# Patient Record
Sex: FEMALE | Race: WHITE | NOT HISPANIC OR LATINO | Employment: STUDENT | URBAN - METROPOLITAN AREA
[De-identification: names, ages, dates, MRNs, and addresses within clinical notes are randomized per-mention and may not be internally consistent; named-entity substitution may affect disease eponyms.]

---

## 2017-10-23 ENCOUNTER — OFFICE VISIT (OUTPATIENT)
Dept: RHEUMATOLOGY | Facility: CLINIC | Age: 22
End: 2017-10-23
Payer: COMMERCIAL

## 2017-10-23 DIAGNOSIS — R76.8 POSITIVE ANA (ANTINUCLEAR ANTIBODY): Primary | ICD-10-CM

## 2017-10-23 DIAGNOSIS — Z79.899 ENCOUNTER FOR LONG-TERM (CURRENT) DRUG USE: ICD-10-CM

## 2017-10-23 LAB
CK SERPL-CCNC: 39 IU/L (ref 13–135)
TSH SERPL DL<=0.005 MIU/L-ACNC: 2.48 ULU/ML (ref 0.3–5.6)

## 2017-10-23 PROCEDURE — 99204 OFFICE O/P NEW MOD 45 MIN: CPT | Mod: ,,, | Performed by: INTERNAL MEDICINE

## 2017-10-23 RX ORDER — SERTRALINE HYDROCHLORIDE 100 MG/1
100 TABLET, FILM COATED ORAL DAILY
COMMUNITY
End: 2018-04-24

## 2017-10-23 RX ORDER — RAMIPRIL 2.5 MG/1
2.5 CAPSULE ORAL DAILY
COMMUNITY
End: 2018-01-04

## 2017-10-23 RX ORDER — HYDROXYCHLOROQUINE SULFATE 200 MG/1
200 TABLET, FILM COATED ORAL 2 TIMES DAILY
Qty: 30 TABLET | Refills: 5 | Status: SHIPPED | OUTPATIENT
Start: 2017-10-23 | End: 2017-11-27

## 2017-10-23 RX ORDER — HYDROXYZINE PAMOATE 25 MG/1
25 CAPSULE ORAL EVERY 8 HOURS PRN
COMMUNITY
End: 2017-11-27

## 2017-10-23 RX ORDER — NAPROXEN SODIUM 220 MG
220 TABLET ORAL
COMMUNITY
End: 2017-11-27

## 2017-10-23 RX ORDER — CHOLECALCIFEROL (VITAMIN D3) 25 MCG
1000 TABLET ORAL DAILY
COMMUNITY
End: 2018-01-04

## 2017-10-23 RX ORDER — FAMOTIDINE 20 MG/1
20 TABLET, FILM COATED ORAL 2 TIMES DAILY
COMMUNITY
End: 2017-11-27
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
## 2017-10-23 NOTE — PATIENT INSTRUCTIONS
Hydroxychloroquine tablets  What is this medicine?  HYDROXYCHLOROQUINE (tanna drox ee KLOR oh kwin) is used to treat rheumatoid arthritis and systemic lupus erythematosus. It is also used to treat malaria.  How should I use this medicine?  Take this medicine by mouth with a glass of water. Follow the directions on the prescription label. If this medicine upsets your stomach take it with food or milk. Take your doses at regular intervals. Do not take your medicine more often than directed.  Talk to your pediatrician regarding the use of this medicine in children. Special care may be needed.  What side effects may I notice from receiving this medicine?  Side effects that you should report to your doctor or health care professional as soon as possible:  · allergic reactions like skin rash, itching or hives, swelling of the face, lips, or tongue  · change in vision  · fever, infection  · hearing loss or ringing  · muscle weakness, tremor, or numbness  · redness, blistering, peeling or loosening of the skin, including inside the mouth  · seizures  · unusual bleeding or bruising  · unusually weak or tired  Side effects that usually do not require medical attention (report to your doctor or health care professional if they continue or are bothersome):  · change in coloration of the mouth or skin  · dizziness  · hair loss, lightening  · headache  · irritability, nervousness, nightmares  · loss of appetite  · stomach upset, diarrhea  What may interact with this medicine?  · antacids  · botulinum toxins  · digoxin  · kaolin  · penicillamine  What if I miss a dose?  If you miss a dose, take it as soon as you can. If it is almost time for your next dose, take only that dose. Do not take double or extra doses.  Where should I keep my medicine?  Keep out of the reach of children. In children, this medicine can cause overdose with small doses.  Store at room temperature between 15 and 30 degrees C (59 and 86 degrees F). Protect  from moisture and light. Throw away any unused medicine after the expiration date.  What should I tell my health care provider before I take this medicine?  They need to know if you have any of these conditions:  · alcoholism  · anemia or other blood disorder  · eye disease  · glucose 6-phosphate dehydrogenase (G6PD) deficiency  · liver disease  · porphyria  · psoriasis  · an unusual or allergic reaction to chloroquine, hydroxychloroquine, other medicines, foods, dyes, or preservatives  · pregnant or trying to get pregnant  · breast-feeding  What should I watch for while using this medicine?  Visit your doctor or health care professional for regular check ups. Tell your doctor if your symptoms do not improve. Arthritis symptoms may take several weeks to improve. If you are taking this medicine for a long time, you will need important blood work done. You will also need to have your eyes checked as directed.  This medicine can make you more sensitive to the sun. Keep out of the sun. If you cannot avoid being in the sun, wear protective clothing and use sunscreen. Do not use sun lamps or tanning beds/booths.  Avoid antacids and kaolin containing products for 2 hours before and after taking a dose of this medicine.  NOTE:This sheet is a summary. It may not cover all possible information. If you have questions about this medicine, talk to your doctor, pharmacist, or health care provider. Copyright© 2017 [employer]

## 2017-10-25 LAB
ALBUMIN SERPL-MCNC: 3.4 G/DL (ref 2.9–4.4)
ALBUMIN/GLOB SERPL ELPH: 1.1 {RATIO} (ref 0.7–1.7)
ALPHA 1 GLOBULIN/PROTEIN TOTAL: 0.2 G/DL (ref 0–0.4)
ALPHA 2 GLOBULIN/PROTEIN TOTAL: 0.6 G/DL (ref 0.4–1)
B-GLOBULIN FLD ELPH-MCNC: 1 G/DL (ref 0.7–1.3)
C3 SERPL-MCNC: 73 MG/DL (ref 82–167)
DRVVT CONFIRM: 37.7 SEC (ref 0–47)
GAMMA GLOB FLD ELPH-MCNC: 1.4 G/DL (ref 0.4–1.8)
GLOBULIN SER CALC-MCNC: 3.2 G/DL (ref 2.2–3.9)
LUPUS ANTICOAGULANT INTERPRETATION: NORMAL
Lab: NORMAL
M PROTEIN MFR UR ELPH: NORMAL G/DL
PROT SERPL-MCNC: 6.6 G/DL (ref 6–8.5)
PTT-LA MIX: 28.4 SEC (ref 0–51.9)

## 2017-11-09 ENCOUNTER — TELEPHONE (OUTPATIENT)
Dept: RHEUMATOLOGY | Facility: CLINIC | Age: 22
End: 2017-11-09

## 2017-11-09 NOTE — TELEPHONE ENCOUNTER
Ms. Swenson notified pt can only take tylenol up to 6 tablets a day until she is seen by the nephrologist

## 2017-11-09 NOTE — TELEPHONE ENCOUNTER
Mom called for patient who is at school in [place], [place].  Pt has joint pain and swelling. Her wrist are hurting pretty bad.  And she is concerned about being able to drive home.  She requests something to help with the pain and swelling sent to pharmacy in [place]

## 2017-11-27 ENCOUNTER — OFFICE VISIT (OUTPATIENT)
Dept: RHEUMATOLOGY | Facility: CLINIC | Age: 22
End: 2017-11-27
Payer: COMMERCIAL

## 2017-11-27 VITALS — BODY MASS INDEX: 24.25 KG/M2 | WEIGHT: 169 LBS | SYSTOLIC BLOOD PRESSURE: 152 MMHG | DIASTOLIC BLOOD PRESSURE: 86 MMHG

## 2017-11-27 DIAGNOSIS — M79.7 FIBROMYALGIA: Primary | ICD-10-CM

## 2017-11-27 PROCEDURE — 99214 OFFICE O/P EST MOD 30 MIN: CPT | Mod: ,,, | Performed by: INTERNAL MEDICINE

## 2017-11-27 RX ORDER — OXYMETAZOLINE HYDROCHLORIDE 1 G/100G
CREAM TOPICAL
Status: ON HOLD | COMMUNITY
Start: 2017-11-14 | End: 2019-01-27

## 2017-11-27 RX ORDER — DAPSONE 75 MG/G
GEL TOPICAL
Status: ON HOLD | COMMUNITY
Start: 2017-11-09 | End: 2019-01-27

## 2017-11-27 NOTE — PROGRESS NOTES
[facility] RHEUMATOLOGY           Follow-up visit      Subjective:       Patient ID:   NAME: Anitha Swenson : 1995     22 y.o. female    Referring Doc: No ref. provider found  Other Physicians:    Chief Complaint:  Fatigue; Joint Pain; and Sore Throat (bloody mucous)      HPI/Interval History:    There have been no changes since her previous visit. We had tried HCQ but she had GI side effects. She decided to discontinue it and I have no objection. She saw dermatology and had a biopsy which was not consistent with vasculitis. She is being treated by dermatology for acne vulgaris and rosacea  She has seen nephrology and had a blood and urine evaluation nothing further. She has been told to keep a blood pressure log by the nephrologist. Her numbers have continued to run quite high.        Medications:    Current Outpatient Prescriptions:     ACZONE 7.5 % GlwP, , Disp: , Rfl:     butalbital-acetaminophen-caffeine -40 mg (FIORICET, ESGIC) -40 mg per tablet, Take 1 tablet by mouth every 4 (four) hours as needed for Pain., Disp: , Rfl:     ramipril (ALTACE) 2.5 MG capsule, Take 2.5 mg by mouth once daily., Disp: , Rfl:     RHOFADE 1 % Crea, , Disp: , Rfl:     sertraline (ZOLOFT) 100 MG tablet, Take 100 mg by mouth once daily. 2 po qd, Disp: , Rfl:     vitamin D 1000 units Tab, Take 1,000 Units by mouth once daily., Disp: , Rfl:         Review of patient's allergies indicates:   Allergen Reactions    Sulfur              Objective:             Labs:   No results found for: WBC, HGB, HCT, MCV, PLTCMP@  Total Protein   Date Value Ref Range Status   10/23/2017 6.6 6.0 - 8.5 g/dL      Albumin   Date Value Ref Range Status   10/23/2017 3.4 2.9 - 4.4 g/dL      CPK   Date Value Ref Range Status   10/23/2017 39 13 - 135 IU/L          Radiology/Diagnostic Studies:    Blood testing has been unrevealing, including a negative LARA and panel.  Lupus anticoagulants and anticardiolipin were likewise negative. The  urine however demonstrates some proteinuria and hematuria.    Assessment/Discussion/Plan:   22 y.o. female with vague musculoskeletal symptoms suggestive of underlying fibromyalgia, probably partially attenuated by full dose Zoloft.  (2) HTN- I continue to be concerned about the cause for her hypertension. I am hopeful that her nephrology evaluation will include an assessment of renovascular flow. My concern in this young woman with unusually high blood pressure is renal artery stenosis. If there is nothing in the nephrology workup to explain the hypertension, I would like her to be seen by cardiology next.    PLAN:  I am not going to put her on any medication at this time. This is almost certainly not an autoimmune disease or vasculitis.  I would like to have an answer to the blood pressure issues.      RTC:  I will see her back in 3 months or sooner if needed.      Electronically signed by Romeo Lanza MD

## 2018-01-04 ENCOUNTER — OFFICE VISIT (OUTPATIENT)
Dept: RHEUMATOLOGY | Facility: CLINIC | Age: 23
End: 2018-01-04
Payer: COMMERCIAL

## 2018-01-04 VITALS
BODY MASS INDEX: 27.49 KG/M2 | SYSTOLIC BLOOD PRESSURE: 182 MMHG | WEIGHT: 192 LBS | HEIGHT: 70 IN | DIASTOLIC BLOOD PRESSURE: 118 MMHG

## 2018-01-04 DIAGNOSIS — M32.14 SYSTEMIC LUPUS ERYTHEMATOSUS WITH GLOMERULAR DISEASE, UNSPECIFIED SLE TYPE: Primary | ICD-10-CM

## 2018-01-04 PROCEDURE — 99213 OFFICE O/P EST LOW 20 MIN: CPT | Mod: ,,, | Performed by: INTERNAL MEDICINE

## 2018-01-04 RX ORDER — AMLODIPINE BESYLATE 10 MG/1
TABLET ORAL
COMMUNITY
Start: 2017-12-29 | End: 2018-03-21

## 2018-01-04 RX ORDER — MEDROXYPROGESTERONE ACETATE 150 MG/ML
150 INJECTION, SUSPENSION INTRAMUSCULAR
COMMUNITY
Start: 2017-12-23 | End: 2020-01-14

## 2018-01-04 RX ORDER — MYCOPHENOLATE MOFETIL 500 MG/1
TABLET ORAL
Status: ON HOLD | COMMUNITY
Start: 2017-12-26 | End: 2019-01-27

## 2018-01-04 RX ORDER — OXYCODONE AND ACETAMINOPHEN 5; 325 MG/1; MG/1
TABLET ORAL
Status: ON HOLD | COMMUNITY
Start: 2017-12-19 | End: 2019-01-27

## 2018-01-04 RX ORDER — SODIUM BICARBONATE 650 MG/1
TABLET ORAL
Refills: 0 | Status: ON HOLD | COMMUNITY
Start: 2017-12-26 | End: 2019-01-27

## 2018-01-04 RX ORDER — PREDNISONE 20 MG/1
TABLET ORAL
COMMUNITY
Start: 2017-12-26 | End: 2018-08-07

## 2018-01-04 RX ORDER — FUROSEMIDE 40 MG/1
TABLET ORAL
Status: ON HOLD | COMMUNITY
Start: 2017-12-29 | End: 2019-01-27

## 2018-01-04 RX ORDER — PANTOPRAZOLE SODIUM 40 MG/1
40 TABLET, DELAYED RELEASE ORAL 2 TIMES DAILY
Status: ON HOLD | COMMUNITY
Start: 2017-12-26 | End: 2019-01-31 | Stop reason: SDUPTHER

## 2018-01-04 NOTE — PROGRESS NOTES
The patient was seen today in my private office in the presence of her mother. I had requested that they come in for a conference.    Prior to her arrival I had spoken at length with Dr. 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    She has been bolused with IV steroids and will be sure he did initially with MMF. He is also titrating her medications for the treatment of her hypertension.    I answered all of their questions concerning vaccinations (the influenza vaccine should be administered as soon as possible and a pneumonia vaccine 2 weeks later).    We also discussed the importance of careful birth control. That discussion led to suggestions about having them look into harvesting of ova prior to possibly having to begin a more aggressive treatment with a drug like Cytoxan or Rituxan.    I am not satisfied with her blood pressure today andhave asked that they contact the nephrologist this afternoon for an adjustment of her blood pressure medication.    By mutual agreement, Dr. Pendleton will manage her kidney disease and bloodshot pressure and I will handle all other aspects of her lupus. The patient and her mother are satisfied with that arrangement and feel confident in Dr. Pendleton's ability.    I will see her back in 2 months.

## 2018-01-09 ENCOUNTER — HISTORICAL (OUTPATIENT)
Dept: ADMINISTRATIVE | Facility: HOSPITAL | Age: 23
End: 2018-01-09

## 2018-01-11 LAB
% SATURATION: 61 %
DRVVT CONFIRM: 41.4 SEC (ref 0–47)
DSDNA AB SER IA-ACNC: 11 IU/ML (ref 0–9)
FERRITIN SERPL-MCNC: 487 NG/ML (ref 24–162)
FOLATE SERPL-MCNC: 6 NG/ML (ref 2.2–11.2)
IRON: 74 MCG/DL (ref 24–162)
LDH SERPL L TO P-CCNC: 181 IU/L (ref 100–194)
LUPUS ANTICOAGULANT INTERPRETATION: NORMAL
PTT-LA MIX: 32.2 SEC (ref 0–51.9)
TOTAL IRON BINDING CAPACITY: 121 MCG/DL (ref 177–435)
VITAMIN B12: 240 PG/ML (ref 62–940)

## 2018-01-12 LAB
HAPTOGLOBIN: 25 MG/DL (ref 34–200)
HOMOCYSTEINE: 16.8 UMOL/L (ref 0–15)

## 2018-01-14 LAB
BASOPHILS NFR BLD: 0 K/UL (ref 0–0.2)
BASOPHILS NFR BLD: 0.1 %
EOSINOPHIL NFR BLD: 0 K/UL (ref 0–0.7)
EOSINOPHIL NFR BLD: 0.1 %
ERYTHROCYTE [DISTWIDTH] IN BLOOD BY AUTOMATED COUNT: 14.5 % (ref 11.7–14.9)
GRAN #: 13.1 K/UL (ref 1.4–6.5)
GRAN%: 84.2 %
HCT VFR BLD AUTO: 24.3 % (ref 36–48)
HGB BLD-MCNC: 8.4 G/DL (ref 12–15)
IMMATURE GRANS (ABS): 0.3 K/UL (ref 0–1)
IMMATURE GRANULOCYTES: 2.2 %
LYMPH #: 1.2 K/UL (ref 1.2–3.4)
LYMPH%: 7.5 %
MCH RBC QN AUTO: 28.9 PG (ref 25–35)
MCHC RBC AUTO-ENTMCNC: 34.6 G/DL (ref 31–36)
MCV RBC AUTO: 83.5 FL (ref 79–98)
MONO #: 0.9 K/UL (ref 0.1–0.6)
MONO%: 5.9 %
NUCLEATED RBCS: 0 %
PLATELET # BLD AUTO: 153 K/UL (ref 140–440)
PMV BLD AUTO: 11.1 FL (ref 8.8–12.7)
RBC # BLD AUTO: 2.91 M/UL (ref 3.5–5.5)
WBC # BLD AUTO: 15.5 K/UL (ref 5–10)

## 2018-01-16 LAB — HAPTOGLOBIN: 38 MG/DL (ref 34–200)

## 2018-01-17 LAB — FLOW CYTOMETRY ANTIBODIES ANALYZED: NORMAL

## 2018-02-08 ENCOUNTER — OFFICE VISIT (OUTPATIENT)
Dept: HEMATOLOGY/ONCOLOGY | Facility: CLINIC | Age: 23
End: 2018-02-08
Payer: COMMERCIAL

## 2018-02-08 VITALS
DIASTOLIC BLOOD PRESSURE: 97 MMHG | HEIGHT: 70 IN | HEART RATE: 102 BPM | SYSTOLIC BLOOD PRESSURE: 132 MMHG | BODY MASS INDEX: 25.57 KG/M2 | WEIGHT: 178.63 LBS | TEMPERATURE: 99 F | RESPIRATION RATE: 18 BRPM

## 2018-02-08 DIAGNOSIS — K92.2 GASTROINTESTINAL HEMORRHAGE, UNSPECIFIED GASTROINTESTINAL HEMORRHAGE TYPE: ICD-10-CM

## 2018-02-08 DIAGNOSIS — E53.8 B12 DEFICIENCY: ICD-10-CM

## 2018-02-08 DIAGNOSIS — D63.8 ANEMIA, CHRONIC DISEASE: ICD-10-CM

## 2018-02-08 DIAGNOSIS — D50.0 ANEMIA DUE TO CHRONIC BLOOD LOSS: ICD-10-CM

## 2018-02-08 DIAGNOSIS — R79.89 ELEVATED HOMOCYSTEINE: Primary | ICD-10-CM

## 2018-02-08 DIAGNOSIS — D64.89 ANEMIA DUE TO MULTIPLE MECHANISMS: ICD-10-CM

## 2018-02-08 DIAGNOSIS — I26.99 OTHER PULMONARY EMBOLISM WITHOUT ACUTE COR PULMONALE, UNSPECIFIED CHRONICITY: ICD-10-CM

## 2018-02-08 PROCEDURE — 3008F BODY MASS INDEX DOCD: CPT | Mod: ,,, | Performed by: INTERNAL MEDICINE

## 2018-02-08 PROCEDURE — 96372 THER/PROPH/DIAG INJ SC/IM: CPT | Mod: ,,, | Performed by: INTERNAL MEDICINE

## 2018-02-08 PROCEDURE — 99214 OFFICE O/P EST MOD 30 MIN: CPT | Mod: 25,,, | Performed by: INTERNAL MEDICINE

## 2018-02-08 RX ORDER — CALCIUM ACETATE 667 MG/1
CAPSULE ORAL
Status: ON HOLD | COMMUNITY
Start: 2018-01-18 | End: 2019-01-27

## 2018-02-08 RX ORDER — APIXABAN 2.5 MG/1
TABLET, FILM COATED ORAL
Status: ON HOLD | COMMUNITY
Start: 2018-01-18 | End: 2019-01-27

## 2018-02-08 RX ORDER — EAR PLUGS
EACH OTIC (EAR)
Refills: 0 | Status: ON HOLD | COMMUNITY
Start: 2018-01-24 | End: 2019-01-27

## 2018-02-08 RX ORDER — CYANOCOBALAMIN 1000 UG/ML
1000 INJECTION, SOLUTION INTRAMUSCULAR; SUBCUTANEOUS WEEKLY
Status: SHIPPED | OUTPATIENT
Start: 2018-02-08 | End: 2018-03-08

## 2018-02-08 RX ORDER — CLONIDINE HYDROCHLORIDE 0.1 MG/1
0.1 TABLET ORAL
Status: ON HOLD | COMMUNITY
Start: 2018-01-18 | End: 2019-01-31 | Stop reason: SDUPTHER

## 2018-02-08 RX ORDER — ONDANSETRON 4 MG/1
4 TABLET, FILM COATED ORAL
COMMUNITY
Start: 2018-01-18 | End: 2019-10-16

## 2018-02-08 RX ORDER — CARVEDILOL 25 MG/1
TABLET ORAL
COMMUNITY
Start: 2018-01-18 | End: 2018-08-07

## 2018-02-08 RX ORDER — OXYCODONE HYDROCHLORIDE 5 MG/1
TABLET ORAL
COMMUNITY
Start: 2018-02-01 | End: 2018-04-24

## 2018-02-08 RX ORDER — DIPHENOXYLATE HYDROCHLORIDE AND ATROPINE SULFATE 2.5; .025 MG/1; MG/1
TABLET ORAL
Status: ON HOLD | COMMUNITY
Start: 2018-01-24 | End: 2019-01-27

## 2018-02-08 RX ORDER — SODIUM CITRATE AND CITRIC ACID MONOHYDRATE 334; 500 MG/5ML; MG/5ML
SOLUTION ORAL
Status: ON HOLD | COMMUNITY
Start: 2018-01-19 | End: 2019-01-27

## 2018-02-08 RX ORDER — TRAMADOL HYDROCHLORIDE 50 MG/1
TABLET ORAL
COMMUNITY
Start: 2018-01-18 | End: 2018-11-08

## 2018-02-08 RX ADMIN — CYANOCOBALAMIN 1000 MCG: 1000 INJECTION, SOLUTION INTRAMUSCULAR; SUBCUTANEOUS at 02:02

## 2018-02-08 NOTE — LETTER
February 8, 2018      John Garcia MD  [address] [facility] [address]           Critical access hospital Hematology Oncology  [address]  [address]  [facility] [address]  Phone: [phone]  Fax: [phone]          Patient: Anitha Swenson   MR Number: [id number]   YOB: 1995   Date of Visit: 2/8/2018       Dear Dr. John Garcia:    Thank you for referring Anitha Swenson to me for evaluation. Attached you will find relevant portions of my assessment and plan of care.    If you have questions, please do not hesitate to call me. I look forward to following Anitha Swenson along with you.    Sincerely,    Sukhjinder Goodwin MD    Enclosure  CC:  No Recipients    If you would like to receive this communication electronically, please contact [email or web address] or [phone] to request more information on [employer] Link access.    For providers and/or their staff who would like to refer a patient to [facility], please contact us through our one-stop-shop provider referral line, [facility], at [phone].    If you feel you have received this communication in error or would no longer like to receive these types of communications, please e-mail [email or web address]

## 2018-02-08 NOTE — PROGRESS NOTES
[facility]  Hospital Follow-up Note    Subjective:      Patient ID:   NAME: Anitha Swenson : 1995     22 y.o. female    Referring Doc: Radha (Ped)  Other Physicians: Marquise Monsalve Kovachev    Chief Complaint: anemia      HPI:  22 y.o. female with diagnosis of anemia  who was seen as a consult at [facility] in 2018 where she was admitted with hypertensive crisis, anasarca, and an acute flare and exacerbation of SLE (lupus). She is here with her parents. The leg swelling has improved. She has been having nose bleeds almost daily occasionally severe. She has not seen Dr Arredondo. She has seen Dr Lanza as of yet (due on ). She denies any CP, SOB, HA's or N/V out of the ordinary.             ROS:   GEN: normal without any fever, night sweats or weight loss  HEENT: normal with no HA's, sore throat, stiff neck, changes in vision  CV: normal with no CP, SOB, PND, BOOGIE or orthopnea  PULM: normal with no SOB, cough, hemoptysis, sputum or pleuritic pain  GI: normal with no abdominal pain, nausea, vomiting, constipation, diarrhea, melanotic stools, BRBPR, or hematemesis  : normal with no hematuria, dysuria  BREAST: normal with no mass, discharge, pain  SKIN: normal with no rash, erythema, bruising, or swelling     Past Medical/Surgical History:  Past Medical History:   Diagnosis Date    Anemia due to chronic blood loss 2018    Anemia due to multiple mechanisms 2018    Anemia, chronic disease 2018    Elevated homocysteine 2018    GI bleeding 2018    Hypertension     Migraine headache with aura     Other pulmonary embolism without acute cor pulmonale 2018     Past Surgical History:   Procedure Laterality Date    HIP SURGERY      TYMPANOSTOMY TUBE PLACEMENT           Allergies:  Review of patient's allergies indicates:   Allergen Reactions    Sulfur        Social/Family History:  Social History     Social History    Marital status: Single     Spouse name:  N/A    Number of children: N/A    Years of education: N/A     Occupational History    Not on file.     Social History Main Topics    Smoking status: Never Smoker    Smokeless tobacco: Never Used    Alcohol use No    Drug use: No    Sexual activity: Not on file     Other Topics Concern    Not on file     Social History Narrative    No narrative on file     Family History   Problem Relation Age of Onset    Diabetes Mellitus Maternal Grandfather          Medications:    Current Outpatient Prescriptions:     ACZONE 7.5 % GlwP, , Disp: , Rfl:     amLODIPine (NORVASC) 10 MG tablet, , Disp: , Rfl:     calcium acetate (PHOSLO) 667 mg capsule, , Disp: , Rfl:     carvedilol (COREG) 25 MG tablet, , Disp: , Rfl:     citric acid-sodium citrate 500-334 mg/5 ml (BICITRA) 500-334 mg/5 mL solution, , Disp: , Rfl:     cloNIDine (CATAPRES) 0.1 MG tablet, , Disp: , Rfl:     diphenoxylate-atropine 2.5-0.025 mg (LOMOTIL) 2.5-0.025 mg per tablet, , Disp: , Rfl:     ELIQUIS 2.5 mg Tab, , Disp: , Rfl:     furosemide (LASIX) 40 MG tablet, , Disp: , Rfl:     medroxyPROGESTERone (DEPO-PROVERA) 150 mg/mL Syrg, , Disp: , Rfl:     mycophenolate (CELLCEPT) 500 mg Tab, , Disp: , Rfl:     ondansetron (ZOFRAN) 4 MG tablet, , Disp: , Rfl:     oxyCODONE (ROXICODONE) 5 MG immediate release tablet, , Disp: , Rfl:     oxyCODONE-acetaminophen (PERCOCET) 5-325 mg per tablet, , Disp: , Rfl:     pantoprazole (PROTONIX) 40 MG tablet, , Disp: , Rfl:     predniSONE (DELTASONE) 20 MG tablet, , Disp: , Rfl:     RHOFADE 1 % Crea, , Disp: , Rfl:     sertraline (ZOLOFT) 100 MG tablet, Take 100 mg by mouth once daily. 2 po qd, Disp: , Rfl:     sodium bicarbonate 650 MG tablet, , Disp: , Rfl: 0    traMADol (ULTRAM) 50 mg tablet, , Disp: , Rfl:     zinc oxide 40 % Oint, MAXIMILIAN TOPICALLY AA BID, Disp: , Rfl: 0      Pathology:  No matching staging information was found for the patient.        Objective:   Vitals:  Blood pressure (!) 132/97,  "pulse 102, temperature 99.2 °F (37.3 °C), resp. rate 18, height 5' 10" (1.778 m), weight 81 kg (178 lb 9.6 oz).    Physical Examination:   GEN: no apparent distress, comfortable; AAOx3  HEAD: atraumatic and normocephalic  EYES: no pallor, no icterus, PERRLA  ENT: OMM, no pharyngeal erythema, external ears WNL; no nasal discharge; no thrush  NECK: no masses, thyroid normal, trachea midline, no LAD/LN's, supple  CV: RRR with no murmur; normal pulse; normal S1 and S2; no pedal edema  CHEST: Normal respiratory effort; CTAB; normal breath sounds; no wheeze or crackles  ABDOM: nontender and nondistended; soft; normal bowel sounds; no rebound/guarding  MUSC/Skeletal: ROM normal; no crepitus; joints normal; no deformities or arthropathy  EXTREM: improved swelling  SKIN: no rashes, lesions, ulcers, petechiae or subcutaneous nodules  : no preciado  NEURO: grossly intact; motor/sensory WNL; AAOx3; no tremors  PSYCH: normal mood, affect and behavior  LYMPH: normal cervical, supraclavicular, axillary and groin LN's      Labs:     1/30/2018 on chart    I have reviewed all available lab results and radiology reports.    Radiology/Diagnostic Studies:          All lab results and imaging results have been reviewed and discussed with the patient    Assessment:   (1) 22 y.o. female with diagnosis of SLE/lupus with recent admission at [facility] for HTN crisis, renal failure and anasarca.  She was seen by hematology a consult for evaluation of her anemia.  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 Elevated homocysteine    2. Anemia, chronic disease    3. Gastrointestinal hemorrhage, unspecified gastrointestinal hemorrhage type    4. Anemia due to chronic blood loss    5. Anemia due to multiple mechanisms    6. Other pulmonary embolism without acute cor pulmonale, unspecified chronicity        Plan:     PLAN:  1. F/u with rheumatology  2. Need her to f/u with Dr Arredondo to see if she still needs to remain on eliquis  3. Check MTHFR genes  4. Repeat iron panel  5. B12 weekly x 4 then q month  6. Consideration for procrit - will leave to discretion of nephrology  RTC in  4 weeks   Fax note to John Garcia MD, Maria Elena Pendleton [place]              Elevated homocysteine    Anemia, chronic disease    Gastrointestinal hemorrhage, unspecified gastrointestinal hemorrhage type    Anemia due to chronic blood loss    Anemia due to multiple mechanisms    Other pulmonary embolism without acute cor pulmonale, unspecified chronicity  -     Methylenetetrahydrofol Genotyping (C677T/A7047T); Future; Expected date: 02/08/2018  -     Iron and TIBC; Future; Expected date: 02/08/2018  -     Ferritin; Future; Expected date: 02/08/2018  -     CBC auto differential; Standing  -     Comprehensive metabolic panel; Standing  -     Ambulatory referral to Gastroenterology  -     Ambulatory referral to  Pulmonology      Follow-up in about 4 weeks (around 3/8/2018).        I have explained and the patient understands all of  the current recommendation(s). I have answered all of their questions to the best of my ability and to their complete satisfaction.             Thank you for allowing me to participate in this pleasant patient's care. Please call with any questions or concerns.      Electronically signed Sukhjinder Goodwin MD

## 2018-02-12 LAB
% SATURATION: 77 %
ALBUMIN SERPL-MCNC: 2.1 G/DL (ref 3.1–4.7)
ALP SERPL-CCNC: 33 IU/L (ref 40–104)
ALT (SGPT): 19 IU/L (ref 3–33)
AST SERPL-CCNC: 11 IU/L (ref 10–40)
BACTERIA SPEC CULT: ABNORMAL
BASOPHILS NFR BLD: 0 %
BASOPHILS NFR BLD: 0 K/UL (ref 0–0.2)
BILIRUB SERPL-MCNC: 0.3 MG/DL (ref 0.3–1)
BUN SERPL-MCNC: 45 MG/DL (ref 8–20)
CALCIUM SERPL-MCNC: 7.8 MG/DL (ref 7.7–10.4)
CHLORIDE: 107 MMOL/L (ref 98–110)
CO2 SERPL-SCNC: 24.9 MMOL/L (ref 22.8–31.6)
CREATININE: 1.72 MG/DL (ref 0.6–1.4)
EOSINOPHIL NFR BLD: 0 K/UL (ref 0–0.7)
EOSINOPHIL NFR BLD: 0.1 %
ERYTHROCYTE [DISTWIDTH] IN BLOOD BY AUTOMATED COUNT: 15.1 % (ref 11.7–14.9)
FOLATE SERPL-MCNC: 18 NG/ML (ref 2.2–11.2)
GLUCOSE: 144 MG/DL (ref 70–99)
GRAN #: 8.2 K/UL (ref 1.4–6.5)
GRAN%: 75.7 %
HCT VFR BLD AUTO: 26.2 % (ref 36–48)
HGB BLD-MCNC: 8.5 G/DL (ref 12–15)
IMMATURE GRANS (ABS): 0.1 K/UL (ref 0–1)
IMMATURE GRANULOCYTES: 1.1 %
IRON: 119 MCG/DL (ref 24–162)
LYMPH #: 1.7 K/UL (ref 1.2–3.4)
LYMPH%: 15.3 %
MCH RBC QN AUTO: 29.1 PG (ref 25–35)
MCHC RBC AUTO-ENTMCNC: 32.4 G/DL (ref 31–36)
MCV RBC AUTO: 89.7 FL (ref 79–98)
MONO #: 0.8 K/UL (ref 0.1–0.6)
MONO%: 7.8 %
NUCLEATED RBCS: 0 %
PHOSPHATE FLD-MCNC: 3.7 MG/DL (ref 2.5–4.9)
PLATELET # BLD AUTO: 295 K/UL (ref 140–440)
PMV BLD AUTO: 9.2 FL (ref 8.8–12.7)
POTASSIUM SERPL-SCNC: 3.2 MMOL/L (ref 3.5–5)
PROT SERPL-MCNC: 4 G/DL (ref 6–8.2)
RBC # BLD AUTO: 2.92 M/UL (ref 3.5–5.5)
RBC # BLD AUTO: ABNORMAL /HPF
SODIUM: 140 MMOL/L (ref 134–144)
SQUAMOUS EPITHELIAL, UA: ABNORMAL
TOTAL IRON BINDING CAPACITY: 155 MCG/DL (ref 177–435)
VITAMIN B12: >1500 PG/ML (ref 62–940)
VITAMIN D, 1,25 (OH)2: 9.84 NG/ML (ref 30–100)
WBC # BLD AUTO: 10.8 K/UL (ref 5–10)
WBC # BLD: ABNORMAL /HPF

## 2018-02-14 ENCOUNTER — TELEPHONE (OUTPATIENT)
Dept: HEMATOLOGY/ONCOLOGY | Facility: CLINIC | Age: 23
End: 2018-02-14

## 2018-02-14 LAB
DSDNA AB SER IA-ACNC: 11 IU/ML (ref 0–9)
RHEUMATOID FACT SERPL-ACNC: <10 IU/ML (ref 0–13.9)

## 2018-02-14 NOTE — TELEPHONE ENCOUNTER
----- Message from Sukhjinder Goodwin MD sent at 2/12/2018  4:42 PM CST -----  Call her with results - wbc is better and hgb stable if not little better

## 2018-02-16 ENCOUNTER — CLINICAL SUPPORT (OUTPATIENT)
Dept: HEMATOLOGY/ONCOLOGY | Facility: CLINIC | Age: 23
End: 2018-02-16
Payer: COMMERCIAL

## 2018-02-16 DIAGNOSIS — D63.8 ANEMIA, CHRONIC DISEASE: ICD-10-CM

## 2018-02-16 PROCEDURE — 96372 THER/PROPH/DIAG INJ SC/IM: CPT | Mod: ,,, | Performed by: INTERNAL MEDICINE

## 2018-02-16 RX ADMIN — CYANOCOBALAMIN 1000 MCG: 1000 INJECTION, SOLUTION INTRAMUSCULAR; SUBCUTANEOUS at 01:02

## 2018-02-23 ENCOUNTER — TELEPHONE (OUTPATIENT)
Dept: HEMATOLOGY/ONCOLOGY | Facility: CLINIC | Age: 23
End: 2018-02-23

## 2018-02-23 ENCOUNTER — CLINICAL SUPPORT (OUTPATIENT)
Dept: HEMATOLOGY/ONCOLOGY | Facility: CLINIC | Age: 23
End: 2018-02-23
Payer: COMMERCIAL

## 2018-02-23 PROCEDURE — 96372 THER/PROPH/DIAG INJ SC/IM: CPT | Mod: ,,, | Performed by: INTERNAL MEDICINE

## 2018-02-23 RX ADMIN — CYANOCOBALAMIN 1000 MCG: 1000 INJECTION, SOLUTION INTRAMUSCULAR; SUBCUTANEOUS at 11:02

## 2018-03-13 ENCOUNTER — CLINICAL SUPPORT (OUTPATIENT)
Dept: HEMATOLOGY/ONCOLOGY | Facility: CLINIC | Age: 23
End: 2018-03-13
Payer: COMMERCIAL

## 2018-03-13 DIAGNOSIS — E53.8 B12 DEFICIENCY: ICD-10-CM

## 2018-03-13 DIAGNOSIS — D63.8 ANEMIA, CHRONIC DISEASE: Primary | ICD-10-CM

## 2018-03-13 PROCEDURE — 96372 THER/PROPH/DIAG INJ SC/IM: CPT | Mod: ,,, | Performed by: INTERNAL MEDICINE

## 2018-03-13 RX ORDER — CYANOCOBALAMIN 1000 UG/ML
1000 INJECTION, SOLUTION INTRAMUSCULAR; SUBCUTANEOUS
Status: DISCONTINUED | OUTPATIENT
Start: 2018-03-14 | End: 2019-10-16

## 2018-03-13 RX ADMIN — CYANOCOBALAMIN 1000 MCG: 1000 INJECTION, SOLUTION INTRAMUSCULAR; SUBCUTANEOUS at 02:03

## 2018-03-21 ENCOUNTER — OFFICE VISIT (OUTPATIENT)
Dept: HEMATOLOGY/ONCOLOGY | Facility: CLINIC | Age: 23
End: 2018-03-21
Payer: COMMERCIAL

## 2018-03-21 VITALS
HEART RATE: 73 BPM | WEIGHT: 163 LBS | SYSTOLIC BLOOD PRESSURE: 126 MMHG | DIASTOLIC BLOOD PRESSURE: 89 MMHG | RESPIRATION RATE: 18 BRPM | BODY MASS INDEX: 23.34 KG/M2 | TEMPERATURE: 100 F | HEIGHT: 70 IN

## 2018-03-21 DIAGNOSIS — I26.99 OTHER PULMONARY EMBOLISM WITHOUT ACUTE COR PULMONALE, UNSPECIFIED CHRONICITY: Primary | ICD-10-CM

## 2018-03-21 DIAGNOSIS — M79.89 RIGHT LEG SWELLING: ICD-10-CM

## 2018-03-21 DIAGNOSIS — Z15.89 COMPOUND HETEROZYGOUS MTHFR MUTATION C677T/A1298C: ICD-10-CM

## 2018-03-21 DIAGNOSIS — D63.8 ANEMIA, CHRONIC DISEASE: ICD-10-CM

## 2018-03-21 DIAGNOSIS — R79.89 ELEVATED HOMOCYSTEINE: ICD-10-CM

## 2018-03-21 DIAGNOSIS — D50.0 ANEMIA DUE TO CHRONIC BLOOD LOSS: ICD-10-CM

## 2018-03-21 DIAGNOSIS — D64.89 ANEMIA DUE TO MULTIPLE MECHANISMS: ICD-10-CM

## 2018-03-21 DIAGNOSIS — K92.2 GASTROINTESTINAL HEMORRHAGE, UNSPECIFIED GASTROINTESTINAL HEMORRHAGE TYPE: ICD-10-CM

## 2018-03-21 PROCEDURE — 99213 OFFICE O/P EST LOW 20 MIN: CPT | Mod: ,,, | Performed by: INTERNAL MEDICINE

## 2018-03-21 RX ORDER — MUPIROCIN 20 MG/G
OINTMENT TOPICAL
Status: ON HOLD | COMMUNITY
Start: 2018-03-19 | End: 2019-01-27

## 2018-03-21 RX ORDER — CALCITRIOL 0.25 UG/1
CAPSULE ORAL
Status: ON HOLD | COMMUNITY
Start: 2018-03-10 | End: 2019-01-27

## 2018-03-21 RX ORDER — RAMIPRIL 5 MG/1
CAPSULE ORAL
COMMUNITY
Start: 2018-03-15 | End: 2018-04-24

## 2018-03-21 RX ORDER — FLUCONAZOLE 200 MG/1
TABLET ORAL
Status: ON HOLD | COMMUNITY
Start: 2018-03-16 | End: 2019-01-27

## 2018-03-21 NOTE — PROGRESS NOTES
[facility]ematology/Oncology  PROGRESS NOTE      Subjective:       Patient ID:   NAME: Anitha Swenson : 1995     23 y.o. female    Referring Doc: Radha (peds)  Other Physicians: Marquise Monsalve Kovachev; Alfred Small    Chief Complaint:  Anemia f/u    History of Present Illness:     Patient returns today for a regularly scheduled follow-up visit.  The patient was recently hospitalized last week at [facility] with atypical CP and N/V. She had repeat EGD with Dr Camarillo which showed candidal esophagitis. Her gastric ulcers had healed per patient.She had two units of blood as well. She is here with her mom. She seems to be having some increase swelling with possible Cushing's from the steroids. She has some increase swelling RLE > LLE with rash on medial and lateral right thigh. Red erythema on lateral side of right foot with no itching except for the lateral thigh.             ROS:   GEN: normal without any fever, night sweats or weight loss  HEENT: normal with no HA's, sore throat, stiff neck, changes in vision  CV: normal with no CP, SOB, PND, BOOGIE or orthopnea  PULM: normal with no SOB, cough, hemoptysis, sputum or pleuritic pain  GI: normal with no abdominal pain, nausea, vomiting, constipation, diarrhea, melanotic stools, BRBPR, or hematemesis  : normal with no hematuria, dysuria  BREAST: normal with no mass, discharge, pain  SKIN: normal with no rash, erythema, bruising, or swelling    Allergies:  Review of patient's allergies indicates:   Allergen Reactions    Sulfur        Medications:    Current Outpatient Prescriptions:     ACZONE 7.5 % GlwP, , Disp: , Rfl:     calcitRIOL (ROCALTROL) 0.25 MCG Cap, , Disp: , Rfl:     calcium acetate (PHOSLO) 667 mg capsule, , Disp: , Rfl:     carvedilol (COREG) 25 MG tablet, , Disp: , Rfl:     citric acid-sodium citrate 500-334 mg/5 ml (BICITRA) 500-334 mg/5 mL solution, , Disp: , Rfl:     cloNIDine (CATAPRES) 0.1 MG tablet, , Disp: , Rfl:      "diphenoxylate-atropine 2.5-0.025 mg (LOMOTIL) 2.5-0.025 mg per tablet, , Disp: , Rfl:     ELIQUIS 2.5 mg Tab, , Disp: , Rfl:     fluconazole (DIFLUCAN) 200 MG Tab, , Disp: , Rfl:     furosemide (LASIX) 40 MG tablet, , Disp: , Rfl:     medroxyPROGESTERone (DEPO-PROVERA) 150 mg/mL Syrg, , Disp: , Rfl:     mupirocin (BACTROBAN) 2 % ointment, , Disp: , Rfl:     mycophenolate (CELLCEPT) 500 mg Tab, , Disp: , Rfl:     ondansetron (ZOFRAN) 4 MG tablet, , Disp: , Rfl:     oxyCODONE (ROXICODONE) 5 MG immediate release tablet, , Disp: , Rfl:     oxyCODONE-acetaminophen (PERCOCET) 5-325 mg per tablet, , Disp: , Rfl:     pantoprazole (PROTONIX) 40 MG tablet, , Disp: , Rfl:     predniSONE (DELTASONE) 20 MG tablet, , Disp: , Rfl:     ramipril (ALTACE) 5 MG capsule, , Disp: , Rfl:     RHOFADE 1 % Crea, , Disp: , Rfl:     sertraline (ZOLOFT) 100 MG tablet, Take 100 mg by mouth once daily. 2 po qd, Disp: , Rfl:     sodium bicarbonate 650 MG tablet, , Disp: , Rfl: 0    traMADol (ULTRAM) 50 mg tablet, , Disp: , Rfl:     zinc oxide 40 % Oint, MAXIMILIAN TOPICALLY AA BID, Disp: , Rfl: 0    Current Facility-Administered Medications:     cyanocobalamin injection 1,000 mcg, 1,000 mcg, Subcutaneous, Q30 Days, Sukhjinder Goodwin MD, 1,000 mcg at 03/13/18 1430    PMHx/PSHx Updates:  See patient's last visit with me on 2/8/2018.  See H&P on 2/8/2018        Pathology:  none          Objective:     Vitals:  Resp. rate 18, height 5' 10" (1.778 m), weight 73.9 kg (163 lb).    Physical Examination:   GEN: no apparent distress, comfortable; AAOx3  HEAD: atraumatic and normocephalic  EYES: no pallor, no icterus, PERRLA  ENT: OMM, no pharyngeal erythema, external ears WNL; no nasal discharge; no thrush  NECK: no masses, thyroid normal, trachea midline, no LAD/LN's, supple  CV: RRR with no murmur; normal pulse; normal S1 and S2; no pedal edema  CHEST: Normal respiratory effort; CTAB; normal breath sounds; no wheeze or crackles  ABDOM: " nontender and nondistended; soft; normal bowel sounds; no rebound/guarding  MUSC/Skeletal: ROM normal; no crepitus; joints normal; no deformities or arthropathy  EXTREM: no clubbing, cyanosis, bilateral LE swelling but right > left;   SKIN: no rashes, lesions, ulcers, petechiae or subcutaneous nodules; red erythema on right lateral foot; rash on medial right thigh and lateral hip  : no preciado  NEURO: grossly intact; motor/sensory WNL; AAOx3; no tremors  PSYCH: normal mood, affect and behavior  LYMPH: normal cervical, supraclavicular, axillary and groin LN's            Labs:     2/12/2018      Ref Range & Units 1mo ago 2mo ago    WBC 5.0 - 10.0 K/uL 10.8   15.5      RBC 3.50 - 5.50 M/uL 2.92   2.91      Hemoglobin 12.0 - 15.0 g/dL 8.5   8.4      Hematocrit 36.0 - 48.0 % 26.2   24.3      MCV 79.0 - 98.0 fL 89.7  83.5     MCH 25.0 - 35.0 pg 29.1  28.9     MCHC 31.0 - 36.0 g/dL 32.4  34.6     RDW 11.7 - 14.9 % 15.1   14.5     Platelets 140 - 440 K/uL 295  153     MPV 8.8 - 12.7 fL 9.2  11.1     Gran% % 75.7  84.2     Lymph% % 15.3  7.5     Mono% % 7.8  5.9     Eosinophil% % 0.1  0.1     Basophil% % 0.0  0.1     Gran # (ANC) 1.4 - 6.5 K/uL 8.2   13.1      Lymph # 1.2 - 3.4 K/uL 1.7  1.2     Mono # 0.1 - 0.6 K/uL 0.8   0.9      Eos # 0.0 - 0.7 K/uL 0.0  0.0     Baso # 0.0 - 0.2 K/uL 0.0  0.0     Immature Grans (Abs) 0.0 - 1.0 K/uL 0.1  0.3     Immature Granulocytes % 1.1  2.2     nRBC% % 0  0          BMP  Lab Results   Component Value Date     02/12/2018    K 3.2 (L) 02/12/2018     02/12/2018    CO2 24.9 02/12/2018    BUN 45 (H) 02/12/2018    CREATININE 1.72 (H) 02/12/2018    CALCIUM 7.8 02/12/2018     Lab Results   Component Value Date    AST 11 02/12/2018    ALKPHOS 33 (L) 02/12/2018    BILITOT 0.3 02/12/2018     Lab Results   Component Value Date    IRON 119 02/12/2018    TIBC 155 (L) 02/12/2018    FERRITIN 487 (H) 01/11/2018         Radiology/Diagnostic Studies:    No results found.    I have reviewed  all available lab results and radiology reports.    Assessment/Plan:     (1) 23 y.o. female with diagnosis of SLE/lupus with recent admission at [facility] for HTN crisis, renal failure and anasarca.  She was seen by hematology a consult for evaluation of her anemia.  - suspected multifactorial process with anemia of chronic disorders, lupus mediated anemia, anemia of renal disease and prior GI bleed component; could also have marrow suppression from the cellcept  - seen by Dr Camarillo with GI for stool OBS positive studies previously  - bili and LDH were normal so no hemolysis was suspected  - iron was 119 and TIBC was 155  - ferritin was 487; retic was 2.9  - SPEP with no M-protein previously    - latest hgb was 8.5 on 2/12/2018 and 10.6 on 3/16/2018 but after a transfusion     (2) HTN/CRI with nephrotic syndrome 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 - followed by Dr Pendleton with nephrology   - latest creatinine at 1.72  - she had kidney biopsy couple of weeks ago which seemed to show improved findings per patient  - seeing Dr Pendleton again on April 6th    (3) SLE/Lupus - followed by Dr Rohan Lanza with Rheum previously; she is on cellcept and prednisone; she is seeing Dr Alfred Small in [place] this Friday     (4) Questionable pulmonary emboli with indeterminant VQ - 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  - clot workup was ordered while in hospital: ATIII was 135, prothrombin II was normal;  Factor V leiden was negative; LA was negative; protein C and S were adequate; antiphospholipid and anticardiolipin negative;   - homocysteine was a little elevated at 16.8  - she is now off the eliquis for about 3 weeks per nephrology's directives  - MTHFR A and C genes were both abnormal and heterozygous  - discussed genetic implications    (5) PTSD     (6) B12 deficiency with prior level at 240     (7) Leucocytosis - mild residual - prior leukemia screen was negative; she is on  steroids  - latest wbc is at 10.8 and improved    (8) Low Vit D - I will to discretion of Dr Pendleton    (9) Recent admit for candidal esophagitis      PLAN:  1. Recommend consideration for procrit - will leave up to discretion of nephrology  2. Patient to see nephrology on April 6th   3. VitD low - I would like nephrology to address this as well  4. Check labs every two weeks  5. She is seeing Dr Small this Friday  6. US of RLE  RTC in 1 month  Fax note to John Garcia MD, Alfred Small; Marquise; Keer[place]    Discussion:     I have explained all of the above in detail and the patient understands all of the current recommendation(s). I have answered all of their questions to the best of my ability and to their complete satisfaction.   The patient is to continue with the current management plan.            Electronically signed by Sukhjinder Goodwin MD

## 2018-03-21 NOTE — LETTER
March 21, 2018      John Garcia MD  [address] [address] [address]           [facility]  [address]  [address]  [place] [address]  Phone: [phone]  Fax: [phone]          Patient: Anitha Swenson   MR Number: [id number]   YOB: 1995   Date of Visit: 3/21/2018       Dear Dr. John Garcia:    Thank you for referring Anitha Swenson to me for evaluation. Attached you will find relevant portions of my assessment and plan of care.    If you have questions, please do not hesitate to call me. I look forward to following Anitha Swenson along with you.    Sincerely,    Sukhjinder Goodwin MD    Enclosure  CC:  No Recipients    If you would like to receive this communication electronically, please contact [email or web address] or [phone] to request more information on [email or web address] access.    For providers and/or their staff who would like to refer a patient to [facility], please contact us through our one-stop-shop provider referral line, [facility], at [phone].    If you feel you have received this communication in error or would no longer like to receive these types of communications, please e-mail [email or web address]

## 2018-03-23 ENCOUNTER — TELEPHONE (OUTPATIENT)
Dept: HEMATOLOGY/ONCOLOGY | Facility: CLINIC | Age: 23
End: 2018-03-23

## 2018-03-23 NOTE — TELEPHONE ENCOUNTER
----- Message from Sukhjinder Goodwin MD sent at 3/23/2018 12:48 PM CDT -----  No clot in leg      Spoke to Anitha and let her know that there is no clot in her leg. She voiced understanding. No further instructions from doctor given

## 2018-04-24 ENCOUNTER — OFFICE VISIT (OUTPATIENT)
Dept: HEMATOLOGY/ONCOLOGY | Facility: CLINIC | Age: 23
End: 2018-04-24
Payer: COMMERCIAL

## 2018-04-24 VITALS
DIASTOLIC BLOOD PRESSURE: 107 MMHG | HEIGHT: 70 IN | BODY MASS INDEX: 23.53 KG/M2 | SYSTOLIC BLOOD PRESSURE: 138 MMHG | TEMPERATURE: 98 F | WEIGHT: 164.38 LBS | HEART RATE: 102 BPM

## 2018-04-24 DIAGNOSIS — D64.89 ANEMIA DUE TO MULTIPLE MECHANISMS: ICD-10-CM

## 2018-04-24 DIAGNOSIS — D63.8 ANEMIA, CHRONIC DISEASE: ICD-10-CM

## 2018-04-24 DIAGNOSIS — R79.89 ELEVATED HOMOCYSTEINE: ICD-10-CM

## 2018-04-24 DIAGNOSIS — D50.0 ANEMIA DUE TO CHRONIC BLOOD LOSS: ICD-10-CM

## 2018-04-24 DIAGNOSIS — Z15.89 COMPOUND HETEROZYGOUS MTHFR MUTATION C677T/A1298C: Primary | ICD-10-CM

## 2018-04-24 DIAGNOSIS — K92.2 GASTROINTESTINAL HEMORRHAGE, UNSPECIFIED GASTROINTESTINAL HEMORRHAGE TYPE: ICD-10-CM

## 2018-04-24 PROCEDURE — 99213 OFFICE O/P EST LOW 20 MIN: CPT | Mod: ,,, | Performed by: INTERNAL MEDICINE

## 2018-04-24 RX ORDER — RAMIPRIL 10 MG/1
CAPSULE ORAL
COMMUNITY
Start: 2018-04-05 | End: 2018-08-07

## 2018-04-24 RX ORDER — DULOXETIN HYDROCHLORIDE 30 MG/1
CAPSULE, DELAYED RELEASE ORAL
COMMUNITY
Start: 2018-03-23 | End: 2018-08-07

## 2018-04-24 RX ORDER — CLONAZEPAM 0.5 MG/1
TABLET ORAL
Status: ON HOLD | COMMUNITY
Start: 2018-04-20 | End: 2019-01-27

## 2018-04-24 NOTE — PROGRESS NOTES
[facility] Hematology/Oncology  PROGRESS NOTE      Subjective:       Patient ID:   NAME: Anitha Swenson : 1995     23 y.o. female    Referring Doc: Radha (peds)  Other Physicians:  Keerthi Camarillo; Alfred Small    Chief Complaint:  Anemia f/u    History of Present Illness:     Patient returns today for a regularly scheduled follow-up visit.  She is here with her mom. She has been in the hospital a couple of times since last visit. Labs from 2018 is on chart. She has been feeling better in general. She had recent blood infection and was seen by Dr An. The leg swelling has improved nicely. She remains on oral steroids but has completed the oral antibiotics. She is now under the care of Dr Alfred Small with Rheumatology in [place]. She seems to be improving overall and is back to driving. No CP, SOB, HA's or N/V        ROS:   GEN: normal without any fever, night sweats or weight loss  HEENT: normal with no HA's, sore throat, stiff neck, changes in vision  CV: normal with no CP, SOB, PND, BOOGIE or orthopnea  PULM: normal with no SOB, cough, hemoptysis, sputum or pleuritic pain  GI: normal with no abdominal pain, nausea, vomiting, constipation, diarrhea, melanotic stools, BRBPR, or hematemesis  : normal with no hematuria, dysuria  BREAST: normal with no mass, discharge, pain  SKIN: normal with no rash, erythema, bruising, or swelling    Allergies:  Review of patient's allergies indicates:   Allergen Reactions    Sulfur        Medications:    Current Outpatient Prescriptions:     ACZONE 7.5 % GlwP, , Disp: , Rfl:     calcitRIOL (ROCALTROL) 0.25 MCG Cap, , Disp: , Rfl:     calcium acetate (PHOSLO) 667 mg capsule, , Disp: , Rfl:     carvedilol (COREG) 25 MG tablet, , Disp: , Rfl:     citric acid-sodium citrate 500-334 mg/5 ml (BICITRA) 500-334 mg/5 mL solution, , Disp: , Rfl:     clonazePAM (KLONOPIN) 0.5 MG tablet, , Disp: , Rfl:     cloNIDine (CATAPRES) 0.1 MG tablet, , Disp: , Rfl:      "diphenoxylate-atropine 2.5-0.025 mg (LOMOTIL) 2.5-0.025 mg per tablet, , Disp: , Rfl:     DULoxetine (CYMBALTA) 30 MG capsule, , Disp: , Rfl:     ELIQUIS 2.5 mg Tab, , Disp: , Rfl:     fluconazole (DIFLUCAN) 200 MG Tab, , Disp: , Rfl:     furosemide (LASIX) 40 MG tablet, , Disp: , Rfl:     medroxyPROGESTERone (DEPO-PROVERA) 150 mg/mL Syrg, , Disp: , Rfl:     mupirocin (BACTROBAN) 2 % ointment, , Disp: , Rfl:     mycophenolate (CELLCEPT) 500 mg Tab, , Disp: , Rfl:     ondansetron (ZOFRAN) 4 MG tablet, , Disp: , Rfl:     oxyCODONE-acetaminophen (PERCOCET) 5-325 mg per tablet, , Disp: , Rfl:     pantoprazole (PROTONIX) 40 MG tablet, , Disp: , Rfl:     predniSONE (DELTASONE) 20 MG tablet, , Disp: , Rfl:     ramipril (ALTACE) 10 MG capsule, , Disp: , Rfl:     RHOFADE 1 % Crea, , Disp: , Rfl:     sodium bicarbonate 650 MG tablet, , Disp: , Rfl: 0    traMADol (ULTRAM) 50 mg tablet, , Disp: , Rfl:     zinc oxide 40 % Oint, MAXIMILIAN TOPICALLY AA BID, Disp: , Rfl: 0    Current Facility-Administered Medications:     cyanocobalamin injection 1,000 mcg, 1,000 mcg, Subcutaneous, Q30 Days, Sukhjinder Goodwin MD, 1,000 mcg at 03/13/18 1430    PMHx/PSHx Updates:  See patient's last visit with me on 3/21/2018.  See H&P on 2/8/2018        Pathology:  none          Objective:     Vitals:  Blood pressure (!) 138/107, pulse 102, temperature 98.4 °F (36.9 °C), height 5' 10" (1.778 m), weight 74.6 kg (164 lb 6.4 oz).    Physical Examination:   GEN: no apparent distress, comfortable; AAOx3  HEAD: atraumatic and normocephalic  EYES: no pallor, no icterus, PERRLA  ENT: OMM, no pharyngeal erythema, external ears WNL; no nasal discharge; no thrush  NECK: no masses, thyroid normal, trachea midline, no LAD/LN's, supple  CV: RRR with no murmur; normal pulse; normal S1 and S2; no pedal edema  CHEST: Normal respiratory effort; CTAB; normal breath sounds; no wheeze or crackles  ABDOM: nontender and nondistended; soft; normal bowel " sounds; no rebound/guarding  MUSC/Skeletal: ROM normal; no crepitus; joints normal; no deformities or arthropathy  EXTREM: no clubbing, cyanosis, bilateral LE swelling much improved  SKIN: no rashes, lesions, ulcers, petechiae or subcutaneous nodules; no current rash of erythema  : no preciado  NEURO: grossly intact; motor/sensory WNL; AAOx3; no tremors  PSYCH: normal mood, affect and behavior  LYMPH: normal cervical, supraclavicular, axillary and groin LN's            Labs:       4/19/2018 on chart from [facility]      MTHFR                                                                 Result:     Two mutations (C677T and Q0714T) - heterzygous for both the MTHFR C677T and M5699V variants     Radiology/Diagnostic Studies:    CXR 4/3/2018  Head CT 4/3/2018    I have reviewed all available lab results and radiology reports.    Assessment/Plan:     (1) 23 y.o. female with diagnosis of SLE/lupus with recent admission at [facility] for HTN crisis, renal failure and anasarca.  She was seen by hematology a consult for evaluation of her anemia.  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 - followed by Dr Pendleton with nephrology   - latest creatinine at 1.31 and better  - she had kidney biopsy couple of weeks ago which seemed to show improved findings per patient  - seeing Dr Pendleton again on April 6th    (3) SLE/Lupus - followed by Dr Rohan Lanza with Rheum previously; she is on cellcept and prednisone; she is seeing  Dr Alfred Small in [place] now     (4) Questionable pulmonary emboli with indeterminant VQ - 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  - clot workup was ordered while in hospital: ATIII was 135, prothrombin II was normal;  Factor V leiden was negative; LA was negative; protein C and S were adequate; antiphospholipid and anticardiolipin negative;   - homocysteine was a little elevated at 16.8  - she is now off the eliquis for about 3 weeks per nephrology's directives  - MTHFR A and C genes were both abnormal and heterozygous  - discussed genetic implications    (5) PTSD     (6) B12 deficiency with prior level at 240     (7) Leucocytosis - mild residual - prior leukemia screen was negative; she is on steroids  - latest wbc is at 20.7 as of 4/19/2018 when she was admitted for blood infection  - wbc was normal at 8.1 on 4/4/2018    (8) Low Vit D - I will defer to discretion of Dr Pendleton    (9) Prior admit for candidal esophagitis    (10) recent blood infection - seen by Dr An and is now off oral antibiotics    PLAN:  1. F/u with Dr Small with Rheum  2. F/u with Dr Pendleton with neph  3. VitD low - I would like nephrology to address this as well  4. Check labs once a month  5. Check repeat ferritin to see if it has improved    RTC in 3 months  Fax note to John Garcia MD, Alfred Small; Dakathy; Keerthi    Discussion:     I have explained all of the above in detail and the patient understands all of the current recommendation(s). I have answered all of their questions to the best of my ability and to their complete satisfaction.   The patient is to continue with the current management plan.            Electronically signed by Sukhjinder Goodwin MD

## 2018-04-24 NOTE — LETTER
April 24, 2018      John Garcia MD  [id number] [address] [address]           [facility] Hematology Oncology  [address]  [address]  [place] [address]  Phone: [phone]  Fax: [phone]          Patient: Anitha Swenson   MR Number: [id number]   YOB: 1995   Date of Visit: 4/24/2018       Dear Dr. John Garcia:    Thank you for referring Anitha Swenson to me for evaluation. Attached you will find relevant portions of my assessment and plan of care.    If you have questions, please do not hesitate to call me. I look forward to following Anitha Swenson along with you.    Sincerely,    Sukhjinder Goodwin MD    Enclosure  CC:  No Recipients    If you would like to receive this communication electronically, please contact [email or web address] or [phone] to request more information on [employer] access.    For providers and/or their staff who would like to refer a patient to [facility], please contact us through our one-stop-shop provider referral line, [facility], at [phone].    If you feel you have received this communication in error or would no longer like to receive these types of communications, please e-mail [email or web address]

## 2018-07-14 LAB — HOMOCYSTEINE: 11.9 UMOL/L (ref 0–15)

## 2018-07-16 ENCOUNTER — TELEPHONE (OUTPATIENT)
Dept: HEMATOLOGY/ONCOLOGY | Facility: CLINIC | Age: 23
End: 2018-07-16

## 2018-07-16 NOTE — TELEPHONE ENCOUNTER
----- Message from Jamal Johns MD sent at 7/13/2018  3:47 PM CDT -----  Please call the patient regarding her abnormal result. How is she doing? WBC is low.    Make sure to call or go into hospital with fever or new infection.

## 2018-07-16 NOTE — TELEPHONE ENCOUNTER
----- Message from Jamal Johns MD sent at 7/13/2018  3:47 PM CDT -----  Please call the patient regarding her abnormal result. How is she doing? WBC is low.    Make sure to call or go into hospital with fever or new infection.      Spoke to her Mom today. Patient is in [facility] ICU. She went in on Thursday evening 7/12 and received 2 units of blood. She also develpoed pneumonia and is in house getting iv antibiotics. Mother is on way back from [place] today. She said Dr. Mckeon saw Anitha over the weekend. Dr. Johns also notified of this.

## 2018-07-18 LAB
INR PPP: 1.1
PROTHROMBIN TIME: 13.9 SEC (ref 11.3–15.2)

## 2018-07-19 ENCOUNTER — HISTORICAL (OUTPATIENT)
Dept: ADMINISTRATIVE | Facility: HOSPITAL | Age: 23
End: 2018-07-19

## 2018-07-21 LAB
BASOPHILS NFR BLD: 0 K/UL (ref 0–0.2)
BASOPHILS NFR BLD: 1 %
EOSINOPHIL NFR BLD: 0 K/UL (ref 0–0.7)
EOSINOPHIL NFR BLD: 0.5 %
ERYTHROCYTE [DISTWIDTH] IN BLOOD BY AUTOMATED COUNT: 15.3 % (ref 11.7–14.9)
GRAN #: 0.4 K/UL (ref 1.4–6.5)
GRAN%: 9.8 %
HCT VFR BLD AUTO: 32.8 % (ref 36–48)
HGB BLD-MCNC: 10.6 G/DL (ref 12–15)
IMMATURE GRANS (ABS): 0 K/UL (ref 0–1)
IMMATURE GRANULOCYTES: 0.5 %
INR PPP: 1.1
LYMPH #: 2.1 K/UL (ref 1.2–3.4)
LYMPH%: 51 %
MCH RBC QN AUTO: 29.6 PG (ref 25–35)
MCHC RBC AUTO-ENTMCNC: 32.3 G/DL (ref 31–36)
MCV RBC AUTO: 91.6 FL (ref 79–98)
MONO #: 1.5 K/UL (ref 0.1–0.6)
MONO%: 37.2 %
NUCLEATED RBCS: 8 %
PLATELET # BLD AUTO: 101 K/UL (ref 140–440)
PMV BLD AUTO: 10.9 FL (ref 8.8–12.7)
PROTHROMBIN TIME: 14.3 SEC (ref 11.3–15.2)
RBC # BLD AUTO: 3.58 M/UL (ref 3.5–5.5)
WBC # BLD AUTO: 4.1 K/UL (ref 5–10)

## 2018-07-23 ENCOUNTER — TELEPHONE (OUTPATIENT)
Dept: HEMATOLOGY/ONCOLOGY | Facility: CLINIC | Age: 23
End: 2018-07-23

## 2018-07-23 NOTE — TELEPHONE ENCOUNTER
----- Message from Sophie Estrada sent at 7/23/2018  2:25 PM CDT -----  Dr. Peters (radiologist with [facility]) called to speak with Dr. Martinez in regards to this patient, however Dr. Martinez was in a room at the time. She is requesting a call back at [phone]. Thanks

## 2018-08-07 ENCOUNTER — OFFICE VISIT (OUTPATIENT)
Dept: HEMATOLOGY/ONCOLOGY | Facility: CLINIC | Age: 23
End: 2018-08-07
Payer: COMMERCIAL

## 2018-08-07 VITALS
TEMPERATURE: 99 F | HEIGHT: 71 IN | BODY MASS INDEX: 25.02 KG/M2 | WEIGHT: 178.69 LBS | DIASTOLIC BLOOD PRESSURE: 86 MMHG | RESPIRATION RATE: 18 BRPM | SYSTOLIC BLOOD PRESSURE: 126 MMHG | HEART RATE: 97 BPM

## 2018-08-07 DIAGNOSIS — K92.2 GASTROINTESTINAL HEMORRHAGE, UNSPECIFIED GASTROINTESTINAL HEMORRHAGE TYPE: ICD-10-CM

## 2018-08-07 DIAGNOSIS — I26.99 OTHER PULMONARY EMBOLISM WITHOUT ACUTE COR PULMONALE, UNSPECIFIED CHRONICITY: ICD-10-CM

## 2018-08-07 DIAGNOSIS — Z15.89 COMPOUND HETEROZYGOUS MTHFR MUTATION C677T/A1298C: ICD-10-CM

## 2018-08-07 DIAGNOSIS — D50.0 ANEMIA DUE TO CHRONIC BLOOD LOSS: ICD-10-CM

## 2018-08-07 DIAGNOSIS — R79.89 ELEVATED HOMOCYSTEINE: ICD-10-CM

## 2018-08-07 DIAGNOSIS — D63.8 ANEMIA, CHRONIC DISEASE: Primary | ICD-10-CM

## 2018-08-07 DIAGNOSIS — D64.89 ANEMIA DUE TO MULTIPLE MECHANISMS: ICD-10-CM

## 2018-08-07 PROCEDURE — 3008F BODY MASS INDEX DOCD: CPT | Mod: ,,, | Performed by: INTERNAL MEDICINE

## 2018-08-07 PROCEDURE — 99214 OFFICE O/P EST MOD 30 MIN: CPT | Mod: ,,, | Performed by: INTERNAL MEDICINE

## 2018-08-07 RX ORDER — DILTIAZEM HYDROCHLORIDE 180 MG/1
CAPSULE, EXTENDED RELEASE ORAL
Refills: 0 | Status: ON HOLD | COMMUNITY
Start: 2018-07-26 | End: 2019-01-27

## 2018-08-07 RX ORDER — DULOXETIN HYDROCHLORIDE 60 MG/1
CAPSULE, DELAYED RELEASE ORAL
Refills: 2 | Status: ON HOLD | COMMUNITY
Start: 2018-07-30 | End: 2019-01-27

## 2018-08-07 RX ORDER — CARVEDILOL 12.5 MG/1
TABLET ORAL
Refills: 0 | Status: ON HOLD | COMMUNITY
Start: 2018-07-26 | End: 2019-06-04 | Stop reason: CLARIF

## 2018-08-07 RX ORDER — RAMIPRIL 5 MG/1
CAPSULE ORAL
Refills: 0 | Status: ON HOLD | COMMUNITY
Start: 2018-07-26 | End: 2019-01-27

## 2018-08-07 RX ORDER — GABAPENTIN 300 MG/1
300 CAPSULE ORAL 3 TIMES DAILY
Refills: 0 | Status: ON HOLD | COMMUNITY
Start: 2018-07-26 | End: 2019-01-31 | Stop reason: SDUPTHER

## 2018-08-07 NOTE — PROGRESS NOTES
[facility] Hematology/Oncology  PROGRESS NOTE      Subjective:       Patient ID:   NAME: Anitha Swenson : 1995     23 y.o. female    Referring Doc: Radha saleem)  Other Physicians:  Keerthi Camarillo; Alfred Small    Chief Complaint:  Anemia f/u    History of Present Illness:     Patient returns today for a regularly scheduled follow-up visit.  She was recently hospitalized with MRSA pneumonia and bacteremia. She had another bone marrow biopsy while in hospital. She is here by herself. She is feeling much better. Breathing ok. She also had C. Diff. Some residual swelling in feet.      ROS:   GEN: normal without any fever, night sweats or weight loss  HEENT: normal with no HA's, sore throat, stiff neck, changes in vision  CV: normal with no CP, SOB, PND, BOOGIE or orthopnea  PULM: normal with no SOB, cough, hemoptysis, sputum or pleuritic pain  GI: normal with no abdominal pain, nausea, vomiting, constipation, diarrhea, melanotic stools, BRBPR, or hematemesis  : normal with no hematuria, dysuria  BREAST: normal with no mass, discharge, pain  SKIN: normal with no rash, erythema, bruising, or swelling    Allergies:  Review of patient's allergies indicates:   Allergen Reactions    Sulfur        Medications:    Current Outpatient Prescriptions:     ACZONE 7.5 % GlwP, , Disp: , Rfl:     calcitRIOL (ROCALTROL) 0.25 MCG Cap, , Disp: , Rfl:     calcium acetate (PHOSLO) 667 mg capsule, , Disp: , Rfl:     carvedilol (COREG) 12.5 MG tablet, TK 1 T PO BID WITH FOOD, Disp: , Rfl: 0    citric acid-sodium citrate 500-334 mg/5 ml (BICITRA) 500-334 mg/5 mL solution, , Disp: , Rfl:     clonazePAM (KLONOPIN) 0.5 MG tablet, , Disp: , Rfl:     cloNIDine (CATAPRES) 0.1 MG tablet, , Disp: , Rfl:     DILT- mg CDCR, , Disp: , Rfl: 0    diphenoxylate-atropine 2.5-0.025 mg (LOMOTIL) 2.5-0.025 mg per tablet, , Disp: , Rfl:     DULoxetine (CYMBALTA) 60 MG capsule, TK 1 C PO QD, Disp: , Rfl: 2    ELIQUIS 2.5 mg Tab, ,  Disp: , Rfl:     fluconazole (DIFLUCAN) 200 MG Tab, , Disp: , Rfl:     furosemide (LASIX) 40 MG tablet, , Disp: , Rfl:     gabapentin (NEURONTIN) 300 MG capsule, , Disp: , Rfl: 0    medroxyPROGESTERone (DEPO-PROVERA) 150 mg/mL Syrg, , Disp: , Rfl:     mupirocin (BACTROBAN) 2 % ointment, , Disp: , Rfl:     mycophenolate (CELLCEPT) 500 mg Tab, , Disp: , Rfl:     ondansetron (ZOFRAN) 4 MG tablet, , Disp: , Rfl:     oxyCODONE-acetaminophen (PERCOCET) 5-325 mg per tablet, , Disp: , Rfl:     pantoprazole (PROTONIX) 40 MG tablet, , Disp: , Rfl:     predniSONE (DELTASONE) 10 MG tablet, , Disp: , Rfl: 0    ramipril (ALTACE) 5 MG capsule, , Disp: , Rfl: 0    RHOFADE 1 % Crea, , Disp: , Rfl:     sodium bicarbonate 650 MG tablet, , Disp: , Rfl: 0    traMADol (ULTRAM) 50 mg tablet, , Disp: , Rfl:     zinc oxide 40 % Oint, MAXIMILIAN TOPICALLY AA BID, Disp: , Rfl: 0    Current Facility-Administered Medications:     cyanocobalamin injection 1,000 mcg, 1,000 mcg, Subcutaneous, Q30 Days, Sukhjinder Goodwin MD, 1,000 mcg at 03/13/18 1430    PMHx/PSHx Updates:  See patient's last visit with me on 3/21/2018.  See H&P on 2/8/2018        Pathology:  Bone Marrow biopsy 7/19/2018:    FINAL DIAGNOSIS:  PERIPHERAL BLOOD:   PANCYTOPENIA:   SEVERE LEUKOPENIA WITH ABSOLUTE NEUTROPENIA AND SLIGHT ABSOLUTE  LYMPHOPENIA.   MILD NORMOCHROMIC NORMOCYTIC ANEMIA AND MILD PERIPHERAL THROMBOCYTOPENIA.    BONE MARROW ASPIRATE, BIOPSY AND CLOT SECTION:   CELLULARITY: 40% ON THE CLOT, 40-50% ON THE BIOPSY.   HYPOCELLULAR MARROW FOR THE PATIENT'S AGE.   TRILINEAGE HEMATOPOEITIC ACTIVITY WITH MARKED MEGALOBLASTIC ERYTHROID HYPERPLASIA.   RELATIVELY MARKED DECREASED LEFT SHIFTED MYELOID MATURATION TO IMMATURE FORMS AND DYSPOIESIS.   NO INCREASED IN CD34+ MYELOID BLASTS (1%) BY FLOW CYTOMETRY.   ADEQUATE MEGAKARYOCYTES WITH A FEW ATYPICAL HYPOLOBULATED FORMS.   ADEQUATE TO MILDLY INCREASED IRON STORES.   SPECIAL STAINS FOR AFB AND FUNGI ARE  "NEGATIVE.    COMMENT: In view of the patient's clinical history, the peripheral  blood and bone marrow findings are suggestive of a secondary process,  that is, post-treatment megaloblastic anemia in view of the patient's  history of vitamin B12 deficiency.  Other possible etiologies for the  patient's leukopenia would include immunosuppressive drugs/toxin  effect, folate deficiency or chronic autoimmune disease.    Objective:     Vitals:  Blood pressure 126/86, pulse 97, temperature 98.7 °F (37.1 °C), resp. rate 18, height 5' 11" (1.803 m), weight 81.1 kg (178 lb 11.2 oz).    Physical Examination:   GEN: no apparent distress, comfortable; AAOx3  HEAD: atraumatic and normocephalic  EYES: no pallor, no icterus, PERRLA  ENT: OMM, no pharyngeal erythema, external ears WNL; no nasal discharge; no thrush  NECK: no masses, thyroid normal, trachea midline, no LAD/LN's, supple  CV: RRR with no murmur; normal pulse; normal S1 and S2; mild pedal swelling  CHEST: Normal respiratory effort; CTAB; normal breath sounds; no wheeze or crackles  ABDOM: nontender and nondistended; soft; normal bowel sounds; no rebound/guarding  MUSC/Skeletal: ROM normal; no crepitus; joints normal; no deformities or arthropathy  EXTREM: no clubbing, cyanosis, bilateral LE swelling much improved  SKIN: no rashes, lesions, ulcers, petechiae or subcutaneous nodules; no current rash of erythema  : no preciado  NEURO: grossly intact; motor/sensory WNL; AAOx3; no tremors  PSYCH: normal mood, affect and behavior  LYMPH: normal cervical, supraclavicular, axillary and groin LN's            Labs:   8/2/2018 on chart from [facility]    7/21/2018:  Lab Results   Component Value Date    WBC 4.1 (L) 07/21/2018    HGB 10.6 (L) 07/21/2018    HCT 32.8 (L) 07/21/2018    MCV 91.6 07/21/2018     (L) 07/21/2018     BMP  Lab Results   Component Value Date     07/18/2018    K 4.7 07/18/2018     07/18/2018    CO2 18.1 (L) 07/18/2018    BUN 43 (H) 07/18/2018    " CREATININE 1.78 (H) 07/18/2018    CALCIUM 9.5 07/18/2018     Lab Results   Component Value Date    AST 10 07/18/2018    ALKPHOS 29 (L) 07/18/2018    BILITOT 0.4 07/18/2018           MTHFR                                                                 Result:     Two mutations (C677T and Q7238L) - heterzygous for both the MTHFR C677T and [id number] variants     Radiology/Diagnostic Studies:    CXR 4/3/2018  Head CT 4/3/2018    I have reviewed all available lab results and radiology reports.    Assessment/Plan:     (1) 23 y.o. female with diagnosis of SLE/lupus with recent admission at [facility] for HTN crisis, renal failure and anasarca.  She was seen by hematology a consult for evaluation of her anemia.  - suspected multifactorial process with anemia of chronic disorders, lupus mediated anemia, anemia of renal disease and prior GI bleed component; could also have marrow suppression from the cellcept  - seen by Dr Camarillo with GI for stool OBS positive studies previously  - bili and LDH were normal so no hemolysis was suspected  - iron was 119 and TIBC was 155  - ferritin was 487; retic was 2.9  - SPEP with no M-protein previously    - latest hgb was  12.4    - recent bone marrow biopsy on 7/19/2018: In view of the patient's clinical history, the peripheral blood and bone marrow findings are suggestive of a secondary process, that is, post-treatment megaloblastic anemia in view of the patient's history of vitamin B12 deficiency.  Other possible etiologies for the patient's leukopenia would include immunosuppressive drugs/toxin effect, folate deficiency or chronic autoimmune disease.   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 - followed by Dr Pendleton with nephrology   - latest creatinine at 1.49  - she had kidney biopsy couple of weeks ago which seemed to show  improved findings per patient  - seeing Dr Pendleton again on April 6th    (3) SLE/Lupus - followed by Dr Rohan Lanza with Rheum previously; she is on cellcept and prednisone; she is seeing Dr Alfred Small in [place] now     (4) Questionable pulmonary emboli with indeterminant VQ - 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  - clot workup was ordered while in hospital: ATIII was 135, prothrombin II was normal;  Factor V leiden was negative; LA was negative; protein C and S were adequate; antiphospholipid and anticardiolipin negative;   - homocysteine was a little elevated at 16.8  - she is now off the eliquis for about 3 weeks per nephrology's directives  - MTHFR A and C genes were both abnormal and heterozygous  - discussed genetic implications    (5) PTSD     (6) B12 deficiency with prior level at 240 - she has been on B12 supplements; latest level improved to 503     (7) Prior Leucocytosis and leucopenia issues    - prior leukemia screen was negative; she is on steroids  - wbc was normal at 11.3    (8) Low Vit D - I will defer to discretion of Dr Pendleton    (9) Prior admit for candidal esophagitis    (10) recent MRSA Pneumonia and C. Diff:   - recent admission at [facility] with MRSA pneumonia and bacteremia  - she was seen by Dr An with ID    (11) Thrombocytopenia - most likely due to autoimmune disorder itself and/or the  immunosuppressive therapy with subsequent marrow suppression  - latest platelet count at 222,000    (12) Mildly elevated ferritin with latest level at 515 - suspect that this is a reactive process   - will check hemochromatosis gene panel      1. Anemia, chronic disease     2. Anemia due to multiple mechanisms     3. Anemia due to chronic blood loss     4. Other pulmonary embolism without acute cor pulmonale, unspecified chronicity     5. Compound heterozygous MTHFR mutation C677T/[id number]     6. Elevated homocysteine     7. Gastrointestinal hemorrhage, unspecified  gastrointestinal hemorrhage type         PLAN:  1. F/u with Dr Small with Rheum  2. F/u with Dr Pendleton with neph, Dr An with ID  3. VitD low - I would like nephrology to address this as well  4. Check labs once a month      RTC in 3 months  Fax note to Alfred Garcia; Marquise; Nataliya Pendleton    Discussion:     I have explained all of the above in detail and the patient understands all of the current recommendation(s). I have answered all of their questions to the best of my ability and to their complete satisfaction.   The patient is to continue with the current management plan.            Electronically signed by Sukhjinder Goodwin MD

## 2018-11-07 NOTE — PROGRESS NOTES
[facility] Hematology/Oncology  PROGRESS NOTE      Subjective:       Patient ID:   NAME: Anitha Swenson : 1995     23 y.o. female    Referring Doc: Radha (batsheva)  Other Physicians:  Keerthi Camarillo; Alfred Small    Chief Complaint:  Anemia f/u    History of Present Illness:     Patient returns today for a regularly scheduled follow-up visit.  She was been on steroids with 10mg daily. She has been having some swelling with probable Cushing syndrome; she sees Dr Pendleton on ; she is now on Benlysta infusions for the Lupus per rheumatology; she does have some residual fatigue; she has been going to pain management; She sees Dr Small again in near future. I spoke to her mom on the speaker phone.       ROS:   GEN: normal without any fever, night sweats or weight loss  HEENT: normal with no HA's, sore throat, stiff neck, changes in vision  CV: normal with no CP, SOB, PND, BOOGIE or orthopnea  PULM: normal with no SOB, cough, hemoptysis, sputum or pleuritic pain  GI: normal with no abdominal pain, nausea, vomiting, constipation, diarrhea, melanotic stools, BRBPR, or hematemesis  : normal with no hematuria, dysuria  BREAST: normal with no mass, discharge, pain  SKIN: normal with no rash, erythema, bruising, or swelling    Allergies:  Review of patient's allergies indicates:   Allergen Reactions    Sulfur        Medications:    Current Outpatient Medications:     ACZONE 7.5 % GlwP, , Disp: , Rfl:     BENLYSTA 200 mg/mL AtIn, , Disp: , Rfl:     calcitRIOL (ROCALTROL) 0.25 MCG Cap, , Disp: , Rfl:     calcium acetate (PHOSLO) 667 mg capsule, , Disp: , Rfl:     CARTIA  mg 24 hr capsule, TK 1 C PO QD, Disp: , Rfl: 3    carvedilol (COREG) 12.5 MG tablet, TK 1 T PO BID WITH FOOD, Disp: , Rfl: 0    citric acid-sodium citrate 500-334 mg/5 ml (BICITRA) 500-334 mg/5 mL solution, , Disp: , Rfl:     clonazePAM (KLONOPIN) 0.5 MG tablet, , Disp: , Rfl:     cloNIDine (CATAPRES) 0.1 MG tablet, , Disp: ,  Rfl:     diclofenac sodium (VOLTAREN) 1 % Gel, APPLY TO AFFECTED AREA Q 4 TO 6 H, Disp: , Rfl: 1    DILT- mg CDCR, , Disp: , Rfl: 0    diphenoxylate-atropine 2.5-0.025 mg (LOMOTIL) 2.5-0.025 mg per tablet, , Disp: , Rfl:     DULoxetine (CYMBALTA) 60 MG capsule, TK 1 C PO QD, Disp: , Rfl: 2    ELIQUIS 2.5 mg Tab, , Disp: , Rfl:     escitalopram oxalate (LEXAPRO) 20 MG tablet, TK 1 T PO QD, Disp: , Rfl: 3    fluconazole (DIFLUCAN) 200 MG Tab, , Disp: , Rfl:     furosemide (LASIX) 40 MG tablet, , Disp: , Rfl:     gabapentin (NEURONTIN) 300 MG capsule, , Disp: , Rfl: 0    hydroxychloroquine (PLAQUENIL) 200 mg tablet, TK 1 T PO QD, Disp: , Rfl: 6    medroxyPROGESTERone (DEPO-PROVERA) 150 mg/mL Syrg, , Disp: , Rfl:     mupirocin (BACTROBAN) 2 % ointment, , Disp: , Rfl:     mycophenolate (CELLCEPT) 500 mg Tab, , Disp: , Rfl:     ondansetron (ZOFRAN) 4 MG tablet, , Disp: , Rfl:     oxyCODONE-acetaminophen (PERCOCET) 5-325 mg per tablet, , Disp: , Rfl:     pantoprazole (PROTONIX) 40 MG tablet, , Disp: , Rfl:     predniSONE (DELTASONE) 10 MG tablet, , Disp: , Rfl: 0    ramipril (ALTACE) 5 MG capsule, , Disp: , Rfl: 0    RHOFADE 1 % Crea, , Disp: , Rfl:     sodium bicarbonate 650 MG tablet, , Disp: , Rfl: 0    tramadol-acetaminophen 37.5-325 mg (ULTRACET) 37.5-325 mg Tab, TK ONE T PO Q 4 TO 6 H PRN, Disp: , Rfl: 1    zinc oxide 40 % Oint, MAXIMILIAN TOPICALLY AA BID, Disp: , Rfl: 0    Current Facility-Administered Medications:     cyanocobalamin injection 1,000 mcg, 1,000 mcg, Subcutaneous, Q30 Days, Sukhjinder Goodwin MD, 1,000 mcg at 03/13/18 1430    PMHx/PSHx Updates:  See patient's last visit with me on 8/7/2018.  See H&P on 2/8/2018        Pathology:  Bone Marrow biopsy 7/19/2018:    FINAL DIAGNOSIS:  PERIPHERAL BLOOD:   PANCYTOPENIA:   SEVERE LEUKOPENIA WITH ABSOLUTE NEUTROPENIA AND SLIGHT ABSOLUTE  LYMPHOPENIA.   MILD NORMOCHROMIC NORMOCYTIC ANEMIA AND MILD PERIPHERAL THROMBOCYTOPENIA.    BONE  MARROW ASPIRATE, BIOPSY AND CLOT SECTION:   CELLULARITY: 40% ON THE CLOT, 40-50% ON THE BIOPSY.   HYPOCELLULAR MARROW FOR THE PATIENT'S AGE.   TRILINEAGE HEMATOPOEITIC ACTIVITY WITH MARKED MEGALOBLASTIC ERYTHROID HYPERPLASIA.   RELATIVELY MARKED DECREASED LEFT SHIFTED MYELOID MATURATION TO IMMATURE FORMS AND DYSPOIESIS.   NO INCREASED IN CD34+ MYELOID BLASTS (1%) BY FLOW CYTOMETRY.   ADEQUATE MEGAKARYOCYTES WITH A FEW ATYPICAL HYPOLOBULATED FORMS.   ADEQUATE TO MILDLY INCREASED IRON STORES.   SPECIAL STAINS FOR AFB AND FUNGI ARE NEGATIVE.    COMMENT: In view of the patient's clinical history, the peripheral  blood and bone marrow findings are suggestive of a secondary process,  that is, post-treatment megaloblastic anemia in view of the patient's  history of vitamin B12 deficiency.  Other possible etiologies for the  patient's leukopenia would include immunosuppressive drugs/toxin  effect, folate deficiency or chronic autoimmune disease.    Objective:     Vitals:  Blood pressure 125/88, pulse 74, temperature 98.5 °F (36.9 °C), resp. rate 20, weight 92.6 kg (204 lb 3.2 oz).    Physical Examination:   GEN: no apparent distress, comfortable; AAOx3  HEAD: atraumatic and normocephalic  EYES: no pallor, no icterus, PERRLA  ENT: OMM, no pharyngeal erythema, external ears WNL; no nasal discharge; no thrush  NECK: no masses, thyroid normal, trachea midline, no LAD/LN's, supple  CV: RRR with no murmur; normal pulse; normal S1 and S2; mild pedal swelling  CHEST: Normal respiratory effort; CTAB; normal breath sounds; no wheeze or crackles  ABDOM: nontender and nondistended; soft; normal bowel sounds; no rebound/guarding  MUSC/Skeletal: ROM normal; no crepitus; joints normal; no deformities or arthropathy  EXTREM: no clubbing, cyanosis, bilateral LE swelling much improved  SKIN: no rashes, lesions, ulcers, petechiae or subcutaneous nodules; no current rash of erythema  : no preciado  NEURO: grossly intact; motor/sensory WNL;  AAOx3; no tremors  PSYCH: normal mood, affect and behavior  LYMPH: normal cervical, supraclavicular, axillary and groin LN's            Labs:   8/2/2018 on chart from [facility]    7/21/2018:  Lab Results   Component Value Date    WBC 4.1 (L) 07/21/2018    HGB 10.6 (L) 07/21/2018    HCT 32.8 (L) 07/21/2018    MCV 91.6 07/21/2018     (L) 07/21/2018     BMP  Lab Results   Component Value Date     07/18/2018    K 4.7 07/18/2018     07/18/2018    CO2 18.1 (L) 07/18/2018    BUN 43 (H) 07/18/2018    CREATININE 1.78 (H) 07/18/2018    CALCIUM 9.5 07/18/2018     Lab Results   Component Value Date    AST 10 07/18/2018    ALKPHOS 29 (L) 07/18/2018    BILITOT 0.4 07/18/2018           MTHFR                                                                 Result:     Two mutations (C677T and [id number]) - heterzygous for both the MTHFR C677T and N1980N variants     Radiology/Diagnostic Studies:    CXR 4/3/2018  Head CT 4/3/2018    I have reviewed all available lab results and radiology reports.    Assessment/Plan:     (1) 23 y.o. female with diagnosis of SLE/lupus with recent admission at [facility] for HTN crisis, renal failure and anasarca.  She was seen by hematology a consult for evaluation of her anemia.  - suspected multifactorial process with anemia of chronic disorders, lupus mediated anemia, anemia of renal disease and prior GI bleed component; could also have marrow suppression from the cellcept  - seen by Dr Camarillo with GI for stool OBS positive studies previously  - bili and LDH were normal so no hemolysis was suspected  - iron was 119 and TIBC was 155  - ferritin was 487; retic was 2.9  - SPEP with no M-protein previously  - latest hgb was  12.4    - recent bone marrow biopsy on 7/19/2018: In view of the patient's clinical history, the peripheral blood and bone marrow findings are suggestive of a secondary process, that is, post-treatment megaloblastic anemia in view of the patient's history of vitamin B12  deficiency.  Other possible etiologies for the patient's leukopenia would include immunosuppressive drugs/toxin effect, folate deficiency or chronic autoimmune disease.   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 - followed by Dr Pendleton with nephrology   - latest creatinine at 1.49  - she had kidney biopsy couple of weeks ago which seemed to show improved findings per patient  - seeing Dr Pendleton again on Nov 17th    (3) SLE/Lupus - followed by Dr Rohan Lanza with Rheum previously; she is on cellcept and prednisone; she is seeing Dr Alfred Small in [place] now  - now on Benlysta infusions for the Lupus     (4) Questionable pulmonary emboli with indeterminant VQ - 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  - clot workup was ordered while in hospital: ATIII was 135, prothrombin II was normal;  Factor V leiden was negative; LA was negative; protein C and S were adequate; antiphospholipid and anticardiolipin negative;   - homocysteine was a little elevated at 16.8  - she is now off the eliquis for about 3 weeks per nephrology's directives  - MTHFR A and C genes were both abnormal and heterozygous  - discussed genetic implications    (5) PTSD     (6) B12 deficiency with prior level at 240 - she has been on B12 supplements; latest level improved to 503     (7) Prior Leucocytosis and leucopenia issues    - prior leukemia screen was negative; she is on steroids  - wbc was normal at 11.3    (8) Low Vit D - I will defer to discretion of Dr Pendleton    (9) Prior admit for candidal esophagitis    (10) recent MRSA Pneumonia and C. Diff:   - recent admission at [facility] with MRSA pneumonia and bacteremia  - she was seen by Dr An with ID    (11) Thrombocytopenia - most likely due to autoimmune disorder itself and/or the  immunosuppressive  therapy with subsequent marrow suppression  - latest platelet count at 222,000    (12) Mildly elevated ferritin with latest level at 515 - suspect that this is a reactive process   - will check hemochromatosis gene panel      1. Other pulmonary embolism without acute cor pulmonale, unspecified chronicity     2. Compound heterozygous MTHFR mutation C677T/N9535L     3. Elevated homocysteine     4. Gastrointestinal hemorrhage, unspecified gastrointestinal hemorrhage type     5. Anemia due to chronic blood loss     6. Anemia due to multiple mechanisms     7. Anemia, chronic disease         PLAN:  1. F/u with Dr Small with Rheum  2. F/u with Dr Pendleton with neph, Dr An with ID  3. Encouraged compliance with labs  4. Check labs once a month      RTC in 3 months  Fax note to Alfred Garcia; Marquise; Nataliya Pendleton    Discussion:     I have explained all of the above in detail and the patient understands all of the current recommendation(s). I have answered all of their questions to the best of my ability and to their complete satisfaction.   The patient is to continue with the current management plan.            Electronically signed by Sukhjinder Goodwin MD

## 2018-11-08 ENCOUNTER — OFFICE VISIT (OUTPATIENT)
Dept: HEMATOLOGY/ONCOLOGY | Facility: CLINIC | Age: 23
End: 2018-11-08
Payer: COMMERCIAL

## 2018-11-08 VITALS
TEMPERATURE: 99 F | RESPIRATION RATE: 20 BRPM | SYSTOLIC BLOOD PRESSURE: 125 MMHG | DIASTOLIC BLOOD PRESSURE: 88 MMHG | HEART RATE: 74 BPM | BODY MASS INDEX: 28.48 KG/M2 | WEIGHT: 204.19 LBS

## 2018-11-08 DIAGNOSIS — I26.99 OTHER PULMONARY EMBOLISM WITHOUT ACUTE COR PULMONALE, UNSPECIFIED CHRONICITY: Primary | ICD-10-CM

## 2018-11-08 DIAGNOSIS — Z15.89 COMPOUND HETEROZYGOUS MTHFR MUTATION C677T/A1298C: ICD-10-CM

## 2018-11-08 DIAGNOSIS — K92.2 GASTROINTESTINAL HEMORRHAGE, UNSPECIFIED GASTROINTESTINAL HEMORRHAGE TYPE: ICD-10-CM

## 2018-11-08 DIAGNOSIS — R79.89 ELEVATED HOMOCYSTEINE: ICD-10-CM

## 2018-11-08 DIAGNOSIS — D64.89 ANEMIA DUE TO MULTIPLE MECHANISMS: ICD-10-CM

## 2018-11-08 DIAGNOSIS — D63.8 ANEMIA, CHRONIC DISEASE: ICD-10-CM

## 2018-11-08 DIAGNOSIS — D50.0 ANEMIA DUE TO CHRONIC BLOOD LOSS: ICD-10-CM

## 2018-11-08 PROCEDURE — 99213 OFFICE O/P EST LOW 20 MIN: CPT | Mod: ,,, | Performed by: INTERNAL MEDICINE

## 2018-11-08 PROCEDURE — 3008F BODY MASS INDEX DOCD: CPT | Mod: ,,, | Performed by: INTERNAL MEDICINE

## 2018-11-08 RX ORDER — TRAMADOL HYDROCHLORIDE AND ACETAMINOPHEN 37.5; 325 MG/1; MG/1
TABLET, FILM COATED ORAL
Refills: 1 | Status: ON HOLD | COMMUNITY
Start: 2018-10-24 | End: 2019-01-27

## 2018-11-08 RX ORDER — ESCITALOPRAM OXALATE 20 MG/1
TABLET ORAL
Refills: 3 | COMMUNITY
Start: 2018-11-05 | End: 2019-05-30

## 2018-11-08 RX ORDER — BELIMUMAB 200 MG/ML
200 SOLUTION SUBCUTANEOUS
Status: ON HOLD | COMMUNITY
Start: 2018-10-12 | End: 2019-01-31 | Stop reason: SDUPTHER

## 2018-11-08 RX ORDER — DILTIAZEM HYDROCHLORIDE 240 MG/1
CAPSULE, EXTENDED RELEASE ORAL
Refills: 3 | Status: ON HOLD | COMMUNITY
Start: 2018-10-14 | End: 2019-01-31 | Stop reason: HOSPADM

## 2018-11-08 RX ORDER — HYDROXYCHLOROQUINE SULFATE 200 MG/1
TABLET, FILM COATED ORAL
Refills: 6 | COMMUNITY
Start: 2018-10-19

## 2018-11-08 RX ORDER — DICLOFENAC SODIUM 10 MG/G
GEL TOPICAL
Refills: 1 | Status: ON HOLD | COMMUNITY
Start: 2018-10-24 | End: 2019-01-27

## 2018-11-08 NOTE — LETTER
November 8, 2018      John Garcia MD  [address] [address] [address]           [facility] - Hematology Oncology  [address]  [address]  [place] [address]  Phone: [phone]  Fax: [phone]          Patient: Anitha Swenson   MR Number: [id number]   YOB: 1995   Date of Visit: 11/8/2018       Dear Dr. John Garcia:    Thank you for referring Anitha Swenson to me for evaluation. Attached you will find relevant portions of my assessment and plan of care.    If you have questions, please do not hesitate to call me. I look forward to following Anitha Swenson along with you.    Sincerely,    Sukhjinder Goodwin MD    Enclosure  CC:  No Recipients    If you would like to receive this communication electronically, please contact [email or web address] or [phone] to request more information on [facility] Link access.    For providers and/or their staff who would like to refer a patient to [facility], please contact us through our one-stop-shop provider referral line, [facility], at [phone].    If you feel you have received this communication in error or would no longer like to receive these types of communications, please e-mail [email or web address]

## 2019-01-23 LAB
% SATURATION: ABNORMAL %
FERRITIN SERPL-MCNC: 5091 NG/ML (ref 24–162)
FOLATE SERPL-MCNC: 13.2 NG/ML (ref 2.2–11.2)
IRON: 14 MCG/DL (ref 24–162)
LDH SERPL L TO P-CCNC: 437 IU/L (ref 100–194)
TOTAL IRON BINDING CAPACITY: ABNORMAL MCG/DL (ref 177–435)
VITAMIN B12: 1401 PG/ML (ref 62–940)

## 2019-01-24 ENCOUNTER — TELEPHONE (OUTPATIENT)
Dept: HEMATOLOGY/ONCOLOGY | Facility: CLINIC | Age: 24
End: 2019-01-24

## 2019-01-24 PROBLEM — M32.9 LUPUS: Status: ACTIVE | Noted: 2019-01-24

## 2019-01-24 NOTE — PLAN OF CARE
Upon the patient's arrival to the floor, please call extension [phone] STAT for Hospital Medicine admit team assignment and for additional admit orders (if nobody answers, press the number 1 option, and this will flip to a beeper or spectra-link, so put in your call back number).  Do not page the attending staff physician associated with the patient in EPIC (they may not be in-house at the time of arrival).  Rather, always call [phone] to reach the triage physician for orders and team assignment.  If you have received no call back after 20 minutes, try again.  If you have not reached Hospital Medicine triage coordinator after two tries, please escalate to charge nurse in order to page the doctor on call for Hospital Medicine in general.         Outside Transfer Acceptance Note     Patients name:  Anitha Swenson MRN: [id number]     Transferring Physician or Mid-Level provider/Clinic giving report:   Dr. Meliza Valenzuela at [facility] NPT, admitted on 1/16/19    Accepting Physician for admission to hospital: Matti Dumont M.D.     Consulting Physicians: None    Acceptance date:  01/24/2019    Reason for transfer:   SLE flare    Report from Physician/Mid-Level Provider:  Ms. Swenson is a 24yo lady with SLE who was admitted on 1/16 with progressive renal failure, edema and vasculitic skin rash and joint pain.  She was continued on her home Prednisone 60mg po qday until today, when she was given one dose of Solumedrol 125mg, then 60mg iv q6 hours.  Since admission, she has developed progressive renal failure, now having a line placed with daily HD initiated.  Apparently she has taken Cytoxan infusions in the recent past.  She was also diagnosed with PNA (stenotrophomonas in sputum), and placed on Meropenem and Levaquin.      To Do List upon arrival:    Consult Nephrology and Rheumatology    CLINICAL REVIEW:  Past Medical History:  Past Medical History:   Diagnosis Date    Anemia due to chronic blood loss  2/8/2018    Anemia due to multiple mechanisms 2/8/2018    Anemia, chronic disease 2/8/2018    Compound heterozygous MTHFR mutation C677T/[id number] 3/21/2018    Elevated homocysteine 2/8/2018    GI bleeding 2/8/2018    Hypertension     Migraine headache with aura     Other pulmonary embolism without acute cor pulmonale 2/8/2018       Review of patient's allergies indicates:   Allergen Reactions    Sulfur        Vital Signs:  109  121/80  16  100.3   95%2LNC  104.3LG    LABS:  See labs from today scanned into media tab    Radiographs:   See CTA and Echo reports scanned into media tab    Medications:    Current Facility-Administered Medications:     cyanocobalamin injection 1,000 mcg, 1,000 mcg, Subcutaneous, Q30 Days, Sukhjinder Goodwin MD, 1,000 mcg at 03/13/18 1430    Current Outpatient Medications:     ACZONE 7.5 % GlwP, , Disp: , Rfl:     BENLYSTA 200 mg/mL AtIn, , Disp: , Rfl:     calcitRIOL (ROCALTROL) 0.25 MCG Cap, , Disp: , Rfl:     calcium acetate (PHOSLO) 667 mg capsule, , Disp: , Rfl:     CARTIA  mg 24 hr capsule, TK 1 C PO QD, Disp: , Rfl: 3    carvedilol (COREG) 12.5 MG tablet, TK 1 T PO BID WITH FOOD, Disp: , Rfl: 0    citric acid-sodium citrate 500-334 mg/5 ml (BICITRA) 500-334 mg/5 mL solution, , Disp: , Rfl:     clonazePAM (KLONOPIN) 0.5 MG tablet, , Disp: , Rfl:     cloNIDine (CATAPRES) 0.1 MG tablet, , Disp: , Rfl:     diclofenac sodium (VOLTAREN) 1 % Gel, APPLY TO AFFECTED AREA Q 4 TO 6 H, Disp: , Rfl: 1    DILT- mg CDCR, , Disp: , Rfl: 0    diphenoxylate-atropine 2.5-0.025 mg (LOMOTIL) 2.5-0.025 mg per tablet, , Disp: , Rfl:     DULoxetine (CYMBALTA) 60 MG capsule, TK 1 C PO QD, Disp: , Rfl: 2    ELIQUIS 2.5 mg Tab, , Disp: , Rfl:     escitalopram oxalate (LEXAPRO) 20 MG tablet, TK 1 T PO QD, Disp: , Rfl: 3    fluconazole (DIFLUCAN) 200 MG Tab, , Disp: , Rfl:     furosemide (LASIX) 40 MG tablet, , Disp: , Rfl:     gabapentin (NEURONTIN) 300 MG capsule, , Disp:  , Rfl: 0    hydroxychloroquine (PLAQUENIL) 200 mg tablet, TK 1 T PO QD, Disp: , Rfl: 6    medroxyPROGESTERone (DEPO-PROVERA) 150 mg/mL Syrg, , Disp: , Rfl:     mupirocin (BACTROBAN) 2 % ointment, , Disp: , Rfl:     mycophenolate (CELLCEPT) 500 mg Tab, , Disp: , Rfl:     ondansetron (ZOFRAN) 4 MG tablet, , Disp: , Rfl:     oxyCODONE-acetaminophen (PERCOCET) 5-325 mg per tablet, , Disp: , Rfl:     pantoprazole (PROTONIX) 40 MG tablet, , Disp: , Rfl:     predniSONE (DELTASONE) 10 MG tablet, , Disp: , Rfl: 0    ramipril (ALTACE) 5 MG capsule, , Disp: , Rfl: 0    RHOFADE 1 % Crea, , Disp: , Rfl:     sodium bicarbonate 650 MG tablet, , Disp: , Rfl: 0    tramadol-acetaminophen 37.5-325 mg (ULTRACET) 37.5-325 mg Tab, TK ONE T PO Q 4 TO 6 H PRN, Disp: , Rfl: 1    zinc oxide 40 % Oint, MAXIMILIAN TOPICALLY AA BID, Disp: , Rfl: 0

## 2019-01-24 NOTE — TELEPHONE ENCOUNTER
[facility] hospital talked to nurse to give order for hemochromotosis  Gene panel     ----- Message from Sukhjinder Goodwin MD sent at 1/23/2019  4:26 PM CST -----  Check and see if she ever had a hemochromatosis genetic panel done in the past in her prior labs at [facility] or as outpatient

## 2019-01-25 LAB
DSDNA AB SER IA-ACNC: <1 IU/ML (ref 0–9)
HAPTOGLOBIN: 334 MG/DL (ref 34–200)

## 2019-01-26 ENCOUNTER — HOSPITAL ENCOUNTER (INPATIENT)
Facility: HOSPITAL | Age: 24
LOS: 5 days | Discharge: HOME | DRG: 545 | End: 2019-01-31
Attending: HOSPITALIST | Admitting: INTERNAL MEDICINE
Payer: COMMERCIAL

## 2019-01-26 DIAGNOSIS — L93.0 LUPUS ERYTHEMATOSUS, UNSPECIFIED FORM: ICD-10-CM

## 2019-01-26 DIAGNOSIS — M32.14 LUPUS NEPHRITIS: Primary | ICD-10-CM

## 2019-01-26 DIAGNOSIS — I31.39 PERICARDIAL EFFUSION: ICD-10-CM

## 2019-01-26 DIAGNOSIS — N17.9 ACUTE RENAL FAILURE, UNSPECIFIED ACUTE RENAL FAILURE TYPE: ICD-10-CM

## 2019-01-26 DIAGNOSIS — M32.14 SYSTEMIC LUPUS ERYTHEMATOSUS WITH GLOMERULAR DISEASE, UNSPECIFIED SLE TYPE: ICD-10-CM

## 2019-01-26 DIAGNOSIS — M32.9 LUPUS: ICD-10-CM

## 2019-01-26 DIAGNOSIS — N19 KIDNEY FAILURE: ICD-10-CM

## 2019-01-26 PROBLEM — I10 HYPERTENSION: Status: ACTIVE | Noted: 2019-01-26

## 2019-01-26 PROBLEM — K21.9 ACID REFLUX DISEASE WITH ULCER: Status: ACTIVE | Noted: 2019-01-26

## 2019-01-26 PROBLEM — F41.9 ANXIETY AND DEPRESSION: Status: ACTIVE | Noted: 2019-01-26

## 2019-01-26 PROBLEM — R06.02 SHORTNESS OF BREATH: Status: ACTIVE | Noted: 2019-01-26

## 2019-01-26 PROBLEM — D84.9 IMMUNOCOMPROMISED: Status: ACTIVE | Noted: 2019-01-26

## 2019-01-26 PROBLEM — F32.A ANXIETY AND DEPRESSION: Status: ACTIVE | Noted: 2019-01-26

## 2019-01-26 LAB
ALBUMIN SERPL BCP-MCNC: 1.7 G/DL
ALP SERPL-CCNC: 72 U/L
ALT SERPL W/O P-5'-P-CCNC: 18 U/L
ANION GAP SERPL CALC-SCNC: 17 MMOL/L
ANISOCYTOSIS BLD QL SMEAR: SLIGHT
APTT BLDCRRT: 25.2 SEC
AST SERPL-CCNC: 7 U/L
BASOPHILS # BLD AUTO: ABNORMAL K/UL
BASOPHILS NFR BLD: 0 %
BILIRUB SERPL-MCNC: 0.3 MG/DL
BUN SERPL-MCNC: 77 MG/DL
CALCIUM SERPL-MCNC: 8.8 MG/DL
CHLORIDE SERPL-SCNC: 98 MMOL/L
CO2 SERPL-SCNC: 19 MMOL/L
CREAT SERPL-MCNC: 5.9 MG/DL
CRP SERPL-MCNC: 147.6 MG/L
DIFFERENTIAL METHOD: ABNORMAL
EOSINOPHIL # BLD AUTO: ABNORMAL K/UL
EOSINOPHIL NFR BLD: 0 %
ERYTHROCYTE [DISTWIDTH] IN BLOOD BY AUTOMATED COUNT: 13.8 %
ERYTHROCYTE [SEDIMENTATION RATE] IN BLOOD BY WESTERGREN METHOD: 83 MM/HR
EST. GFR  (AFRICAN AMERICAN): 10.8 ML/MIN/1.73 M^2
EST. GFR  (NON AFRICAN AMERICAN): 9.4 ML/MIN/1.73 M^2
ESTIMATED AVG GLUCOSE: 117 MG/DL
GLUCOSE SERPL-MCNC: 197 MG/DL
HBA1C MFR BLD HPLC: 5.7 %
HCT VFR BLD AUTO: 29.8 %
HGB BLD-MCNC: 9.8 G/DL
IMM GRANULOCYTES # BLD AUTO: ABNORMAL K/UL
IMM GRANULOCYTES NFR BLD AUTO: ABNORMAL %
INR PPP: 1
LYMPHOCYTES # BLD AUTO: ABNORMAL K/UL
LYMPHOCYTES NFR BLD: 4 %
MAGNESIUM SERPL-MCNC: 1.4 MG/DL
MCH RBC QN AUTO: 28.9 PG
MCHC RBC AUTO-ENTMCNC: 32.9 G/DL
MCV RBC AUTO: 88 FL
METAMYELOCYTES NFR BLD MANUAL: 1 %
MONOCYTES # BLD AUTO: ABNORMAL K/UL
MONOCYTES NFR BLD: 7 %
MYELOCYTES NFR BLD MANUAL: 4 %
NEUTROPHILS NFR BLD: 83 %
NEUTS BAND NFR BLD MANUAL: 1 %
NRBC BLD-RTO: 2 /100 WBC
PHOSPHATE SERPL-MCNC: 6.7 MG/DL
PLATELET # BLD AUTO: 96 K/UL
PMV BLD AUTO: 10 FL
POLYCHROMASIA BLD QL SMEAR: ABNORMAL
POTASSIUM SERPL-SCNC: 4.5 MMOL/L
PROT SERPL-MCNC: 5.5 G/DL
PROTHROMBIN TIME: 10.4 SEC
RBC # BLD AUTO: 3.39 M/UL
SODIUM SERPL-SCNC: 134 MMOL/L
TOXIC GRANULES BLD QL SMEAR: PRESENT
WBC # BLD AUTO: 12.7 K/UL

## 2019-01-26 PROCEDURE — 63600175 PHARM REV CODE 636 W HCPCS: Performed by: STUDENT IN AN ORGANIZED HEALTH CARE EDUCATION/TRAINING PROGRAM

## 2019-01-26 PROCEDURE — 85610 PROTHROMBIN TIME: CPT

## 2019-01-26 PROCEDURE — 25000003 PHARM REV CODE 250: Performed by: STUDENT IN AN ORGANIZED HEALTH CARE EDUCATION/TRAINING PROGRAM

## 2019-01-26 PROCEDURE — 93005 ELECTROCARDIOGRAM TRACING: CPT

## 2019-01-26 PROCEDURE — 20600001 HC STEP DOWN PRIVATE ROOM

## 2019-01-26 PROCEDURE — 84100 ASSAY OF PHOSPHORUS: CPT

## 2019-01-26 PROCEDURE — 85027 COMPLETE CBC AUTOMATED: CPT

## 2019-01-26 PROCEDURE — 93010 EKG 12-LEAD: ICD-10-PCS | Mod: ,,, | Performed by: INTERNAL MEDICINE

## 2019-01-26 PROCEDURE — 93010 ELECTROCARDIOGRAM REPORT: CPT | Mod: ,,, | Performed by: INTERNAL MEDICINE

## 2019-01-26 PROCEDURE — 85652 RBC SED RATE AUTOMATED: CPT

## 2019-01-26 PROCEDURE — 99223 PR INITIAL HOSPITAL CARE,LEVL III: ICD-10-PCS | Mod: ,,, | Performed by: HOSPITALIST

## 2019-01-26 PROCEDURE — 83036 HEMOGLOBIN GLYCOSYLATED A1C: CPT

## 2019-01-26 PROCEDURE — 99223 1ST HOSP IP/OBS HIGH 75: CPT | Mod: ,,, | Performed by: HOSPITALIST

## 2019-01-26 PROCEDURE — 85007 BL SMEAR W/DIFF WBC COUNT: CPT

## 2019-01-26 PROCEDURE — 85730 THROMBOPLASTIN TIME PARTIAL: CPT

## 2019-01-26 PROCEDURE — 86140 C-REACTIVE PROTEIN: CPT

## 2019-01-26 PROCEDURE — 80053 COMPREHEN METABOLIC PANEL: CPT

## 2019-01-26 PROCEDURE — 83735 ASSAY OF MAGNESIUM: CPT

## 2019-01-26 RX ORDER — IPRATROPIUM BROMIDE AND ALBUTEROL SULFATE 2.5; .5 MG/3ML; MG/3ML
3 SOLUTION RESPIRATORY (INHALATION)
Status: DISCONTINUED | OUTPATIENT
Start: 2019-01-26 | End: 2019-01-30

## 2019-01-26 RX ORDER — CLONIDINE HYDROCHLORIDE 0.1 MG/1
0.1 TABLET ORAL 2 TIMES DAILY
Status: DISCONTINUED | OUTPATIENT
Start: 2019-01-26 | End: 2019-01-26

## 2019-01-26 RX ORDER — CARVEDILOL 12.5 MG/1
12.5 TABLET ORAL 2 TIMES DAILY
Status: DISCONTINUED | OUTPATIENT
Start: 2019-01-26 | End: 2019-01-31 | Stop reason: HOSPADM

## 2019-01-26 RX ORDER — HYDROXYCHLOROQUINE SULFATE 200 MG/1
200 TABLET, FILM COATED ORAL DAILY
Status: DISCONTINUED | OUTPATIENT
Start: 2019-01-27 | End: 2019-01-31 | Stop reason: HOSPADM

## 2019-01-26 RX ORDER — CLONAZEPAM 0.5 MG/1
0.5 TABLET ORAL 2 TIMES DAILY PRN
Status: DISCONTINUED | OUTPATIENT
Start: 2019-01-26 | End: 2019-01-26

## 2019-01-26 RX ORDER — MYCOPHENOLATE MOFETIL 250 MG/1
500 CAPSULE ORAL 2 TIMES DAILY
Status: DISCONTINUED | OUTPATIENT
Start: 2019-01-26 | End: 2019-01-26

## 2019-01-26 RX ORDER — IBUPROFEN 200 MG
16 TABLET ORAL
Status: DISCONTINUED | OUTPATIENT
Start: 2019-01-26 | End: 2019-01-31 | Stop reason: HOSPADM

## 2019-01-26 RX ORDER — CETIRIZINE HYDROCHLORIDE 5 MG/1
10 TABLET ORAL DAILY
Status: DISCONTINUED | OUTPATIENT
Start: 2019-01-27 | End: 2019-01-31 | Stop reason: HOSPADM

## 2019-01-26 RX ORDER — ALPRAZOLAM 0.5 MG/1
0.5 TABLET ORAL EVERY 6 HOURS PRN
Status: DISCONTINUED | OUTPATIENT
Start: 2019-01-26 | End: 2019-01-31 | Stop reason: HOSPADM

## 2019-01-26 RX ORDER — RAMIPRIL 5 MG/1
5 CAPSULE ORAL DAILY
Status: DISCONTINUED | OUTPATIENT
Start: 2019-01-27 | End: 2019-01-26

## 2019-01-26 RX ORDER — CALCITRIOL 0.25 UG/1
0.25 CAPSULE ORAL DAILY
Status: DISCONTINUED | OUTPATIENT
Start: 2019-01-27 | End: 2019-01-26

## 2019-01-26 RX ORDER — SODIUM CHLORIDE 0.9 % (FLUSH) 0.9 %
5 SYRINGE (ML) INJECTION
Status: DISCONTINUED | OUTPATIENT
Start: 2019-01-26 | End: 2019-01-31 | Stop reason: HOSPADM

## 2019-01-26 RX ORDER — SODIUM BICARBONATE 650 MG/1
650 TABLET ORAL DAILY
Status: DISCONTINUED | OUTPATIENT
Start: 2019-01-27 | End: 2019-01-31 | Stop reason: HOSPADM

## 2019-01-26 RX ORDER — ZINC OXIDE 20 G/100G
OINTMENT TOPICAL
Status: DISCONTINUED | OUTPATIENT
Start: 2019-01-26 | End: 2019-01-31 | Stop reason: HOSPADM

## 2019-01-26 RX ORDER — RAMELTEON 8 MG/1
8 TABLET ORAL NIGHTLY PRN
Status: DISCONTINUED | OUTPATIENT
Start: 2019-01-26 | End: 2019-01-31 | Stop reason: HOSPADM

## 2019-01-26 RX ORDER — CALCITRIOL 0.5 UG/1
0.5 CAPSULE ORAL DAILY
Status: DISCONTINUED | OUTPATIENT
Start: 2019-01-27 | End: 2019-01-26

## 2019-01-26 RX ORDER — CHLORHEXIDINE GLUCONATE ORAL RINSE 1.2 MG/ML
15 SOLUTION DENTAL 2 TIMES DAILY
Status: DISCONTINUED | OUTPATIENT
Start: 2019-01-26 | End: 2019-01-28

## 2019-01-26 RX ORDER — CARVEDILOL 12.5 MG/1
12.5 TABLET ORAL DAILY
Status: DISCONTINUED | OUTPATIENT
Start: 2019-01-27 | End: 2019-01-26

## 2019-01-26 RX ORDER — OXYCODONE AND ACETAMINOPHEN 5; 325 MG/1; MG/1
1 TABLET ORAL EVERY 6 HOURS PRN
Status: DISCONTINUED | OUTPATIENT
Start: 2019-01-26 | End: 2019-01-26

## 2019-01-26 RX ORDER — SODIUM CHLORIDE 0.9 % (FLUSH) 0.9 %
5 SYRINGE (ML) INJECTION
Status: DISCONTINUED | OUTPATIENT
Start: 2019-01-26 | End: 2019-01-28

## 2019-01-26 RX ORDER — GLUCAGON 1 MG
1 KIT INJECTION
Status: DISCONTINUED | OUTPATIENT
Start: 2019-01-26 | End: 2019-01-31 | Stop reason: HOSPADM

## 2019-01-26 RX ORDER — ONDANSETRON 4 MG/1
4 TABLET, FILM COATED ORAL EVERY 6 HOURS PRN
Status: DISCONTINUED | OUTPATIENT
Start: 2019-01-26 | End: 2019-01-28

## 2019-01-26 RX ORDER — IPRATROPIUM BROMIDE AND ALBUTEROL SULFATE 2.5; .5 MG/3ML; MG/3ML
3 SOLUTION RESPIRATORY (INHALATION) EVERY 6 HOURS PRN
Status: DISCONTINUED | OUTPATIENT
Start: 2019-01-26 | End: 2019-01-31 | Stop reason: HOSPADM

## 2019-01-26 RX ORDER — PANTOPRAZOLE SODIUM 40 MG/1
40 TABLET, DELAYED RELEASE ORAL DAILY
Status: DISCONTINUED | OUTPATIENT
Start: 2019-01-27 | End: 2019-01-31 | Stop reason: HOSPADM

## 2019-01-26 RX ORDER — SEVELAMER CARBONATE 800 MG/1
800 TABLET, FILM COATED ORAL
Status: DISCONTINUED | OUTPATIENT
Start: 2019-01-27 | End: 2019-01-26

## 2019-01-26 RX ORDER — ACYCLOVIR 200 MG/5ML
200 SUSPENSION ORAL 2 TIMES DAILY
Status: DISCONTINUED | OUTPATIENT
Start: 2019-01-26 | End: 2019-01-31 | Stop reason: HOSPADM

## 2019-01-26 RX ORDER — ONDANSETRON 2 MG/ML
4 INJECTION INTRAMUSCULAR; INTRAVENOUS EVERY 6 HOURS PRN
Status: DISCONTINUED | OUTPATIENT
Start: 2019-01-26 | End: 2019-01-28

## 2019-01-26 RX ORDER — CALCIUM ACETATE 667 MG/1
667 CAPSULE ORAL
Status: DISCONTINUED | OUTPATIENT
Start: 2019-01-27 | End: 2019-01-31 | Stop reason: HOSPADM

## 2019-01-26 RX ORDER — IBUPROFEN 200 MG
24 TABLET ORAL
Status: DISCONTINUED | OUTPATIENT
Start: 2019-01-26 | End: 2019-01-31 | Stop reason: HOSPADM

## 2019-01-26 RX ORDER — LEVOFLOXACIN 5 MG/ML
500 INJECTION, SOLUTION INTRAVENOUS
Status: DISCONTINUED | OUTPATIENT
Start: 2019-01-27 | End: 2019-01-27

## 2019-01-26 RX ORDER — DULOXETIN HYDROCHLORIDE 60 MG/1
60 CAPSULE, DELAYED RELEASE ORAL DAILY
Status: DISCONTINUED | OUTPATIENT
Start: 2019-01-27 | End: 2019-01-26

## 2019-01-26 RX ORDER — GABAPENTIN 300 MG/1
300 CAPSULE ORAL 3 TIMES DAILY
Status: DISCONTINUED | OUTPATIENT
Start: 2019-01-26 | End: 2019-01-28

## 2019-01-26 RX ORDER — HEPARIN SODIUM 5000 [USP'U]/ML
5000 INJECTION, SOLUTION INTRAVENOUS; SUBCUTANEOUS EVERY 8 HOURS
Status: DISCONTINUED | OUTPATIENT
Start: 2019-01-27 | End: 2019-01-31 | Stop reason: HOSPADM

## 2019-01-26 RX ORDER — HEPARIN SODIUM 5000 [USP'U]/ML
5000 INJECTION, SOLUTION INTRAVENOUS; SUBCUTANEOUS EVERY 8 HOURS
Status: DISCONTINUED | OUTPATIENT
Start: 2019-01-26 | End: 2019-01-26

## 2019-01-26 RX ORDER — CHOLECALCIFEROL (VITAMIN D3) 25 MCG
1000 TABLET ORAL DAILY
Status: DISCONTINUED | OUTPATIENT
Start: 2019-01-27 | End: 2019-01-31 | Stop reason: HOSPADM

## 2019-01-26 RX ORDER — MORPHINE SULFATE 2 MG/ML
4 INJECTION, SOLUTION INTRAMUSCULAR; INTRAVENOUS EVERY 4 HOURS PRN
Status: DISCONTINUED | OUTPATIENT
Start: 2019-01-26 | End: 2019-01-28

## 2019-01-26 RX ORDER — FLUCONAZOLE 100 MG/1
100 TABLET ORAL DAILY
Status: DISCONTINUED | OUTPATIENT
Start: 2019-01-27 | End: 2019-01-28

## 2019-01-26 RX ORDER — ESCITALOPRAM OXALATE 20 MG/1
20 TABLET ORAL DAILY
Status: DISCONTINUED | OUTPATIENT
Start: 2019-01-27 | End: 2019-01-31 | Stop reason: HOSPADM

## 2019-01-26 RX ORDER — MAGNESIUM SULFATE HEPTAHYDRATE 40 MG/ML
2 INJECTION, SOLUTION INTRAVENOUS ONCE
Status: COMPLETED | OUTPATIENT
Start: 2019-01-26 | End: 2019-01-26

## 2019-01-26 RX ADMIN — CHLORHEXIDINE GLUCONATE 0.12% ORAL RINSE 15 ML: 1.2 LIQUID ORAL at 09:01

## 2019-01-26 RX ADMIN — CALCIUM GLUCONATE 1000 MG: 94 INJECTION, SOLUTION INTRAVENOUS at 11:01

## 2019-01-26 RX ADMIN — GABAPENTIN 300 MG: 300 CAPSULE ORAL at 09:01

## 2019-01-26 RX ADMIN — ONDANSETRON 4 MG: 2 INJECTION INTRAMUSCULAR; INTRAVENOUS at 07:01

## 2019-01-26 RX ADMIN — ACYCLOVIR 200 MG: 200 SUSPENSION ORAL at 09:01

## 2019-01-26 RX ADMIN — LEVOFLOXACIN 500 MG: 500 INJECTION, SOLUTION INTRAVENOUS at 11:01

## 2019-01-26 RX ADMIN — METHYLPREDNISOLONE SODIUM SUCCINATE 60 MG: 40 INJECTION, POWDER, FOR SOLUTION INTRAMUSCULAR; INTRAVENOUS at 11:01

## 2019-01-26 RX ADMIN — CARVEDILOL 12.5 MG: 12.5 TABLET, FILM COATED ORAL at 09:01

## 2019-01-26 RX ADMIN — MAGNESIUM SULFATE IN WATER 2 G: 40 INJECTION, SOLUTION INTRAVENOUS at 09:01

## 2019-01-26 RX ADMIN — Medication 4 MG: at 07:01

## 2019-01-27 PROBLEM — N18.9 ANEMIA IN CKD (CHRONIC KIDNEY DISEASE): Status: ACTIVE | Noted: 2019-01-27

## 2019-01-27 PROBLEM — D63.1 ANEMIA IN CKD (CHRONIC KIDNEY DISEASE): Status: ACTIVE | Noted: 2019-01-27

## 2019-01-27 PROBLEM — R21 RASH: Status: ACTIVE | Noted: 2019-01-27

## 2019-01-27 LAB
ALBUMIN SERPL BCP-MCNC: 1.8 G/DL
ALP SERPL-CCNC: 68 U/L
ALT SERPL W/O P-5'-P-CCNC: 18 U/L
ANION GAP SERPL CALC-SCNC: 15 MMOL/L
ANISOCYTOSIS BLD QL SMEAR: SLIGHT
AST SERPL-CCNC: 8 U/L
BACTERIA #/AREA URNS AUTO: NORMAL /HPF
BASOPHILS NFR BLD: 0 %
BILIRUB SERPL-MCNC: 0.3 MG/DL
BILIRUB UR QL STRIP: NEGATIVE
BILIRUB UR QL STRIP: NEGATIVE
BUN SERPL-MCNC: 88 MG/DL
C3 SERPL-MCNC: 146 MG/DL
C4 SERPL-MCNC: 30 MG/DL
CA-I BLDV-SCNC: 0.95 MMOL/L
CALCIUM SERPL-MCNC: 8.6 MG/DL
CHLORIDE SERPL-SCNC: 97 MMOL/L
CLARITY UR REFRACT.AUTO: CLEAR
CLARITY UR REFRACT.AUTO: CLEAR
CO2 SERPL-SCNC: 20 MMOL/L
COLOR UR AUTO: YELLOW
COLOR UR AUTO: YELLOW
CREAT SERPL-MCNC: 5.9 MG/DL
CREAT UR-MCNC: 75 MG/DL
CREAT UR-MCNC: 75 MG/DL
DAT IGG-SP REAG RBC-IMP: NORMAL
DIFFERENTIAL METHOD: ABNORMAL
EOSINOPHIL NFR BLD: 0 %
ERYTHROCYTE [DISTWIDTH] IN BLOOD BY AUTOMATED COUNT: 13.7 %
EST. GFR  (AFRICAN AMERICAN): 10.8 ML/MIN/1.73 M^2
EST. GFR  (NON AFRICAN AMERICAN): 9.4 ML/MIN/1.73 M^2
GLUCOSE SERPL-MCNC: 145 MG/DL
GLUCOSE UR QL STRIP: NEGATIVE
GLUCOSE UR QL STRIP: NEGATIVE
HAPTOGLOB SERPL-MCNC: 330 MG/DL
HCT VFR BLD AUTO: 29.8 %
HGB BLD-MCNC: 9.7 G/DL
HGB UR QL STRIP: ABNORMAL
HGB UR QL STRIP: ABNORMAL
HYALINE CASTS UR QL AUTO: 0 /LPF
IMM GRANULOCYTES # BLD AUTO: ABNORMAL K/UL
IMM GRANULOCYTES NFR BLD AUTO: ABNORMAL %
KETONES UR QL STRIP: NEGATIVE
KETONES UR QL STRIP: NEGATIVE
LDH SERPL L TO P-CCNC: 601 U/L
LEUKOCYTE ESTERASE UR QL STRIP: NEGATIVE
LEUKOCYTE ESTERASE UR QL STRIP: NEGATIVE
LYMPHOCYTES NFR BLD: 6 %
MAGNESIUM SERPL-MCNC: 1.9 MG/DL
MCH RBC QN AUTO: 28.4 PG
MCHC RBC AUTO-ENTMCNC: 32.6 G/DL
MCV RBC AUTO: 87 FL
MICROSCOPIC COMMENT: NORMAL
MONOCYTES NFR BLD: 2 %
NEUTROPHILS NFR BLD: 90 %
NEUTS BAND NFR BLD MANUAL: 2 %
NITRITE UR QL STRIP: NEGATIVE
NITRITE UR QL STRIP: NEGATIVE
NRBC BLD-RTO: 2 /100 WBC
PH UR STRIP: 5 [PH] (ref 5–8)
PH UR STRIP: 5 [PH] (ref 5–8)
PHOSPHATE SERPL-MCNC: 8 MG/DL
PLATELET # BLD AUTO: 93 K/UL
PLATELET BLD QL SMEAR: ABNORMAL
PMV BLD AUTO: 10.2 FL
POLYCHROMASIA BLD QL SMEAR: ABNORMAL
POTASSIUM SERPL-SCNC: 4.5 MMOL/L
PROT SERPL-MCNC: 5.4 G/DL
PROT UR QL STRIP: ABNORMAL
PROT UR QL STRIP: ABNORMAL
PROT UR-MCNC: 187 MG/DL
PROT UR-MCNC: 187 MG/DL
PROT/CREAT UR: 2.49 MG/G{CREAT}
PROT/CREAT UR: 2.49 MG/G{CREAT}
RBC # BLD AUTO: 3.41 M/UL
RBC #/AREA URNS AUTO: 2 /HPF (ref 0–4)
SODIUM SERPL-SCNC: 132 MMOL/L
SP GR UR STRIP: 1.01 (ref 1–1.03)
SP GR UR STRIP: 1.01 (ref 1–1.03)
SQUAMOUS #/AREA URNS AUTO: 3 /HPF
TOXIC GRANULES BLD QL SMEAR: PRESENT
URN SPEC COLLECT METH UR: ABNORMAL
URN SPEC COLLECT METH UR: ABNORMAL
WBC # BLD AUTO: 12.9 K/UL
WBC #/AREA URNS AUTO: 0 /HPF (ref 0–5)

## 2019-01-27 PROCEDURE — 63600175 PHARM REV CODE 636 W HCPCS: Performed by: STUDENT IN AN ORGANIZED HEALTH CARE EDUCATION/TRAINING PROGRAM

## 2019-01-27 PROCEDURE — 94761 N-INVAS EAR/PLS OXIMETRY MLT: CPT

## 2019-01-27 PROCEDURE — 82330 ASSAY OF CALCIUM: CPT

## 2019-01-27 PROCEDURE — 99223 PR INITIAL HOSPITAL CARE,LEVL III: ICD-10-PCS | Mod: ,,, | Performed by: INTERNAL MEDICINE

## 2019-01-27 PROCEDURE — 83615 LACTATE (LD) (LDH) ENZYME: CPT

## 2019-01-27 PROCEDURE — 85613 RUSSELL VIPER VENOM DILUTED: CPT

## 2019-01-27 PROCEDURE — 99222 1ST HOSP IP/OBS MODERATE 55: CPT | Mod: ,,, | Performed by: INTERNAL MEDICINE

## 2019-01-27 PROCEDURE — 25000242 PHARM REV CODE 250 ALT 637 W/ HCPCS: Performed by: STUDENT IN AN ORGANIZED HEALTH CARE EDUCATION/TRAINING PROGRAM

## 2019-01-27 PROCEDURE — 85027 COMPLETE CBC AUTOMATED: CPT

## 2019-01-27 PROCEDURE — 82570 ASSAY OF URINE CREATININE: CPT

## 2019-01-27 PROCEDURE — 99223 1ST HOSP IP/OBS HIGH 75: CPT | Mod: ,,, | Performed by: INTERNAL MEDICINE

## 2019-01-27 PROCEDURE — 99233 SBSQ HOSP IP/OBS HIGH 50: CPT | Mod: ,,, | Performed by: HOSPITALIST

## 2019-01-27 PROCEDURE — 80053 COMPREHEN METABOLIC PANEL: CPT

## 2019-01-27 PROCEDURE — 86160 COMPLEMENT ANTIGEN: CPT | Mod: 59

## 2019-01-27 PROCEDURE — 86146 BETA-2 GLYCOPROTEIN ANTIBODY: CPT

## 2019-01-27 PROCEDURE — 85598 HEXAGNAL PHOSPH PLTLT NEUTRL: CPT

## 2019-01-27 PROCEDURE — 83010 ASSAY OF HAPTOGLOBIN QUANT: CPT

## 2019-01-27 PROCEDURE — 83516 IMMUNOASSAY NONANTIBODY: CPT

## 2019-01-27 PROCEDURE — 99222 PR INITIAL HOSPITAL CARE,LEVL II: ICD-10-PCS | Mod: ,,, | Performed by: INTERNAL MEDICINE

## 2019-01-27 PROCEDURE — 86880 COOMBS TEST DIRECT: CPT

## 2019-01-27 PROCEDURE — 25000003 PHARM REV CODE 250: Performed by: STUDENT IN AN ORGANIZED HEALTH CARE EDUCATION/TRAINING PROGRAM

## 2019-01-27 PROCEDURE — 94640 AIRWAY INHALATION TREATMENT: CPT

## 2019-01-27 PROCEDURE — 20600001 HC STEP DOWN PRIVATE ROOM

## 2019-01-27 PROCEDURE — 81001 URINALYSIS AUTO W/SCOPE: CPT

## 2019-01-27 PROCEDURE — 36415 COLL VENOUS BLD VENIPUNCTURE: CPT

## 2019-01-27 PROCEDURE — 84100 ASSAY OF PHOSPHORUS: CPT

## 2019-01-27 PROCEDURE — 83735 ASSAY OF MAGNESIUM: CPT

## 2019-01-27 PROCEDURE — 86038 ANTINUCLEAR ANTIBODIES: CPT

## 2019-01-27 PROCEDURE — 85007 BL SMEAR W/DIFF WBC COUNT: CPT

## 2019-01-27 PROCEDURE — 86160 COMPLEMENT ANTIGEN: CPT

## 2019-01-27 PROCEDURE — 83516 IMMUNOASSAY NONANTIBODY: CPT | Mod: 59

## 2019-01-27 PROCEDURE — 86255 FLUORESCENT ANTIBODY SCREEN: CPT | Mod: 91

## 2019-01-27 PROCEDURE — 86225 DNA ANTIBODY NATIVE: CPT

## 2019-01-27 PROCEDURE — 86147 CARDIOLIPIN ANTIBODY EA IG: CPT

## 2019-01-27 PROCEDURE — 99233 PR SUBSEQUENT HOSPITAL CARE,LEVL III: ICD-10-PCS | Mod: ,,, | Performed by: HOSPITALIST

## 2019-01-27 RX ORDER — HYDROCODONE BITARTRATE AND ACETAMINOPHEN 5; 325 MG/1; MG/1
1 TABLET ORAL EVERY 12 HOURS PRN
Status: ON HOLD | COMMUNITY
End: 2019-06-04 | Stop reason: CLARIF

## 2019-01-27 RX ORDER — LOPERAMIDE HCL 2 MG
2 TABLET ORAL 4 TIMES DAILY PRN
COMMUNITY
End: 2019-10-16

## 2019-01-27 RX ORDER — METOLAZONE 5 MG/1
5 TABLET ORAL EVERY MORNING
Status: ON HOLD | COMMUNITY
End: 2019-01-31 | Stop reason: HOSPADM

## 2019-01-27 RX ORDER — ACETAMINOPHEN 325 MG/1
325 TABLET ORAL EVERY 6 HOURS PRN
COMMUNITY
End: 2019-10-16

## 2019-01-27 RX ORDER — TEMAZEPAM 15 MG/1
15 CAPSULE ORAL NIGHTLY PRN
COMMUNITY
End: 2019-09-04 | Stop reason: CLARIF

## 2019-01-27 RX ORDER — BUMETANIDE 2 MG/1
2 TABLET ORAL 3 TIMES DAILY
Status: ON HOLD | COMMUNITY
End: 2019-01-31 | Stop reason: HOSPADM

## 2019-01-27 RX ORDER — ACETAMINOPHEN 500 MG
5000 TABLET ORAL EVERY MORNING
COMMUNITY

## 2019-01-27 RX ORDER — ALPRAZOLAM 1 MG/1
1 TABLET ORAL DAILY PRN
COMMUNITY
End: 2019-05-30

## 2019-01-27 RX ADMIN — CALCIUM ACETATE 667 MG: 667 CAPSULE ORAL at 09:01

## 2019-01-27 RX ADMIN — METHYLPREDNISOLONE SODIUM SUCCINATE 60 MG: 40 INJECTION, POWDER, FOR SOLUTION INTRAMUSCULAR; INTRAVENOUS at 05:01

## 2019-01-27 RX ADMIN — GABAPENTIN 300 MG: 300 CAPSULE ORAL at 02:01

## 2019-01-27 RX ADMIN — Medication 4 MG: at 05:01

## 2019-01-27 RX ADMIN — CHLORHEXIDINE GLUCONATE 0.12% ORAL RINSE 15 ML: 1.2 LIQUID ORAL at 09:01

## 2019-01-27 RX ADMIN — IPRATROPIUM BROMIDE AND ALBUTEROL SULFATE 3 ML: .5; 3 SOLUTION RESPIRATORY (INHALATION) at 09:01

## 2019-01-27 RX ADMIN — ACYCLOVIR 200 MG: 200 SUSPENSION ORAL at 10:01

## 2019-01-27 RX ADMIN — CETIRIZINE HYDROCHLORIDE 10 MG: 5 TABLET ORAL at 08:01

## 2019-01-27 RX ADMIN — IPRATROPIUM BROMIDE AND ALBUTEROL SULFATE 3 ML: .5; 3 SOLUTION RESPIRATORY (INHALATION) at 08:01

## 2019-01-27 RX ADMIN — METHYLPREDNISOLONE SODIUM SUCCINATE 60 MG: 40 INJECTION, POWDER, FOR SOLUTION INTRAMUSCULAR; INTRAVENOUS at 11:01

## 2019-01-27 RX ADMIN — VITAMIN D, TAB 1000IU (100/BT) 1000 UNITS: 25 TAB at 09:01

## 2019-01-27 RX ADMIN — HEPARIN SODIUM 5000 UNITS: 5000 INJECTION, SOLUTION INTRAVENOUS; SUBCUTANEOUS at 06:01

## 2019-01-27 RX ADMIN — CARVEDILOL 12.5 MG: 12.5 TABLET, FILM COATED ORAL at 10:01

## 2019-01-27 RX ADMIN — HYDROXYCHLOROQUINE SULFATE 200 MG: 200 TABLET, FILM COATED ORAL at 09:01

## 2019-01-27 RX ADMIN — ESCITALOPRAM OXALATE 20 MG: 20 TABLET, FILM COATED ORAL at 08:01

## 2019-01-27 RX ADMIN — FLUCONAZOLE 100 MG: 100 TABLET ORAL at 08:01

## 2019-01-27 RX ADMIN — CHLORHEXIDINE GLUCONATE 0.12% ORAL RINSE 15 ML: 1.2 LIQUID ORAL at 10:01

## 2019-01-27 RX ADMIN — CARVEDILOL 12.5 MG: 12.5 TABLET, FILM COATED ORAL at 09:01

## 2019-01-27 RX ADMIN — CALCIUM ACETATE 667 MG: 667 CAPSULE ORAL at 12:01

## 2019-01-27 RX ADMIN — ACYCLOVIR 200 MG: 200 SUSPENSION ORAL at 09:01

## 2019-01-27 RX ADMIN — CALCIUM ACETATE 667 MG: 667 CAPSULE ORAL at 05:01

## 2019-01-27 RX ADMIN — GABAPENTIN 300 MG: 300 CAPSULE ORAL at 10:01

## 2019-01-27 RX ADMIN — Medication 4 MG: at 12:01

## 2019-01-27 RX ADMIN — HEPARIN SODIUM 5000 UNITS: 5000 INJECTION, SOLUTION INTRAVENOUS; SUBCUTANEOUS at 02:01

## 2019-01-27 RX ADMIN — SODIUM BICARBONATE 650 MG TABLET 650 MG: at 09:01

## 2019-01-27 RX ADMIN — PANTOPRAZOLE SODIUM 40 MG: 40 TABLET, DELAYED RELEASE ORAL at 08:01

## 2019-01-27 RX ADMIN — IPRATROPIUM BROMIDE AND ALBUTEROL SULFATE 3 ML: .5; 3 SOLUTION RESPIRATORY (INHALATION) at 01:01

## 2019-01-27 RX ADMIN — METHYLPREDNISOLONE SODIUM SUCCINATE 60 MG: 40 INJECTION, POWDER, FOR SOLUTION INTRAMUSCULAR; INTRAVENOUS at 06:01

## 2019-01-27 RX ADMIN — GABAPENTIN 300 MG: 300 CAPSULE ORAL at 09:01

## 2019-01-27 RX ADMIN — HEPARIN SODIUM 5000 UNITS: 5000 INJECTION, SOLUTION INTRAVENOUS; SUBCUTANEOUS at 10:01

## 2019-01-27 NOTE — PHARMACY MED REC
"Admission Medication Reconciliation - Pharmacy Consult Note    The home medication history was taken by Elinor Garcia, Pharmacy Tech.     You may go to "Admission" then "Reconcile Home Medications" tabs to review and/or act upon these items.     Potentially problematic discrepancies with current MAR  o Patient IS taking the following which was not ordered upon admit  o Diltiazem 240mg XR PO daily  o Patient IS NOT taking the following which was ordered upon admit  o Calcium acetate (rec'd d/c, patients phos is almost low)  o Blanca Jones, PharmD, BCPS  [id number]                  .    .            "

## 2019-01-27 NOTE — SUBJECTIVE & OBJECTIVE
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 Treated with cellcept in 2018 until 10-11/2018 (stopped for neutropenic fever), last steroid pulce 12/31/18 and had 2 doses CYC, last 1/19/2018. On HD since 1/2019. Sees Dr. Pendleton, nephrology in [place], [place]. and Dr. Small, rheumatology    Migraine headache with aura     Other pulmonary embolism without acute cor pulmonale 2/8/2018       Past Surgical History:   Procedure Laterality Date    ARTHROSCOPIC FEMOROPLASTY Left 12/26/2014    [facility] at [facility] [address] [facility]    ARTHROSCOPY-HIP WITH ACETABULOPLASTY(INCLUDES LABRAL REPAIR Left 12/26/2014    Performed by A[facility] Blake [facility] at [facility] OR [address] [place] [address]    HIP SURGERY      REPAIR-LABRAL Left 12/26/2014    Performed by Avery Blake MD at [facility] OR [address] [place] [address]    TYMPANOSTOMY TUBE PLACEMENT         Review of patient's allergies indicates:   Allergen Reactions    Sulfur        No current facility-administered medications on file prior to encounter.      Current Outpatient Medications on File Prior to Encounter   Medication Sig    ACZONE 7.5 % GlwP     BENLYSTA 200 mg/mL AtIn     calcitRIOL (ROCALTROL) 0.25 MCG Cap     calcium acetate (PHOSLO) 667 mg capsule     CARTIA  mg 24 hr capsule TK 1 C PO QD    carvedilol (COREG) 12.5 MG tablet TK 1 T PO BID WITH FOOD    citric acid-sodium citrate 500-334 mg/5 ml (BICITRA) 500-334 mg/5 mL solution     clonazePAM (KLONOPIN) 0.5 MG tablet     cloNIDine (CATAPRES) 0.1 MG tablet     diclofenac sodium (VOLTAREN) 1 % Gel  APPLY TO AFFECTED AREA Q 4 TO 6 H    DILT- mg CDCR     diphenoxylate-atropine 2.5-0.025 mg (LOMOTIL) 2.5-0.025 mg per tablet     DULoxetine (CYMBALTA) 60 MG capsule TK 1 C PO QD    ELIQUIS 2.5 mg Tab     escitalopram oxalate (LEXAPRO) 20 MG tablet TK 1 T PO QD    fluconazole (DIFLUCAN) 200 MG Tab     furosemide (LASIX) 40 MG tablet     gabapentin (NEURONTIN) 300 MG capsule     hydroxychloroquine (PLAQUENIL) 200 mg tablet TK 1 T PO QD    medroxyPROGESTERone (DEPO-PROVERA) 150 mg/mL Syrg     mupirocin (BACTROBAN) 2 % ointment     mycophenolate (CELLCEPT) 500 mg Tab     ondansetron (ZOFRAN) 4 MG tablet     oxyCODONE-acetaminophen (PERCOCET) 5-325 mg per tablet     pantoprazole (PROTONIX) 40 MG tablet     predniSONE (DELTASONE) 10 MG tablet     ramipril (ALTACE) 5 MG capsule     RHOFADE 1 % Crea     sodium bicarbonate 650 MG tablet     tramadol-acetaminophen 37.5-325 mg (ULTRACET) 37.5-325 mg Tab TK ONE T PO Q 4 TO 6 H PRN    zinc oxide 40 % Oint MAXIMILIAN TOPICALLY AA BID     Family History     Problem Relation (Age of Onset)    Diabetes Mellitus Maternal Grandfather        Tobacco Use    Smoking status: Never Smoker    Smokeless tobacco: Never Used   Substance and Sexual Activity    Alcohol use: No    Drug use: No    Sexual activity: Not on file     Review of Systems   Constitutional: Positive for fatigue. Negative for appetite change, chills and fever.   HENT: Positive for congestion. Negative for sore throat and trouble swallowing.    Eyes: Negative for visual disturbance.   Respiratory: Positive for cough and shortness of breath.    Cardiovascular: Positive for leg swelling. Negative for chest pain and palpitations.   Gastrointestinal: Positive for nausea. Negative for abdominal pain, blood in stool and vomiting.   Genitourinary: Negative for difficulty urinating, dysuria and hematuria.   Musculoskeletal: Positive for arthralgias and back pain.   Skin: Positive for color change and rash.    Neurological: Positive for weakness. Negative for dizziness and headaches.   Psychiatric/Behavioral: Negative for confusion.     Objective:     Vital Signs (Most Recent):  Temp: 97.8 °F (36.6 °C) (01/26/19 1751)  Pulse: 99 (01/26/19 1751)  Resp: 20 (01/26/19 1751)  BP: (!) 136/99 (01/26/19 1751)  SpO2: 95 % (01/26/19 1751) Vital Signs (24h Range):  Temp:  [97.8 °F (36.6 °C)-98.1 °F (36.7 °C)] 97.8 °F (36.6 °C)  Pulse:  [] 99  Resp:  [16-21] 20  SpO2:  [95 %-98 %] 95 %  BP: (129-136)/(79-99) 136/99        There is no height or weight on file to calculate BMI.    Physical Exam   Constitutional: She is oriented to person, place, and time. She appears well-developed and well-nourished. No distress.   HENT:   Head: Normocephalic and atraumatic.   Diffuse facial swelling   Eyes: Conjunctivae and EOM are normal. No scleral icterus.   Neck: Normal range of motion. Neck supple.   Cardiovascular: Normal rate, regular rhythm, normal heart sounds and intact distal pulses.   No murmur heard.  Pulmonary/Chest: Effort normal and breath sounds normal. No respiratory distress.   Breathing comfortably on 2L NC  Decreased breath sound bilateral lower lung fields   Abdominal: Soft. Bowel sounds are normal. She exhibits no distension. There is tenderness (diffuse).   Musculoskeletal: Normal range of motion. She exhibits edema and tenderness.   Neurological: She is alert and oriented to person, place, and time.   CN largely in tact  Strength full throughout  No sensory deficits   Skin: Skin is warm and dry. Rash (vasculitis rash to BLE; erythema to inner thighss) noted.   Psychiatric: She has a normal mood and affect. Her behavior is normal.   Nursing note and vitals reviewed.        CRANIAL NERVES     CN III, IV, VI   Extraocular motions are normal.        Significant Labs: All pertinent labs within the past 24 hours have been reviewed.    Significant Imaging: No new imaging to review

## 2019-01-27 NOTE — ASSESSMENT & PLAN NOTE
- Chronic steroid use  - Recently started Cytoxan in December per nephrology  - CD4 <200  - Respiratory cultures positive for stenotrophomonas, started on levaquin  - Continue Levaquin   - Continue acyclovir  - ID, Heme/Onc consult in the am

## 2019-01-27 NOTE — ASSESSMENT & PLAN NOTE
- Follows w/ nephrology in [place]  - Recently started on Cytoxan in December w/ dosing x2 monthly  - Received infusion prior to transfer  - Consult heme/onc, nephrology

## 2019-01-27 NOTE — ASSESSMENT & PLAN NOTE
24 yo F w/ PMH of SLE, anemia, HTN, and anxiety/depression who presents as a transfer with worsening SLE symptoms. Presents for nephrology and rheumatology consults. Recent hospitalization complicated by progressive renal failure 2/2 lupus nephritis, currently receiving HD daily.     Plan:  - Consult nephrology, rheumatology in the morning  - Continue IV solumedrol 60 mg q6  - Resume home hydroxychloroquine  - IV morphine PRN   - Retroperitoneal US pending

## 2019-01-27 NOTE — HPI
24yo F with history of SLE (dx Dec 2017, +LARA, malar rash, arthralgias, oral ulcers, hair loss, pleurisy, sicca symptoms, fatigue, photosensitivity), lupus nephritis (confirmed with kidney biopsy x 3, most recent Dec 2018), HTN, anemia, anxiety/depression was transferred from [facility] for lupus flare.  Patient was admitted to [facility] on 1/16/19 for worsening renal failure, anemia, edema, joint pain, and fatigue. During admission at [facility], patient found to be fluid overloaded and started on diuretics. Also re-started on home dose of prednisone 60 mg daily. Seen by Nephrologist  and given second dose of Cytoxan 500 mg IV on 1/19/19 (first dose on 12/31/18) per [facility]upus protocol. Patient also w/ productive cough. CTA done on 1/23/19 revealing multifocal pneumonia. Sputum growing Stenotrophomonas and patient started on Levaquin.  Patient was transferred to [facility] on 1/26 because no rheumatology inpatient. Day prior to transfer, prednisone 60 mg oral changed to Solumedrol 60 mg IV q 6h after and initial 125 mg IV bolus.     Lupus history:  Patient was diagnosed with SLE in Dec 2017. She was first diagnosed via kidney biopsy and then found thereafter +LARA.  Patient had worsening kidney function for a while and underwent kidney biopsy 12/2017 by Dr. Pendleton in [place].  She was found to have class III/IV lupus nephritis at that time.  She was seeing Dr. Lanza (rheumatology at that time).  At the time of diagnose, patient was admitted. She was started on high dose steroid x 3 days.  Discharge with prednisone 60mg.  Later started on plaquenil 200mg and then Cellcept 500mg BID.  Patient developed multiple infections (double PNA, C.diff, and MRSA) at that time and Cellcept was discontinued for around 1 month and then restarted. Attempts were made to taper steroid - she was down to prednisone 10mg and then developed a flare.  Prednisone was increased back to 60mg. Her 2nd renal biopsy was performed in March 2018 and showed  "class V lupus nephritis.  Patient was started on Benylasta SQ (started since Sept 2018) in addition to her plaquenil, prednisone and Cellcept.  She switched rheumatologist to Dr. Alfred Small (on [place]) just a few months ago.   Her 3rd renal biopsy was done Dec 27, 2018 and showed class III/IV lupus with crescents.  Dr. Pendleton decided to start Cytoxan. Has had in depth conversations about ovarian preservation with Cytoxan and has declined this. She was pulsed with Solumedrol 1g IV x 3 days, last dose on 12/31/18 and also started on Cyclophosphamide 500 mg IV q 2 weeks for 6 sessions per [facility] Lupus protocol. Altogether, has received two Cyclophosphomide infusions (12/31/18 and 1/19/19).      Initial SLE presentation (at the time of diagnose) include fatigue, diffused joint pain and kidney dysfunction.  Patient then developed Sicca symptoms, malar rash, photosensitivity, CP, SOB, and pleurisy.      Patient had been compliant with all home medications.  Most of her lupus care had been managed by her outpatient nephrologist (Dr. Pendleton).  Had couple of transfusions in the past because of Cellcept.  Had a bone marrow biopsy for evaluation of pancytopenia (July 2018) - showed hypocellularity with marked megaloblastic erythroid hyperplasia.  Per hem/onc, Dr. Goodwin, "the peripheral blood and bone marrow findings are suggestive of a secondary process, that is, post-treatment megaloblastic anemia in view of the patient's history of vitamin B12 deficiency.  Other possible etiologies for the patient's leukopenia would include immunosuppressive drugs/toxin effect, folate deficiency or chronic autoimmune disease. Suspect autoimmune mediated and /or immunosuppressive drug mediated hematopathology and marrow findings." Also had EGD/Colonoscopy in July 2018 which showed "erosive gastritis and small non-bleeding antral ulcerations, minimal non-specific colitis."    Patient just started on HD during this hospitalization (at " [facility]).  Still making urine.  Complaining of diffused body swelling.  Denies any recent sick contacts/travels, fever/chills, mouth ulcers, rash. +abdominal discomfort (feels like distention), +productive cough (greenish), pleurisy, sicca symptoms, CP, SOB, and joint swelling.   Last HD session was 1/25 at [facility].

## 2019-01-27 NOTE — PLAN OF CARE
Problem: Adult Inpatient Plan of Care  Goal: Plan of Care Review  Outcome: Ongoing (interventions implemented as appropriate)  Recommendations     1. Continue current Renal diet. Add Novasource ONS if PO intake consistently <50%.   2. RD to monitor & follow-up.

## 2019-01-27 NOTE — ASSESSMENT & PLAN NOTE
23F PMH SLE on cytoxan, steroids and plaquenil who presents as a transfer for nephrology and rheumatology consults, recently diagnosed w/ stenotrophomonas PNA and candidal esophagitis. Symptoms improving. ID consulted for treatment recommendations.    Recommendations:  - continue levofloxacin x 7 days total for treatment of stenotrophomonas in sputum cx  - continue fluconazole x 14 days total for treatment of candidal esophagitis    ID will sign off. Call with questions.

## 2019-01-27 NOTE — ASSESSMENT & PLAN NOTE
- Chronic steroid use  - Recently started Cytoxan in December per nephrology  - CD4 <200  - Respiratory cultures positive for stenotrophomonas, started on levaquin  - ID consulted; appreciate assistance  - Continue Levaquin   - Continue acyclovir, fluconazole for ppx  - Patient with sulfur allergy; will hold off on PCP ppx until assessed by ID

## 2019-01-27 NOTE — ASSESSMENT & PLAN NOTE
22yo F with history of SLE (dx Dec 2017 via kidney biopsy), lupus nephritis (confirmed with kidney biopsy - Dec 2017), HTN, anemia, anxiety/depression was transferred from [facility] for lupus flare.  Patient was admitted to [facility] for worsening renal failure, edema, joint pain, and fatigue.  Patient was found to have a lupus nephritis flare and started on high dose steroid and HD was started.  During this admission, patient was also given one dose of Cytoxan and found to have PNA (+Stenotrophomas - sensitive to Levaquin).  Patient was transferred to [facility] on 1/26 because no rheumatology inpatient.    Patient was diagnosed with Dec 2017 with SLE.  She was first diagnosed via kidney biopsy and then found thereafter +LARA.  Patient had a worsening kidney function for a while and underwent kidney biopsy by Dr. Pendleton in [place].  She was found to have class III/IV lupus nephritis at that time.  She was seeing Dr. Lanza (rheumatology at that time).  At the time of diagnose, patient was admitted.  She was started on high dose steroid x 3 days.  Discharge with prednisone 60mg.  Later started on plaquenil 200mg and then CellCept 500mg BID.  Patient developed multiple infections (double PNA, C.diff, and MRSA) at that time and CellCept was discontinued for a while.  Attempts were made to taper steroid - she was down to prednisone 10mg and then developed a flare.  Flare consist of worsen renal function.  Prednisone was increased back to 60mg.  2nd renal biopsy was performed in March 2018 and showed class V lupus nephritis.  Patient was then started on Benylasta SQ (started since Sept 2018) in addition to her plaquenil, prednisone and Cellcept.  She switched rheumatologist to Dr. Alfred Small (on [place]) just a few months ago.   3rd renal biopsy was done Dec 27 2018 and showed class III/IV lupus with crescent.  Discussion was made about treatment plan - patient agreed to start Cytoxan immediately.  1st dose of Cytoxan administered in  [facility] on Dec 31.  Goal was to do Cytoxan infusion Q2 weeks for 6 sessions.  Last cytoxan was done during Jan 16 admission.     Initial SLE presentation (at the time of diagnose) include fatigue, diffused joint pain and kidney dysfunction.  Patient then developed Sicca symptoms, joint pain (bilateral hands, ankles, and knees), LE weakness, malar rash, photosensitivity, CP, SOB, and pleurisy.      Labs: leukocytosis (12.70), thrombocytopenia (96).  Elevated inflammatory markers (ESR 83, .6).     Imaging: CXR : NUBIA +opacification.  US kidneys - renal disease, no acute processes.      At this time, patient appears to exhibit signs of lupus nephritis flare without other manifestation and PNA.  Cytoxan was recently administered and patient is on high dose steroids for the flare.     Plan   - Hold Benylasta SQ at this time - +PNA  - PNA treatment as per primary team  - Continue high dose steroid   - Will need more information about [facility] hospitalization (including dosing of cytoxan and steroid. Renal biopsy reports, previous labs).  Also need clarification for why acyclovir is on board - prophylaxis?    - Patient had extend long usage of high dose steroid - will need PCP prophylaxis with Bactrim DS M/W/F  - appreciate nephrology recommendation on management of lupus nephritis  - Pending more labs: C3/C4, LARA, dsDNA, ANCA, APS, ruben, LDH, Hapto, UA, Up/c  - continue home dose of plaquenil

## 2019-01-27 NOTE — H&P
[facility] Hospital Medicine  History & Physical    Patient Name: Anitha Swenson  MRN: [id number]  Admission Date: 1/26/2019  Attending Physician: Trevon Gao MD   Primary Care Provider: John Garcia MD    [facility] Medicine Team: [facility] MED 1 Kim Fox MD     Patient information was obtained from patient, parent, past medical records and ER records.     Subjective:     Principal Problem:Lupus    Chief Complaint:   Chief Complaint   Patient presents with    Kidney Failure        HPI: 24 yo F w/ PMH of SLE, anemia, HTN, and anxiety/depression who presents as a transfer from [facility] for nephrology and rheumatology consults. She presented to OSH on 1/16 with progressive renal failure, edema, and worsening skin rash, as well as joint pain. Initially during her hospitalization she continued her home prednisone but as symptoms progressed she was started on IV Solumedrol for stress dosing. Her admission has been complicated by progressive renal failure requiring daily HD with worsening peripheral and pulmonary edema, as well as developing pneumonia w/ cultures positive for stenotrophomonas. Due to her immunocompromised status, she was started on antibiotic regimen levaquin. On presentation, she was also noted to have developed a vasculitis skin rash to her lower extremities, which was painful to palpation. Wound care was following. Of note, she was recently diagnosed w/ candidal esophagitis. Her most recent CD4 count was <200. Follows with nephrology who initiated chemotherapy w/ cytoxan which she receives infusions twice a month. No previous cardiac involvement. Reports shortness of breath stable on 2L NC, as well as nausea, bilateral lower back pain. Denies fever, chills, chest pain, abdominal pain, dysuria, hematuria, and hematochezia.        Past Medical History:   Diagnosis Date    Anemia due to chronic blood loss 2/8/2018    Anemia due to multiple mechanisms 2/8/2018    Anemia,  chronic disease 2/8/2018    Compound heterozygous MTHFR mutation [id number] 3/21/2018    Elevated homocysteine 2/8/2018    GI bleeding 2/8/2018    Hypertension     Lupus nephritis, ISN/RPS class IV 12/2018    diagnosed 12/2018, 3 biopsies, 1st - Class 3-4, no crescents, IFTA 15%, 2nd - Class V, 3rd - Class 4, w/ cressents, IFTA 30%. Treated with cellcept in 2018 until 10-11/2018 (stopped for neutropenic fever), last steroid pulce 12/31/18 and had 2 doses CYC, last 1/19/2018. On HD since 1/2019. Sees Dr. Pendleton, nephrology in [place], [place]. and Dr. Small, rheumatology    Migraine headache with aura     Other pulmonary embolism without acute cor pulmonale 2/8/2018       Past Surgical History:   Procedure Laterality Date    ARTHROSCOPIC FEMOROPLASTY Left 12/26/2014    Performed by Avery Blake MD at [facility] OR [address] [place] [address]    ARTHROSCOPY-HIP WITH ACETABULOPLASTY(INCLUDES LABRAL REPAIR Left 12/26/2014    Performed by Avery Blake MD at [facility] OR [address] [place] [address]    HIP SURGERY      REPAIR-LABRAL Left 12/26/2014    Performed by Avery Blake MD at [facility] OR [address] [place] [address]    TYMPANOSTOMY TUBE PLACEMENT         Review of patient's allergies indicates:   Allergen Reactions    Sulfur        No current facility-administered medications on file prior to encounter.      Current Outpatient Medications on File Prior to Encounter   Medication Sig    ACZONE 7.5 % GlwP     BENLYSTA 200 mg/mL AtIn     calcitRIOL (ROCALTROL) 0.25 MCG Cap     calcium acetate (PHOSLO) 667 mg capsule     CARTIA  mg 24 hr capsule TK 1 C PO QD    carvedilol (COREG) 12.5 MG tablet TK 1 T PO BID WITH FOOD    citric acid-sodium citrate 500-334 mg/5 ml (BICITRA) 500-334 mg/5 mL solution     clonazePAM (KLONOPIN) 0.5 MG tablet     cloNIDine (CATAPRES) 0.1 MG tablet     diclofenac sodium (VOLTAREN) 1 % Gel APPLY TO AFFECTED AREA Q 4 TO 6 H    DILT- mg CDCR     diphenoxylate-atropine 2.5-0.025 mg (LOMOTIL) 2.5-0.025 mg per tablet      DULoxetine (CYMBALTA) 60 MG capsule TK 1 C PO QD    ELIQUIS 2.5 mg Tab     escitalopram oxalate (LEXAPRO) 20 MG tablet TK 1 T PO QD    fluconazole (DIFLUCAN) 200 MG Tab     furosemide (LASIX) 40 MG tablet     gabapentin (NEURONTIN) 300 MG capsule     hydroxychloroquine (PLAQUENIL) 200 mg tablet TK 1 T PO QD    medroxyPROGESTERone (DEPO-PROVERA) 150 mg/mL Syrg     mupirocin (BACTROBAN) 2 % ointment     mycophenolate (CELLCEPT) 500 mg Tab     ondansetron (ZOFRAN) 4 MG tablet     oxyCODONE-acetaminophen (PERCOCET) 5-325 mg per tablet     pantoprazole (PROTONIX) 40 MG tablet     predniSONE (DELTASONE) 10 MG tablet     ramipril (ALTACE) 5 MG capsule     RHOFADE 1 % Crea     sodium bicarbonate 650 MG tablet     tramadol-acetaminophen 37.5-325 mg (ULTRACET) 37.5-325 mg Tab TK ONE T PO Q 4 TO 6 H PRN    zinc oxide 40 % Oint MAXIMILIAN TOPICALLY AA BID     Family History     Problem Relation (Age of Onset)    Diabetes Mellitus Maternal Grandfather        Tobacco Use    Smoking status: Never Smoker    Smokeless tobacco: Never Used   Substance and Sexual Activity    Alcohol use: No    Drug use: No    Sexual activity: Not on file     Review of Systems   Constitutional: Positive for fatigue. Negative for appetite change, chills and fever.   HENT: Positive for congestion. Negative for sore throat and trouble swallowing.    Eyes: Negative for visual disturbance.   Respiratory: Positive for cough and shortness of breath.    Cardiovascular: Positive for leg swelling. Negative for chest pain and palpitations.   Gastrointestinal: Positive for nausea. Negative for abdominal pain, blood in stool and vomiting.   Genitourinary: Negative for difficulty urinating, dysuria and hematuria.   Musculoskeletal: Positive for arthralgias and back pain.   Skin: Positive for color change and rash.   Neurological: Positive for weakness. Negative for dizziness and headaches.   Psychiatric/Behavioral: Negative for confusion.      Objective:     Vital Signs (Most Recent):  Temp: 97.8 °F (36.6 °C) (01/26/19 1751)  Pulse: 99 (01/26/19 1751)  Resp: 20 (01/26/19 1751)  BP: (!) 136/99 (01/26/19 1751)  SpO2: 95 % (01/26/19 1751) Vital Signs (24h Range):  Temp:  [97.8 °F (36.6 °C)-98.1 °F (36.7 °C)] 97.8 °F (36.6 °C)  Pulse:  [] 99  Resp:  [16-21] 20  SpO2:  [95 %-98 %] 95 %  BP: (129-136)/(79-99) 136/99        There is no height or weight on file to calculate BMI.    Physical Exam   Constitutional: She is oriented to person, place, and time. She appears well-developed and well-nourished. No distress.   HENT:   Head: Normocephalic and atraumatic.   Diffuse facial swelling   Eyes: Conjunctivae and EOM are normal. No scleral icterus.   Neck: Normal range of motion. Neck supple.   Cardiovascular: Normal rate, regular rhythm, normal heart sounds and intact distal pulses.   No murmur heard.  Pulmonary/Chest: Effort normal and breath sounds normal. No respiratory distress.   Breathing comfortably on 2L NC  Decreased breath sound bilateral lower lung fields   Abdominal: Soft. Bowel sounds are normal. She exhibits no distension. There is tenderness (diffuse).   Musculoskeletal: Normal range of motion. She exhibits edema and tenderness.   Neurological: She is alert and oriented to person, place, and time.   CN largely in tact  Strength full throughout  No sensory deficits   Skin: Skin is warm and dry. Rash (vasculitis rash to BLE; erythema to inner thighss) noted.   Psychiatric: She has a normal mood and affect. Her behavior is normal.   Nursing note and vitals reviewed.        CRANIAL NERVES     CN III, IV, VI   Extraocular motions are normal.        Significant Labs: All pertinent labs within the past 24 hours have been reviewed.    Significant Imaging: No new imaging to review    Assessment/Plan:     * Lupus    24 yo F w/ PMH of SLE, anemia, HTN, and anxiety/depression who presents as a transfer with worsening SLE symptoms. Presents for  nephrology and rheumatology consults. Recent hospitalization complicated by progressive renal failure 2/2 lupus nephritis, currently receiving HD daily.     Plan:  - Consult nephrology, rheumatology in the morning  - Continue IV solumedrol 60 mg q6  - Resume home hydroxychloroquine  - IV morphine PRN   - Retroperitoneal US pending     Hypertension    - On clonidine, ramipril, coreg at home  - Continue home coreg at this time         Acid reflux disease with ulcer    - Hx of nonbleeding ulcers  - Continue home pantoprazole daily       Lupus nephritis    - Follows w/ nephrology in [place]  - Recently started on Cytoxan in December w/ dosing x2 monthly  - Received infusion prior to transfer  - Consult heme/onc, nephrology       Anxiety and depression    - Continue home Lexapro   - Xanax PRN       Shortness of breath    - Not on O2 at home  - Breathing comfortably on 2L NC  - O2 supplementation started following ARF w/ worsening pulmonary edema  - CXR pending  - HD per nephrology       Immunocompromised    - Chronic steroid use  - Recently started Cytoxan in December per nephrology  - CD4 <200  - Respiratory cultures positive for stenotrophomonas, started on levaquin  - Continue Levaquin   - Continue acyclovir  - ID, Heme/Onc consult in the am       Anemia, chronic disease    - No active signs of bleeding  - CBC daily         VTE Risk Mitigation (From admission, onward)        Ordered     heparin (porcine) injection 5,000 Units  Every 8 hours      01/26/19 1810     IP VTE HIGH RISK PATIENT  Once      01/26/19 1805             Kim Fox MD  Department of Hospital Medicine   [facility]

## 2019-01-27 NOTE — HPI
23F PMH SLE on cytoxan, plaquenil and steroids, HTN, anxiety and depression who was transferred to [facility] after presenting w/ worsening renal failure, edema, skin rash and joint pain. She was started on HD for worsening peripheral and pulmonary edema. Sputum cx + stenotrophomonas on 1/22. She also has recent diagnosis of candidal esophagitis. ID consulted for recommendations regarding these issues. She states her respiratory status has improved, denies fevers, pain w/ swallowing has improved. No other complaints.

## 2019-01-27 NOTE — CONSULTS
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  Pt states she had HD a few days ago via CVC.  She has no acute respiratory complaints at the moment.  She states she she had had 3 kidney bxs and on chart review, she is currently on cytoxan for SLE.  She has no fever, chills or sob at this time.  She is on abx for sputum positive for stenotrophomonas.    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 Treated with cellcept in 2018 until 10-11/2018 (stopped for neutropenic fever), last steroid pulce 12/31/18 and had 2 doses CYC, last 1/19/2018. On HD since 1/2019. Sees Dr. Pendleton, nephrology in [place], [place]. and Dr. Small, rheumatology    Migraine headache with aura     Other pulmonary embolism without acute cor pulmonale 2/8/2018       Allergies:   Review of patient's allergies indicates:   Allergen Reactions    Sulfur        Home Meds:   No current facility-administered medications on file prior to encounter.      Current Outpatient Medications on File Prior to Encounter   Medication Sig Dispense Refill    ACZONE 7.5 % GlwP       BENLYSTA 200 mg/mL AtIn       calcitRIOL (ROCALTROL) 0.25 MCG Cap       calcium acetate (PHOSLO) 667 mg capsule       CARTIA  mg 24 hr capsule TK 1 C PO QD  3    carvedilol (COREG) 12.5 MG tablet TK  1 T PO BID WITH FOOD  0    citric acid-sodium citrate 500-334 mg/5 ml (BICITRA) 500-334 mg/5 mL solution       clonazePAM (KLONOPIN) 0.5 MG tablet       cloNIDine (CATAPRES) 0.1 MG tablet       diclofenac sodium (VOLTAREN) 1 % Gel APPLY TO AFFECTED AREA Q 4 TO 6 H  1    DILT- mg CDCR   0    diphenoxylate-atropine 2.5-0.025 mg (LOMOTIL) 2.5-0.025 mg per tablet       DULoxetine (CYMBALTA) 60 MG capsule TK 1 C PO QD  2    ELIQUIS 2.5 mg Tab       escitalopram oxalate (LEXAPRO) 20 MG tablet TK 1 T PO QD  3    fluconazole (DIFLUCAN) 200 MG Tab       furosemide (LASIX) 40 MG tablet       gabapentin (NEURONTIN) 300 MG capsule   0    hydroxychloroquine (PLAQUENIL) 200 mg tablet TK 1 T PO QD  6    medroxyPROGESTERone (DEPO-PROVERA) 150 mg/mL Syrg       mupirocin (BACTROBAN) 2 % ointment       mycophenolate (CELLCEPT) 500 mg Tab       ondansetron (ZOFRAN) 4 MG tablet       oxyCODONE-acetaminophen (PERCOCET) 5-325 mg per tablet       pantoprazole (PROTONIX) 40 MG tablet       predniSONE (DELTASONE) 10 MG tablet   0    ramipril (ALTACE) 5 MG capsule   0    RHOFADE 1 % Crea       sodium bicarbonate 650 MG tablet   0    tramadol-acetaminophen 37.5-325 mg (ULTRACET) 37.5-325 mg Tab TK ONE T PO Q 4 TO 6 H PRN  1    zinc oxide 40 % Oint MAXIMILIAN TOPICALLY AA BID  0       Inpatient Meds: Scheduled Meds:   acyclovir  200 mg Oral BID    albuterol-ipratropium  3 mL Nebulization Q6H WAKE    calcium acetate  667 mg Oral TID WM    carvedilol  12.5 mg Oral BID    cetirizine  10 mg Oral Daily    chlorhexidine  15 mL Mouth/Throat BID    escitalopram oxalate  20 mg Oral Daily    fluconazole  100 mg Oral Daily    gabapentin  300 mg Oral TID    heparin (porcine)  5,000 Units Subcutaneous Q8H    hydroxychloroquine  200 mg Oral Daily    [START ON 1/28/2019] levoFLOXacin (LEVAQUIN) IVPB  250 mg Intravenous Q48H    methylPREDNISolone sodium succinate  60 mg Intravenous Q6H    pantoprazole  40 mg Oral Daily     sodium bicarbonate  650 mg Oral Daily    vitamin D  1,000 Units Oral Daily     Continuous Infusions:  PRN Meds:.albuterol-ipratropium, ALPRAZolam, dextrose 50%, dextrose 50%, glucagon (human recombinant), glucose, glucose, morphine, ondansetron, ondansetron, ramelteon, sodium chloride 0.9%, sodium chloride 0.9%, zinc oxide    FH:   Family History   Problem Relation Age of Onset    Diabetes Mellitus Maternal Grandfather        SH:   Social History     Socioeconomic History    Marital status: Single     Spouse name: Not on file    Number of children: Not on file    Years of education: Not on file    Highest education level: Not on file   Social Needs    Financial resource strain: Not on file    Food insecurity - worry: Not on file    Food insecurity - inability: Not on file    Transportation needs - medical: Not on file    Transportation needs - non-medical: Not on file   Occupational History    Not on file   Tobacco Use    Smoking status: Never Smoker    Smokeless tobacco: Never Used   Substance and Sexual Activity    Alcohol use: No    Drug use: No    Sexual activity: Not on file   Other Topics Concern    Not on file   Social History Narrative    Not on file       PSxHx:   Past Surgical History:   Procedure Laterality Date    ARTHROSCOPIC FEMOROPLASTY Left 12/26/2014    Performed [facility] at [facility] OR [address] [place] [address]    ARTHROSCOPY-HIP WITH ACETABULOPLASTY(INCLUDES LABRAL REPAIR Left 12/26/2014    Performed by [facility] at [facility] OR [address] [place] [address]    HIP SURGERY      REPAIR-LABRAL Left 12/26/2014    Performed by [facility] MD at [facility] OR [address] [place] [address]    TYMPANOSTOMY TUBE PLACEMENT         ROS:   Constitutional -  weakness,   HEENT - no vision changes,   CVS - no chest pain, edema, palpitations  Resp - no cough, shortness of breath  GI - no nausea, vomiting or diarrhea.   - no difficulty urinating, dysuria or hematuria  Neuro - no headaches, seizures or weakness  Skin -  rash  Vascular -  no edema  Heme - no easy bleeding or bruising, chills or fever  All other systems reviewed and otherwise negative except as above    Physical Exam:   Temp:  [97.5 °F (36.4 °C)-98.4 °F (36.9 °C)] 97.5 °F (36.4 °C)  Pulse:  [] 80  Resp:  [16-21] 18  SpO2:  [94 %-97 %] 96 %  BP: (126-136)/(67-99) 135/93  I/O last 3 completed shifts:  In: 200 [P.O.:200]  Out: 50 [Urine:50]  I/O this shift:  In: -   Out: 200 [Urine:200]        Intake/Output Summary (Last 24 hours) at 1/27/2019 1114  Last data filed at 1/27/2019 0900  Gross per 24 hour   Intake 200 ml   Output 250 ml   Net -50 ml     Wt Readings from Last 1 Encounters:   01/27/19 1000 104.3 kg (229 lb 15 oz)   01/27/19 0924 104.3 kg (229 lb 15 oz)     Lines -CVC to chest wall  Vital signs reviewed.  Gen - no acute distress, alert and oriented  HENT - normocephalic, atraumatic, steroid facies  Eyes - extraocular motions intact  CVS - RRR, no murmurs or rubs  Resp -dyspnea  Abd - soft, non-tender, non-distended, no rebound or guarding  Ext/Vascular  edema  Skin - rash to bilateral LE  Neuro - alert and oriented, normal speech, moves all four extremities, no asterixis  Psych - normal mood and affect, normal thought process      Labs:   Recent Results (from the past 24 hour(s))   Hemoglobin A1c    Collection Time: 01/26/19  6:32 PM   Result Value Ref Range    Hemoglobin A1C 5.7 (H) 4.0 - 5.6 %    Estimated Avg Glucose 117 68 - 131 mg/dL   Comprehensive Metabolic Panel (CMP)    Collection Time: 01/26/19  6:32 PM   Result Value Ref Range    Sodium 134 (L) 136 - 145 mmol/L    Potassium 4.5 3.5 - 5.1 mmol/L    Chloride 98 95 - 110 mmol/L    CO2 19 (L) 23 - 29 mmol/L    Glucose 197 (H) 70 - 110 mg/dL    BUN, Bld 77 (H) 6 - 20 mg/dL    Creatinine 5.9 (H) 0.5 - 1.4 mg/dL    Calcium 8.8 8.7 - 10.5 mg/dL    Total Protein 5.5 (L) 6.0 - 8.4 g/dL    Albumin 1.7 (L) 3.5 - 5.2 g/dL    Total Bilirubin 0.3 0.1 - 1.0 mg/dL    Alkaline Phosphatase 72 55 - 135 U/L    AST 7 (L) 10 - 40 U/L     ALT 18 10 - 44 U/L    Anion Gap 17 (H) 8 - 16 mmol/L    eGFR if African American 10.8 (A) >60 mL/min/1.73 m^2    eGFR if non African American 9.4 (A) >60 mL/min/1.73 m^2   Magnesium    Collection Time: 01/26/19  6:32 PM   Result Value Ref Range    Magnesium 1.4 (L) 1.6 - 2.6 mg/dL   Phosphorus    Collection Time: 01/26/19  6:32 PM   Result Value Ref Range    Phosphorus 6.7 (H) 2.7 - 4.5 mg/dL   CBC with Automated Differential    Collection Time: 01/26/19  6:32 PM   Result Value Ref Range    WBC 12.70 3.90 - 12.70 K/uL    RBC 3.39 (L) 4.00 - 5.40 M/uL    Hemoglobin 9.8 (L) 12.0 - 16.0 g/dL    Hematocrit 29.8 (L) 37.0 - 48.5 %    MCV 88 82 - 98 fL    MCH 28.9 27.0 - 31.0 pg    MCHC 32.9 32.0 - 36.0 g/dL    RDW 13.8 11.5 - 14.5 %    Platelets 96 (L) 150 - 350 K/uL    MPV 10.0 9.2 - 12.9 fL    Immature Granulocytes CANCELED 0.0 - 0.5 %    Immature Grans (Abs) CANCELED 0.00 - 0.04 K/uL    Lymph # CANCELED 1.0 - 4.8 K/uL    Mono # CANCELED 0.3 - 1.0 K/uL    Eos # CANCELED 0.0 - 0.5 K/uL    Baso # CANCELED 0.00 - 0.20 K/uL    nRBC 2 (A) 0 /100 WBC    Gran% 83.0 (H) 38.0 - 73.0 %    Lymph% 4.0 (L) 18.0 - 48.0 %    Mono% 7.0 4.0 - 15.0 %    Eosinophil% 0.0 0.0 - 8.0 %    Basophil% 0.0 0.0 - 1.9 %    Bands 1.0 %    Metamyelocytes 1.0 %    Myelocytes 4.0 %    Aniso Slight     Poly Occasional     Toxic Granulation Present     Differential Method Manual    PT/INR    Collection Time: 01/26/19  6:32 PM   Result Value Ref Range    Prothrombin Time 10.4 9.0 - 12.5 sec    INR 1.0 0.8 - 1.2   PTT    Collection Time: 01/26/19  6:32 PM   Result Value Ref Range    aPTT 25.2 21.0 - 32.0 sec   Sedimentation rate    Collection Time: 01/26/19  6:32 PM   Result Value Ref Range    Sed Rate 83 (H) 0 - 36 mm/Hr   C-reactive protein    Collection Time: 01/26/19  6:32 PM   Result Value Ref Range    .6 (H) 0.0 - 8.2 mg/L   Calcium, ionized    Collection Time: 01/27/19  4:30 AM   Result Value Ref Range    Calcium, Ion 0.95 (L) 1.06 - 1.42  mmol/L   Comprehensive Metabolic Panel (CMP)    Collection Time: 01/27/19  6:00 AM   Result Value Ref Range    Sodium 132 (L) 136 - 145 mmol/L    Potassium 4.5 3.5 - 5.1 mmol/L    Chloride 97 95 - 110 mmol/L    CO2 20 (L) 23 - 29 mmol/L    Glucose 145 (H) 70 - 110 mg/dL    BUN, Bld 88 (H) 6 - 20 mg/dL    Creatinine 5.9 (H) 0.5 - 1.4 mg/dL    Calcium 8.6 (L) 8.7 - 10.5 mg/dL    Total Protein 5.4 (L) 6.0 - 8.4 g/dL    Albumin 1.8 (L) 3.5 - 5.2 g/dL    Total Bilirubin 0.3 0.1 - 1.0 mg/dL    Alkaline Phosphatase 68 55 - 135 U/L    AST 8 (L) 10 - 40 U/L    ALT 18 10 - 44 U/L    Anion Gap 15 8 - 16 mmol/L    eGFR if African American 10.8 (A) >60 mL/min/1.73 m^2    eGFR if non African American 9.4 (A) >60 mL/min/1.73 m^2   Magnesium    Collection Time: 01/27/19  6:00 AM   Result Value Ref Range    Magnesium 1.9 1.6 - 2.6 mg/dL   Phosphorus    Collection Time: 01/27/19  6:00 AM   Result Value Ref Range    Phosphorus 8.0 (H) 2.7 - 4.5 mg/dL   CBC with Automated Differential    Collection Time: 01/27/19  6:00 AM   Result Value Ref Range    WBC 12.90 (H) 3.90 - 12.70 K/uL    RBC 3.41 (L) 4.00 - 5.40 M/uL    Hemoglobin 9.7 (L) 12.0 - 16.0 g/dL    Hematocrit 29.8 (L) 37.0 - 48.5 %    MCV 87 82 - 98 fL    MCH 28.4 27.0 - 31.0 pg    MCHC 32.6 32.0 - 36.0 g/dL    RDW 13.7 11.5 - 14.5 %    Platelets 93 (L) 150 - 350 K/uL    MPV 10.2 9.2 - 12.9 fL    Immature Granulocytes CANCELED 0.0 - 0.5 %    Immature Grans (Abs) CANCELED 0.00 - 0.04 K/uL    nRBC 2 (A) 0 /100 WBC    Gran% 90.0 (H) 38.0 - 73.0 %    Lymph% 6.0 (L) 18.0 - 48.0 %    Mono% 2.0 (L) 4.0 - 15.0 %    Eosinophil% 0.0 0.0 - 8.0 %    Basophil% 0.0 0.0 - 1.9 %    Bands 2.0 %    Platelet Estimate Decreased (A)     Aniso Slight     Poly Occasional     Toxic Granulation Present     Differential Method Manual              A/P:   1.SLE nephritis with ESRD  Check am labs- consider HD in am  Will require standard CBC, PTH, Phos  Consider PD or home dialysis when stable  Check C3 and  C4  Consult Rheum  Currently on solumedrol IV     2. Anemia of CKD- EPO with HD  Check iron studies    3. Secondary HPTH- screen with PTH      Access: CVC

## 2019-01-27 NOTE — CONSULTS
"  [facility]  Adult Nutrition  Consult Note    SUMMARY     Recommendations    1. Continue current Renal diet. Add Novasource ONS if PO intake consistently <50%.   2. RD to monitor & follow-up.    Goals: PO intake >50%  Nutrition Goal Status: new  Communication of RD Recs: reviewed with RN    Reason for Assessment    Reason For Assessment: consult  Diagnosis: other (see comments)(Lupus)  Relevant Medical History: HTN, Anemia  Interdisciplinary Rounds: did not attend    General Information Comments: Pt sleeping at time of visit w/ no family at bedside. Pt currently on renal diet & tolerating, per RN notes. Per chart review, pt weighed 92 kg - 2018. Pt appears nourished. PTA, pt requiring HD daily.  Nutrition Discharge Planning: Adequate PO intake    Nutrition/Diet History    Patient Reported Diet/Restrictions/Preferences: other (see comments)(ARCENIO)  Spiritual, Cultural Beliefs, Judaism Practices, Values that Affect Care: no  Factors Affecting Nutritional Intake: None identified at this time    Anthropometrics    Temp: 97.5 °F (36.4 °C)  Height: 5' 10.87" (180 cm)  Height (inches): 70.87 in  Weight: 104.3 kg (229 lb 15 oz)  Weight (lb): 229.94 lb  Ideal Body Weight (IBW), Female: 154.35 lb  % Ideal Body Weight, Female (lb): 148.97 lb  BMI (Calculated): 32.3  BMI Grade: 30 - 34.9- obesity - grade I  Usual Body Weight (UBW), k.6 kg(2018)  % Usual Body Weight: 112.87  % Weight Change From Usual Weight: 12.63 %    Lab/Procedures/Meds    Pertinent Labs Reviewed: reviewed  Pertinent Labs Comments: Na 132, BUN 88, Creat 5.9, GFR 10.8, P 8  Pertinent Medications Reviewed: reviewed    Estimated/Assessed Needs    Weight Used For Calorie Calculations: 104.3 kg (229 lb 15 oz)     Energy Calorie Requirements (kcal): 2371 kcal/d  Energy Need Method: [place]-St Jeor(1.25 PAL)     Protein Requirements: 104-125 g/d (1-1.2 g/kg)  Weight Used For Protein Calculations: 104.3 kg (229 lb 15 oz)     Estimated " Fluid Requirement Method: other (see comments)(Per MD or 1 mL/kcal)    Nutrition Prescription Ordered    Current Diet Order: Renal    Evaluation of Received Nutrient/Fluid Intake    Comments: LBM: 1/25    Tolerance: tolerating    Nutrition Risk    Level of Risk/Frequency of Follow-up: (1x/week)     Assessment and Plan    Nutrition Problem  Increased nutrient needs    Related to (etiology):   Increased demand for protein    Signs and Symptoms (as evidenced by):   Pt on HD PTA    Nutrition Diagnosis Status:   New     Monitor and Evaluation    Food and Nutrient Intake: energy intake, food and beverage intake  Food and Nutrient Adminstration: diet order  Physical Activity and Function: nutrition-related ADLs and IADLs  Anthropometric Measurements: weight, weight change  Biochemical Data, Medical Tests and Procedures: lipid profile, inflammatory profile, glucose/endocrine profile, gastrointestinal profile, electrolyte and renal panel  Nutrition-Focused Physical Findings: overall appearance     Nutrition Follow-Up    RD Follow-up?: Yes

## 2019-01-27 NOTE — SUBJECTIVE & OBJECTIVE
Past Medical History:   Diagnosis Date    Anemia due to chronic blood loss 2/8/2018    Anemia, chronic disease 2/8/2018    Compound heterozygous MTHFR mutation [id number] 3/21/2018    Elevated homocysteine 2/8/2018    GI bleeding 2/8/2018    Hypertension     Lupus nephritis, ISN/RPS class IV 12/2018    diagnosed 12/2018, 3 biopsies, 1st - Class 3-4, no crescents, IFTA 15%, 2nd - Class V, 3rd - Class 4, w/ cressents, IFTA 30%. Treated with cellcept in 2018 until 10-11/2018 (stopped for neutropenic fever), last steroid pulce 12/31/18 and had 2 doses CYC, last 1/19/2018. On HD since 1/2019. Sees Dr. Pendleton, nephrology in [place], [place]. and Dr. Small, rheumatology    Migraine headache with aura     Other pulmonary embolism without acute cor pulmonale 2/8/2018       Past Surgical History:   Procedure Laterality Date    ARTHROSCOPIC FEMOROPLASTY Left 12/26/2014    Performed by Avery Blake MD at [facility] OR [address] [place] [address]    ARTHROSCOPY-HIP WITH ACETABULOPLASTY(INCLUDES LABRAL REPAIR Left 12/26/2014    Performed by Avery Blake MD at [facility] OR [address] [place] [address]    HIP SURGERY      REPAIR-LABRAL Left 12/26/2014    Performed by Avery Blake MD at [facility] OR [address] [place] [address]    TYMPANOSTOMY TUBE PLACEMENT           There is no immunization history on file for this patient.    Review of patient's allergies indicates:   Allergen Reactions    Sulfur      Current Facility-Administered Medications   Medication Frequency    acyclovir suspension 200 mg BID    albuterol-ipratropium 2.5 mg-0.5 mg/3 mL nebulizer solution 3 mL Q6H PRN    albuterol-ipratropium 2.5 mg-0.5 mg/3 mL nebulizer solution 3 mL Q6H WAKE    ALPRAZolam tablet 0.5 mg Q6H PRN    calcium acetate capsule 667 mg TID WM    carvedilol tablet 12.5 mg BID    cetirizine tablet 10 mg Daily    chlorhexidine 0.12 % solution 15 mL BID    dextrose 50% injection 12.5 g PRN    dextrose 50% injection 25 g PRN    escitalopram oxalate tablet 20 mg Daily    fluconazole tablet 100  mg Daily    gabapentin capsule 300 mg TID    glucagon (human recombinant) injection 1 mg PRN    glucose chewable tablet 16 g PRN    glucose chewable tablet 24 g PRN    heparin (porcine) injection 5,000 Units Q8H    hydroxychloroquine tablet 200 mg Daily    [START ON 1/28/2019] levoFLOXacin (LEVAQUIN) 250 mg in dextrose 5 % 50 mL IVPB Q48H    methylPREDNISolone sodium succinate injection 60 mg Q6H    morphine injection 4 mg Q4H PRN    ondansetron injection 4 mg Q6H PRN    ondansetron tablet 4 mg Q6H PRN    pantoprazole EC tablet 40 mg Daily    ramelteon tablet 8 mg Nightly PRN    sodium bicarbonate tablet 650 mg Daily    sodium chloride 0.9% flush 5 mL PRN    sodium chloride 0.9% flush 5 mL PRN    vitamin D 1000 units tablet 1,000 Units Daily    zinc oxide 20 % ointment PRN     Family History     Problem Relation (Age of Onset)    Diabetes Mellitus Maternal Grandfather        Tobacco Use    Smoking status: Never Smoker    Smokeless tobacco: Never Used   Substance and Sexual Activity    Alcohol use: No    Drug use: No    Sexual activity: Not on file     Review of Systems   Constitutional: Positive for unexpected weight change. Negative for appetite change, chills and fever.   HENT: Negative for congestion, sore throat and trouble swallowing.    Eyes: Negative for visual disturbance.   Respiratory: Positive for cough and shortness of breath.    Cardiovascular: Positive for chest pain and leg swelling.   Gastrointestinal: Positive for abdominal distention and abdominal pain. Negative for blood in stool, nausea and vomiting.   Genitourinary: Negative for difficulty urinating, dysuria, genital sores and hematuria.   Musculoskeletal: Positive for arthralgias. Negative for joint swelling and neck stiffness.   Skin: Negative for rash.   Neurological: Positive for weakness. Negative for dizziness and headaches.   Psychiatric/Behavioral: Negative for confusion.     Objective:     Vital Signs (Most  Recent):  Temp: 97.6 °F (36.4 °C) (01/27/19 1220)  Pulse: 99 (01/27/19 1220)  Resp: 18 (01/27/19 0833)  BP: 132/81 (01/27/19 1220)  SpO2: 97 % (01/27/19 1220)  O2 Device (Oxygen Therapy): nasal cannula (01/27/19 0833) Vital Signs (24h Range):  Temp:  [97.5 °F (36.4 °C)-98.4 °F (36.9 °C)] 97.6 °F (36.4 °C)  Pulse:  [] 99  Resp:  [16-21] 18  SpO2:  [94 %-97 %] 97 %  BP: (126-136)/(67-99) 132/81     Weight: 104.3 kg (229 lb 15 oz) (01/27/19 1000)  Body mass index is 32.19 kg/m².  Body surface area is 2.28 meters squared.      Intake/Output Summary (Last 24 hours) at 1/27/2019 1324  Last data filed at 1/27/2019 0900  Gross per 24 hour   Intake 200 ml   Output 250 ml   Net -50 ml       Physical Exam   Constitutional: She is oriented to person, place, and time and well-developed, well-nourished, and in no distress. No distress.   Obese   +cushoid features    HENT:   Head: Normocephalic and atraumatic.   Dried oral mucosa  No signs of mouth ulcers   Normal salivary pooling    Eyes: EOM are normal. Pupils are equal, round, and reactive to light.   Neck: Normal range of motion. Neck supple.   Cardiovascular: Normal rate, regular rhythm and normal heart sounds.    Pulmonary/Chest: Effort normal and breath sounds normal.   Abdominal: Soft. She exhibits distension.   Neurological: She is alert and oriented to person, place, and time.   Skin: Skin is warm and dry.     Diffused stretch markings in bilateral thighs and abdomen   Psychiatric: Mood, affect and judgment normal.   Musculoskeletal: Normal range of motion. She exhibits edema and tenderness. She exhibits no deformity.   No signs of synovitis  ROM intact  +2 pitting edema of bilateral LE - to the calves   Mild tenderness of the ankle           Significant Labs:  Recent Results (from the past 48 hour(s))   Hemoglobin A1c    Collection Time: 01/26/19  6:32 PM   Result Value Ref Range    Hemoglobin A1C 5.7 (H) 4.0 - 5.6 %    Estimated Avg Glucose 117 68 - 131 mg/dL    Comprehensive Metabolic Panel (CMP)    Collection Time: 01/26/19  6:32 PM   Result Value Ref Range    Sodium 134 (L) 136 - 145 mmol/L    Potassium 4.5 3.5 - 5.1 mmol/L    Chloride 98 95 - 110 mmol/L    CO2 19 (L) 23 - 29 mmol/L    Glucose 197 (H) 70 - 110 mg/dL    BUN, Bld 77 (H) 6 - 20 mg/dL    Creatinine 5.9 (H) 0.5 - 1.4 mg/dL    Calcium 8.8 8.7 - 10.5 mg/dL    Total Protein 5.5 (L) 6.0 - 8.4 g/dL    Albumin 1.7 (L) 3.5 - 5.2 g/dL    Total Bilirubin 0.3 0.1 - 1.0 mg/dL    Alkaline Phosphatase 72 55 - 135 U/L    AST 7 (L) 10 - 40 U/L    ALT 18 10 - 44 U/L    Anion Gap 17 (H) 8 - 16 mmol/L    eGFR if African American 10.8 (A) >60 mL/min/1.73 m^2    eGFR if non African American 9.4 (A) >60 mL/min/1.73 m^2   Magnesium    Collection Time: 01/26/19  6:32 PM   Result Value Ref Range    Magnesium 1.4 (L) 1.6 - 2.6 mg/dL   Phosphorus    Collection Time: 01/26/19  6:32 PM   Result Value Ref Range    Phosphorus 6.7 (H) 2.7 - 4.5 mg/dL   CBC with Automated Differential    Collection Time: 01/26/19  6:32 PM   Result Value Ref Range    WBC 12.70 3.90 - 12.70 K/uL    RBC 3.39 (L) 4.00 - 5.40 M/uL    Hemoglobin 9.8 (L) 12.0 - 16.0 g/dL    Hematocrit 29.8 (L) 37.0 - 48.5 %    MCV 88 82 - 98 fL    MCH 28.9 27.0 - 31.0 pg    MCHC 32.9 32.0 - 36.0 g/dL    RDW 13.8 11.5 - 14.5 %    Platelets 96 (L) 150 - 350 K/uL    MPV 10.0 9.2 - 12.9 fL    Immature Granulocytes CANCELED 0.0 - 0.5 %    Immature Grans (Abs) CANCELED 0.00 - 0.04 K/uL    Lymph # CANCELED 1.0 - 4.8 K/uL    Mono # CANCELED 0.3 - 1.0 K/uL    Eos # CANCELED 0.0 - 0.5 K/uL    Baso # CANCELED 0.00 - 0.20 K/uL    nRBC 2 (A) 0 /100 WBC    Gran% 83.0 (H) 38.0 - 73.0 %    Lymph% 4.0 (L) 18.0 - 48.0 %    Mono% 7.0 4.0 - 15.0 %    Eosinophil% 0.0 0.0 - 8.0 %    Basophil% 0.0 0.0 - 1.9 %    Bands 1.0 %    Metamyelocytes 1.0 %    Myelocytes 4.0 %    Aniso Slight     Poly Occasional     Toxic Granulation Present     Differential Method Manual    PT/INR    Collection Time: 01/26/19   6:32 PM   Result Value Ref Range    Prothrombin Time 10.4 9.0 - 12.5 sec    INR 1.0 0.8 - 1.2   PTT    Collection Time: 01/26/19  6:32 PM   Result Value Ref Range    aPTT 25.2 21.0 - 32.0 sec   Sedimentation rate    Collection Time: 01/26/19  6:32 PM   Result Value Ref Range    Sed Rate 83 (H) 0 - 36 mm/Hr   C-reactive protein    Collection Time: 01/26/19  6:32 PM   Result Value Ref Range    .6 (H) 0.0 - 8.2 mg/L   Calcium, ionized    Collection Time: 01/27/19  4:30 AM   Result Value Ref Range    Calcium, Ion 0.95 (L) 1.06 - 1.42 mmol/L   Comprehensive Metabolic Panel (CMP)    Collection Time: 01/27/19  6:00 AM   Result Value Ref Range    Sodium 132 (L) 136 - 145 mmol/L    Potassium 4.5 3.5 - 5.1 mmol/L    Chloride 97 95 - 110 mmol/L    CO2 20 (L) 23 - 29 mmol/L    Glucose 145 (H) 70 - 110 mg/dL    BUN, Bld 88 (H) 6 - 20 mg/dL    Creatinine 5.9 (H) 0.5 - 1.4 mg/dL    Calcium 8.6 (L) 8.7 - 10.5 mg/dL    Total Protein 5.4 (L) 6.0 - 8.4 g/dL    Albumin 1.8 (L) 3.5 - 5.2 g/dL    Total Bilirubin 0.3 0.1 - 1.0 mg/dL    Alkaline Phosphatase 68 55 - 135 U/L    AST 8 (L) 10 - 40 U/L    ALT 18 10 - 44 U/L    Anion Gap 15 8 - 16 mmol/L    eGFR if African American 10.8 (A) >60 mL/min/1.73 m^2    eGFR if non African American 9.4 (A) >60 mL/min/1.73 m^2   Magnesium    Collection Time: 01/27/19  6:00 AM   Result Value Ref Range    Magnesium 1.9 1.6 - 2.6 mg/dL   Phosphorus    Collection Time: 01/27/19  6:00 AM   Result Value Ref Range    Phosphorus 8.0 (H) 2.7 - 4.5 mg/dL   CBC with Automated Differential    Collection Time: 01/27/19  6:00 AM   Result Value Ref Range    WBC 12.90 (H) 3.90 - 12.70 K/uL    RBC 3.41 (L) 4.00 - 5.40 M/uL    Hemoglobin 9.7 (L) 12.0 - 16.0 g/dL    Hematocrit 29.8 (L) 37.0 - 48.5 %    MCV 87 82 - 98 fL    MCH 28.4 27.0 - 31.0 pg    MCHC 32.6 32.0 - 36.0 g/dL    RDW 13.7 11.5 - 14.5 %    Platelets 93 (L) 150 - 350 K/uL    MPV 10.2 9.2 - 12.9 fL    Immature Granulocytes CANCELED 0.0 - 0.5 %     Immature Grans (Abs) CANCELED 0.00 - 0.04 K/uL    nRBC 2 (A) 0 /100 WBC    Gran% 90.0 (H) 38.0 - 73.0 %    Lymph% 6.0 (L) 18.0 - 48.0 %    Mono% 2.0 (L) 4.0 - 15.0 %    Eosinophil% 0.0 0.0 - 8.0 %    Basophil% 0.0 0.0 - 1.9 %    Bands 2.0 %    Platelet Estimate Decreased (A)     Aniso Slight     Poly Occasional     Toxic Granulation Present     Differential Method Manual    C3 complement    Collection Time: 01/27/19 12:12 PM   Result Value Ref Range    Complement (C-3) 146 50 - 180 mg/dL   C4 complement    Collection Time: 01/27/19 12:12 PM   Result Value Ref Range    Complement (C-4) 30 11 - 44 mg/dL         Significant Imaging:  Imaging results within the past 24 hours have been reviewed.

## 2019-01-27 NOTE — CONSULTS
Consult received. Full note to follow. Please call for questions.    Thanks,    Jackie Smith DO  Infectious Disease Fellow  P: [phone]

## 2019-01-27 NOTE — CONSULTS
Please see consult note dated 1/27 @ 8179    FERMIN Jc, [facility]  Nephrology  Pager:  [phone]

## 2019-01-27 NOTE — ASSESSMENT & PLAN NOTE
- Concern for vasculitis vs cellulitis  - Tender to touch  - Bandaged per wound care at OSH  - Wound care consulted

## 2019-01-27 NOTE — HOSPITAL COURSE
Admitted to hospital medicine. Consulted nephrology and rheumatology. Family requested to go back to [place] as management has been the same here. Continue steroids. PJP ppx w/ atovoquone 2/2 bactrim allergy and complete Levaquin for PNA. Pending permacath placement today, scheduled for today. Patient and family planning to discharge home following line placement. Confirmed scheduled HD chair on 2/1/10.

## 2019-01-27 NOTE — HPI
22 yo F w/ PMH of SLE, anemia, HTN, and anxiety/depression who presents as a transfer from OSH for nephrology and rheumatology consults. She presented to OSH on 1/16 with progressive renal failure, edema, and worsening skin rash, as well as joint pain. Initially during her hospitalization she continued her home prednisone but as symptoms progressed she was started on IV Solumedrol for stress dosing. Her admission has been complicated by progressive renal failure requiring daily HD with worsening peripheral and pulmonary edema, as well as developing pneumonia w/ cultures positive for stenotrophomonas. Due to her immunocompromised status, she was started on antibiotic regimen levaquin. On presentation, she was also noted to have developed a vasculitis skin rash to her lower extremities, which was painful to palpation. Wound care was following. Of note, she was recently diagnosed w/ candidal esophagitis. Her most recent CD4 count was <200. Follows with nephrology who initiated chemotherapy w/ cytoxan which she receives infusions twice a month. No previous cardiac involvement. Reports shortness of breath stable on 2L NC, as well as nausea, bilateral lower back pain. Denies fever, chills, chest pain, abdominal pain, dysuria, hematuria, and hematochezia.

## 2019-01-27 NOTE — PROGRESS NOTES
Pharmacist Renal Dose Adjustment Note    Anitha Swenson is a 23 y.o. female being treated with the medication levofloxacin 500 daily    Patient Data:    Vital Signs (Most Recent):  Temp: 97.5 °F (36.4 °C) (01/27/19 0814)  Pulse: 80 (01/27/19 0833)  Resp: 18 (01/27/19 0833)  BP: (!) 135/93 (01/27/19 0814)  SpO2: 96 % (01/27/19 0833)   Vital Signs (72h Range):  Temp:  [97.5 °F (36.4 °C)-100.3 °F (37.9 °C)]   Pulse:  []   Resp:  [16-22]   BP: (121-136)/(67-99)   SpO2:  [94 %-98 %]      Recent Labs   Lab 01/26/19  1832 01/27/19  0600   CREATININE 5.9* 5.9*     Serum creatinine: 5.9 mg/dL (H) 01/27/19 0600  Estimated creatinine clearance: 19.8 mL/min (A)  Patient on hemodialysis    Levaquin 500mg IV daily will be changed to 250mg IV q48h for HD dosing.    Melanie Jones, PharmD, BCPS  [id number]

## 2019-01-27 NOTE — SUBJECTIVE & OBJECTIVE
Past Medical History:   Diagnosis Date    Anemia due to chronic blood loss 2/8/2018    Anemia, chronic disease 2/8/2018    Compound heterozygous MTHFR mutation [id number] 3/21/2018    Elevated homocysteine 2/8/2018    GI bleeding 2/8/2018    Hypertension     Lupus nephritis, ISN/RPS class IV 12/2018    diagnosed 12/2018, 3 biopsies, 1st - Class 3-4, no crescents, IFTA 15%, 2nd - Class V, 3rd - Class 4, w/ cressents, IFTA 30%. Treated with cellcept in 2018 until 10-11/2018 (stopped for neutropenic fever), last steroid pulce 12/31/18 and had 2 doses CYC, last 1/19/2018. On HD since 1/2019. Sees Dr. Pendleton, nephrology in [place], [place]. and Dr. Small, rheumatology    Migraine headache with aura     Other pulmonary embolism without acute cor pulmonale 2/8/2018       Past Surgical History:   Procedure Laterality Date    ARTHROSCOPIC FEMOROPLASTY Left 12/26/2014    Performed by Avery Blake MD at [facility] OR [address] [place] [address]    ARTHROSCOPY-HIP WITH ACETABULOPLASTY(INCLUDES LABRAL REPAIR Left 12/26/2014    Performed by Avery Blake MD at [facility] OR [address] [place] [address]    HIP SURGERY      REPAIR-LABRAL Left 12/26/2014    Performed by Avery Blake MD at [facility] OR [address] [place] [address]    TYMPANOSTOMY TUBE PLACEMENT         Review of patient's allergies indicates:   Allergen Reactions    Sulfur        Medications:  Facility-Administered Medications Prior to Admission   Medication    cyanocobalamin injection 1,000 mcg     Medications Prior to Admission   Medication Sig    acetaminophen (TYLENOL) 325 MG tablet Take 325 mg by mouth every 6 (six) hours as needed for Pain.    ALPRAZolam (XANAX) 1 MG tablet Take 1 mg by mouth daily as needed for Anxiety.    BENLYSTA 200 mg/mL AtIn Inject 200 mg as directed Every Friday.     bumetanide (BUMEX) 2 MG tablet Take 2 mg by mouth 3 (three) times daily.    CARTIA  mg 24 hr capsule Take 1 capsule by mouth once daily    carvedilol (COREG) 12.5 MG tablet Take 1 tablet by mouth twice daily     cholecalciferol, vitamin D3, (VITAMIN D3) 5,000 unit Tab Take 5,000 Units by mouth every morning.    cloNIDine (CATAPRES) 0.1 MG tablet Take 0.1 mg by mouth as needed.     escitalopram oxalate (LEXAPRO) 20 MG tablet Take 1 tablet by mouth once daily    gabapentin (NEURONTIN) 300 MG capsule Take 300 mg by mouth 3 (three) times daily.     HYDROcodone-acetaminophen (NORCO) 5-325 mg per tablet Take 1 tablet by mouth every 12 (twelve) hours as needed for Pain.    hydroxychloroquine (PLAQUENIL) 200 mg tablet Take 1 tablet by mouth nightly    loperamide (IMODIUM A-D) 2 mg Tab Take 2 mg by mouth 4 (four) times daily as needed (diarrhea).    medroxyPROGESTERone (DEPO-PROVERA) 150 mg/mL Syrg Inject 150 mg into the muscle every 3 (three) months.     metOLazone (ZAROXOLYN) 5 MG tablet Take 5 mg by mouth every morning.    ondansetron (ZOFRAN) 4 MG tablet Take 4 mg by mouth every 24 hours as needed for Nausea.     pantoprazole (PROTONIX) 40 MG tablet Take 40 mg by mouth 2 (two) times daily.     PREDNISONE ORAL Take 60 mg by mouth once daily.     temazepam (RESTORIL) 15 mg Cap Take 15 mg by mouth nightly as needed (insomnia).     Antibiotics (From admission, onward)    Start     Stop Route Frequency Ordered    01/28/19 2100  levoFLOXacin (LEVAQUIN) 250 mg in dextrose 5 % 50 mL IVPB      -- IV Every 48 hours (non-standard times) 01/27/19 0928        Antifungals (From admission, onward)    Start     Stop Route Frequency Ordered    01/27/19 0900  fluconazole tablet 100 mg      -- Oral Daily 01/26/19 1853        Antivirals (From admission, onward)        Stop Route Frequency     acyclovir      -- Oral 2 times daily             There is no immunization history on file for this patient.    Family History     Problem Relation (Age of Onset)    Diabetes Mellitus Maternal Grandfather        Social History     Socioeconomic History    Marital status: Single     Spouse name: None    Number of children: None    Years of  education: None    Highest education level: None   Social Needs    Financial resource strain: None    Food insecurity - worry: None    Food insecurity - inability: None    Transportation needs - medical: None    Transportation needs - non-medical: None   Occupational History    None   Tobacco Use    Smoking status: Never Smoker    Smokeless tobacco: Never Used   Substance and Sexual Activity    Alcohol use: No    Drug use: No    Sexual activity: None   Other Topics Concern    None   Social History Narrative    None     Review of Systems   Constitutional: Negative for activity change, appetite change, chills, fever and unexpected weight change.   HENT: Negative for dental problem, ear discharge, ear pain, mouth sores, sinus pain, sore throat and trouble swallowing.    Eyes: Negative for pain and discharge.   Respiratory: Positive for cough. Negative for chest tightness, shortness of breath and wheezing.    Cardiovascular: Negative for chest pain and leg swelling.   Gastrointestinal: Negative for abdominal distention, abdominal pain, constipation, diarrhea, nausea and vomiting.   Genitourinary: Negative for difficulty urinating, dysuria, flank pain, frequency, genital sores and hematuria.   Musculoskeletal: Negative for arthralgias, joint swelling, neck pain and neck stiffness.   Skin: Negative for color change, rash and wound.   Allergic/Immunologic: Positive for immunocompromised state.   Neurological: Negative for dizziness, weakness, light-headedness, numbness and headaches.   Psychiatric/Behavioral: Negative for confusion. The patient is not nervous/anxious.      Objective:     Vital Signs (Most Recent):  Temp: 97.6 °F (36.4 °C) (01/27/19 1220)  Pulse: 104 (01/27/19 1340)  Resp: 18 (01/27/19 1340)  BP: 132/81 (01/27/19 1220)  SpO2: 96 % (01/27/19 1340) Vital Signs (24h Range):  Temp:  [97.5 °F (36.4 °C)-98.4 °F (36.9 °C)] 97.6 °F (36.4 °C)  Pulse:  [] 104  Resp:  [16-21] 18  SpO2:  [94 %-97 %]  96 %  BP: (126-136)/(67-99) 132/81     Weight: 104.3 kg (229 lb 15 oz)  Body mass index is 32.19 kg/m².    Estimated Creatinine Clearance: 19.8 mL/min (A) (based on SCr of 5.9 mg/dL (H)).    Physical Exam   Constitutional: She is oriented to person, place, and time. She appears well-developed and well-nourished. No distress.   HENT:   Right Ear: External ear normal.   Left Ear: External ear normal.   Nose: Nose normal.   Mouth/Throat: Oropharynx is clear and moist.   Eyes: Conjunctivae and EOM are normal.   Neck: Normal range of motion. Neck supple.   Cardiovascular: Normal rate, regular rhythm, normal heart sounds and intact distal pulses.   No murmur heard.  Pulmonary/Chest: Effort normal and breath sounds normal. No respiratory distress. She has no wheezes.   Abdominal: Soft. Bowel sounds are normal. She exhibits no distension. There is no tenderness.   Musculoskeletal: Normal range of motion. She exhibits no edema.   Neurological: She is alert and oriented to person, place, and time. No cranial nerve deficit. Coordination normal.   Skin: Skin is warm and dry. She is not diaphoretic. No erythema.   Psychiatric: She has a normal mood and affect. Her behavior is normal.   Vitals reviewed.      Significant Labs:   CBC:   Recent Labs   Lab 01/26/19  1832 01/27/19  0600   WBC 12.70 12.90*   HGB 9.8* 9.7*   HCT 29.8* 29.8*   PLT 96* 93*     CMP:   Recent Labs   Lab 01/26/19  1832 01/27/19  0600   * 132*   K 4.5 4.5   CL 98 97   CO2 19* 20*   * 145*   BUN 77* 88*   CREATININE 5.9* 5.9*   CALCIUM 8.8 8.6*   PROT 5.5* 5.4*   ALBUMIN 1.7* 1.8*   BILITOT 0.3 0.3   ALKPHOS 72 68   AST 7* 8*   ALT 18 18   ANIONGAP 17* 15   EGFRNONAA 9.4* 9.4*     Microbiology Results (last 7 days)     ** No results found for the last 168 hours. **          Significant Imaging: I have reviewed all pertinent imaging results/findings within the past 24 hours.

## 2019-01-27 NOTE — CONSULTS
[facility]  Rheumatology  Consult Note    Patient Name: Anitha Swenson  MRN: [id number]  Admission Date: 1/26/2019  Hospital Length of Stay: 1 days  Code Status: Full Code   Attending Provider: Trevon Gao MD  Primary Care Physician: John Garcia MD  Principal Problem:Lupus    Consults  Subjective:     HPI: 24yo F with history of SLE (dx Dec 2017 via kidney biopsy), lupus nephritis (confirmed with kidney biopsy - Dec 2017), HTN, anemia, anxiety/depression was transferred from [facility] for lupus flare.  Patient was admitted to [facility] for worsening renal failure, edema, joint pain, and fatigue.  Patient was found to have a lupus nephritis flare and started on high dose steroid and HD was started.  During this admission, patient was also given one dose of Cytoxan and found to have PNA (+Stenotrophomas - sensitive to Levaquin).  Patient was transferred to [facility] on 1/26 because no rheumatology inpatient.    Patient was diagnosed with Dec 2017 with SLE.  She was first diagnosed via kidney biopsy and then found thereafter +LARA.  Patient had a worsening kidney function for a while and underwent kidney biopsy by Dr. Pendleton in [place].  She was found to have class III/IV lupus nephritis at that time.  She was seeing Dr. Lanza (rheumatology at that time).  At the time of diagnose, patient was admitted.  She was started on high dose steroid x 3 days.  Discharge with prednisone 60mg.  Later started on plaquenil 200mg and then CellCept 500mg BID.  Patient developed multiple infections (double PNA, C.diff, and MRSA) at that time and CellCept was discontinued for a while.  Attempts were made to taper steroid - she was down to prednisone 10mg and then developed a flare.  Flare consist of worsen renal function.  Prednisone was increased back to 60mg.  2nd renal biopsy was performed in March 2018 and showed class V lupus nephritis.  Patient was then started on Benylasta SQ (started since Sept 2018) in addition to  her plaquenil, prednisone and Cellcept.  She switched rheumatologist to Dr. Alfred Small (on [place]) just a few months ago.   3rd renal biopsy was done Dec 27 2018 and showed class III/IV lupus with crescent.  Discussion was made about treatment plan - patient agreed to start Cytoxan immediately.  1st dose of Cytoxan administered in [facility] on Dec 31.  Goal was to do Cytoxan infusion Q2 weeks for 6 sessions.  Last cytoxan was done during Jan 16 admission.     Initial SLE presentation (at the time of diagnose) include fatigue, diffused joint pain and kidney dysfunction.  Patient then developed Sicca symptoms, joint pain (bilateral hands, ankles, and knees), LE weakness, malar rash, photosensitivity, CP, SOB, and pleurisy.      Patient had been compliant with all home medications.  Most of her lupus care had been managed by her outpatient nephrologist (Dr. Pendleton).  Had couple of transfusion in the past because of CellCept SE.  Had a bone marrow biopsy for evaluation of pancytopenia (July 2018) - showed hypocellularity with marked megaloblastic erythroid hyperplasia.      Patient just started on HD during this hospitalization (at [facility]).  Still making urine.  Complaining of diffused body swelling.  Denies any recent sick contacts/travels, fever/chills, mouth ulcers, rash. +abdominal discomfort (feels like distention), +productive cough (greenish), pleurisy, sicca symptoms, CP, SOB, and joint swelling.   Last HD session was 1/25 at [facility].      Past Medical History:   Diagnosis Date    Anemia due to chronic blood loss 2/8/2018    Anemia, chronic disease 2/8/2018    Compound heterozygous MTHFR mutation [id number] 3/21/2018    Elevated homocysteine 2/8/2018    GI bleeding 2/8/2018    Hypertension     Lupus nephritis, ISN/RPS class IV 12/2018    diagnosed 12/2018, 3 biopsies, 1st - Class 3-4, no crescents, IFTA 15%, 2nd - Class V, 3rd - Class 4, w/ cressents, IFTA 30%. Treated with cellcept in 2018 until 10-11/2018  (stopped for neutropenic fever), last steroid pulce 12/31/18 and had 2 doses CYC, last 1/19/2018. On HD since 1/2019. Sees Dr. Pendleton, nephrology in [place], [place]. and Dr. Small, rheumatology    Migraine headache with aura     Other pulmonary embolism without acute cor pulmonale 2/8/2018       Past Surgical History:   Procedure Laterality Date    ARTHROSCOPIC FEMOROPLASTY Left 12/26/2014    Performed by Avery Blake MD at [facility] OR [address] [place] [address]    ARTHROSCOPY-HIP WITH ACETABULOPLASTY(INCLUDES LABRAL REPAIR Left 12/26/2014    Performed by Avery Blake MD at [facility] OR [facility]    HIP SURGERY      REPAIR-LABRAL Left 12/26/2014    Performed by Avery Blake MD at [facility] OR [address] [place] [address]    TYMPANOSTOMY TUBE PLACEMENT           There is no immunization history on file for this patient.    Review of patient's allergies indicates:   Allergen Reactions    Sulfur      Current Facility-Administered Medications   Medication Frequency    acyclovir suspension 200 mg BID    albuterol-ipratropium 2.5 mg-0.5 mg/3 mL nebulizer solution 3 mL Q6H PRN    albuterol-ipratropium 2.5 mg-0.5 mg/3 mL nebulizer solution 3 mL Q6H WAKE    ALPRAZolam tablet 0.5 mg Q6H PRN    calcium acetate capsule 667 mg TID WM    carvedilol tablet 12.5 mg BID    cetirizine tablet 10 mg Daily    chlorhexidine 0.12 % solution 15 mL BID    dextrose 50% injection 12.5 g PRN    dextrose 50% injection 25 g PRN    escitalopram oxalate tablet 20 mg Daily    fluconazole tablet 100 mg Daily    gabapentin capsule 300 mg TID    glucagon (human recombinant) injection 1 mg PRN    glucose chewable tablet 16 g PRN    glucose chewable tablet 24 g PRN    heparin (porcine) injection 5,000 Units Q8H    hydroxychloroquine tablet 200 mg Daily    [START ON 1/28/2019] levoFLOXacin (LEVAQUIN) 250 mg in dextrose 5 % 50 mL IVPB Q48H    methylPREDNISolone sodium succinate injection 60 mg Q6H    morphine injection 4 mg Q4H PRN    ondansetron injection 4 mg Q6H PRN     ondansetron tablet 4 mg Q6H PRN    pantoprazole EC tablet 40 mg Daily    ramelteon tablet 8 mg Nightly PRN    sodium bicarbonate tablet 650 mg Daily    sodium chloride 0.9% flush 5 mL PRN    sodium chloride 0.9% flush 5 mL PRN    vitamin D 1000 units tablet 1,000 Units Daily    zinc oxide 20 % ointment PRN     Family History     Problem Relation (Age of Onset)    Diabetes Mellitus Maternal Grandfather        Tobacco Use    Smoking status: Never Smoker    Smokeless tobacco: Never Used   Substance and Sexual Activity    Alcohol use: No    Drug use: No    Sexual activity: Not on file     Review of Systems   Constitutional: Positive for unexpected weight change. Negative for appetite change, chills and fever.   HENT: Negative for congestion, sore throat and trouble swallowing.    Eyes: Negative for visual disturbance.   Respiratory: Positive for cough and shortness of breath.    Cardiovascular: Positive for chest pain and leg swelling.   Gastrointestinal: Positive for abdominal distention and abdominal pain. Negative for blood in stool, nausea and vomiting.   Genitourinary: Negative for difficulty urinating, dysuria, genital sores and hematuria.   Musculoskeletal: Positive for arthralgias. Negative for joint swelling and neck stiffness.   Skin: Negative for rash.   Neurological: Positive for weakness. Negative for dizziness and headaches.   Psychiatric/Behavioral: Negative for confusion.     Objective:     Vital Signs (Most Recent):  Temp: 97.6 °F (36.4 °C) (01/27/19 1220)  Pulse: 99 (01/27/19 1220)  Resp: 18 (01/27/19 0833)  BP: 132/81 (01/27/19 1220)  SpO2: 97 % (01/27/19 1220)  O2 Device (Oxygen Therapy): nasal cannula (01/27/19 0833) Vital Signs (24h Range):  Temp:  [97.5 °F (36.4 °C)-98.4 °F (36.9 °C)] 97.6 °F (36.4 °C)  Pulse:  [] 99  Resp:  [16-21] 18  SpO2:  [94 %-97 %] 97 %  BP: (126-136)/(67-99) 132/81     Weight: 104.3 kg (229 lb 15 oz) (01/27/19 1000)  Body mass index is 32.19 kg/m².  Body  surface area is 2.28 meters squared.      Intake/Output Summary (Last 24 hours) at 1/27/2019 1324  Last data filed at 1/27/2019 0900  Gross per 24 hour   Intake 200 ml   Output 250 ml   Net -50 ml       Physical Exam   Constitutional: She is oriented to person, place, and time and well-developed, well-nourished, and in no distress. No distress.   Obese   +cushoid features    HENT:   Head: Normocephalic and atraumatic.   Dried oral mucosa  No signs of mouth ulcers   Normal salivary pooling    Eyes: EOM are normal. Pupils are equal, round, and reactive to light.   Neck: Normal range of motion. Neck supple.   Cardiovascular: Normal rate, regular rhythm and normal heart sounds.    Pulmonary/Chest: Effort normal and breath sounds normal.   Abdominal: Soft. She exhibits distension.   Neurological: She is alert and oriented to person, place, and time.   Skin: Skin is warm and dry.     Diffused stretch markings in bilateral thighs and abdomen   Psychiatric: Mood, affect and judgment normal.   Musculoskeletal: Normal range of motion. She exhibits edema and tenderness. She exhibits no deformity.   No signs of synovitis  ROM intact  +2 pitting edema of bilateral LE - to the calves   Mild tenderness of the ankle           Significant Labs:  Recent Results (from the past 48 hour(s))   Hemoglobin A1c    Collection Time: 01/26/19  6:32 PM   Result Value Ref Range    Hemoglobin A1C 5.7 (H) 4.0 - 5.6 %    Estimated Avg Glucose 117 68 - 131 mg/dL   Comprehensive Metabolic Panel (CMP)    Collection Time: 01/26/19  6:32 PM   Result Value Ref Range    Sodium 134 (L) 136 - 145 mmol/L    Potassium 4.5 3.5 - 5.1 mmol/L    Chloride 98 95 - 110 mmol/L    CO2 19 (L) 23 - 29 mmol/L    Glucose 197 (H) 70 - 110 mg/dL    BUN, Bld 77 (H) 6 - 20 mg/dL    Creatinine 5.9 (H) 0.5 - 1.4 mg/dL    Calcium 8.8 8.7 - 10.5 mg/dL    Total Protein 5.5 (L) 6.0 - 8.4 g/dL    Albumin 1.7 (L) 3.5 - 5.2 g/dL    Total Bilirubin 0.3 0.1 - 1.0 mg/dL    Alkaline  Phosphatase 72 55 - 135 U/L    AST 7 (L) 10 - 40 U/L    ALT 18 10 - 44 U/L    Anion Gap 17 (H) 8 - 16 mmol/L    eGFR if African American 10.8 (A) >60 mL/min/1.73 m^2    eGFR if non African American 9.4 (A) >60 mL/min/1.73 m^2   Magnesium    Collection Time: 01/26/19  6:32 PM   Result Value Ref Range    Magnesium 1.4 (L) 1.6 - 2.6 mg/dL   Phosphorus    Collection Time: 01/26/19  6:32 PM   Result Value Ref Range    Phosphorus 6.7 (H) 2.7 - 4.5 mg/dL   CBC with Automated Differential    Collection Time: 01/26/19  6:32 PM   Result Value Ref Range    WBC 12.70 3.90 - 12.70 K/uL    RBC 3.39 (L) 4.00 - 5.40 M/uL    Hemoglobin 9.8 (L) 12.0 - 16.0 g/dL    Hematocrit 29.8 (L) 37.0 - 48.5 %    MCV 88 82 - 98 fL    MCH 28.9 27.0 - 31.0 pg    MCHC 32.9 32.0 - 36.0 g/dL    RDW 13.8 11.5 - 14.5 %    Platelets 96 (L) 150 - 350 K/uL    MPV 10.0 9.2 - 12.9 fL    Immature Granulocytes CANCELED 0.0 - 0.5 %    Immature Grans (Abs) CANCELED 0.00 - 0.04 K/uL    Lymph # CANCELED 1.0 - 4.8 K/uL    Mono # CANCELED 0.3 - 1.0 K/uL    Eos # CANCELED 0.0 - 0.5 K/uL    Baso # CANCELED 0.00 - 0.20 K/uL    nRBC 2 (A) 0 /100 WBC    Gran% 83.0 (H) 38.0 - 73.0 %    Lymph% 4.0 (L) 18.0 - 48.0 %    Mono% 7.0 4.0 - 15.0 %    Eosinophil% 0.0 0.0 - 8.0 %    Basophil% 0.0 0.0 - 1.9 %    Bands 1.0 %    Metamyelocytes 1.0 %    Myelocytes 4.0 %    Aniso Slight     Poly Occasional     Toxic Granulation Present     Differential Method Manual    PT/INR    Collection Time: 01/26/19  6:32 PM   Result Value Ref Range    Prothrombin Time 10.4 9.0 - 12.5 sec    INR 1.0 0.8 - 1.2   PTT    Collection Time: 01/26/19  6:32 PM   Result Value Ref Range    aPTT 25.2 21.0 - 32.0 sec   Sedimentation rate    Collection Time: 01/26/19  6:32 PM   Result Value Ref Range    Sed Rate 83 (H) 0 - 36 mm/Hr   C-reactive protein    Collection Time: 01/26/19  6:32 PM   Result Value Ref Range    .6 (H) 0.0 - 8.2 mg/L   Calcium, ionized    Collection Time: 01/27/19  4:30 AM   Result  Value Ref Range    Calcium, Ion 0.95 (L) 1.06 - 1.42 mmol/L   Comprehensive Metabolic Panel (CMP)    Collection Time: 01/27/19  6:00 AM   Result Value Ref Range    Sodium 132 (L) 136 - 145 mmol/L    Potassium 4.5 3.5 - 5.1 mmol/L    Chloride 97 95 - 110 mmol/L    CO2 20 (L) 23 - 29 mmol/L    Glucose 145 (H) 70 - 110 mg/dL    BUN, Bld 88 (H) 6 - 20 mg/dL    Creatinine 5.9 (H) 0.5 - 1.4 mg/dL    Calcium 8.6 (L) 8.7 - 10.5 mg/dL    Total Protein 5.4 (L) 6.0 - 8.4 g/dL    Albumin 1.8 (L) 3.5 - 5.2 g/dL    Total Bilirubin 0.3 0.1 - 1.0 mg/dL    Alkaline Phosphatase 68 55 - 135 U/L    AST 8 (L) 10 - 40 U/L    ALT 18 10 - 44 U/L    Anion Gap 15 8 - 16 mmol/L    eGFR if African American 10.8 (A) >60 mL/min/1.73 m^2    eGFR if non African American 9.4 (A) >60 mL/min/1.73 m^2   Magnesium    Collection Time: 01/27/19  6:00 AM   Result Value Ref Range    Magnesium 1.9 1.6 - 2.6 mg/dL   Phosphorus    Collection Time: 01/27/19  6:00 AM   Result Value Ref Range    Phosphorus 8.0 (H) 2.7 - 4.5 mg/dL   CBC with Automated Differential    Collection Time: 01/27/19  6:00 AM   Result Value Ref Range    WBC 12.90 (H) 3.90 - 12.70 K/uL    RBC 3.41 (L) 4.00 - 5.40 M/uL    Hemoglobin 9.7 (L) 12.0 - 16.0 g/dL    Hematocrit 29.8 (L) 37.0 - 48.5 %    MCV 87 82 - 98 fL    MCH 28.4 27.0 - 31.0 pg    MCHC 32.6 32.0 - 36.0 g/dL    RDW 13.7 11.5 - 14.5 %    Platelets 93 (L) 150 - 350 K/uL    MPV 10.2 9.2 - 12.9 fL    Immature Granulocytes CANCELED 0.0 - 0.5 %    Immature Grans (Abs) CANCELED 0.00 - 0.04 K/uL    nRBC 2 (A) 0 /100 WBC    Gran% 90.0 (H) 38.0 - 73.0 %    Lymph% 6.0 (L) 18.0 - 48.0 %    Mono% 2.0 (L) 4.0 - 15.0 %    Eosinophil% 0.0 0.0 - 8.0 %    Basophil% 0.0 0.0 - 1.9 %    Bands 2.0 %    Platelet Estimate Decreased (A)     Aniso Slight     Poly Occasional     Toxic Granulation Present     Differential Method Manual    C3 complement    Collection Time: 01/27/19 12:12 PM   Result Value Ref Range    Complement (C-3) 146 50 - 180 mg/dL    C4 complement    Collection Time: 01/27/19 12:12 PM   Result Value Ref Range    Complement (C-4) 30 11 - 44 mg/dL         Significant Imaging:  Imaging results within the past 24 hours have been reviewed.    Assessment/Plan:     * Lupus    22yo F with history of SLE (dx Dec 2017 via kidney biopsy), lupus nephritis (confirmed with kidney biopsy - Dec 2017), HTN, anemia, anxiety/depression was transferred from [facility] for lupus flare.  Patient was admitted to [facility] for worsening renal failure, edema, joint pain, and fatigue.  Patient was found to have a lupus nephritis flare and started on high dose steroid and HD was started.  During this admission, patient was also given one dose of Cytoxan and found to have PNA (+Stenotrophomas - sensitive to Levaquin).  Patient was transferred to [facility] on 1/26 because no rheumatology inpatient.    Patient was diagnosed with Dec 2017 with SLE.  She was first diagnosed via kidney biopsy and then found thereafter +LARA.  Patient had a worsening kidney function for a while and underwent kidney biopsy by Dr. Pendleton in [place].  She was found to have class III/IV lupus nephritis at that time.  She was seeing Dr. Lanza (rheumatology at that time).  At the time of diagnose, patient was admitted.  She was started on high dose steroid x 3 days.  Discharge with prednisone 60mg.  Later started on plaquenil 200mg and then CellCept 500mg BID.  Patient developed multiple infections (double PNA, C.diff, and MRSA) at that time and CellCept was discontinued for a while.  Attempts were made to taper steroid - she was down to prednisone 10mg and then developed a flare.  Flare consist of worsen renal function.  Prednisone was increased back to 60mg.  2nd renal biopsy was performed in March 2018 and showed class V lupus nephritis.  Patient was then started on Benylasta SQ (started since Sept 2018) in addition to her plaquenil, prednisone and Cellcept.  She switched rheumatologist to Dr. Alfred Small (on  [place]) just a few months ago.   3rd renal biopsy was done Dec 27 2018 and showed class III/IV lupus with crescent.  Discussion was made about treatment plan - patient agreed to start Cytoxan immediately.  1st dose of Cytoxan administered in [facility] on Dec 31.  Goal was to do Cytoxan infusion Q2 weeks for 6 sessions.  Last cytoxan was done during Jan 16 admission.     Initial SLE presentation (at the time of diagnose) include fatigue, diffused joint pain and kidney dysfunction.  Patient then developed Sicca symptoms, joint pain (bilateral hands, ankles, and knees), LE weakness, malar rash, photosensitivity, CP, SOB, and pleurisy.      Labs: leukocytosis (12.70), thrombocytopenia (96).  Elevated inflammatory markers (ESR 83, .6).     Imaging: CXR : NUBIA +opacification.  US kidneys - renal disease, no acute processes.      At this time, patient appears to exhibit signs of lupus nephritis flare without other manifestation and PNA.  Cytoxan was recently administered and patient is on high dose steroids for the flare.     Plan   - Hold Benylasta SQ at this time - +PNA  - PNA treatment as per primary team  - Continue high dose steroid   - Will need more information about [facility] hospitalization (including dosing of cytoxan and steroid. Renal biopsy reports, previous labs).  Also need clarification for why acyclovir is on board - prophylaxis?    - Patient had extend long usage of high dose steroid - will need PCP prophylaxis with Bactrim DS M/W/F  - appreciate nephrology recommendation on management of lupus nephritis  - Pending more labs: C3/C4, LARA, dsDNA, ANCA, APS, ruben, LDH, Hapto, UA, Up/c  - continue home dose of plaquenil            Thank you for your consult. I will follow-up with patient. Please contact us if you have any additional questions.    Johanny Hemphill MD  Rheumatology  [facility]    Attending addendum:   22yo F with history of SLE diagnosed in Dec 2017 with lupus nephritis  (confirmed with kidney biopsy), HTN, anemia transferred here for higher level care.  Patient's initial renal biopsy showed Class III/ IV nephritis. She had been managed by Dr. Lanza with cellcept 500mg po BID and prednisone 60mg a day. Patient developed recurrent infections on cellcept so it was stopped and she was continued on high dose prednisone.She had worsening renal function and had second biopsy march 2018 that showed class V.   She then transitioned to Dr. Small and he put her on Benlysta in sept 2018 and restarted the  Cellcept. She had third renal biopsy on December 27th that showed class III/ IV with crescents. She had cytoxan on dec. 31 and Jan 16 and then developed pneumonia.  Patient has been on HD since her most recent admission and on high dose steroids.  At this point, will need to obtain outside records to get more information on her treatment plan.  Continue high dose steroids.  Labs as per hemant.

## 2019-01-27 NOTE — ASSESSMENT & PLAN NOTE
- Not on O2 at home  - Breathing comfortably on 2L NC  - O2 supplementation started following ARF w/ worsening pulmonary edema  - CXR consistent w/ NUBIA opacification, no significant pulmonary edema; peripheral edema present  - ID consulted  - HD per nephrology

## 2019-01-27 NOTE — SUBJECTIVE & OBJECTIVE
Interval History: Vitals stable overnight. Afebrile. Breathing comfortably on 2L NC. Pain well controlled. Denies worsening shortness of breath, chest pain.     Review of Systems   Constitutional: Negative for appetite change, chills and fever.   HENT: Negative for congestion, sore throat and trouble swallowing.    Eyes: Negative for visual disturbance.   Respiratory: Negative for cough and shortness of breath.    Cardiovascular: Positive for leg swelling. Negative for chest pain.   Gastrointestinal: Positive for abdominal pain. Negative for abdominal distention, blood in stool, nausea and vomiting.   Genitourinary: Negative for dysuria and hematuria.   Musculoskeletal: Positive for arthralgias.   Skin: Positive for rash.   Neurological: Positive for weakness. Negative for dizziness and headaches.   Psychiatric/Behavioral: Negative for confusion.     Objective:     Vital Signs (Most Recent):  Temp: 97.6 °F (36.4 °C) (01/27/19 1220)  Pulse: 99 (01/27/19 1220)  Resp: 18 (01/27/19 0833)  BP: 132/81 (01/27/19 1220)  SpO2: 97 % (01/27/19 1220) Vital Signs (24h Range):  Temp:  [97.5 °F (36.4 °C)-98.4 °F (36.9 °C)] 97.6 °F (36.4 °C)  Pulse:  [] 99  Resp:  [16-21] 18  SpO2:  [94 %-97 %] 97 %  BP: (126-136)/(67-99) 132/81     Weight: 104.3 kg (229 lb 15 oz)  Body mass index is 32.19 kg/m².    Intake/Output Summary (Last 24 hours) at 1/27/2019 1316  Last data filed at 1/27/2019 0900  Gross per 24 hour   Intake 200 ml   Output 250 ml   Net -50 ml      Physical Exam   Constitutional: She is oriented to person, place, and time. She appears well-developed and well-nourished. No distress.   HENT:   Head: Normocephalic and atraumatic.   Mouth/Throat: Oropharynx is clear and moist. No oropharyngeal exudate.   Diffuse facial edema   Cardiovascular: Normal rate, regular rhythm, normal heart sounds and intact distal pulses.   No murmur heard.  Pulmonary/Chest: Effort normal and breath sounds normal. No respiratory distress. She  has no wheezes.   Breathing comfortably on 2L NC  Decreased breath sound bilateral lower lung fields   Abdominal: Soft. Bowel sounds are normal. She exhibits no distension. There is tenderness (diffuse).   Musculoskeletal: Normal range of motion. She exhibits edema and tenderness. She exhibits no deformity.   Neurological: She is alert and oriented to person, place, and time.   CN largely in tact  Strength full throughout  No sensory deficits    Skin: Skin is warm and dry. Rash (vasculitic like rash BLE; mild erythema w/ desquamation to inner thighs) noted.   Psychiatric: She has a normal mood and affect. Her behavior is normal.   Nursing note and vitals reviewed.      Significant Labs:   A1C:   Recent Labs   Lab 01/26/19 1832   HGBA1C 5.7*     CBC:   Recent Labs   Lab 01/26/19 1832 01/27/19  0600   WBC 12.70 12.90*   HGB 9.8* 9.7*   HCT 29.8* 29.8*   PLT 96* 93*     CMP:   Recent Labs   Lab 01/26/19 1832 01/27/19  0600   * 132*   K 4.5 4.5   CL 98 97   CO2 19* 20*   * 145*   BUN 77* 88*   CREATININE 5.9* 5.9*   CALCIUM 8.8 8.6*   PROT 5.5* 5.4*   ALBUMIN 1.7* 1.8*   BILITOT 0.3 0.3   ALKPHOS 72 68   AST 7* 8*   ALT 18 18   ANIONGAP 17* 15   EGFRNONAA 9.4* 9.4*       Significant Imaging: I have reviewed all pertinent imaging results/findings within the past 24 hours.

## 2019-01-27 NOTE — CONSULTS
[facility]  Infectious Disease  Consult Note    Patient Name: Anitha Swenson  MRN: [id number]  Admission Date: 1/26/2019  Hospital Length of Stay: 1 days  Attending Physician: Trevon Gao MD  Primary Care Provider: John Garcia MD     Isolation Status: No active isolations    Patient information was obtained from patient and ER records.      Consults  Assessment/Plan:     Immunocompromised    23F PMH SLE on cytoxan, steroids and plaquenil who presents as a transfer for nephrology and rheumatology consults, recently diagnosed w/ stenotrophomonas PNA and candidal esophagitis. Symptoms improving. ID consulted for treatment recommendations.    Recommendations:  - continue levofloxacin x 7 days total for treatment of stenotrophomonas in sputum cx  - continue fluconazole x 14 days total for treatment of candidal esophagitis    ID will sign off. Call with questions.          Thank you for your consult. I will sign off. Please contact us if you have any additional questions.    Anjana Smith,   Infectious Disease  [facility]    Subjective:     Principal Problem: Lupus    HPI: 23F PMH SLE on cytoxan, plaquenil and steroids, HTN, anxiety and depression who was [place] [facility] [place] presenting w/ worsening renal failure, edema, skin rash and joint pain. She was started on HD for worsening peripheral and pulmonary edema. Sputum cx + stenotrophomonas on 1/22. She also has recent diagnosis of candidal esophagitis. ID consulted for recommendations regarding these issues. She states her respiratory status has improved, denies fevers, pain w/ swallowing has improved. No other complaints.     Past Medical History:   Diagnosis Date    Anemia due to chronic blood loss 2/8/2018    Anemia, chronic disease 2/8/2018    Compound heterozygous MTHFR mutation [id number] 3/21/2018    Elevated homocysteine 2/8/2018    GI bleeding 2/8/2018    Hypertension     Lupus nephritis,  ISN/RPS class IV 12/2018    diagnosed 12/2018, 3 biopsies, 1st - Class 3-4, no crescents, IFTA 15%, 2nd - Class V, 3rd - Class 4, w/ cressents, IFTA 30%. Treated with cellcept in 2018 until 10-11/2018 (stopped for neutropenic fever), last steroid pulce 12/31/18 and had 2 doses CYC, last 1/19/2018. On HD since 1/2019. Sees Dr. Pendleton, nephrology in [place], [place]. and Dr. Small, rheumatology    Migraine headache with aura     Other pulmonary embolism without acute cor pulmonale 2/8/2018       Past Surgical History:   Procedure Laterality Date    ARTHROSCOPIC FEMOROPLASTY Left 12/26/2014    Performed by Avery Blake MD at [facility] OR [address] [place] [address]    ARTHROSCOPY-HIP WITH ACETABULOPLASTY(INCLUDES LABRAL REPAIR Left 12/26/2014    Performed by Avery Blake MD at [facility] OR [address] [place] [address]    HIP SURGERY      REPAIR-LABRAL Left 12/26/2014    Performed by Avery Blake MD at [facility] OR [address] [place] [address]    TYMPANOSTOMY TUBE PLACEMENT         Review of patient's allergies indicates:   Allergen Reactions    Sulfur        Medications:  Facility-Administered Medications Prior to Admission   Medication    cyanocobalamin injection 1,000 mcg     Medications Prior to Admission   Medication Sig    acetaminophen (TYLENOL) 325 MG tablet Take 325 mg by mouth every 6 (six) hours as needed for Pain.    ALPRAZolam (XANAX) 1 MG tablet Take 1 mg by mouth daily as needed for Anxiety.    BENLYSTA 200 mg/mL AtIn Inject 200 mg as directed Every Friday.     bumetanide (BUMEX) 2 MG tablet Take 2 mg by mouth 3 (three) times daily.    CARTIA  mg 24 hr capsule Take 1 capsule by mouth once daily    carvedilol (COREG) 12.5 MG tablet Take 1 tablet by mouth twice daily    cholecalciferol, vitamin D3, (VITAMIN D3) 5,000 unit Tab Take 5,000 Units by mouth every morning.    cloNIDine (CATAPRES) 0.1 MG tablet Take 0.1 mg by mouth as needed.     escitalopram oxalate (LEXAPRO) 20 MG tablet Take 1 tablet by mouth once daily    gabapentin (NEURONTIN) 300 MG  capsule Take 300 mg by mouth 3 (three) times daily.     HYDROcodone-acetaminophen (NORCO) 5-325 mg per tablet Take 1 tablet by mouth every 12 (twelve) hours as needed for Pain.    hydroxychloroquine (PLAQUENIL) 200 mg tablet Take 1 tablet by mouth nightly    loperamide (IMODIUM A-D) 2 mg Tab Take 2 mg by mouth 4 (four) times daily as needed (diarrhea).    medroxyPROGESTERone (DEPO-PROVERA) 150 mg/mL Syrg Inject 150 mg into the muscle every 3 (three) months.     metOLazone (ZAROXOLYN) 5 MG tablet Take 5 mg by mouth every morning.    ondansetron (ZOFRAN) 4 MG tablet Take 4 mg by mouth every 24 hours as needed for Nausea.     pantoprazole (PROTONIX) 40 MG tablet Take 40 mg by mouth 2 (two) times daily.     PREDNISONE ORAL Take 60 mg by mouth once daily.     temazepam (RESTORIL) 15 mg Cap Take 15 mg by mouth nightly as needed (insomnia).     Antibiotics (From admission, onward)    Start     Stop Route Frequency Ordered    01/28/19 2100  levoFLOXacin (LEVAQUIN) 250 mg in dextrose 5 % 50 mL IVPB      -- IV Every 48 hours (non-standard times) 01/27/19 0928        Antifungals (From admission, onward)    Start     Stop Route Frequency Ordered    01/27/19 0900  fluconazole tablet 100 mg      -- Oral Daily 01/26/19 1853        Antivirals (From admission, onward)        Stop Route Frequency     acyclovir      -- Oral 2 times daily             There is no immunization history on file for this patient.    Family History     Problem Relation (Age of Onset)    Diabetes Mellitus Maternal Grandfather        Social History     Socioeconomic History    Marital status: Single     Spouse name: None    Number of children: None    Years of education: None    Highest education level: None   Social Needs    Financial resource strain: None    Food insecurity - worry: None    Food insecurity - inability: None    Transportation needs - medical: None    Transportation needs - non-medical: None   Occupational History    None    Tobacco Use    Smoking status: Never Smoker    Smokeless tobacco: Never Used   Substance and Sexual Activity    Alcohol use: No    Drug use: No    Sexual activity: None   Other Topics Concern    None   Social History Narrative    None     Review of Systems   Constitutional: Negative for activity change, appetite change, chills, fever and unexpected weight change.   HENT: Negative for dental problem, ear discharge, ear pain, mouth sores, sinus pain, sore throat and trouble swallowing.    Eyes: Negative for pain and discharge.   Respiratory: Positive for cough. Negative for chest tightness, shortness of breath and wheezing.    Cardiovascular: Negative for chest pain and leg swelling.   Gastrointestinal: Negative for abdominal distention, abdominal pain, constipation, diarrhea, nausea and vomiting.   Genitourinary: Negative for difficulty urinating, dysuria, flank pain, frequency, genital sores and hematuria.   Musculoskeletal: Negative for arthralgias, joint swelling, neck pain and neck stiffness.   Skin: Negative for color change, rash and wound.   Allergic/Immunologic: Positive for immunocompromised state.   Neurological: Negative for dizziness, weakness, light-headedness, numbness and headaches.   Psychiatric/Behavioral: Negative for confusion. The patient is not nervous/anxious.      Objective:     Vital Signs (Most Recent):  Temp: 97.6 °F (36.4 °C) (01/27/19 1220)  Pulse: 104 (01/27/19 1340)  Resp: 18 (01/27/19 1340)  BP: 132/81 (01/27/19 1220)  SpO2: 96 % (01/27/19 1340) Vital Signs (24h Range):  Temp:  [97.5 °F (36.4 °C)-98.4 °F (36.9 °C)] 97.6 °F (36.4 °C)  Pulse:  [] 104  Resp:  [16-21] 18  SpO2:  [94 %-97 %] 96 %  BP: (126-136)/(67-99) 132/81     Weight: 104.3 kg (229 lb 15 oz)  Body mass index is 32.19 kg/m².    Estimated Creatinine Clearance: 19.8 mL/min (A) (based on SCr of 5.9 mg/dL (H)).    Physical Exam   Constitutional: She is oriented to person, place, and time. She appears  well-developed and well-nourished. No distress.   HENT:   Right Ear: External ear normal.   Left Ear: External ear normal.   Nose: Nose normal.   Mouth/Throat: Oropharynx is clear and moist.   Eyes: Conjunctivae and EOM are normal.   Neck: Normal range of motion. Neck supple.   Cardiovascular: Normal rate, regular rhythm, normal heart sounds and intact distal pulses.   No murmur heard.  Pulmonary/Chest: Effort normal and breath sounds normal. No respiratory distress. She has no wheezes.   Abdominal: Soft. Bowel sounds are normal. She exhibits no distension. There is no tenderness.   Musculoskeletal: Normal range of motion. She exhibits no edema.   Neurological: She is alert and oriented to person, place, and time. No cranial nerve deficit. Coordination normal.   Skin: Skin is warm and dry. She is not diaphoretic. No erythema.   Psychiatric: She has a normal mood and affect. Her behavior is normal.   Vitals reviewed.      Significant Labs:   CBC:   Recent Labs   Lab 01/26/19  1832 01/27/19  0600   WBC 12.70 12.90*   HGB 9.8* 9.7*   HCT 29.8* 29.8*   PLT 96* 93*     CMP:   Recent Labs   Lab 01/26/19  1832 01/27/19  0600   * 132*   K 4.5 4.5   CL 98 97   CO2 19* 20*   * 145*   BUN 77* 88*   CREATININE 5.9* 5.9*   CALCIUM 8.8 8.6*   PROT 5.5* 5.4*   ALBUMIN 1.7* 1.8*   BILITOT 0.3 0.3   ALKPHOS 72 68   AST 7* 8*   ALT 18 18   ANIONGAP 17* 15   EGFRNONAA 9.4* 9.4*     Microbiology Results (last 7 days)     ** No results found for the last 168 hours. **          Significant Imaging: I have reviewed all pertinent imaging results/findings within the past 24 hours.

## 2019-01-27 NOTE — ASSESSMENT & PLAN NOTE
22 yo F w/ PMH of SLE, anemia, HTN, and anxiety/depression who presents as a transfer with worsening SLE symptoms. Presents for nephrology and rheumatology consults. Recent hospitalization complicated by progressive renal failure 2/2 lupus nephritis, currently receiving HD.     Plan:  - Consult nephrology, rheumatology; appreciate assistance  - Continue IV solumedrol 60 mg q6  - Resume home hydroxychloroquine  - Continue Cytoxin as previously scheduled; will need to clarify dosage, last infusion  - Holding Benlysta in the setting of active respiratory infection  - Follow up on LARA, Anti-ds, C3/C4  - IV morphine PRN   - Retroperitoneal US demonstrated medical renal disease w/o evidence of hydronephrosis or obstructive

## 2019-01-27 NOTE — PLAN OF CARE
Problem: Adult Inpatient Plan of Care  Goal: Plan of Care Review  Outcome: Ongoing (interventions implemented as appropriate)  Pt rested comfortably for most of night, VSS, NAD.

## 2019-01-27 NOTE — ASSESSMENT & PLAN NOTE
- Hb 9.7  - No active signs of bleeding  - Nephrology consulted  - EPO w/ HD  - follow up iron studies

## 2019-01-27 NOTE — ASSESSMENT & PLAN NOTE
- Not on O2 at home  - Breathing comfortably on 2L NC  - O2 supplementation started following ARF w/ worsening pulmonary edema  - CXR pending  - HD per nephrology

## 2019-01-27 NOTE — PROGRESS NOTES
[facility] Hospital Medicine  Progress Note    Patient Name: Anitha Swenson  MRN: [id number]  Patient Class: IP- Inpatient   Admission Date: 1/26/2019  Length of Stay: 1 days  Attending Physician: Trevon Gao MD  Primary Care Provider: John Garcia MD    [facility] Medicine Team: [facility] 1 Kim Fox MD    Subjective:     Principal Problem:Lupus    HPI:  24 yo F w/ PMH of SLE, anemia, HTN, and anxiety/depression who presents as a transfer from [facility] for nephrology and rheumatology consults. She presented to [facility] on 1/16 with progressive renal failure, edema, and worsening skin rash, as well as joint pain. Initially during her hospitalization she continued her home prednisone but as symptoms progressed she was started on IV Solumedrol for stress dosing. Her admission has been complicated by progressive renal failure requiring daily HD with worsening peripheral and pulmonary edema, as well as developing pneumonia w/ cultures positive for stenotrophomonas. Due to her immunocompromised status, she was started on antibiotic regimen levaquin. On presentation, she was also noted to have developed a vasculitis skin rash to her lower extremities, which was painful to palpation. Wound care was following. Of note, she was recently diagnosed w/ candidal esophagitis. Her most recent CD4 count was <200. Follows with nephrology who initiated chemotherapy w/ cytoxan which she receives infusions twice a month. No previous cardiac involvement. Reports shortness of breath stable on 2L NC, as well as nausea, bilateral lower back pain. Denies fever, chills, chest pain, abdominal pain, dysuria, hematuria, and hematochezia.        Hospital Course:  Admitted to hospital medicine. Consulted nephrology and rheumatology.    Interval History: Vitals stable overnight. Afebrile. Breathing comfortably on 2L NC. Pain well controlled. Denies worsening shortness of breath, chest pain.     Review of Systems    Constitutional: Negative for appetite change, chills and fever.   HENT: Negative for congestion, sore throat and trouble swallowing.    Eyes: Negative for visual disturbance.   Respiratory: Negative for cough and shortness of breath.    Cardiovascular: Positive for leg swelling. Negative for chest pain.   Gastrointestinal: Positive for abdominal pain. Negative for abdominal distention, blood in stool, nausea and vomiting.   Genitourinary: Negative for dysuria and hematuria.   Musculoskeletal: Positive for arthralgias.   Skin: Positive for rash.   Neurological: Positive for weakness. Negative for dizziness and headaches.   Psychiatric/Behavioral: Negative for confusion.     Objective:     Vital Signs (Most Recent):  Temp: 97.6 °F (36.4 °C) (01/27/19 1220)  Pulse: 99 (01/27/19 1220)  Resp: 18 (01/27/19 0833)  BP: 132/81 (01/27/19 1220)  SpO2: 97 % (01/27/19 1220) Vital Signs (24h Range):  Temp:  [97.5 °F (36.4 °C)-98.4 °F (36.9 °C)] 97.6 °F (36.4 °C)  Pulse:  [] 99  Resp:  [16-21] 18  SpO2:  [94 %-97 %] 97 %  BP: (126-136)/(67-99) 132/81     Weight: 104.3 kg (229 lb 15 oz)  Body mass index is 32.19 kg/m².    Intake/Output Summary (Last 24 hours) at 1/27/2019 1316  Last data filed at 1/27/2019 0900  Gross per 24 hour   Intake 200 ml   Output 250 ml   Net -50 ml      Physical Exam   Constitutional: She is oriented to person, place, and time. She appears well-developed and well-nourished. No distress.   HENT:   Head: Normocephalic and atraumatic.   Mouth/Throat: Oropharynx is clear and moist. No oropharyngeal exudate.   Diffuse facial edema   Cardiovascular: Normal rate, regular rhythm, normal heart sounds and intact distal pulses.   No murmur heard.  Pulmonary/Chest: Effort normal and breath sounds normal. No respiratory distress. She has no wheezes.   Breathing comfortably on 2L NC  Decreased breath sound bilateral lower lung fields   Abdominal: Soft. Bowel sounds are normal. She exhibits no distension. There  is tenderness (diffuse).   Musculoskeletal: Normal range of motion. She exhibits edema and tenderness. She exhibits no deformity.   Neurological: She is alert and oriented to person, place, and time.   CN largely in tact  Strength full throughout  No sensory deficits    Skin: Skin is warm and dry. Rash (vasculitic like rash BLE; mild erythema w/ desquamation to inner thighs) noted.   Psychiatric: She has a normal mood and affect. Her behavior is normal.   Nursing note and vitals reviewed.      Significant Labs:   A1C:   Recent Labs   Lab 01/26/19 1832   HGBA1C 5.7*     CBC:   Recent Labs   Lab 01/26/19 1832 01/27/19  0600   WBC 12.70 12.90*   HGB 9.8* 9.7*   HCT 29.8* 29.8*   PLT 96* 93*     CMP:   Recent Labs   Lab 01/26/19 1832 01/27/19  0600   * 132*   K 4.5 4.5   CL 98 97   CO2 19* 20*   * 145*   BUN 77* 88*   CREATININE 5.9* 5.9*   CALCIUM 8.8 8.6*   PROT 5.5* 5.4*   ALBUMIN 1.7* 1.8*   BILITOT 0.3 0.3   ALKPHOS 72 68   AST 7* 8*   ALT 18 18   ANIONGAP 17* 15   EGFRNONAA 9.4* 9.4*       Significant Imaging: I have reviewed all pertinent imaging results/findings within the past 24 hours.    Assessment/Plan:      * Lupus    24 yo F w/ PMH of SLE, anemia, HTN, and anxiety/depression who presents as a transfer with worsening SLE symptoms. Presents for nephrology and rheumatology consults. Recent hospitalization complicated by progressive renal failure 2/2 lupus nephritis, currently receiving HD.     Plan:  - Consult nephrology, rheumatology; appreciate assistance  - Continue IV solumedrol 60 mg q6  - Resume home hydroxychloroquine  - Continue Cytoxin as previously scheduled; will need to clarify dosage, last infusion  - Holding Benlysta in the setting of active respiratory infection  - Follow up on LARA, Anti-ds, C3/C4  - IV morphine PRN   - Retroperitoneal US demonstrated medical renal disease w/o evidence of hydronephrosis or obstructive      Rash    - Concern for vasculitis vs cellulitis  - Tender  to touch  - Bandaged per wound care at OSH  - Wound care consulted       Anemia in CKD (chronic kidney disease)    - Hb 9.7  - No active signs of bleeding  - Nephrology consulted  - EPO w/ HD  - follow up iron studies       Acute renal failure    - See lupus nephritis       Essential hypertension    - Normotensive  - On clonidine, ramipril, coreg at home  - Continue home coreg at this time         Acid reflux disease with ulcer    - Hx of nonbleeding ulcers  - Continue home pantoprazole daily       Lupus nephritis    - Follows w/ nephrology in [place]  - Inpatient nephrology consulted; appreciate recommendations  - Recently started on Cytoxan in December w/ dosing x2 monthly  - Reports receiving infusion prior to transfer  - possible HD in am       Anxiety and depression    - Continue home Lexapro  - Xanax PRN       Shortness of breath    - Not on O2 at home  - Breathing comfortably on 2L NC  - O2 supplementation started following ARF w/ worsening pulmonary edema  - CXR consistent w/ NUBIA opacification, no significant pulmonary edema; peripheral edema present  - ID consulted  - HD per nephrology       Immunocompromised    - Chronic steroid use  - Recently started Cytoxan in December per nephrology  - CD4 <200  - Respiratory cultures positive for stenotrophomonas, started on levaquin  - ID consulted; appreciate assistance  - Continue Levaquin   - Continue acyclovir, fluconazole for ppx  - Patient with sulfur allergy; will hold off on PCP ppx until assessed by ID         VTE Risk Mitigation (From admission, onward)        Ordered     heparin (porcine) injection 5,000 Units  Every 8 hours      01/26/19 1810     IP VTE HIGH RISK PATIENT  Once      01/26/19 1805              Kim Fox MD  Department of Hospital Medicine   [facility]

## 2019-01-27 NOTE — ASSESSMENT & PLAN NOTE
- Follows w/ nephrology in [place]  - Inpatient nephrology consulted; appreciate recommendations  - Recently started on Cytoxan in December w/ dosing x2 monthly  - Reports receiving infusion prior to transfer  - possible HD in am

## 2019-01-28 PROBLEM — R23.9 ALTERATION IN SKIN INTEGRITY: Status: ACTIVE | Noted: 2019-01-28

## 2019-01-28 LAB
ALBUMIN SERPL BCP-MCNC: 1.9 G/DL
ALP SERPL-CCNC: 59 U/L
ALT SERPL W/O P-5'-P-CCNC: 16 U/L
ANA SER QL IF: NORMAL
ANION GAP SERPL CALC-SCNC: 16 MMOL/L
ANISOCYTOSIS BLD QL SMEAR: SLIGHT
ASCENDING AORTA: 3.22 CM
AST SERPL-CCNC: 7 U/L
AV INDEX (PROSTH): 0.85
AV MEAN GRADIENT: 1.71 MMHG
AV PEAK GRADIENT: 3.39 MMHG
AV VALVE AREA: 3.52 CM2
AV VELOCITY RATIO: 0.85
BASOPHILS # BLD AUTO: ABNORMAL K/UL
BASOPHILS NFR BLD: 0 %
BILIRUB SERPL-MCNC: 0.3 MG/DL
BSA FOR ECHO PROCEDURE: 2.28 M2
BUN SERPL-MCNC: 115 MG/DL
CALCIUM SERPL-MCNC: 9.2 MG/DL
CHLORIDE SERPL-SCNC: 93 MMOL/L
CK SERPL-CCNC: 16 U/L
CO2 SERPL-SCNC: 18 MMOL/L
CREAT SERPL-MCNC: 6.7 MG/DL
CV ECHO LV RWT: 0.4 CM
DIFFERENTIAL METHOD: ABNORMAL
DOP CALC AO PEAK VEL: 0.92 M/S
DOP CALC AO VTI: 15.49 CM
DOP CALC LVOT AREA: 4.15 CM2
DOP CALC LVOT DIAMETER: 2.3 CM
DOP CALC LVOT PEAK VEL: 0.79 M/S
DOP CALC LVOT STROKE VOLUME: 54.52 CM3
DOP CALCLVOT PEAK VEL VTI: 13.13 CM
DSDNA AB SER-ACNC: NORMAL [IU]/ML
E WAVE DECELERATION TIME: 66.46 MSEC
E/A RATIO: 1.01
E/E' RATIO: 7.05
ECHO LV POSTERIOR WALL: 0.9 CM (ref 0.6–1.1)
EOSINOPHIL # BLD AUTO: ABNORMAL K/UL
EOSINOPHIL NFR BLD: 0 %
ERYTHROCYTE [DISTWIDTH] IN BLOOD BY AUTOMATED COUNT: 13.6 %
EST. GFR  (AFRICAN AMERICAN): 9.3 ML/MIN/1.73 M^2
EST. GFR  (NON AFRICAN AMERICAN): 8 ML/MIN/1.73 M^2
FERRITIN SERPL-MCNC: 3386 NG/ML
FRACTIONAL SHORTENING: 29 % (ref 28–44)
GIANT PLATELETS BLD QL SMEAR: PRESENT
GLUCOSE SERPL-MCNC: 174 MG/DL
HBV CORE AB SERPL QL IA: NEGATIVE
HBV SURFACE AB SER-ACNC: POSITIVE M[IU]/ML
HBV SURFACE AG SERPL QL IA: NEGATIVE
HCT VFR BLD AUTO: 28.8 %
HCV AB SERPL QL IA: NEGATIVE
HEPATITIS A ANTIBODY, IGG: POSITIVE
HGB BLD-MCNC: 9.6 G/DL
HIV 1+2 AB+HIV1 P24 AG SERPL QL IA: NEGATIVE
IMM GRANULOCYTES # BLD AUTO: ABNORMAL K/UL
IMM GRANULOCYTES NFR BLD AUTO: ABNORMAL %
INTERVENTRICULAR SEPTUM: 1.02 CM (ref 0.6–1.1)
IRON SERPL-MCNC: 75 UG/DL
LA MAJOR: 5.47 CM
LA MINOR: 6.24 CM
LA WIDTH: 3.75 CM
LEFT ATRIUM SIZE: 2.58 CM
LEFT ATRIUM VOLUME INDEX: 21.5 ML/M2
LEFT ATRIUM VOLUME: 47.94 CM3
LEFT INTERNAL DIMENSION IN SYSTOLE: 3.23 CM (ref 2.1–4)
LEFT VENTRICLE DIASTOLIC VOLUME INDEX: 41.83 ML/M2
LEFT VENTRICLE DIASTOLIC VOLUME: 93.44 ML
LEFT VENTRICLE MASS INDEX: 65.4 G/M2
LEFT VENTRICLE SYSTOLIC VOLUME INDEX: 18.8 ML/M2
LEFT VENTRICLE SYSTOLIC VOLUME: 41.97 ML
LEFT VENTRICULAR INTERNAL DIMENSION IN DIASTOLE: 4.52 CM (ref 3.5–6)
LEFT VENTRICULAR MASS: 145.99 G
LV LATERAL E/E' RATIO: 8.22
LV SEPTAL E/E' RATIO: 6.17
LYMPHOCYTES # BLD AUTO: ABNORMAL K/UL
LYMPHOCYTES NFR BLD: 1 %
MAGNESIUM SERPL-MCNC: 1.9 MG/DL
MCH RBC QN AUTO: 29.2 PG
MCHC RBC AUTO-ENTMCNC: 33.3 G/DL
MCV RBC AUTO: 88 FL
METAMYELOCYTES NFR BLD MANUAL: 1 %
MONOCYTES # BLD AUTO: ABNORMAL K/UL
MONOCYTES NFR BLD: 4 %
MV PEAK A VEL: 0.73 M/S
MV PEAK E VEL: 0.74 M/S
NEUTROPHILS NFR BLD: 91 %
NEUTS BAND NFR BLD MANUAL: 3 %
NRBC BLD-RTO: 3 /100 WBC
PHOSPHATE SERPL-MCNC: 10.2 MG/DL
PISA TR MAX VEL: 1.98 M/S
PLATELET # BLD AUTO: 99 K/UL
PLATELET BLD QL SMEAR: ABNORMAL
PMV BLD AUTO: 10.3 FL
POLYCHROMASIA BLD QL SMEAR: ABNORMAL
POTASSIUM SERPL-SCNC: 5 MMOL/L
PROT SERPL-MCNC: 5.1 G/DL
PTH-INTACT SERPL-MCNC: 427 PG/ML
RA MAJOR: 5.29 CM
RA PRESSURE: 3 MMHG
RA WIDTH: 3.53 CM
RBC # BLD AUTO: 3.29 M/UL
RIGHT VENTRICULAR END-DIASTOLIC DIMENSION: 3.16 CM
RV TISSUE DOPPLER FREE WALL SYSTOLIC VELOCITY 1 (APICAL 4 CHAMBER VIEW): 19.19 M/S
SATURATED IRON: 62 %
SINUS: 3.14 CM
SODIUM SERPL-SCNC: 127 MMOL/L
STJ: 2.58 CM
TDI LATERAL: 0.09
TDI SEPTAL: 0.12
TDI: 0.11
TOTAL IRON BINDING CAPACITY: 121 UG/DL
TR MAX PG: 15.68 MMHG
TRANSFERRIN SERPL-MCNC: 82 MG/DL
TRICUSPID ANNULAR PLANE SYSTOLIC EXCURSION: 2.62 CM
TV REST PULMONARY ARTERY PRESSURE: 19 MMHG
WBC # BLD AUTO: 13.06 K/UL

## 2019-01-28 PROCEDURE — 63600175 PHARM REV CODE 636 W HCPCS: Performed by: STUDENT IN AN ORGANIZED HEALTH CARE EDUCATION/TRAINING PROGRAM

## 2019-01-28 PROCEDURE — A4216 STERILE WATER/SALINE, 10 ML: HCPCS | Performed by: STUDENT IN AN ORGANIZED HEALTH CARE EDUCATION/TRAINING PROGRAM

## 2019-01-28 PROCEDURE — 83540 ASSAY OF IRON: CPT

## 2019-01-28 PROCEDURE — 85027 COMPLETE CBC AUTOMATED: CPT

## 2019-01-28 PROCEDURE — 99233 SBSQ HOSP IP/OBS HIGH 50: CPT | Mod: ,,, | Performed by: INTERNAL MEDICINE

## 2019-01-28 PROCEDURE — 86803 HEPATITIS C AB TEST: CPT

## 2019-01-28 PROCEDURE — 99232 SBSQ HOSP IP/OBS MODERATE 35: CPT | Mod: ,,, | Performed by: INTERNAL MEDICINE

## 2019-01-28 PROCEDURE — 87340 HEPATITIS B SURFACE AG IA: CPT

## 2019-01-28 PROCEDURE — 63600175 PHARM REV CODE 636 W HCPCS: Performed by: HOSPITALIST

## 2019-01-28 PROCEDURE — 63600175 PHARM REV CODE 636 W HCPCS: Performed by: INTERNAL MEDICINE

## 2019-01-28 PROCEDURE — 99231 SBSQ HOSP IP/OBS SF/LOW 25: CPT | Mod: ,,, | Performed by: INTERNAL MEDICINE

## 2019-01-28 PROCEDURE — 86703 HIV-1/HIV-2 1 RESULT ANTBDY: CPT

## 2019-01-28 PROCEDURE — 85007 BL SMEAR W/DIFF WBC COUNT: CPT

## 2019-01-28 PROCEDURE — 86235 NUCLEAR ANTIGEN ANTIBODY: CPT | Mod: 59

## 2019-01-28 PROCEDURE — 82728 ASSAY OF FERRITIN: CPT

## 2019-01-28 PROCEDURE — 82595 ASSAY OF CRYOGLOBULIN: CPT

## 2019-01-28 PROCEDURE — 25000003 PHARM REV CODE 250: Performed by: STUDENT IN AN ORGANIZED HEALTH CARE EDUCATION/TRAINING PROGRAM

## 2019-01-28 PROCEDURE — 86235 NUCLEAR ANTIGEN ANTIBODY: CPT

## 2019-01-28 PROCEDURE — 83970 ASSAY OF PARATHORMONE: CPT

## 2019-01-28 PROCEDURE — 25000242 PHARM REV CODE 250 ALT 637 W/ HCPCS: Performed by: STUDENT IN AN ORGANIZED HEALTH CARE EDUCATION/TRAINING PROGRAM

## 2019-01-28 PROCEDURE — 82085 ASSAY OF ALDOLASE: CPT

## 2019-01-28 PROCEDURE — 86706 HEP B SURFACE ANTIBODY: CPT

## 2019-01-28 PROCEDURE — 99233 PR SUBSEQUENT HOSPITAL CARE,LEVL III: ICD-10-PCS | Mod: ,,, | Performed by: INTERNAL MEDICINE

## 2019-01-28 PROCEDURE — 86682 HELMINTH ANTIBODY: CPT

## 2019-01-28 PROCEDURE — 36415 COLL VENOUS BLD VENIPUNCTURE: CPT

## 2019-01-28 PROCEDURE — 25000003 PHARM REV CODE 250: Performed by: INTERNAL MEDICINE

## 2019-01-28 PROCEDURE — 83735 ASSAY OF MAGNESIUM: CPT

## 2019-01-28 PROCEDURE — 99231 PR SUBSEQUENT HOSPITAL CARE,LEVL I: ICD-10-PCS | Mod: ,,, | Performed by: INTERNAL MEDICINE

## 2019-01-28 PROCEDURE — 20600001 HC STEP DOWN PRIVATE ROOM

## 2019-01-28 PROCEDURE — 84100 ASSAY OF PHOSPHORUS: CPT

## 2019-01-28 PROCEDURE — 97161 PT EVAL LOW COMPLEX 20 MIN: CPT

## 2019-01-28 PROCEDURE — 99232 PR SUBSEQUENT HOSPITAL CARE,LEVL II: ICD-10-PCS | Mod: ,,, | Performed by: INTERNAL MEDICINE

## 2019-01-28 PROCEDURE — 99233 PR SUBSEQUENT HOSPITAL CARE,LEVL III: ICD-10-PCS | Mod: ,,, | Performed by: HOSPITALIST

## 2019-01-28 PROCEDURE — 80053 COMPREHEN METABOLIC PANEL: CPT

## 2019-01-28 PROCEDURE — 86790 VIRUS ANTIBODY NOS: CPT

## 2019-01-28 PROCEDURE — 82550 ASSAY OF CK (CPK): CPT

## 2019-01-28 PROCEDURE — 90935 HEMODIALYSIS ONE EVALUATION: CPT

## 2019-01-28 PROCEDURE — 86704 HEP B CORE ANTIBODY TOTAL: CPT

## 2019-01-28 PROCEDURE — 97165 OT EVAL LOW COMPLEX 30 MIN: CPT

## 2019-01-28 PROCEDURE — 86592 SYPHILIS TEST NON-TREP QUAL: CPT

## 2019-01-28 PROCEDURE — 94640 AIRWAY INHALATION TREATMENT: CPT

## 2019-01-28 PROCEDURE — 99233 SBSQ HOSP IP/OBS HIGH 50: CPT | Mod: ,,, | Performed by: HOSPITALIST

## 2019-01-28 PROCEDURE — 94761 N-INVAS EAR/PLS OXIMETRY MLT: CPT

## 2019-01-28 PROCEDURE — 86787 VARICELLA-ZOSTER ANTIBODY: CPT

## 2019-01-28 RX ORDER — AMOXICILLIN 250 MG
1 CAPSULE ORAL DAILY
Status: DISCONTINUED | OUTPATIENT
Start: 2019-01-29 | End: 2019-01-31 | Stop reason: HOSPADM

## 2019-01-28 RX ORDER — GABAPENTIN 300 MG/1
300 CAPSULE ORAL NIGHTLY
Status: DISCONTINUED | OUTPATIENT
Start: 2019-01-29 | End: 2019-01-31 | Stop reason: HOSPADM

## 2019-01-28 RX ORDER — PREDNISONE 20 MG/1
60 TABLET ORAL DAILY
Status: DISCONTINUED | OUTPATIENT
Start: 2019-01-28 | End: 2019-01-31 | Stop reason: HOSPADM

## 2019-01-28 RX ORDER — SODIUM CHLORIDE 9 MG/ML
INJECTION, SOLUTION INTRAVENOUS ONCE
Status: COMPLETED | OUTPATIENT
Start: 2019-01-28 | End: 2019-01-28

## 2019-01-28 RX ORDER — ATOVAQUONE 750 MG/5ML
1500 SUSPENSION ORAL DAILY
Status: DISCONTINUED | OUTPATIENT
Start: 2019-01-29 | End: 2019-01-31 | Stop reason: HOSPADM

## 2019-01-28 RX ORDER — LEVOFLOXACIN 500 MG/1
500 TABLET, FILM COATED ORAL EVERY OTHER DAY
Status: DISCONTINUED | OUTPATIENT
Start: 2019-01-28 | End: 2019-01-29

## 2019-01-28 RX ORDER — MORPHINE SULFATE 2 MG/ML
3 INJECTION, SOLUTION INTRAMUSCULAR; INTRAVENOUS EVERY 4 HOURS PRN
Status: DISCONTINUED | OUTPATIENT
Start: 2019-01-28 | End: 2019-01-31 | Stop reason: HOSPADM

## 2019-01-28 RX ORDER — FLUCONAZOLE 100 MG/1
100 TABLET ORAL DAILY
Status: DISCONTINUED | OUTPATIENT
Start: 2019-01-29 | End: 2019-01-31 | Stop reason: HOSPADM

## 2019-01-28 RX ORDER — SODIUM CHLORIDE 9 MG/ML
INJECTION, SOLUTION INTRAVENOUS
Status: DISCONTINUED | OUTPATIENT
Start: 2019-01-28 | End: 2019-01-31 | Stop reason: HOSPADM

## 2019-01-28 RX ORDER — ONDANSETRON 2 MG/ML
4 INJECTION INTRAMUSCULAR; INTRAVENOUS EVERY 6 HOURS PRN
Status: DISCONTINUED | OUTPATIENT
Start: 2019-01-28 | End: 2019-01-31 | Stop reason: HOSPADM

## 2019-01-28 RX ORDER — HEPARIN SODIUM 1000 [USP'U]/ML
1000 INJECTION, SOLUTION INTRAVENOUS; SUBCUTANEOUS
Status: DISCONTINUED | OUTPATIENT
Start: 2019-01-28 | End: 2019-01-31 | Stop reason: HOSPADM

## 2019-01-28 RX ADMIN — LEVOFLOXACIN 500 MG: 500 TABLET, FILM COATED ORAL at 10:01

## 2019-01-28 RX ADMIN — CALCIUM ACETATE 667 MG: 667 CAPSULE ORAL at 09:01

## 2019-01-28 RX ADMIN — GABAPENTIN 300 MG: 300 CAPSULE ORAL at 02:01

## 2019-01-28 RX ADMIN — CETIRIZINE HYDROCHLORIDE 10 MG: 5 TABLET ORAL at 10:01

## 2019-01-28 RX ADMIN — CHLORHEXIDINE GLUCONATE 0.12% ORAL RINSE 15 ML: 1.2 LIQUID ORAL at 10:01

## 2019-01-28 RX ADMIN — ESCITALOPRAM OXALATE 20 MG: 20 TABLET, FILM COATED ORAL at 10:01

## 2019-01-28 RX ADMIN — SODIUM BICARBONATE 650 MG TABLET 650 MG: at 10:01

## 2019-01-28 RX ADMIN — ONDANSETRON 4 MG: 2 INJECTION INTRAMUSCULAR; INTRAVENOUS at 06:01

## 2019-01-28 RX ADMIN — HEPARIN SODIUM 1000 UNITS: 1000 INJECTION INTRAVENOUS; SUBCUTANEOUS at 06:01

## 2019-01-28 RX ADMIN — CARVEDILOL 12.5 MG: 12.5 TABLET, FILM COATED ORAL at 10:01

## 2019-01-28 RX ADMIN — Medication 3 MG: at 10:01

## 2019-01-28 RX ADMIN — HEPARIN SODIUM 5000 UNITS: 5000 INJECTION, SOLUTION INTRAVENOUS; SUBCUTANEOUS at 05:01

## 2019-01-28 RX ADMIN — HYDROXYCHLOROQUINE SULFATE 200 MG: 200 TABLET, FILM COATED ORAL at 02:01

## 2019-01-28 RX ADMIN — HEPARIN SODIUM 5000 UNITS: 5000 INJECTION, SOLUTION INTRAVENOUS; SUBCUTANEOUS at 02:01

## 2019-01-28 RX ADMIN — GABAPENTIN 300 MG: 300 CAPSULE ORAL at 10:01

## 2019-01-28 RX ADMIN — PANTOPRAZOLE SODIUM 40 MG: 40 TABLET, DELAYED RELEASE ORAL at 10:01

## 2019-01-28 RX ADMIN — ACYCLOVIR 200 MG: 200 SUSPENSION ORAL at 10:01

## 2019-01-28 RX ADMIN — HUMAN ALBUMIN MICROSPHERES AND PERFLUTREN 0.66 MG: 10; .22 INJECTION, SOLUTION INTRAVENOUS at 02:01

## 2019-01-28 RX ADMIN — IPRATROPIUM BROMIDE AND ALBUTEROL SULFATE 3 ML: .5; 3 SOLUTION RESPIRATORY (INHALATION) at 01:01

## 2019-01-28 RX ADMIN — Medication 3 MG: at 06:01

## 2019-01-28 RX ADMIN — PREDNISONE 60 MG: 20 TABLET ORAL at 02:01

## 2019-01-28 RX ADMIN — IPRATROPIUM BROMIDE AND ALBUTEROL SULFATE 3 ML: .5; 3 SOLUTION RESPIRATORY (INHALATION) at 07:01

## 2019-01-28 RX ADMIN — HEPARIN SODIUM 5000 UNITS: 5000 INJECTION, SOLUTION INTRAVENOUS; SUBCUTANEOUS at 10:01

## 2019-01-28 RX ADMIN — SODIUM CHLORIDE 300 ML: 0.9 INJECTION, SOLUTION INTRAVENOUS at 06:01

## 2019-01-28 RX ADMIN — IPRATROPIUM BROMIDE AND ALBUTEROL SULFATE 3 ML: .5; 3 SOLUTION RESPIRATORY (INHALATION) at 09:01

## 2019-01-28 RX ADMIN — CALCIUM ACETATE 667 MG: 667 CAPSULE ORAL at 11:01

## 2019-01-28 RX ADMIN — SODIUM CHLORIDE 5 ML: 9 INJECTION, SOLUTION INTRAMUSCULAR; INTRAVENOUS; SUBCUTANEOUS at 10:01

## 2019-01-28 RX ADMIN — FLUCONAZOLE 100 MG: 100 TABLET ORAL at 10:01

## 2019-01-28 NOTE — PROGRESS NOTES
Arterial side sucking down continuously lines reversed with same result. Lines flushed both lumens sluggish now. BFR@200 Dr. العراقي notified will cathflo before next treatment.

## 2019-01-28 NOTE — PLAN OF CARE
Problem: Occupational Therapy Goal  Goal: Occupational Therapy Goal  Goals to be met by: 2/10     Patient will increase functional independence with ADLs by performing:    UE Dressing with Modified Weld.  LE Dressing with Supervision.  Grooming while standing with Stand-by Assistance.  Toileting from toilet with Supervision for hygiene and clothing management.   Supine to sit with Modified Weld.  Stand pivot transfers with Supervision using RW.    Outcome: Ongoing (interventions implemented as appropriate)  OT eval completed.

## 2019-01-28 NOTE — PROGRESS NOTES
[facility] Hospital Medicine  Progress Note    Patient Name: Anitha Swenson  MRN: [id number]  Patient Class: IP- Inpatient   Admission Date: 1/26/2019  Length of Stay: 2 days  Attending Physician: Trevon Gao MD  Primary Care Provider: John Garcia MD    [facility] Medicine Team: [facility] 1 Kim Fox MD    Subjective:     Principal Problem:Lupus    HPI:  22 yo F w/ PMH of SLE, anemia, HTN, and anxiety/depression who presents as a transfer from [facility] for nephrology and rheumatology consults. She presented to [facility] on 1/16 with progressive renal failure, edema, and worsening skin rash, as well as joint pain. Initially during her hospitalization she continued her home prednisone but as symptoms progressed she was started on IV Solumedrol for stress dosing. Her admission has been complicated by progressive renal failure requiring daily HD with worsening peripheral and pulmonary edema, as well as developing pneumonia w/ cultures positive for stenotrophomonas. Due to her immunocompromised status, she was started on antibiotic regimen levaquin. On presentation, she was also noted to have developed a vasculitis skin rash to her lower extremities, which was painful to palpation. Wound care was following. Of note, she was recently diagnosed w/ candidal esophagitis. Her most recent CD4 count was <200. Follows with nephrology who initiated chemotherapy w/ cytoxan which she receives infusions twice a month. No previous cardiac involvement. Reports shortness of breath stable on 2L NC, as well as nausea, bilateral lower back pain. Denies fever, chills, chest pain, abdominal pain, dysuria, hematuria, and hematochezia.        Hospital Course:  Admitted to hospital medicine. Consulted nephrology and rheumatology.    Interval History: Vitals stable overnight. Afebrile. Breathing comfortably on 2L NC. Pain well controlled. Abdominal pain stable. Denies worsening shortness of breath, chest pain.      Review of Systems   Constitutional: Negative for appetite change, chills and fever.   HENT: Negative for congestion, sore throat and trouble swallowing.    Eyes: Negative for visual disturbance.   Respiratory: Negative for cough and shortness of breath.    Cardiovascular: Positive for leg swelling. Negative for chest pain.   Gastrointestinal: Positive for abdominal pain. Negative for abdominal distention, blood in stool, nausea and vomiting.   Genitourinary: Negative for dysuria and hematuria.   Musculoskeletal: Positive for arthralgias.   Skin: Positive for rash.   Neurological: Positive for weakness. Negative for dizziness and headaches.   Psychiatric/Behavioral: Negative for confusion.     Objective:     Vital Signs (Most Recent):  Temp: 97.8 °F (36.6 °C) (01/28/19 1500)  Pulse: 105 (01/28/19 1615)  Resp: 18 (01/28/19 1500)  BP: (!) 158/106 (01/28/19 1615)  SpO2: 95 % (01/28/19 1358) Vital Signs (24h Range):  Temp:  [97.5 °F (36.4 °C)-98.5 °F (36.9 °C)] 97.8 °F (36.6 °C)  Pulse:  [] 105  Resp:  [14-20] 18  SpO2:  [93 %-99 %] 95 %  BP: (139-177)/() 158/106     Weight: 103.9 kg (229 lb)  Body mass index is 31.94 kg/m².    Intake/Output Summary (Last 24 hours) at 1/28/2019 1643  Last data filed at 1/28/2019 1358  Gross per 24 hour   Intake 200 ml   Output 950 ml   Net -750 ml      Physical Exam   Constitutional: She is oriented to person, place, and time. She appears well-developed and well-nourished. No distress.   Obese appearing female   HENT:   Head: Normocephalic and atraumatic.   Mouth/Throat: Oropharynx is clear and moist. No oropharyngeal exudate.   Diffuse facial edema; moon facies   Eyes: Conjunctivae and EOM are normal.   Neck: Normal range of motion. Neck supple.   Cardiovascular: Normal rate, regular rhythm, normal heart sounds and intact distal pulses.   No murmur heard.  Pulmonary/Chest: Effort normal and breath sounds normal. No respiratory distress. She has no wheezes.   Breathing  comfortably on 2L NC  Decreased breath sound bilateral lower lung fields   Abdominal: Soft. Bowel sounds are normal. She exhibits no distension. There is tenderness (diffuse).   Musculoskeletal: Normal range of motion. She exhibits edema and tenderness. She exhibits no deformity.   Neurological: She is alert and oriented to person, place, and time.   CN largely in tact  Strength full throughout  No sensory deficits    Skin: Skin is warm and dry. Rash (vasculitic like rash BLE; mild erythema w/ desquamation to inner thighs) noted.   Psychiatric: She has a normal mood and affect. Her behavior is normal.   Nursing note and vitals reviewed.      Significant Labs:   A1C:   Recent Labs   Lab 01/26/19 1832   HGBA1C 5.7*     CBC:   Recent Labs   Lab 01/26/19 1832 01/27/19  0600 01/28/19  0611   WBC 12.70 12.90* 13.06*   HGB 9.8* 9.7* 9.6*   HCT 29.8* 29.8* 28.8*   PLT 96* 93* 99*     CMP:   Recent Labs   Lab 01/26/19 1832 01/27/19  0600 01/28/19  0611   * 132* 127*   K 4.5 4.5 5.0   CL 98 97 93*   CO2 19* 20* 18*   * 145* 174*   BUN 77* 88* 115*   CREATININE 5.9* 5.9* 6.7*   CALCIUM 8.8 8.6* 9.2   PROT 5.5* 5.4* 5.1*   ALBUMIN 1.7* 1.8* 1.9*   BILITOT 0.3 0.3 0.3   ALKPHOS 72 68 59   AST 7* 8* 7*   ALT 18 18 16   ANIONGAP 17* 15 16   EGFRNONAA 9.4* 9.4* 8.0*       Significant Imaging: I have reviewed all pertinent imaging results/findings within the past 24 hours.    Assessment/Plan:      * Lupus    22 yo F w/ PMH of SLE, anemia, HTN, and anxiety/depression who presents as a transfer with worsening SLE symptoms. Presents for nephrology and rheumatology consults. Recent hospitalization complicated by progressive renal failure 2/2 lupus nephritis, currently receiving HD.     Plan:  - Consult nephrology, rheumatology; appreciate assistance  - Discontinued IV solumedrol 60 mg q6 per nephrology and family request  - po Prednisone 60 mg daily  - Continue home hydroxychloroquine  - Resume Cytoxan infusions as  previously scheduled per rheumatology  - Holding Benlysta in the setting of active respiratory infection  - F/U extensive rheumatology work up  - IV morphine PRN   - Retroperitoneal US demonstrated medical renal disease w/o evidence of hydronephrosis or obstructive   - Resumed HD per nephrology     Alteration in skin integrity    - Per wound care       Rash    - Concern for vasculitis vs cellulitis  - Tender to touch  - Bandaged per wound care at OSH  - Wound care consulted; rebandaged  - Dermatology consulted placed given immunosuppression status       Anemia in CKD (chronic kidney disease)    - Hb 9.6  - No active signs of bleeding  - Nephrology consulted  - EPO w/ HD  - follow up iron studies       Acute renal failure    - See lupus nephritis       Essential hypertension    - Hypertensive w/ systolics 150-170s  - On clonidine, ramipril, coreg at home  - Continue home coreg at this time   - Reassess after HD         Acid reflux disease with ulcer    - Hx of nonbleeding ulcers  - Continue home pantoprazole daily       Lupus nephritis    - Follows w/ nephrology in [place]  - Inpatient nephrology consulted; appreciate recommendations  - Recently started on Cytoxan in December w/ dosing x2 monthly  - Planning to resume infusions   - HD today  - prednisone po 60 mg daily         Anxiety and depression    - Continue home Lexapro  - Xanax PRN       Shortness of breath    - Not on O2 at home  - Breathing comfortably on 2L NC  - O2 supplementation started following ARF w/ worsening pulmonary edema  - CXR consistent w/ NUBIA opacification, no significant pulmonary edema; peripheral edema present  - ID consulted; continue levaquin  - HD per nephrology       Immunocompromised    - Chronic steroid use  - Recently started Cytoxan in December per nephrology  - CD4 <200  - ID consulted; appreciate assistance  - Respiratory cultures positive for stenotrophomonas, continue levaquin  - Continue acyclovir, fluconazole for ppx  -  Obtained blood cultures, urine culture, ECHO, repeat sputum culture as Rheum intends to resume Cytoxan infusions  - Patient with sulfur allergy; will hold off on PCP ppx until further assessed by ID         VTE Risk Mitigation (From admission, onward)        Ordered     heparin (porcine) injection 1,000 Units  As needed (PRN)      01/28/19 1542     heparin (porcine) injection 5,000 Units  Every 8 hours      01/26/19 1810     IP VTE HIGH RISK PATIENT  Once      01/26/19 1805              Kim Fox MD  Department of Hospital Medicine   [facility]

## 2019-01-28 NOTE — ASSESSMENT & PLAN NOTE
- Chronic steroid use  - Recently started Cytoxan in December per nephrology  - CD4 <200  - ID consulted; appreciate assistance  - Respiratory cultures positive for stenotrophomonas, continue levaquin  - Continue acyclovir, fluconazole for ppx  - Obtained blood cultures, urine culture, ECHO, repeat sputum culture as Rheum intends to resume Cytoxan infusions  - Patient with sulfur allergy; will hold off on PCP ppx until further assessed by ID

## 2019-01-28 NOTE — PLAN OF CARE
Met with pt's grandmother at bs. Pt sleeping. Ble's swollen and wrapped. U/S in progress at bs.   GM requests rollator to assist w pt's ambulation at school.     Future Appointments   Date Time Provider Department Center   2/7/2019  2:00 PM Sukhjinder Goodwin MD [facility] HEM ONC [facility]        01/28/19 1421   Discharge Assessment   Assessment Type Discharge Planning Assessment   Confirmed/corrected address and phone number on facesheet? Yes   Assessment information obtained from? Caregiver   Expected Length of Stay (days) 4   Communicated expected length of stay with patient/caregiver yes   Prior to hospitilization cognitive status: Alert/Oriented   Prior to hospitalization functional status: Independent;Assistive Equipment   Current cognitive status: Alert/Oriented   Current Functional Status: Assistive Equipment;Needs Assistance   Able to Return to Prior Arrangements yes   Is patient able to care for self after discharge? Unable to determine at this time (comments)   Who are your caregiver(s) and their phone number(s)? lives w her mom and dad  has ride home when needed   Patient's perception of discharge disposition home or selfcare   Readmission Within the Last 30 Days no previous admission in last 30 days   Patient currently being followed by outpatient case management? No   Patient currently receives any other outside agency services? No   Equipment Currently Used at Home bedside commode;bath bench;walker, rolling  (wants a rollator. Secure chat msg to MD Fox)   Do you have any problems affording any of your prescribed medications? No   Is the patient taking medications as prescribed? yes   Does the patient have transportation home? Yes   Transportation Anticipated family or friend will provide   Discharge Plan A Home with family   Discharge Plan B Home with family   DME Needed Upon Discharge  rollator   Patient/Family in Agreement with Plan yes

## 2019-01-28 NOTE — PLAN OF CARE
Problem: Adult Inpatient Plan of Care  Goal: Plan of Care Review  Outcome: Ongoing (interventions implemented as appropriate)  Pt rested comfortably most of night. Wound care consult for Monday. Pt and family not wanting pt to have high doses of solumedrol.

## 2019-01-28 NOTE — ASSESSMENT & PLAN NOTE
- Hypertensive w/ systolics 150-170s  - On clonidine, ramipril, coreg at home  - Continue home coreg at this time   - Reassess after HD

## 2019-01-28 NOTE — ASSESSMENT & PLAN NOTE
22 yo F w/ PMH of SLE, anemia, HTN, and anxiety/depression who presents as a transfer with worsening SLE symptoms. Presents for nephrology and rheumatology consults. Recent hospitalization complicated by progressive renal failure 2/2 lupus nephritis, currently receiving HD.     Plan:  - Consult nephrology, rheumatology; appreciate assistance  - Discontinued IV solumedrol 60 mg q6 per nephrology and family request  - po Prednisone 60 mg daily  - Continue home hydroxychloroquine  - Resume Cytoxan infusions as previously scheduled per rheumatology  - Holding Benlysta in the setting of active respiratory infection  - F/U extensive rheumatology work up  - IV morphine PRN   - Retroperitoneal US demonstrated medical renal disease w/o evidence of hydronephrosis or obstructive   - Resumed HD per nephrology

## 2019-01-28 NOTE — PLAN OF CARE
Problem: Adult Inpatient Plan of Care  Goal: Plan of Care Review  Outcome: Ongoing (interventions implemented as appropriate)  POC reviewed with pt and mother. VSS. AAOx4. Prn pain medication administered x1. No acute changes. Will continue to monitor.

## 2019-01-28 NOTE — PT/OT/SLP EVAL
Occupational Therapy   Evaluation    Name: Anitha Swenson  MRN: [id number]  Admitting Diagnosis:  Lupus      Recommendations:     Discharge Recommendations: rehabilitation facility  Discharge Equipment Recommendations:  cane, straight  Barriers to discharge:  Decreased caregiver support    Assessment:     Anitha Swenson is a 23 y.o. female with a medical diagnosis of Lupus.  She presents with good participation and motivation.  Pt reported progressively requiring increasing (A) recently however does not have 24 hour (A) available at home therefore recommend inpt rehab.  Pt is at risk for falls. Performance deficits affecting function: weakness, impaired endurance, impaired self care skills, impaired sensation, impaired functional mobilty, impaired balance, decreased coordination, decreased upper extremity function, decreased lower extremity function.      Rehab Prognosis: Fair; patient would benefit from acute skilled OT services to address these deficits and reach maximum level of function.       Plan:     Patient to be seen 4 x/week to address the above listed problems via self-care/home management, therapeutic activities, therapeutic exercises  · Plan of Care Expires: 02/27/19  · Plan of Care Reviewed with: patient    Subjective     Chief Complaint: edema  Patient/Family Comments/goals: to have wound care assess wounds    Occupational Profile:  Living Environment & PLOF: Pt resides with parents in 1 story house with 1 step to enter.  Pt recently required (A) with donning pants, bathing, & using toilet however was modified independent with ambulation using RW inside house.  Prior to last few months pt was independent with mobility.  Pt is normally an active  however not last few months.  Prior to illness, pt was working as a  & enjoys reading, watching Netflix, riding bicycle, & swimming.  Equipment Used at Home:  (walking stick, RW, shower chair, 3 in 1 commode)  Assistance upon Discharge: does  not have 24 hour (A)    Pain/Comfort:  · Pain Rating 1: 6/10  · Location - Side 1: Bilateral  · Location 1: leg  · Pain Addressed 1: Reposition, Distraction, Cessation of Activity, Nurse notified  · Pain Rating Post-Intervention 1: 6/10    Patients cultural, spiritual, Gnosticism conflicts given the current situation: no    Objective:     Communicated with: RN prior to session.  Patient found supine with PureWick upon OT entry to room.  Family present in room.    General Precautions: Standard, fall     Occupational Performance:    Bed Mobility:    · Patient completed Supine to Sit with minimum assistance and HOB elevated  · Patient completed Sit to Supine with minimum assistance    Functional Mobility/Transfers:  · Patient completed Sit <> Stand Transfer with minimum assistance  with  rolling walker x 2 trials from EOB    Activities of Daily Living:  · Upper Body Dressing: stand by assistance donning gown around back while seated EOB  · Lower Body Dressing: total assistance donning socks while seated EOB    Cognitive/Visual Perceptual:  Cognitive/Psychosocial Skills:     -       Oriented to: Person, Place, Time and Situation   -       Follows Commands/attention:Follows multistep  commands  -       Communication: clear/fluent  -       Memory: No Deficits noted  -       Safety awareness/insight to disability: intact   -       Mood/Affect/Coping skills/emotional control: Appropriate to situation    Physical Exam:  Postural examination/scapula alignment:    -       Rounded shoulders  Skin integrity: wounds to LLE, redness to BLE  Edema:  Moderate BLE  Sensation: tingling in (B) hands  Upper Extremity Range of Motion:     -       Right Upper Extremity: WNL  -       Left Upper Extremity: WNL  Upper Extremity Strength: 3+/5 BUE   Strength:    -       Right Upper Extremity: WFL  -       Left Upper Extremity: WFL    AMPAC 6 Click ADL:  AMPAC Total Score: 18    Treatment & Education:  Noted tremors in BUE (pt reported due  to luis migueliods for her condition).  Pt with reported dizziness with standing. Provided education regarding role of OT, POC, & discharge recommendations with pt & family verbalizing understanding.  Pt had no further questions & when asked whether there were any concerns pt reported none.    Education:  Patient left supine with all lines intact, call button in reach, RN notified, family members present and white board updated.    GOALS:   Multidisciplinary Problems     Occupational Therapy Goals        Problem: Occupational Therapy Goal    Goal Priority Disciplines Outcome Interventions   Occupational Therapy Goal     OT, PT/OT Ongoing (interventions implemented as appropriate)    Description:  Goals to be met by: 2/10     Patient will increase functional independence with ADLs by performing:    UE Dressing with Modified [place].  LE Dressing with Supervision.  Grooming while standing with Stand-by Assistance.  Toileting from toilet with Supervision for hygiene and clothing management.   Supine to sit with Modified [place].  Stand pivot transfers with Supervision using RW.                      History:     Past Medical History:   Diagnosis Date    Anemia due to chronic blood loss 2/8/2018    Anemia, chronic disease 2/8/2018    Compound heterozygous MTHFR mutation [id number] 3/21/2018    Elevated homocysteine 2/8/2018    GI bleeding 2/8/2018    Hypertension     Lupus nephritis, ISN/RPS class IV 12/2018    diagnosed 12/2018, 3 biopsies, 1st - Class 3-4, no crescents, IFTA 15%, 2nd - Class V, 3rd - Class 4, w/ cressents, IFTA 30%. Treated with cellcept in 2018 until 10-11/2018 (stopped for neutropenic fever), last steroid pulce 12/31/18 and had 2 doses CYC, last 1/19/2018. On HD since 1/2019. Sees Dr. Pendleton, nephrology in [place], [place]. and Dr. Small, rheumatology    Migraine headache with aura     Other pulmonary embolism without acute cor pulmonale 2/8/2018       Past Surgical History:   Procedure  "Laterality Date    ARTHROSCOPIC FEMOROPLASTY Left 2014    Performed by [facility] Blake [facility] at [facility] OR 1ST FLR    ARTHROSCOPY-HIP WITH ACETABULOPLASTY(INCLUDES LABRAL REPAIR Left 2014    Performed by [facility]very Blake MD at [facility] OR 1ST FLR    HIP SURGERY      REPAIR-LABRAL Left 2014    Performed by Avery Blake MD at [facility] OR 1ST FLR    TYMPANOSTOMY TUBE PLACEMENT         Clinical Decision Makin.  OT Low:  "Pt evaluation falls under low complexity for evaluation coding due to performance deficits noted in 1-3 areas as stated above and 0 co-morbities affecting current functional status. Data obtained from problem focused assessments. No modifications or assistance was required for completion of evaluation. Only brief occupational profile and history review completed."     Time Tracking:     OT Date of Treatment: 19  OT Start Time: 1127  OT Stop Time: 1147  OT Total Time (min): 20 min    Billable Minutes:Evaluation 20    JOSE Pimentel  2019    "

## 2019-01-28 NOTE — ASSESSMENT & PLAN NOTE
- Concern for vasculitis vs cellulitis  - Tender to touch  - Bandaged per wound care at OSH  - Wound care consulted; rebandaged  - Dermatology consulted placed given immunosuppression status

## 2019-01-28 NOTE — SUBJECTIVE & OBJECTIVE
Interval History: This morning patient reporting side effects from increased dose of steroids including tremors, tunnel vision, and continued swelling of the body. Reports that she is no longer having a productive cough. Continues to have blisters on ankles with weeping. Denies joint pain/swelling, new rashes, abdominal pain, n/v, altered mentation, headaches, muscle pain, urinary symptoms, hair loss, oral/nasal ulcers. Does have slight pain in the mid-chest when taking a deep breath.     Current Facility-Administered Medications   Medication Frequency    acyclovir suspension 200 mg BID    albuterol-ipratropium 2.5 mg-0.5 mg/3 mL nebulizer solution 3 mL Q6H PRN    albuterol-ipratropium 2.5 mg-0.5 mg/3 mL nebulizer solution 3 mL Q6H WAKE    ALPRAZolam tablet 0.5 mg Q6H PRN    calcium acetate capsule 667 mg TID WM    carvedilol tablet 12.5 mg BID    cetirizine tablet 10 mg Daily    chlorhexidine 0.12 % solution 15 mL BID    dextrose 50% injection 12.5 g PRN    dextrose 50% injection 25 g PRN    escitalopram oxalate tablet 20 mg Daily    fluconazole tablet 100 mg Daily    gabapentin capsule 300 mg TID    glucagon (human recombinant) injection 1 mg PRN    glucose chewable tablet 16 g PRN    glucose chewable tablet 24 g PRN    heparin (porcine) injection 5,000 Units Q8H    hydroxychloroquine tablet 200 mg Daily    levoFLOXacin (LEVAQUIN) 250 mg in dextrose 5 % 50 mL IVPB Q48H    methylPREDNISolone sodium succinate injection 60 mg Q6H    morphine injection 3 mg Q4H PRN    ondansetron injection 4 mg Q6H PRN    ondansetron tablet 4 mg Q6H PRN    pantoprazole EC tablet 40 mg Daily    ramelteon tablet 8 mg Nightly PRN    sodium bicarbonate tablet 650 mg Daily    sodium chloride 0.9% flush 5 mL PRN    sodium chloride 0.9% flush 5 mL PRN    vitamin D 1000 units tablet 1,000 Units Daily    zinc oxide 20 % ointment PRN     Objective:     Vital Signs (Most Recent):  Temp: 98.2 °F (36.8 °C) (01/28/19  0805)  Pulse: 92 (01/28/19 0805)  Resp: 16 (01/28/19 0805)  BP: (!) 159/109 (01/28/19 0805)  SpO2: (!) 94 % (01/28/19 0805)  O2 Device (Oxygen Therapy): nasal cannula(patient found on 2 liter nasal cannula. Sats 96-97% RA) (01/28/19 0734) Vital Signs (24h Range):  Temp:  [97.5 °F (36.4 °C)-98.5 °F (36.9 °C)] 98.2 °F (36.8 °C)  Pulse:  [] 92  Resp:  [14-20] 16  SpO2:  [93 %-97 %] 94 %  BP: (132-159)/() 159/109     Weight: 104.3 kg (229 lb 15 oz) (01/27/19 1000)  Body mass index is 32.19 kg/m².  Body surface area is 2.28 meters squared.      Intake/Output Summary (Last 24 hours) at 1/28/2019 1136  Last data filed at 1/28/2019 0630  Gross per 24 hour   Intake 200 ml   Output 300 ml   Net -100 ml       Physical Exam   Constitutional: She is oriented to person, place, and time and well-developed, well-nourished, and in no distress. No distress.   Obese   +cushoid features    HENT:   Head: Normocephalic and atraumatic.   Dried oral mucosa  No signs of mouth ulcers   Normal salivary pooling    Eyes: EOM are normal. Pupils are equal, round, and reactive to light.   Neck: Normal range of motion. Neck supple.   Cardiovascular: Normal rate, regular rhythm and normal heart sounds.    Pulmonary/Chest: Effort normal and breath sounds normal.   Abdominal: Soft. She exhibits distension.   Neurological: She is alert and oriented to person, place, and time.   Skin: Skin is warm and dry.     Diffused stretch markings in bilateral thighs and abdomen    Bilateral ankles with blister like lesions covered with dressing. No necrotic lesions noted.    Psychiatric: Mood, affect and judgment normal.   Musculoskeletal: Normal range of motion. She exhibits edema and tenderness. She exhibits no deformity.   No signs of synovitis  ROM intact  +2 pitting edema of bilateral LE - to the calves   Mild tenderness of the ankle         Significant Labs:  Results for ALEXANDRA PARIS (MRN [id number]) as of 1/28/2019 11:19   Ref. Range 1/28/2019  06:11   WBC Latest Ref Range: 3.90 - 12.70 K/uL 13.06 (H)   RBC Latest Ref Range: 4.00 - 5.40 M/uL 3.29 (L)   Hemoglobin Latest Ref Range: 12.0 - 16.0 g/dL 9.6 (L)   Hematocrit Latest Ref Range: 37.0 - 48.5 % 28.8 (L)   MCV Latest Ref Range: 82 - 98 fL 88   MCH Latest Ref Range: 27.0 - 31.0 pg 29.2   MCHC Latest Ref Range: 32.0 - 36.0 g/dL 33.3   RDW Latest Ref Range: 11.5 - 14.5 % 13.6   Platelets Latest Ref Range: 150 - 350 K/uL 99 (L)   MPV Latest Ref Range: 9.2 - 12.9 fL 10.3   Gran% Latest Ref Range: 38.0 - 73.0 % 91.0 (H)   Immature Granulocytes Latest Ref Range: 0.0 - 0.5 % CANCELED   Immature Grans (Abs) Latest Ref Range: 0.00 - 0.04 K/uL CANCELED   Lymph% Latest Ref Range: 18.0 - 48.0 % 1.0 (L)   Lymph # Latest Ref Range: 1.0 - 4.8 K/uL CANCELED   Mono% Latest Ref Range: 4.0 - 15.0 % 4.0   Mono # Latest Ref Range: 0.3 - 1.0 K/uL CANCELED   Eosinophil% Latest Ref Range: 0.0 - 8.0 % 0.0   Eos # Latest Ref Range: 0.0 - 0.5 K/uL CANCELED   Basophil% Latest Ref Range: 0.0 - 1.9 % 0.0   Baso # Latest Ref Range: 0.00 - 0.20 K/uL CANCELED   BANDS Latest Units: % 3.0   Metamyelocytes Latest Units: % 1.0   nRBC Latest Ref Range: 0 /100 WBC 3 (A)   Aniso Unknown Slight   Poly Unknown Occasional   Platelet Estimate Unknown Decreased (A)   Large/Giant Platelets Unknown Present   Differential Method Unknown Manual   Iron Latest Ref Range: 30 - 160 ug/dL 75   TIBC Latest Ref Range: 250 - 450 ug/dL 121 (L)   Saturated Iron Latest Ref Range: 20 - 50 % 62 (H)   Transferrin Latest Ref Range: 200 - 375 mg/dL 82 (L)   Ferritin Latest Ref Range: 20.0 - 300.0 ng/mL 3,386 (H)     Results for ALEXANDRA PARIS (MRN [id number]) as of 1/28/2019 11:45   Ref. Range 1/11/2018 04:10 1/23/2019 13:50 1/27/2019 15:24   LD Latest Ref Range: 110 - 260 U/L 181 437 (H) 601 (H)   Results for ALEXANDRA PARIS (MRN [id number]) as of 1/28/2019 11:45   Ref. Range 1/27/2019 15:24   Haptoglobin Latest Ref Range: 30 - 250 mg/dL 330 (H)   Results for RAINA  ALEXANDRA MAYER (MRN [id number]) as of 1/28/2019 11:45   Ref. Range 1/27/2019 15:24   Direct Ryder (JHONNY) Unknown NEG   Results for ALEXANDRA PARIS (MRN [id number]) as of 1/28/2019 11:45   Ref. Range 1/26/2019 18:32   Sed Rate Latest Ref Range: 0 - 36 mm/Hr 83 (H)   Results for ALEXANDRA PARIS (MRN [id number]) as of 1/28/2019 11:45   Ref. Range 1/26/2019 18:32   CRP Latest Ref Range: 0.0 - 8.2 mg/L 147.6 (H)       Results for ALEXANDRA PARIS (MRN [id number]) as of 1/28/2019 11:19   Ref. Range 1/28/2019 06:11   Sodium Latest Ref Range: 136 - 145 mmol/L 127 (L)   Potassium Latest Ref Range: 3.5 - 5.1 mmol/L 5.0   Chloride Latest Ref Range: 95 - 110 mmol/L 93 (L)   CO2 Latest Ref Range: 23 - 29 mmol/L 18 (L)   Anion Gap Latest Ref Range: 8 - 16 mmol/L 16   BUN, Bld Latest Ref Range: 6 - 20 mg/dL 115 (H)   Creatinine Latest Ref Range: 0.5 - 1.4 mg/dL 6.7 (H)   eGFR if non African American Latest Ref Range: >60 mL/min/1.73 m^2 8.0 (A)   eGFR if African American Latest Ref Range: >60 mL/min/1.73 m^2 9.3 (A)   Glucose Latest Ref Range: 70 - 110 mg/dL 174 (H)   Calcium Latest Ref Range: 8.7 - 10.5 mg/dL 9.2   Phosphorus Latest Ref Range: 2.7 - 4.5 mg/dL 10.2 (HH)   Magnesium Latest Ref Range: 1.6 - 2.6 mg/dL 1.9   Alkaline Phosphatase Latest Ref Range: 55 - 135 U/L 59   Total Protein Latest Ref Range: 6.0 - 8.4 g/dL 5.1 (L)   Albumin Latest Ref Range: 3.5 - 5.2 g/dL 1.9 (L)   Total Bilirubin Latest Ref Range: 0.1 - 1.0 mg/dL 0.3   AST Latest Ref Range: 10 - 40 U/L 7 (L)   ALT Latest Ref Range: 10 - 44 U/L 16   Results for ALEXANDRA PARIS (MRN [id number]) as of 1/28/2019 11:45   Ref. Range 10/23/2017 14:55 2/12/2018 16:18 1/27/2019 12:12   C3 Complement Latest Ref Range: 82 - 167 mg/dL 73 (L)     Complement (C-3) Latest Ref Range: 50 - 180 mg/dL   146   Complement (C-4) Latest Ref Range: 11 - 44 mg/dL   30   Rheumatoid Factor Latest Ref Range: 0.0 - 13.9 IU/mL  <10.0    Results for ALEXANDRA PARIS (MRN [id number]) as of 1/28/2019 11:45   Ref.  Range 1/27/2019 15:10   Specimen UA Unknown Urine, Clean Catch   Color, UA Latest Ref Range: Yellow, Straw, Amanda  Yellow   pH, UA Latest Ref Range: 5.0 - 8.0  5.0   Specific Gravity, UA Latest Ref Range: 1.005 - 1.030  1.015   Appearance, UA Latest Ref Range: Clear  Clear   Protein, UA Latest Ref Range: Negative  2+ (A)   Glucose, UA Latest Ref Range: Negative  Negative   Ketones, UA Latest Ref Range: Negative  Negative   Occult Blood UA Latest Ref Range: Negative  1+ (A)   Nitrite, UA Latest Ref Range: Negative  Negative   Bilirubin (UA) Latest Ref Range: Negative  Negative   Leukocytes, UA Latest Ref Range: Negative  Negative   RBC, UA Latest Ref Range: 0 - 4 /hpf 2   WBC, UA Latest Ref Range: 0 - 5 /hpf 0   Bacteria, UA Latest Ref Range: None-Occ /hpf Rare   Squam Epithel, UA Latest Units: /hpf 3   Hyaline Casts, UA Latest Ref Range: 0-1/lpf /lpf 0   Microscopic Comment Unknown SEE COMMENT   Prot/Creat Ratio, Ur Latest Ref Range: 0.00 - 0.20  2.49 (H)   Protein, Urine Random Latest Ref Range: 0 - 15 mg/dL 187 (H)   Creatinine, Random Ur Latest Ref Range: 15.0 - 325.0 mg/dL 75.0     Significant Imaging:  [facility] CTA 1/23/19:  1. Bilateral multifocal airspace opacities, suspicious for bilateral multifocal pneumonia.  F/u in 4-6 weeks  2. Cardiomegaly with small pericardial effusion  3. Negative for PE

## 2019-01-28 NOTE — PT/OT/SLP EVAL
Physical Therapy Evaluation    Patient Name:  Anitha Swenson   MRN:  [id number]    Recommendations:     Discharge Recommendations:  rehabilitation facility   Discharge Equipment Recommendations: cane, straight   Barriers to discharge: Decreased caregiver support and wound care needs    Assessment:     Anitha Swenson is a 23 y.o. female admitted with a medical diagnosis of Lupus.  She presents with the following impairments/functional limitations:  weakness, impaired functional mobilty, impaired coordination, impaired endurance, gait instability, decreased coordination, pain, impaired sensation, impaired balance, impaired skin, impaired self care skills, decreased lower extremity function, decreased ROM, edema. Pt demonstrates decreased activity tolerance requiring additional assist for bed mob, EOB sitting, and standing activities.     Rehab Prognosis: Good; patient would benefit from acute skilled PT services to address these deficits and reach maximum level of function.    Recent Surgery: * No surgery found *      Plan:     During this hospitalization, patient to be seen 4 x/week to address the identified rehab impairments via gait training, therapeutic activities, therapeutic exercises, neuromuscular re-education and progress toward the following goals:    · Plan of Care Expires:  02/11/19    Subjective     Chief Complaint: LE pain related to opened wounds  Patient/Family Comments/goals: have wound care consult and increase strength to return to PLOF  Pain/Comfort:  · Pain Rating 1: 6/10  · Location - Side 1: Bilateral  · Location 1: leg  · Pain Addressed 1: Pre-medicate for activity, Reposition, Distraction, Cessation of Activity  · Pain Rating Post-Intervention 1: 6/10    Patients cultural, spiritual, Scientology conflicts given the current situation: no    Living Environment:  Pt lives at home with parents in 1 story home with 1 step to enter and no hand rails. Pt will not have 24hr supervision with parents  absent throughout the day. Pt recently stopped driving in last couple months. Pt worked as  prior and will likely be able to return following recovery. Pt has walking stick and RW that she used PRN, but recently has relied more on AD and mother to assist with mob and self care.     Prior to admission, patients level of function was Mod I/Atilio depending on strength level.  Equipment used at home: bedside commode, bath bench, walker, rolling, other (see comments)(walking stick).  DME owned (not currently used): none.  Upon discharge, patient will have assistance from mother/father(not 24hr).    Objective:     Communicated with RN prior to session, nursing stated pt has LE edema and open wounds with expected wound consult today.  Patient found call button in reach and family present PureWick, PICC line  upon PT entry to room.    General Precautions: Standard, fall   Orthopedic Precautions:N/A   Braces: N/A     Exams:  · Cognitive Exam:  Patient is oriented to Person, Place, Time and Situation  · Sensation:    · -       Impaired  reports pain with pressure  · RLE ROM: Deficits: decreased hip/knee gross movements   · RLE Strength: Deficits: 3/5 grossly limited by pain  · LLE ROM: Deficits: decreased hip/knee gross movements   · LLE Strength: 3/5 grossly limited by pain    Functional Mobility:  · Bed Mobility:     · Rolling Right: minimum assistance  · Scooting: minimum assistance  · Supine to Sit: minimum assistance  · Sit to Supine: minimum assistance  · Transfers:     · Sit to Stand:  minimum assistance with rolling walker  · Gait: 2 steps forward and 2 steps back with Min A and RW  · Balance: P+/F-      Therapeutic Activities and Exercises:   -pt performed STS x2 with Min A and assist stabilizing posture. Pt demonstrated swaying posture.   -Static standing for ~30secs requiring seated rest following bilat. Knee weakness   -Pt educated on PT roles, expectations, and POC; safety c mobility; benefits of OOB  activities; performing therex; d/c recs       AM-PAC 6 CLICK MOBILITY  Total Score:16     Patient left HOB elevated with call button in reach and family present.    GOALS:   Multidisciplinary Problems     Physical Therapy Goals        Problem: Physical Therapy Goal    Goal Priority Disciplines Outcome Goal Variances Interventions   Physical Therapy Goal     PT, PT/OT      Description:  Goals to be met by: 2019     Patient will increase functional independence with mobility by performin. Supine to sit with Stand-by Assistance  2. Sit to supine with Stand-by Assistance  3. Gait  x 25' feet with Stand-by Assistance using Rolling Walker.   4. Stand for 3 minutes with Stand-by Assistance using Rolling Walker  5. Lower extremity exercise program x15 reps per handout, with independence                      History:     Past Medical History:   Diagnosis Date    Anemia due to chronic blood loss 2018    Anemia, chronic disease 2018    Compound heterozygous MTHFR mutation [id number] 3/21/2018    Elevated homocysteine 2018    GI bleeding 2018    Hypertension     Lupus nephritis, ISN/RPS class IV 2018    diagnosed 2018, 3 biopsies, 1st - Class 3-4, no crescents, IFTA 15%, 2nd - Class V, 3rd - Class 4, w/ cressents, IFTA 30%. Treated with cellcept in 2018 until  (stopped for neutropenic fever), last steroid pulce 18 and had 2 doses CYC, last 2018. On HD since 2019. Sees Dr. Pendleton, nephrology in [place], [place]. and Dr. Small, rheumatology    Migraine headache with aura     Other pulmonary embolism without acute cor pulmonale 2018       Past Surgical History:   Procedure Laterality Date    ARTHROSCOPIC FEMOROPLASTY Left 2014    [facility]    ARTHROSCOPY-HIP WITH ACETABULOPLASTY(INCLUDES LABRAL REPAIR Left 2014    [facility] at [facility]    HIP SURGERY      REPAIR-LABRAL Left  12/26/2014    [facility]    TYMPANOSTOMY TUBE PLACEMENT         Clinical Decision Making:     History  Co-morbidities and personal factors that may impact the plan of care Examination  Body Structures and Functions, activity limitations and participation restrictions that may impact the plan of care Clinical Presentation   Decision Making/ Complexity Score   Co-morbidities:   [] Time since onset of injury / illness / exacerbation  [] Status of current condition  []Patient's cognitive status and safety concerns    [] Multiple Medical Problems (see med hx)  Personal Factors:   [] Patient's age  [] Prior Level of function   [] Patient's home situation (environment and family support)  [] Patient's level of motivation  [] Expected progression of patient      HISTORY:(criteria)    [] 88622 - no personal factors/history    [] 63947 - has 1-2 personal factor/comorbidity     [] 68316 - has >3 personal factor/comorbidity     Body Regions:  [] Objective examination findings  [] Head     []  Neck  [] Trunk   [] Upper Extremity  [] Lower Extremity    Body Systems:  [] For communication ability, affect, cognition, language, and learning style: the assessment of the ability to make needs known, consciousness, orientation (person, place, and time), expected emotional /behavioral responses, and learning preferences (eg, learning barriers, education  needs)  [] For the neuromuscular system: a general assessment of gross coordinated movement (eg, balance, gait, locomotion, transfers, and transitions) and motor function  (motor control and motor learning)  [] For the musculoskeletal system: the assessment of gross symmetry, gross range of motion, gross strength, height, and weight  [] For the integumentary system: the assessment of pliability(texture), presence of scar formation, skin color, and skin integrity  [] For cardiovascular/pulmonary system: the assessment of heart rate, respiratory  rate, blood pressure, and edema     Activity limitations:    [] Patient's cognitive status and saf ety concerns          [] Status of current condition      [] Weight bearing restriction  [] Cardiopulmunary Restriction    Participation Restrictions:   [] Goals and goal agreement with the patient     [] Rehab potential (prognosis) and probable outcome      Examination of Body System: (criteria)    [] 18223 - addressing 1-2 elements    [] 41674 - addressing a total of 3 or more elements     [] 99667 -  Addressing a total of 4 or more elements         Clinical Presentation: (criteria)  Choose one     On examination of body system using standardized tests and measures patient presents with (CHOOSE ONE) elements from any of the following: body structures and functions, activity limitations, and/or participation restrictions.  Leading to a clinical presentation that is considered (CHOOSE ONE)                              Clinical Decision Making  (Eval Complexity):  Choose One     Time Tracking:     PT Received On: 01/28/19  PT Start Time: 1125     PT Stop Time: 1146  PT Total Time (min): 21 min     Billable Minutes: Evaluation 21      Terence Dos Santos, PT  01/28/2019

## 2019-01-28 NOTE — ASSESSMENT & PLAN NOTE
- Follows w/ nephrology in [place]  - Inpatient nephrology consulted; appreciate recommendations  - Recently started on Cytoxan in December w/ dosing x2 monthly  - Planning to resume infusions   - HD today  - prednisone po 60 mg daily

## 2019-01-28 NOTE — MEDICAL/APP STUDENT
[facility]  Nephrology  Progress Note    Patient Name: Anitha Swenson  MRN: [id number]  Admission Date: 1/26/2019  Hospital Length of Stay: 2 days  Attending Provider: Trevon Gao MD   Primary Care Physician: John Garcia MD  Principal Problem:Lupus    Consults  Subjective:     Interval History:     No acute overnight events. Patient on a very high dose of steroids on admission (solumedrol 60 mg q6h), this likely is contributing to the patient's cushingoid appearance this morning. Steroids switched back to patient's home dose, prednisone 60 mg daily. She will receive a round of dialysis today.     Review of patient's allergies indicates:   Allergen Reactions    Sulfur      Current Facility-Administered Medications   Medication Frequency    acyclovir suspension 200 mg BID    albuterol-ipratropium 2.5 mg-0.5 mg/3 mL nebulizer solution 3 mL Q6H PRN    albuterol-ipratropium 2.5 mg-0.5 mg/3 mL nebulizer solution 3 mL Q6H WAKE    ALPRAZolam tablet 0.5 mg Q6H PRN    calcium acetate capsule 667 mg TID WM    carvedilol tablet 12.5 mg BID    cetirizine tablet 10 mg Daily    chlorhexidine 0.12 % solution 15 mL BID    dextrose 50% injection 12.5 g PRN    dextrose 50% injection 25 g PRN    escitalopram oxalate tablet 20 mg Daily    fluconazole tablet 100 mg Daily    gabapentin capsule 300 mg TID    glucagon (human recombinant) injection 1 mg PRN    glucose chewable tablet 16 g PRN    glucose chewable tablet 24 g PRN    heparin (porcine) injection 5,000 Units Q8H    hydroxychloroquine tablet 200 mg Daily    levoFLOXacin (LEVAQUIN) 250 mg in dextrose 5 % 50 mL IVPB Q48H    methylPREDNISolone sodium succinate injection 60 mg Q6H    morphine injection 3 mg Q4H PRN    ondansetron injection 4 mg Q6H PRN    ondansetron tablet 4 mg Q6H PRN    pantoprazole EC tablet 40 mg Daily    ramelteon tablet 8 mg Nightly PRN    sodium bicarbonate tablet 650 mg Daily    sodium chloride 0.9%  flush 5 mL PRN    sodium chloride 0.9% flush 5 mL PRN    vitamin D 1000 units tablet 1,000 Units Daily    zinc oxide 20 % ointment PRN       Objective:     Vital Signs (Most Recent):  Temp: 98.2 °F (36.8 °C) (01/28/19 0805)  Pulse: 92 (01/28/19 0805)  Resp: 16 (01/28/19 0805)  BP: (!) 159/109 (01/28/19 0805)  SpO2: (!) 94 % (01/28/19 0805)  O2 Device (Oxygen Therapy): nasal cannula(patient found on 2 liter nasal cannula. Sats 96-97% RA) (01/28/19 0734) Vital Signs (24h Range):  Temp:  [97.5 °F (36.4 °C)-98.5 °F (36.9 °C)] 98.2 °F (36.8 °C)  Pulse:  [] 92  Resp:  [14-20] 16  SpO2:  [93 %-97 %] 94 %  BP: (132-159)/() 159/109     Weight: 104.3 kg (229 lb 15 oz) (01/27/19 1000)  Body mass index is 32.19 kg/m².  Body surface area is 2.28 meters squared.    I/O last 3 completed shifts:  In: 400 [P.O.:400]  Out: 550 [Urine:550]    Physical Exam   Constitutional: She is oriented to person, place, and time. She appears well-developed and well-nourished. No distress.   HENT:   Edematous face    Cardiovascular: Normal rate, regular rhythm and normal heart sounds.   Pulmonary/Chest: Effort normal and breath sounds normal. No respiratory distress.   Musculoskeletal: She exhibits edema.   Erythemous lesions bilaterally on feet  L leg has 2 medial lesions   Neurological: She is alert and oriented to person, place, and time.   Skin: Skin is warm and dry. She is not diaphoretic.   Psychiatric: She has a normal mood and affect. Her behavior is normal. Judgment and thought content normal.       Significant Labs:sure  CMP:   Recent Labs   Lab 01/28/19  0611   *   CALCIUM 9.2   ALBUMIN 1.9*   PROT 5.1*   *   K 5.0   CO2 18*   CL 93*   *   CREATININE 6.7*   ALKPHOS 59   ALT 16   AST 7*   BILITOT 0.3     All labs within the past 24 hours have been reviewed.    Significant Imaging:  US: Reviewed     1. No evidence of hydronephrosis/obstructive uropathy.  2. Findings suggestive of medical renal  disease.    Assessment/Plan:   22 yo F w/ PMH of SLE, anemia, HTN, and anxiety/depression who presents as a transfer with worsening SLE symptoms. Presents for nephrology and rheumatology consults. Recent hospitalization complicated by progressive renal failure 2/2 lupus nephritis, currently receiving HD.     Lupus Nephritis   Started on cytoxan in December, has received 2 doses to date; dose scheduled every two weeks  Pt's most recent renal biopsy (12/27/18) showed Class IV lupus nephritis w/ crescents  Retroperitoneal US showed evidence of medical renal disease w/o no sign of hydronephrosis or obstruction   C3/C4 WNL  Urine Protein/Creatinine ratio: 2.47   CBC, BMP, Phos daily  D/C solumedrol 60 mg q6h  Start Prednisone 60 mg daily   Dialysis today, will dialyze as needed    Active Diagnoses:    Diagnosis Date Noted POA    PRINCIPAL PROBLEM:  Lupus [L93.0] 01/24/2019 Yes    Anemia in CKD (chronic kidney disease) [N18.9, D63.1] 01/27/2019 Yes    Rash [R21] 01/27/2019 Yes    Immunocompromised [D84.9] 01/26/2019 Yes    Shortness of breath [R06.02] 01/26/2019 Yes    Anxiety and depression [F41.9, F32.9] 01/26/2019 Yes    Lupus nephritis [M32.14] 01/26/2019 Yes    Essential hypertension [I10] 01/26/2019 Yes    Acute renal failure [N17.9] 01/26/2019 Yes      Problems Resolved During this Admission:           Thank you for your consult. I will follow-up with patient. Please contact us if you have any additional questions.    Charlie Miramontes  Nephrology  [facility]    ATTENDING PHYSICIAN ATTESTATION  I have personally interviewed and examined the patient. I thoroughly reviewed the demographic, clinical, laboratorial and imaging information available in medical records. I agree with the assessment and recommendations provided above.

## 2019-01-28 NOTE — PLAN OF CARE
Problem: Physical Therapy Goal  Goal: Physical Therapy Goal  Goals to be met by: 2019     Patient will increase functional independence with mobility by performin. Supine to sit with Stand-by Assistance  2. Sit to supine with Stand-by Assistance  3. Gait  x 25' feet with Stand-by Assistance using Rolling Walker.   4. Stand for 3 minutes with Stand-by Assistance using Rolling Walker  5. Lower extremity exercise program x15 reps per handout, with independence    Eval completed and POC established     Terence Dos Santos, PT, DPT  2019

## 2019-01-28 NOTE — PROGRESS NOTES
Procedure explained. optison given ivp via left upper arm picc line for imaging. Denies transfusion rxn. Tolerated well. Sl flushed before and after with 10 cc ns. Aseptic technique maintained.

## 2019-01-28 NOTE — PROGRESS NOTES
Report received from COSME Raymond. Pt arrived from floor AAOx4. Maintenance dialysis initiated via R subclavian cvc without difficulty.

## 2019-01-28 NOTE — ASSESSMENT & PLAN NOTE
- Not on O2 at home  - Breathing comfortably on 2L NC  - O2 supplementation started following ARF w/ worsening pulmonary edema  - CXR consistent w/ NUBIA opacification, no significant pulmonary edema; peripheral edema present  - ID consulted; continue levaquin  - HD per nephrology

## 2019-01-28 NOTE — SUBJECTIVE & OBJECTIVE
Interval History: Vitals stable overnight. Afebrile. Breathing comfortably on 2L NC. Pain well controlled. Abdominal pain stable. Denies worsening shortness of breath, chest pain.     Review of Systems   Constitutional: Negative for appetite change, chills and fever.   HENT: Negative for congestion, sore throat and trouble swallowing.    Eyes: Negative for visual disturbance.   Respiratory: Negative for cough and shortness of breath.    Cardiovascular: Positive for leg swelling. Negative for chest pain.   Gastrointestinal: Positive for abdominal pain. Negative for abdominal distention, blood in stool, nausea and vomiting.   Genitourinary: Negative for dysuria and hematuria.   Musculoskeletal: Positive for arthralgias.   Skin: Positive for rash.   Neurological: Positive for weakness. Negative for dizziness and headaches.   Psychiatric/Behavioral: Negative for confusion.     Objective:     Vital Signs (Most Recent):  Temp: 97.8 °F (36.6 °C) (01/28/19 1500)  Pulse: 105 (01/28/19 1615)  Resp: 18 (01/28/19 1500)  BP: (!) 158/106 (01/28/19 1615)  SpO2: 95 % (01/28/19 1358) Vital Signs (24h Range):  Temp:  [97.5 °F (36.4 °C)-98.5 °F (36.9 °C)] 97.8 °F (36.6 °C)  Pulse:  [] 105  Resp:  [14-20] 18  SpO2:  [93 %-99 %] 95 %  BP: (139-177)/() 158/106     Weight: 103.9 kg (229 lb)  Body mass index is 31.94 kg/m².    Intake/Output Summary (Last 24 hours) at 1/28/2019 1643  Last data filed at 1/28/2019 1358  Gross per 24 hour   Intake 200 ml   Output 950 ml   Net -750 ml      Physical Exam   Constitutional: She is oriented to person, place, and time. She appears well-developed and well-nourished. No distress.   Obese appearing female   HENT:   Head: Normocephalic and atraumatic.   Mouth/Throat: Oropharynx is clear and moist. No oropharyngeal exudate.   Diffuse facial edema; moon facies   Eyes: Conjunctivae and EOM are normal.   Neck: Normal range of motion. Neck supple.   Cardiovascular: Normal rate, regular rhythm,  normal heart sounds and intact distal pulses.   No murmur heard.  Pulmonary/Chest: Effort normal and breath sounds normal. No respiratory distress. She has no wheezes.   Breathing comfortably on 2L NC  Decreased breath sound bilateral lower lung fields   Abdominal: Soft. Bowel sounds are normal. She exhibits no distension. There is tenderness (diffuse).   Musculoskeletal: Normal range of motion. She exhibits edema and tenderness. She exhibits no deformity.   Neurological: She is alert and oriented to person, place, and time.   CN largely in tact  Strength full throughout  No sensory deficits    Skin: Skin is warm and dry. Rash (vasculitic like rash BLE; mild erythema w/ desquamation to inner thighs) noted.   Psychiatric: She has a normal mood and affect. Her behavior is normal.   Nursing note and vitals reviewed.      Significant Labs:   A1C:   Recent Labs   Lab 01/26/19 1832   HGBA1C 5.7*     CBC:   Recent Labs   Lab 01/26/19 1832 01/27/19  0600 01/28/19  0611   WBC 12.70 12.90* 13.06*   HGB 9.8* 9.7* 9.6*   HCT 29.8* 29.8* 28.8*   PLT 96* 93* 99*     CMP:   Recent Labs   Lab 01/26/19 1832 01/27/19  0600 01/28/19  0611   * 132* 127*   K 4.5 4.5 5.0   CL 98 97 93*   CO2 19* 20* 18*   * 145* 174*   BUN 77* 88* 115*   CREATININE 5.9* 5.9* 6.7*   CALCIUM 8.8 8.6* 9.2   PROT 5.5* 5.4* 5.1*   ALBUMIN 1.7* 1.8* 1.9*   BILITOT 0.3 0.3 0.3   ALKPHOS 72 68 59   AST 7* 8* 7*   ALT 18 18 16   ANIONGAP 17* 15 16   EGFRNONAA 9.4* 9.4* 8.0*       Significant Imaging: I have reviewed all pertinent imaging results/findings within the past 24 hours.

## 2019-01-28 NOTE — PROGRESS NOTES
[facility]  Rheumatology  Progress Note    Patient Name: Anitha Swenson  MRN: [id number]  Admission Date: 1/26/2019  Hospital Length of Stay: 2 days  Code Status: Full Code   Attending Provider: Trevon Gao MD  Primary Care Physician: John Garcia MD  Principal Problem: Lupus    Subjective:     HPI: 22yo F with history of SLE (dx Dec 2017, +LARA, malar rash, arthralgias, oral ulcers, hair loss, pleurisy, sicca symptoms, fatigue, photosensitivity), lupus nephritis (confirmed with kidney biopsy x 3, most recent Dec 2018), HTN, anemia, anxiety/depression was transferred from [facility] for lupus flare.  Patient was admitted to [facility] on 1/16/19 for worsening renal failure, anemia, edema, joint pain, and fatigue. During admission at [facility], patient found to be fluid overloaded and started on diuretics. Also re-started on home dose of prednisone 60 mg daily. Seen by Nephrologist  and given second dose of Cytoxan 500 mg IV on 1/19/19 (first dose on 12/31/18) per Eurolupus protocol. Patient also w/ productive cough. CTA done on 1/23/19 revealing multifocal pneumonia. Sputum growing Stenotrophomonas and patient started on Levaquin.  Patient was transferred to [facility] on 1/26 because no rheumatology inpatient. Day prior to transfer, prednisone 60 mg oral changed to Solumedrol 60 mg IV q 6h after and initial 125 mg IV bolus.     Lupus history:  Patient was diagnosed with SLE in Dec 2017. She was first diagnosed via kidney biopsy and then found thereafter +LARA.  Patient had worsening kidney function for a while and underwent kidney biopsy 12/2017 by Dr. Pendleton in [place].  She was found to have class III/IV lupus nephritis at that time.  She was seeing Dr. Lanza (rheumatology at that time).  At the time of diagnose, patient was admitted. She was started on high dose steroid x 3 days.  Discharge with prednisone 60mg.  Later started on plaquenil 200mg and then Cellcept 500mg BID.  Patient developed  "multiple infections (double PNA, C.diff, and MRSA) at that time and Cellcept was discontinued for around 1 month and then restarted. Attempts were made to taper steroid - she was down to prednisone 10mg and then developed a flare.  Prednisone was increased back to 60mg. Her 2nd renal biopsy was performed in March 2018 and showed class V lupus nephritis.  Patient was started on Benylasta SQ (started since Sept 2018) in addition to her plaquenil, prednisone and Cellcept.  She switched rheumatologist to Dr. Alfred Small (on [place]) just a few months ago.   Her 3rd renal biopsy was done Dec 27, 2018 and showed class III/IV lupus with crescents.  Dr. Pendleton decided to start Cytoxan. Has had in depth conversations about ovarian preservation with Cytoxan and has declined this. She was pulsed with Solumedrol 1g IV x 3 days, last dose on 12/31/18 and also started on Cyclophosphamide 500 mg IV q 2 weeks for 6 sessions per Euro Lupus protocol. Altogether, has received two Cyclophosphomide infusions (12/31/18 and 1/19/19).      Initial SLE presentation (at the time of diagnose) include fatigue, diffused joint pain and kidney dysfunction.  Patient then developed Sicca symptoms, malar rash, photosensitivity, CP, SOB, and pleurisy.      Patient had been compliant with all home medications.  Most of her lupus care had been managed by her outpatient nephrologist (Dr. Pendleton).  Had couple of transfusions in the past because of Cellcept.  Had a bone marrow biopsy for evaluation of pancytopenia (July 2018) - showed hypocellularity with marked megaloblastic erythroid hyperplasia.  Per hem/onc, Dr. Goodwin, "the peripheral blood and bone marrow findings are suggestive of a secondary process, that is, post-treatment megaloblastic anemia in view of the patient's history of vitamin B12 deficiency.  Other possible etiologies for the patient's leukopenia would include immunosuppressive drugs/toxin effect, folate deficiency or chronic " "autoimmune disease. Suspect autoimmune mediated and /or immunosuppressive drug mediated hematopathology and marrow findings." Also had EGD/Colonoscopy in July 2018 which showed "erosive gastritis and small non-bleeding antral ulcerations, minimal non-specific colitis."    Patient just started on HD during this hospitalization (at [facility]).  Still making urine.  Complaining of diffused body swelling.  Denies any recent sick contacts/travels, fever/chills, mouth ulcers, rash. +abdominal discomfort (feels like distention), +productive cough (greenish), pleurisy, sicca symptoms, CP, SOB, and joint swelling.   Last HD session was 1/25 at [facility].      Interval History: This morning patient reporting side effects from increased dose of steroids including tremors, tunnel vision, and continued swelling of the body. Reports that she is no longer having a productive cough. Continues to have blisters on ankles with weeping. Denies joint pain/swelling, new rashes, abdominal pain, n/v, altered mentation, headaches, muscle pain, urinary symptoms, hair loss, oral/nasal ulcers. Does have slight pain in the mid-chest when taking a deep breath.     Current Facility-Administered Medications   Medication Frequency    acyclovir suspension 200 mg BID    albuterol-ipratropium 2.5 mg-0.5 mg/3 mL nebulizer solution 3 mL Q6H PRN    albuterol-ipratropium 2.5 mg-0.5 mg/3 mL nebulizer solution 3 mL Q6H WAKE    ALPRAZolam tablet 0.5 mg Q6H PRN    calcium acetate capsule 667 mg TID WM    carvedilol tablet 12.5 mg BID    cetirizine tablet 10 mg Daily    chlorhexidine 0.12 % solution 15 mL BID    dextrose 50% injection 12.5 g PRN    dextrose 50% injection 25 g PRN    escitalopram oxalate tablet 20 mg Daily    fluconazole tablet 100 mg Daily    gabapentin capsule 300 mg TID    glucagon (human recombinant) injection 1 mg PRN    glucose chewable tablet 16 g PRN    glucose chewable tablet 24 g PRN    heparin (porcine) injection 5,000 Units Q8H "    hydroxychloroquine tablet 200 mg Daily    levoFLOXacin (LEVAQUIN) 250 mg in dextrose 5 % 50 mL IVPB Q48H    methylPREDNISolone sodium succinate injection 60 mg Q6H    morphine injection 3 mg Q4H PRN    ondansetron injection 4 mg Q6H PRN    ondansetron tablet 4 mg Q6H PRN    pantoprazole EC tablet 40 mg Daily    ramelteon tablet 8 mg Nightly PRN    sodium bicarbonate tablet 650 mg Daily    sodium chloride 0.9% flush 5 mL PRN    sodium chloride 0.9% flush 5 mL PRN    vitamin D 1000 units tablet 1,000 Units Daily    zinc oxide 20 % ointment PRN     Objective:     Vital Signs (Most Recent):  Temp: 98.2 °F (36.8 °C) (01/28/19 0805)  Pulse: 92 (01/28/19 0805)  Resp: 16 (01/28/19 0805)  BP: (!) 159/109 (01/28/19 0805)  SpO2: (!) 94 % (01/28/19 0805)  O2 Device (Oxygen Therapy): nasal cannula(patient found on 2 liter nasal cannula. Sats 96-97% RA) (01/28/19 0734) Vital Signs (24h Range):  Temp:  [97.5 °F (36.4 °C)-98.5 °F (36.9 °C)] 98.2 °F (36.8 °C)  Pulse:  [] 92  Resp:  [14-20] 16  SpO2:  [93 %-97 %] 94 %  BP: (132-159)/() 159/109     Weight: 104.3 kg (229 lb 15 oz) (01/27/19 1000)  Body mass index is 32.19 kg/m².  Body surface area is 2.28 meters squared.      Intake/Output Summary (Last 24 hours) at 1/28/2019 1136  Last data filed at 1/28/2019 0630  Gross per 24 hour   Intake 200 ml   Output 300 ml   Net -100 ml       Physical Exam   Constitutional: She is oriented to person, place, and time and well-developed, well-nourished, and in no distress. No distress.   Obese   +cushoid features    HENT:   Head: Normocephalic and atraumatic.   Dried oral mucosa  No signs of mouth ulcers   Normal salivary pooling    Eyes: EOM are normal. Pupils are equal, round, and reactive to light.   Neck: Normal range of motion. Neck supple.   Cardiovascular: Normal rate, regular rhythm and normal heart sounds.    Pulmonary/Chest: Effort normal and breath sounds normal.   Abdominal: Soft. She exhibits distension.    Neurological: She is alert and oriented to person, place, and time.   Skin: Skin is warm and dry.     Diffused stretch markings in bilateral thighs and abdomen    Bilateral ankles with blister like lesions covered with dressing. No necrotic lesions noted.    Psychiatric: Mood, affect and judgment normal.   Musculoskeletal: Normal range of motion. She exhibits edema and tenderness. She exhibits no deformity.   No signs of synovitis  ROM intact  +2 pitting edema of bilateral LE - to the calves   Mild tenderness of the ankle         Significant Labs:  Results for ALEXANDRA PARIS (MRN [id number]) as of 1/28/2019 11:19   Ref. Range 1/28/2019 06:11   WBC Latest Ref Range: 3.90 - 12.70 K/uL 13.06 (H)   RBC Latest Ref Range: 4.00 - 5.40 M/uL 3.29 (L)   Hemoglobin Latest Ref Range: 12.0 - 16.0 g/dL 9.6 (L)   Hematocrit Latest Ref Range: 37.0 - 48.5 % 28.8 (L)   MCV Latest Ref Range: 82 - 98 fL 88   MCH Latest Ref Range: 27.0 - 31.0 pg 29.2   MCHC Latest Ref Range: 32.0 - 36.0 g/dL 33.3   RDW Latest Ref Range: 11.5 - 14.5 % 13.6   Platelets Latest Ref Range: 150 - 350 K/uL 99 (L)   MPV Latest Ref Range: 9.2 - 12.9 fL 10.3   Gran% Latest Ref Range: 38.0 - 73.0 % 91.0 (H)   Immature Granulocytes Latest Ref Range: 0.0 - 0.5 % CANCELED   Immature Grans (Abs) Latest Ref Range: 0.00 - 0.04 K/uL CANCELED   Lymph% Latest Ref Range: 18.0 - 48.0 % 1.0 (L)   Lymph # Latest Ref Range: 1.0 - 4.8 K/uL CANCELED   Mono% Latest Ref Range: 4.0 - 15.0 % 4.0   Mono # Latest Ref Range: 0.3 - 1.0 K/uL CANCELED   Eosinophil% Latest Ref Range: 0.0 - 8.0 % 0.0   Eos # Latest Ref Range: 0.0 - 0.5 K/uL CANCELED   Basophil% Latest Ref Range: 0.0 - 1.9 % 0.0   Baso # Latest Ref Range: 0.00 - 0.20 K/uL CANCELED   BANDS Latest Units: % 3.0   Metamyelocytes Latest Units: % 1.0   nRBC Latest Ref Range: 0 /100 WBC 3 (A)   Aniso Unknown Slight   Poly Unknown Occasional   Platelet Estimate Unknown Decreased (A)   Large/Giant Platelets Unknown Present    Differential Method Unknown Manual   Iron Latest Ref Range: 30 - 160 ug/dL 75   TIBC Latest Ref Range: 250 - 450 ug/dL 121 (L)   Saturated Iron Latest Ref Range: 20 - 50 % 62 (H)   Transferrin Latest Ref Range: 200 - 375 mg/dL 82 (L)   Ferritin Latest Ref Range: 20.0 - 300.0 ng/mL 3,386 (H)     Results for ALEXANDRA PARIS (MRN [id number]) as of 1/28/2019 11:45   Ref. Range 1/11/2018 04:10 1/23/2019 13:50 1/27/2019 15:24   LD Latest Ref Range: 110 - 260 U/L 181 437 (H) 601 (H)   Results for ALEXANDRA PARIS (MRN [id number]) as of 1/28/2019 11:45   Ref. Range 1/27/2019 15:24   Haptoglobin Latest Ref Range: 30 - 250 mg/dL 330 (H)   Results for ALEXANDRA PARIS (MRN [id number]) as of 1/28/2019 11:45   Ref. Range 1/27/2019 15:24   Direct Ryder (JHONNY) Unknown NEG   Results for ALEXANDRA PARIS (MRN [id number]) as of 1/28/2019 11:45   Ref. Range 1/26/2019 18:32   Sed Rate Latest Ref Range: 0 - 36 mm/Hr 83 (H)   Results for ALEXANDRA PARIS (MRN [id number]) as of 1/28/2019 11:45   Ref. Range 1/26/2019 18:32   CRP Latest Ref Range: 0.0 - 8.2 mg/L 147.6 (H)       Results for ALEXANDRA PARIS (MRN [id number]) as of 1/28/2019 11:19   Ref. Range 1/28/2019 06:11   Sodium Latest Ref Range: 136 - 145 mmol/L 127 (L)   Potassium Latest Ref Range: 3.5 - 5.1 mmol/L 5.0   Chloride Latest Ref Range: 95 - 110 mmol/L 93 (L)   CO2 Latest Ref Range: 23 - 29 mmol/L 18 (L)   Anion Gap Latest Ref Range: 8 - 16 mmol/L 16   BUN, Bld Latest Ref Range: 6 - 20 mg/dL 115 (H)   Creatinine Latest Ref Range: 0.5 - 1.4 mg/dL 6.7 (H)   eGFR if non African American Latest Ref Range: >60 mL/min/1.73 m^2 8.0 (A)   eGFR if African American Latest Ref Range: >60 mL/min/1.73 m^2 9.3 (A)   Glucose Latest Ref Range: 70 - 110 mg/dL 174 (H)   Calcium Latest Ref Range: 8.7 - 10.5 mg/dL 9.2   Phosphorus Latest Ref Range: 2.7 - 4.5 mg/dL 10.2 (HH)   Magnesium Latest Ref Range: 1.6 - 2.6 mg/dL 1.9   Alkaline Phosphatase Latest Ref Range: 55 - 135 U/L 59   Total Protein Latest Ref  Range: 6.0 - 8.4 g/dL 5.1 (L)   Albumin Latest Ref Range: 3.5 - 5.2 g/dL 1.9 (L)   Total Bilirubin Latest Ref Range: 0.1 - 1.0 mg/dL 0.3   AST Latest Ref Range: 10 - 40 U/L 7 (L)   ALT Latest Ref Range: 10 - 44 U/L 16   Results for ALEXANDRA PARIS (MRN [id number]) as of 1/28/2019 11:45   Ref. Range 10/23/2017 14:55 2/12/2018 16:18 1/27/2019 12:12   C3 Complement Latest Ref Range: 82 - 167 mg/dL 73 (L)     Complement (C-3) Latest Ref Range: 50 - 180 mg/dL   146   Complement (C-4) Latest Ref Range: 11 - 44 mg/dL   30   Rheumatoid Factor Latest Ref Range: 0.0 - 13.9 IU/mL  <10.0    Results for ALEXANDRA PARIS (MRN [id number]) as of 1/28/2019 11:45   Ref. Range 1/27/2019 15:10   Specimen UA Unknown Urine, Clean Catch   Color, UA Latest Ref Range: Yellow, Straw, Amanda  Yellow   pH, UA Latest Ref Range: 5.0 - 8.0  5.0   Specific Gravity, UA Latest Ref Range: 1.005 - 1.030  1.015   Appearance, UA Latest Ref Range: Clear  Clear   Protein, UA Latest Ref Range: Negative  2+ (A)   Glucose, UA Latest Ref Range: Negative  Negative   Ketones, UA Latest Ref Range: Negative  Negative   Occult Blood UA Latest Ref Range: Negative  1+ (A)   Nitrite, UA Latest Ref Range: Negative  Negative   Bilirubin (UA) Latest Ref Range: Negative  Negative   Leukocytes, UA Latest Ref Range: Negative  Negative   RBC, UA Latest Ref Range: 0 - 4 /hpf 2   WBC, UA Latest Ref Range: 0 - 5 /hpf 0   Bacteria, UA Latest Ref Range: None-Occ /hpf Rare   Squam Epithel, UA Latest Units: /hpf 3   Hyaline Casts, UA Latest Ref Range: 0-1/lpf /lpf 0   Microscopic Comment Unknown SEE COMMENT   Prot/Creat Ratio, Ur Latest Ref Range: 0.00 - 0.20  2.49 (H)   Protein, Urine Random Latest Ref Range: 0 - 15 mg/dL 187 (H)   Creatinine, Random Ur Latest Ref Range: 15.0 - 325.0 mg/dL 75.0     Significant Imaging:  [facility] CTA 1/23/19:  1. Bilateral multifocal airspace opacities, suspicious for bilateral multifocal pneumonia.  F/u in 4-6 weeks  2. Cardiomegaly with small pericardial  effusion  3. Negative for PE      Assessment/Plan:     * Lupus    22yo F with history of SLE (dx Dec 2017 via kidney biopsy), lupus nephritis (confirmed with kidney biopsy x 3, most recent Dec 2018), HTN, anemia, anxiety/depression was transferred from [facility] for lupus flare.  Patient was admitted to [facility] for worsening renal failure, edema, joint pain, and fatigue. During admission at [facility], patient found to be fluid overloaded and started on diuretics. Also seen by Nephrology and given second dose of Cytoxan 500 mg IV on 1/19/19 (first dose on 12/31/18) per Eurolupus protocol. Patient also w/ productive cough. CTA done on 1/23/19 revealing multifocal pneumonia. Sputum growing Stenotrophomonas and patient started on Levaquin.  Patient was transferred to [facility] on 1/26 because no rheumatology inpatient.    Lupus history:  Patient was diagnosed with SLE in Dec 2017. She was first diagnosed via kidney biopsy and then found thereafter +LARA.  Patient had worsening kidney function for a while and underwent kidney biopsy 12/2017 by Dr. Pendleton in [place].  She was found to have class III/IV lupus nephritis at that time.  She was seeing Dr. Lanza (rheumatology at that time).  At the time of diagnose, patient was admitted.  She was started on high dose steroid x 3 days.  Discharge with prednisone 60mg.  Later started on plaquenil 200mg and then Cellcept 500mg BID.  Patient developed multiple infections (double PNA, C.diff, and MRSA) at that time and Cellcept was discontinued for a while.  Attempts were made to taper steroid - she was down to prednisone 10mg and then developed a flare.  Flare consist of worsen renal function.  Prednisone was increased back to 60mg. Her 2nd renal biopsy was performed in March 2018 and showed class V lupus nephritis.  Patient was started on Benylasta SQ (started since Sept 2018) in addition to her plaquenil, prednisone and Cellcept.  She switched rheumatologist to Dr. Alfred Small (on [place]) just  a few months ago.   Her 3rd renal biopsy was done Dec 27 2018 and showed class III/IV lupus with crescents.  Dr. Pendleton decided to start Cytoxan. She was pulsed with Solumedrol 1g IV x 3 days, last dose on 12/31/18 and also started on Cyclophosphamide 500 mg IV q 2 weeks for 6 sessions per Euro Lupus protocol. Altogether, has received two Cyclophosphomide infusions (12/31/18 and 1/19/19).     Initial SLE presentation (at the time of diagnose) include fatigue, diffused joint pain and kidney dysfunction.  Patient then developed Sicca symptoms, joint pain (bilateral hands, ankles, and knees), LE weakness, malar rash, photosensitivity, CP, SOB, and pleurisy.      Labs: leukocytosis (13.06), thrombocytopenia (99).  Elevated inflammatory markers (ESR 83, .6). LDH elevated at 601, Haptoglobin elevated at 330. Complements normal. LARA, dsDNA pending. Direct ruben negative.    Imaging: CXR : NUBIA +opacification.  US kidneys - renal disease, no acute processes.  CTA 1/23/19 revealing multifocal pneumonia.     CXR findings of PNA. CRP also signifiantly elevated and consistent with infection. Currently on Levaquin along with acyclovir. Fluconazole for possible esophageal candidiasis.    Exam: 2-3+ pitting edema of lower extremities, blister like lesions also present on both ankles. No synovitis, effusions, oral/nasal ulcers, muscle weakness.    Problem list:   Multifocal PNA: on Levaquin. Should be 7 day course per ID.   Lupus Nephritis: Dec 27 2018 and showed class III/IV lupus with crescents.  Dr. Pendleton decided to start Cyclophosphamide given that patient has progressed despite almost year long treatment with Cellcept. She was pulsed with Solumedrol 1g IV x 3 days, last dose on 12/31/18 and also started on Cyclophosphamide 500 mg IV q 2 weeks for 6 sessions per Euro Lupus protocol. Altogether, has received two Cyclophosphomide infusions (12/31/18 and 1/19/19).  Next dose due on 2/2/19.  SLE:  SLEDAI: 3 for  thrombocytopenia and pleurisy. However, pending dsDNA and will also need to check with Dr. Pendleton about prior pr/cr ratio to see if there has been a significant increase in her proteinuria. Also recommend Derm consult regarding leg/ankle lesions to see if they represent vasculitis.   Skin lesions on ankles: recommend derm and wound care consult  Immunocompromised: on acyclovir and fluconazole. Needs PJP ppx with atovaquone.  HTN: likely related to kidney disease and steroids. Management per primary  Chronic anemia: multi-factorial - related to renal disease, medications, acute infection, SLE. Direct ruben negative.     Plan:  - derm consult for biopsy of ankle lesions with H&E and DIF  - prednisone 60 mg oral daily   - will need next Cyclophosphamide 500 mg IV infusion on 2/2/19. Patient consented. This will be her 3rd Cyclophosphamide infusion. Plan is to complete a total of 6 infusions done every 2 weeks (Euro-Lupus protocol).  - will need ID clearance prior to Cyclophosphamide infusions  - pan-culture, check ECHO  - management of PNA per primary  - Start PCP ppx with atovaquone (cannot use Bactrim due to sulfa allergy)  - Pending more labs: LRAA, dsDNA, ANCA, APS,cryoglobulins, pre-dmards, CPK,aldolase   - decrease dose of plaquenil to 200 MWF after HD  - continue PPI  - continue Vitamin D 1000 units daily   - DEXA as outpatient               Jazmine Sandy MD  Rheumatology  [facility]    Pt interviewed and examined with .     SLE: SLEDAI: 7(thrombocytopenia,  pleurisy, need to check with Dr. Pendleton her nephrologist about prior upcr, u/a to see if significant increase; need skin biopsy to confirm lower leg lesions as vasculitic.   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   Agree with decreasing prednisone 60mg daily  Agree with cyclophosphamide 500mg IV q 2 wks x 6 total. Dose #3 due 2/2/19 (will complete 7 day course levofloxacin 1/30/19) will  need ID clearance prior to dosing  Decrease hydroxychloroquine to 200mg M-W-F after each hemodialysis  Start atovaquone prophylaxis for Pneumocystis.  Derm for skin biopsy with H & E,  DIF    Pulmonary infiltrates with sputum culture + Stenotrophomonas rx with levofloxacin with resolution of cough(elevated procalcitonin and CRP most c/w infectious etiology)  +thrush, on fluconazole

## 2019-01-28 NOTE — ASSESSMENT & PLAN NOTE
22yo F with history of SLE (dx Dec 2017 via kidney biopsy), lupus nephritis (confirmed with kidney biopsy x 3, most recent Dec 2018), HTN, anemia, anxiety/depression was transferred from [facility] for lupus flare.  Patient was admitted to [facility] for worsening renal failure, edema, joint pain, and fatigue. During admission at [facility], patient found to be fluid overloaded and started on diuretics. Also seen by Nephrology and given second dose of Cytoxan 500 mg IV on 1/19/19 (first dose on 12/31/18) per Eurolupus protocol. Patient also w/ productive cough. CTA done on 1/23/19 revealing multifocal pneumonia. Sputum growing Stenotrophomonas and patient started on Levaquin.  Patient was transferred to [facility] on 1/26 because no rheumatology inpatient.    Lupus history:  Patient was diagnosed with SLE in Dec 2017. She was first diagnosed via kidney biopsy and then found thereafter +LARA.  Patient had worsening kidney function for a while and underwent kidney biopsy 12/2017 by Dr. Pendleton in [place].  She was found to have class III/IV lupus nephritis at that time.  She was seeing Dr. Lanza (rheumatology at that time).  At the time of diagnose, patient was admitted.  She was started on high dose steroid x 3 days.  Discharge with prednisone 60mg.  Later started on plaquenil 200mg and then Cellcept 500mg BID.  Patient developed multiple infections (double PNA, C.diff, and MRSA) at that time and Cellcept was discontinued for a while.  Attempts were made to taper steroid - she was down to prednisone 10mg and then developed a flare.  Flare consist of worsen renal function.  Prednisone was increased back to 60mg. Her 2nd renal biopsy was performed in March 2018 and showed class V lupus nephritis.  Patient was started on Benylasta SQ (started since Sept 2018) in addition to her plaquenil, prednisone and Cellcept.  She switched rheumatologist to Dr. Alfred Small (on [place]) just a few months ago.   Her 3rd renal biopsy was done Dec 27 2018 and  showed class III/IV lupus with crescents.  Dr. Pendleton decided to start Cytoxan. She was pulsed with Solumedrol 1g IV x 3 days, last dose on 12/31/18 and also started on Cyclophosphamide 500 mg IV q 2 weeks for 6 sessions per [facility] Lupus protocol. Altogether, has received two Cyclophosphomide infusions (12/31/18 and 1/19/19).     Initial SLE presentation (at the time of diagnose) include fatigue, diffused joint pain and kidney dysfunction.  Patient then developed Sicca symptoms, joint pain (bilateral hands, ankles, and knees), LE weakness, malar rash, photosensitivity, CP, SOB, and pleurisy.      Labs: leukocytosis (13.06), thrombocytopenia (99).  Elevated inflammatory markers (ESR 83, .6). LDH elevated at 601, Haptoglobin elevated at 330. Complements normal. LARA, dsDNA pending. Direct ruben negative.    Imaging: CXR : NUBIA +opacification.  US kidneys - renal disease, no acute processes.  CTA 1/23/19 revealing multifocal pneumonia.     CXR findings of PNA. CRP also signifiantly elevated and consistent with infection. Currently on Levaquin along with acyclovir and fluconazole for ppx per ID.     Exam: 2-3+ pitting edema of lower extremities, blister like lesions also present on both ankles. No synovitis, effusions, oral/nasal ulcers, muscle weakness    Problem list:   Multifocal PNA: on Levaquin. Should be 7 day course per ID.   Lupus Nephritis: Dec 27 2018 and showed class III/IV lupus with crescents.  Dr. Pendleton decided to start Cytoxan. She was pulsed with Solumedrol 1g IV x 3 days, last dose on 12/31/18 and also started on Cyclophosphamide 500 mg IV q 2 weeks for 6 sessions per [facility] Lupus protocol. Altogether, has received two Cyclophosphomide infusions (12/31/18 and 1/19/19).  Next dose due   SLE:  SLEDAI: 7 pending dsDNA (thrombocytopenia, pleurisy, proteinuria) indicating mild/mod flare. Currently on Solumedrol 60 mg IV q6h.    Skin lesions on ankles: consider derm and wound care consult    Immunocompromised: on acyclovir and fluconazole. Needs PJP ppx  HTN: likely related to kidney disease and steroids. Management per primary  Chronic anemia: multi-factorial - related to renal disease, medications, acute infection, SLE. Direct ruben negative.     Plan:  - Given mild-mod SLE flare, recommend planning for Cyclophosphamide 500 mg IV on 2/2/19 if okay with ID. Hold Benylasta SQ at this time - +PNA   - wound care and derm regarding bilateral ankle lesions   - pan-culture, check ECHO  - PNA treatment as per primary team  - Decrease Solumedrol to 60 mg IV q8  - Start PCP ppx with atovaquone (cannot use Bactrim due to sulfa allergy)  - appreciate nephrology recommendation on management of lupus nephritis  - Pending more labs: LARA, dsDNA, ANCA, APS,cryoglobulins, pre-dmards, CPK,aldolase   - continue home dose of plaquenil   - continue PPI  - continue Vitamin D 1000 units daily   - DEXA as outpatient

## 2019-01-28 NOTE — ASSESSMENT & PLAN NOTE
- Hb 9.6  - No active signs of bleeding  - Nephrology consulted  - EPO w/ HD  - follow up iron studies

## 2019-01-28 NOTE — PROGRESS NOTES
Wound care consult received for BLEs.     PMH: SLE (dx Dec 2017 via kidney biopsy), lupus nephritis (confirmed with kidney biopsy - Dec 2017), HTN, anemia, anxiety/depression     Patient with anasarca. BLEs with +3 edema. Legs are reddened and skin to RLE and thighs peeling mildly.   To the left lateral ankle with blistering and darkened discoloration. Skin from blisters is peeling with small to moderate serous drainage noted.   Wound to the left lateral ankle/lower leg appears to be a healing blister with some areas of maroon discoloration to the periwound.   Wounds cleansed and mepilex transfer applied to maintain moisture balance and wick excess moisture, ABD placed over transfer and secured with kerlex.   Sween applied to dry skin.     Discussed with Dr Fox and request that dermatology assess wounds as well due to patient's medical complexity.     Recommend:  Mepilex transfer to medial and lateral low leg/ankle, abd and kerlex, change transfer every other day and change abd and kerlex daily if needed to manage moisture.     Wound care to follow PRN.   Katharine Duarte RN [facility]   [phone]         01/28/19 1244       Wound 01/28/19 1200 Ulceration medial Leg   Date First Assessed/Time First Assessed: 01/28/19 1200   Pre-existing: Yes  Primary Wound Type: Ulceration  Side: Left  Orientation: medial  Location: Leg   Wound Image    Wound WDL ex   Dressing Appearance Open to air   Drainage Amount Moderate   Drainage Characteristics/Odor Serosanguineous   Appearance Purple;Blistered   Tissue loss description Full thickness   Red (%), Wound Tissue Color 50 %   Yellow (%), Wound Tissue Color 50 %   Periwound Area Redness   Wound Edges Open   Wound Length (cm) 10 cm   Wound Width (cm) 7 cm   Wound Depth (cm) 0.1 cm   Wound Volume (cm^3) 7 cm^3   Wound Surface Area (cm^2) 70 cm^2   Care Cleansed with:;Sterile normal saline   Dressing Applied;Changed;Foam;Rolled gauze   Dressing Change Due 01/30/19       Wound 01/28/19  1200 Ulceration lateral Leg   Date First Assessed/Time First Assessed: 01/28/19 1200   Primary Wound Type: Ulceration  Side: Left  Orientation: lateral  Location: (c) Leg   Wound Image    Wound WDL ex   Dressing Appearance Open to air   Drainage Amount Moderate   Drainage Characteristics/Odor Serous   Appearance Slough;Blistered   Yellow (%), Wound Tissue Color 100 %   Periwound Area Redness   Wound Edges Undefined   Wound Length (cm) 9 cm   Wound Width (cm) 5 cm   Wound Depth (cm) 0.1 cm   Wound Volume (cm^3) 4.5 cm^3   Wound Surface Area (cm^2) 45 cm^2   Care Cleansed with:;Sterile normal saline   Dressing Applied;Changed;Foam;Rolled gauze   Dressing Change Due 01/30/19

## 2019-01-29 ENCOUNTER — TELEPHONE (OUTPATIENT)
Dept: HEMATOLOGY/ONCOLOGY | Facility: CLINIC | Age: 24
End: 2019-01-29

## 2019-01-29 PROBLEM — R23.8 SKIN BULLA: Status: ACTIVE | Noted: 2019-01-29

## 2019-01-29 LAB
ALBUMIN SERPL BCP-MCNC: 2 G/DL
ALP SERPL-CCNC: 54 U/L
ALT SERPL W/O P-5'-P-CCNC: 16 U/L
ANION GAP SERPL CALC-SCNC: 14 MMOL/L
ANISOCYTOSIS BLD QL SMEAR: SLIGHT
AST SERPL-CCNC: 9 U/L
BASOPHILS # BLD AUTO: ABNORMAL K/UL
BASOPHILS NFR BLD: 0 %
BILIRUB SERPL-MCNC: 0.4 MG/DL
BUN SERPL-MCNC: 87 MG/DL
CALCIUM SERPL-MCNC: 8.2 MG/DL
CARDIOLIPIN IGG SER IA-ACNC: <9.4 GPL
CARDIOLIPIN IGM SER IA-ACNC: <9.4 MPL
CHLORIDE SERPL-SCNC: 93 MMOL/L
CO2 SERPL-SCNC: 22 MMOL/L
CREAT SERPL-MCNC: 5.4 MG/DL
DIFFERENTIAL METHOD: ABNORMAL
EOSINOPHIL # BLD AUTO: ABNORMAL K/UL
EOSINOPHIL NFR BLD: 0 %
ERYTHROCYTE [DISTWIDTH] IN BLOOD BY AUTOMATED COUNT: 13.6 %
EST. GFR  (AFRICAN AMERICAN): 12 ML/MIN/1.73 M^2
EST. GFR  (NON AFRICAN AMERICAN): 10.4 ML/MIN/1.73 M^2
GLUCOSE SERPL-MCNC: 170 MG/DL
HCT VFR BLD AUTO: 26.7 %
HGB BLD-MCNC: 8.9 G/DL
HYPOCHROMIA BLD QL SMEAR: ABNORMAL
IMM GRANULOCYTES # BLD AUTO: ABNORMAL K/UL
IMM GRANULOCYTES NFR BLD AUTO: ABNORMAL %
LYMPHOCYTES # BLD AUTO: ABNORMAL K/UL
LYMPHOCYTES NFR BLD: 2 %
MAGNESIUM SERPL-MCNC: 2 MG/DL
MCH RBC QN AUTO: 29.4 PG
MCHC RBC AUTO-ENTMCNC: 33.3 G/DL
MCV RBC AUTO: 88 FL
METAMYELOCYTES NFR BLD MANUAL: 1 %
MONOCYTES # BLD AUTO: ABNORMAL K/UL
MONOCYTES NFR BLD: 6 %
NEUTROPHILS NFR BLD: 89 %
NEUTS BAND NFR BLD MANUAL: 2 %
NRBC BLD-RTO: 3 /100 WBC
OVALOCYTES BLD QL SMEAR: ABNORMAL
PATH REV BLD -IMP: NORMAL
PATH REV BLD -IMP: NORMAL
PHOSPHATE SERPL-MCNC: 8.7 MG/DL
PLATELET # BLD AUTO: 92 K/UL
PMV BLD AUTO: 10.6 FL
POIKILOCYTOSIS BLD QL SMEAR: SLIGHT
POLYCHROMASIA BLD QL SMEAR: ABNORMAL
POTASSIUM SERPL-SCNC: 4.8 MMOL/L
PROT SERPL-MCNC: 4.6 G/DL
PROTEINASE3 IGG SER-ACNC: <0.2 U
RBC # BLD AUTO: 3.03 M/UL
RPR SER QL: NORMAL
SODIUM SERPL-SCNC: 129 MMOL/L
WBC # BLD AUTO: 11.19 K/UL

## 2019-01-29 PROCEDURE — 94761 N-INVAS EAR/PLS OXIMETRY MLT: CPT

## 2019-01-29 PROCEDURE — 86480 TB TEST CELL IMMUN MEASURE: CPT

## 2019-01-29 PROCEDURE — 63600175 PHARM REV CODE 636 W HCPCS: Performed by: STUDENT IN AN ORGANIZED HEALTH CARE EDUCATION/TRAINING PROGRAM

## 2019-01-29 PROCEDURE — 94640 AIRWAY INHALATION TREATMENT: CPT

## 2019-01-29 PROCEDURE — 25000003 PHARM REV CODE 250: Performed by: STUDENT IN AN ORGANIZED HEALTH CARE EDUCATION/TRAINING PROGRAM

## 2019-01-29 PROCEDURE — 85060 PATHOLOGIST REVIEW: ICD-10-PCS | Mod: ,,, | Performed by: PATHOLOGY

## 2019-01-29 PROCEDURE — 63600175 PHARM REV CODE 636 W HCPCS: Performed by: HOSPITALIST

## 2019-01-29 PROCEDURE — 99233 SBSQ HOSP IP/OBS HIGH 50: CPT | Mod: ,,, | Performed by: HOSPITALIST

## 2019-01-29 PROCEDURE — 99233 PR SUBSEQUENT HOSPITAL CARE,LEVL III: ICD-10-PCS | Mod: ,,, | Performed by: INTERNAL MEDICINE

## 2019-01-29 PROCEDURE — 83735 ASSAY OF MAGNESIUM: CPT

## 2019-01-29 PROCEDURE — 85060 BLOOD SMEAR INTERPRETATION: CPT | Mod: ,,, | Performed by: PATHOLOGY

## 2019-01-29 PROCEDURE — 80053 COMPREHEN METABOLIC PANEL: CPT

## 2019-01-29 PROCEDURE — 36415 COLL VENOUS BLD VENIPUNCTURE: CPT

## 2019-01-29 PROCEDURE — 85027 COMPLETE CBC AUTOMATED: CPT

## 2019-01-29 PROCEDURE — 25000242 PHARM REV CODE 250 ALT 637 W/ HCPCS: Performed by: STUDENT IN AN ORGANIZED HEALTH CARE EDUCATION/TRAINING PROGRAM

## 2019-01-29 PROCEDURE — 99232 SBSQ HOSP IP/OBS MODERATE 35: CPT | Mod: ,,, | Performed by: INTERNAL MEDICINE

## 2019-01-29 PROCEDURE — 99233 PR SUBSEQUENT HOSPITAL CARE,LEVL III: ICD-10-PCS | Mod: ,,, | Performed by: HOSPITALIST

## 2019-01-29 PROCEDURE — 99231 PR SUBSEQUENT HOSPITAL CARE,LEVL I: ICD-10-PCS | Mod: ,,, | Performed by: INTERNAL MEDICINE

## 2019-01-29 PROCEDURE — 84100 ASSAY OF PHOSPHORUS: CPT

## 2019-01-29 PROCEDURE — 99232 PR SUBSEQUENT HOSPITAL CARE,LEVL II: ICD-10-PCS | Mod: ,,, | Performed by: INTERNAL MEDICINE

## 2019-01-29 PROCEDURE — 99233 SBSQ HOSP IP/OBS HIGH 50: CPT | Mod: ,,, | Performed by: INTERNAL MEDICINE

## 2019-01-29 PROCEDURE — 99231 SBSQ HOSP IP/OBS SF/LOW 25: CPT | Mod: ,,, | Performed by: INTERNAL MEDICINE

## 2019-01-29 PROCEDURE — 99253 IP/OBS CNSLTJ NEW/EST LOW 45: CPT | Mod: ,,, | Performed by: DERMATOLOGY

## 2019-01-29 PROCEDURE — 81001 URINALYSIS AUTO W/SCOPE: CPT

## 2019-01-29 PROCEDURE — 99253 PR INITIAL INPATIENT CONSULT,LEVL III: ICD-10-PCS | Mod: ,,, | Performed by: DERMATOLOGY

## 2019-01-29 PROCEDURE — 27000221 HC OXYGEN, UP TO 24 HOURS

## 2019-01-29 PROCEDURE — 85007 BL SMEAR W/DIFF WBC COUNT: CPT

## 2019-01-29 PROCEDURE — 20600001 HC STEP DOWN PRIVATE ROOM

## 2019-01-29 PROCEDURE — 25000003 PHARM REV CODE 250: Performed by: INTERNAL MEDICINE

## 2019-01-29 RX ADMIN — GABAPENTIN 300 MG: 300 CAPSULE ORAL at 08:01

## 2019-01-29 RX ADMIN — IPRATROPIUM BROMIDE AND ALBUTEROL SULFATE 3 ML: .5; 3 SOLUTION RESPIRATORY (INHALATION) at 07:01

## 2019-01-29 RX ADMIN — HYDROXYCHLOROQUINE SULFATE 200 MG: 200 TABLET, FILM COATED ORAL at 09:01

## 2019-01-29 RX ADMIN — SODIUM BICARBONATE 650 MG TABLET 650 MG: at 09:01

## 2019-01-29 RX ADMIN — PREDNISONE 60 MG: 20 TABLET ORAL at 09:01

## 2019-01-29 RX ADMIN — ACYCLOVIR 200 MG: 200 SUSPENSION ORAL at 08:01

## 2019-01-29 RX ADMIN — CALCIUM ACETATE 667 MG: 667 CAPSULE ORAL at 01:01

## 2019-01-29 RX ADMIN — IPRATROPIUM BROMIDE AND ALBUTEROL SULFATE 3 ML: .5; 3 SOLUTION RESPIRATORY (INHALATION) at 01:01

## 2019-01-29 RX ADMIN — HEPARIN SODIUM 5000 UNITS: 5000 INJECTION, SOLUTION INTRAVENOUS; SUBCUTANEOUS at 05:01

## 2019-01-29 RX ADMIN — STANDARDIZED SENNA CONCENTRATE AND DOCUSATE SODIUM 1 TABLET: 8.6; 5 TABLET, FILM COATED ORAL at 09:01

## 2019-01-29 RX ADMIN — ATOVAQUONE 1500 MG: 750 SUSPENSION ORAL at 09:01

## 2019-01-29 RX ADMIN — VITAMIN D, TAB 1000IU (100/BT) 1000 UNITS: 25 TAB at 09:01

## 2019-01-29 RX ADMIN — FLUCONAZOLE 100 MG: 100 TABLET ORAL at 09:01

## 2019-01-29 RX ADMIN — CETIRIZINE HYDROCHLORIDE 10 MG: 5 TABLET ORAL at 09:01

## 2019-01-29 RX ADMIN — PANTOPRAZOLE SODIUM 40 MG: 40 TABLET, DELAYED RELEASE ORAL at 09:01

## 2019-01-29 RX ADMIN — ACYCLOVIR 200 MG: 200 SUSPENSION ORAL at 09:01

## 2019-01-29 RX ADMIN — ESCITALOPRAM OXALATE 20 MG: 20 TABLET, FILM COATED ORAL at 09:01

## 2019-01-29 RX ADMIN — CALCIUM ACETATE 667 MG: 667 CAPSULE ORAL at 09:01

## 2019-01-29 RX ADMIN — Medication 3 MG: at 08:01

## 2019-01-29 RX ADMIN — Medication 3 MG: at 02:01

## 2019-01-29 RX ADMIN — HEPARIN SODIUM 5000 UNITS: 5000 INJECTION, SOLUTION INTRAVENOUS; SUBCUTANEOUS at 01:01

## 2019-01-29 RX ADMIN — Medication 3 MG: at 09:01

## 2019-01-29 RX ADMIN — CARVEDILOL 12.5 MG: 12.5 TABLET, FILM COATED ORAL at 08:01

## 2019-01-29 RX ADMIN — CALCIUM ACETATE 667 MG: 667 CAPSULE ORAL at 05:01

## 2019-01-29 RX ADMIN — CARVEDILOL 12.5 MG: 12.5 TABLET, FILM COATED ORAL at 09:01

## 2019-01-29 RX ADMIN — HEPARIN SODIUM 5000 UNITS: 5000 INJECTION, SOLUTION INTRAVENOUS; SUBCUTANEOUS at 09:01

## 2019-01-29 NOTE — ASSESSMENT & PLAN NOTE
- Follows w/ nephrology in [place]  - Inpatient nephrology consulted; appreciate recommendations  - Recently started on Cytoxan in December w/ dosing x2 monthly  - Planning to resume infusions   - HD today  - prednisone po 60 mg daily

## 2019-01-29 NOTE — PLAN OF CARE
CM called [facility] and spoke to Joyce to initiate return back to [facility].

## 2019-01-29 NOTE — PROGRESS NOTES
[facility]  Infectious Disease  Progress Note    Patient Name: Anitha Swenson  MRN: [id number]  Admission Date: 1/26/2019  Length of Stay: 2 days  Attending Physician: Trevon Gao MD  Primary Care Provider: John Garcia MD    Isolation Status: No active isolations  Assessment/Plan:      Immunocompromised    23F PMH SLE on cytoxan, steroids and plaquenil who presents as a transfer for nephrology and rheumatology consults, recently diagnosed w/ stenotrophomonas PNA and candidal esophagitis. Symptoms improving. ID consulted for treatment recommendations.    Recommendations:  - continue levofloxacin x 7 days total for treatment of stenotrophomonas in sputum cx - end date 1/31/2019  - continue fluconazole x 14 days total for treatment of candidal esophagitis  - OK for cytoxan once treatment of pneumonia is finished on 1/31  - confirmed bactrim allergy - hives and swelling - recommend atovaquone 1500mg daily w/ food for PJP ppx         Anticipated Disposition: TBD    Thank you for your consult. I will follow-up with patient. Please contact us if you have any additional questions.    Anjana Smith,   Infectious Disease  [facility]    Subjective:     Principal Problem:Lupus    HPI: 23F PMH SLE on cytoxan, plaquenil and steroids, HTN, anxiety and depression who was transferred [place] [facility] after presenting w/ worsening renal failure, edema, skin rash and joint pain. She was started on HD for worsening peripheral and pulmonary edema. Sputum cx + stenotrophomonas on 1/22. She also has recent diagnosis of candidal esophagitis. ID consulted for recommendations regarding these issues. She states her respiratory status has improved, denies fevers, pain w/ swallowing has improved. No other complaints.   Interval History: no events overnight, pt seen in HD, denies complaints    Review of Systems   Constitutional: Negative for activity change, appetite change, chills, fever and  unexpected weight change.   HENT: Negative for dental problem, ear discharge, ear pain, mouth sores, sinus pain, sore throat and trouble swallowing.    Eyes: Negative for pain and discharge.   Respiratory: Positive for cough. Negative for chest tightness, shortness of breath and wheezing.    Cardiovascular: Negative for chest pain and leg swelling.   Gastrointestinal: Negative for abdominal distention, abdominal pain, constipation, diarrhea, nausea and vomiting.   Genitourinary: Negative for difficulty urinating, dysuria, flank pain, frequency, genital sores and hematuria.   Musculoskeletal: Negative for arthralgias, joint swelling, neck pain and neck stiffness.   Skin: Negative for color change, rash and wound.   Allergic/Immunologic: Positive for immunocompromised state.   Neurological: Negative for dizziness, weakness, light-headedness, numbness and headaches.   Psychiatric/Behavioral: Negative for confusion. The patient is not nervous/anxious.      Objective:     Vital Signs (Most Recent):  Temp: 97.9 °F (36.6 °C) (01/28/19 1815)  Pulse: 98 (01/28/19 1815)  Resp: 18 (01/28/19 1815)  BP: (!) 158/99 (01/28/19 1815)  SpO2: 95 % (01/28/19 1358) Vital Signs (24h Range):  Temp:  [97.7 °F (36.5 °C)-98.5 °F (36.9 °C)] 97.9 °F (36.6 °C)  Pulse:  [] 98  Resp:  [14-20] 18  SpO2:  [94 %-99 %] 95 %  BP: (147-177)/() 158/99     Weight: 103.9 kg (229 lb)  Body mass index is 31.94 kg/m².    Estimated Creatinine Clearance: 17.5 mL/min (A) (based on SCr of 6.7 mg/dL (H)).    Physical Exam   Constitutional: She is oriented to person, place, and time. She appears well-developed and well-nourished. No distress.   HENT:   Right Ear: External ear normal.   Left Ear: External ear normal.   Nose: Nose normal.   Mouth/Throat: Oropharynx is clear and moist.   Eyes: Conjunctivae and EOM are normal.   Neck: Normal range of motion. Neck supple.   Cardiovascular: Normal rate, regular rhythm, normal heart sounds and intact distal  pulses.   No murmur heard.  Pulmonary/Chest: Effort normal and breath sounds normal. No respiratory distress. She has no wheezes.   Abdominal: Soft. Bowel sounds are normal. She exhibits no distension. There is no tenderness.   Musculoskeletal: Normal range of motion. She exhibits no edema.   Neurological: She is alert and oriented to person, place, and time. No cranial nerve deficit. Coordination normal.   Skin: Skin is warm and dry. She is not diaphoretic. No erythema.   Psychiatric: She has a normal mood and affect. Her behavior is normal.   Vitals reviewed.      Significant Labs:   CBC:   Recent Labs   Lab 01/27/19  0600 01/28/19  0611   WBC 12.90* 13.06*   HGB 9.7* 9.6*   HCT 29.8* 28.8*   PLT 93* 99*     CMP:   Recent Labs   Lab 01/27/19  0600 01/28/19  0611   * 127*   K 4.5 5.0   CL 97 93*   CO2 20* 18*   * 174*   BUN 88* 115*   CREATININE 5.9* 6.7*   CALCIUM 8.6* 9.2   PROT 5.4* 5.1*   ALBUMIN 1.8* 1.9*   BILITOT 0.3 0.3   ALKPHOS 68 59   AST 8* 7*   ALT 18 16   ANIONGAP 15 16   EGFRNONAA 9.4* 8.0*     Microbiology Results (last 7 days)     Procedure Component Value Units Date/Time    Culture, Respiratory with Gram Stain [[id number]]     Order Status:  No result Specimen:  Respiratory from Sputum, Induced     Blood culture [[id number]]     Order Status:  No result Specimen:  Blood     Blood culture [[id number]]     Order Status:  No result Specimen:  Blood           Significant Imaging: I have reviewed all pertinent imaging results/findings within the past 24 hours.

## 2019-01-29 NOTE — ASSESSMENT & PLAN NOTE
- Chronic steroid use  - Recently started Cytoxan in December per nephrology  - CD4 <200  - ID consulted; appreciate assistance  - Respiratory cultures positive for stenotrophomonas, continue levaquin  - Continue acyclovir, fluconazole for ppx  - Obtained blood cultures, urine culture, ECHO, repeat sputum culture as Rheum intends to resume Cytoxan infusions  - Atovaquone for PCP ppx- allergic to sulfa, unable to give Bactrim .

## 2019-01-29 NOTE — PLAN OF CARE
Problem: Adult Inpatient Plan of Care  Goal: Plan of Care Review  Outcome: Ongoing (interventions implemented as appropriate)   01/28/19 1815   Plan of Care Review   Plan of Care Reviewed With patient;grandparent;mother;father     Pt AAOx4. VSS at this time. Pt seen by nephrology, rheumatology, and wound care this AM. Dialysis completed. Wound care completed. Pt and family requesting to have meeting with attending physician in order to review what the collective POC is based on the recommendations from multiple physician consults. Dermatology and ID consulted. Urine and sputum culture needs to be collected. POC reviewed with pt and family members who verbalize understanding.     Problem: Fall Injury Risk  Goal: Absence of Fall and Fall-Related Injury  Outcome: Ongoing (interventions implemented as appropriate)  Intervention: Identify and Manage Contributors to Fall Injury Risk   01/28/19 1815   Manage Acute Allergic Reaction   Medication Review/Management medications reviewed   Identify and Manage Contributors to Fall Injury Risk   Self-Care Promotion independence encouraged     Intervention: Promote Injury-Free Environment   01/28/19 1815   Optimize Hope and Functional Mobility   Environmental Safety Modification assistive device/personal items within reach;lighting adjusted;clutter free environment maintained;room organization consistent   Optimize Balance and Safe Activity   Safety Promotion/Fall Prevention assistive device/personal item within reach;bed alarm set;Fall Risk reviewed with patient/family;family to remain at bedside;lighting adjusted;medications reviewed;side rails raised x 2

## 2019-01-29 NOTE — SUBJECTIVE & OBJECTIVE
Interval History: Seen at bedside. Reports some improvement in cough. Seen by Dermatology, Dr. Paris but declined biopsy of ankle lesions. Patient's family would like transfer back to [facility] since all specialists are there. They spoke with patient's Nephrologist, Dr. Pendleton who agrees with this plan. Denies joint pain/swelling, new rashes, abdominal pain, n/v, altered mentation, headaches, muscle pain, urinary symptoms, hair loss, oral/nasal ulcers. Does have slight pain in the mid-chest when taking a deep breath.     Current Facility-Administered Medications   Medication Frequency    0.9%  NaCl infusion PRN    acyclovir suspension 200 mg BID    albuterol-ipratropium 2.5 mg-0.5 mg/3 mL nebulizer solution 3 mL Q6H PRN    albuterol-ipratropium 2.5 mg-0.5 mg/3 mL nebulizer solution 3 mL Q6H WAKE    ALPRAZolam tablet 0.5 mg Q6H PRN    atovaquone suspension 1,500 mg Daily    calcium acetate capsule 667 mg TID WM    carvedilol tablet 12.5 mg BID    cetirizine tablet 10 mg Daily    dextrose 50% injection 12.5 g PRN    dextrose 50% injection 25 g PRN    escitalopram oxalate tablet 20 mg Daily    fluconazole tablet 100 mg Daily    gabapentin capsule 300 mg QHS    glucagon (human recombinant) injection 1 mg PRN    glucose chewable tablet 16 g PRN    glucose chewable tablet 24 g PRN    heparin (porcine) injection 1,000 Units PRN    heparin (porcine) injection 5,000 Units Q8H    hydroxychloroquine tablet 200 mg Daily    [START ON 1/30/2019] levoFLOXacin tablet 750 mg Every other day    morphine injection 3 mg Q4H PRN    ondansetron injection 4 mg Q6H PRN    pantoprazole EC tablet 40 mg Daily    predniSONE tablet 60 mg Daily    ramelteon tablet 8 mg Nightly PRN    senna-docusate 8.6-50 mg per tablet 1 tablet Daily    sodium bicarbonate tablet 650 mg Daily    sodium chloride 0.9% flush 5 mL PRN    vitamin D 1000 units tablet 1,000 Units Daily    zinc oxide 20 % ointment PRN     Objective:     Vital  Signs (Most Recent):  Temp: 98.1 °F (36.7 °C) (01/29/19 0845)  Pulse: 96 (01/29/19 0845)  Resp: 20 (01/29/19 0845)  BP: (!) 169/113 (01/29/19 0845)  SpO2: 95 % (01/29/19 0845)  O2 Device (Oxygen Therapy): nasal cannula (01/29/19 0845) Vital Signs (24h Range):  Temp:  [97.7 °F (36.5 °C)-98.2 °F (36.8 °C)] 98.1 °F (36.7 °C)  Pulse:  [] 96  Resp:  [15-20] 20  SpO2:  [94 %-97 %] 95 %  BP: (140-177)/() 169/113     Weight: 102.2 kg (225 lb 5 oz) (01/29/19 0600)  Body mass index is 31.42 kg/m².  Body surface area is 2.26 meters squared.      Intake/Output Summary (Last 24 hours) at 1/29/2019 1246  Last data filed at 1/29/2019 0600  Gross per 24 hour   Intake 850 ml   Output 3330 ml   Net -2480 ml       Physical Exam   Constitutional: She is oriented to person, place, and time and well-developed, well-nourished, and in no distress. No distress.   Obese   +cushoid features    HENT:   Head: Normocephalic and atraumatic.   Dried oral mucosa  No signs of mouth ulcers   Normal salivary pooling    Eyes: EOM are normal. Pupils are equal, round, and reactive to light.   Neck: Normal range of motion. Neck supple.   Cardiovascular: Normal rate, regular rhythm and normal heart sounds.    Pulmonary/Chest: Effort normal and breath sounds normal.   Abdominal: Soft. She exhibits distension.   Neurological: She is alert and oriented to person, place, and time.   Skin: Skin is warm and dry.     Diffused stretch markings in bilateral thighs and abdomen    Bilateral ankles with blister like lesions covered with dressing. No necrotic lesions noted.    Psychiatric: Mood, affect and judgment normal.   Musculoskeletal: Normal range of motion. She exhibits edema and tenderness. She exhibits no deformity.   No signs of synovitis  ROM intact  +2 pitting edema of bilateral LE - to the calves   Mild tenderness of the ankle         Significant Labs:  Results for ALEXANDRA PARIS (MRN [id number]) as of 1/29/2019 12:45   Ref. Range 1/29/2019  06:02   WBC Latest Ref Range: 3.90 - 12.70 K/uL 11.19   RBC Latest Ref Range: 4.00 - 5.40 M/uL 3.03 (L)   Hemoglobin Latest Ref Range: 12.0 - 16.0 g/dL 8.9 (L)   Hematocrit Latest Ref Range: 37.0 - 48.5 % 26.7 (L)   MCV Latest Ref Range: 82 - 98 fL 88   MCH Latest Ref Range: 27.0 - 31.0 pg 29.4   MCHC Latest Ref Range: 32.0 - 36.0 g/dL 33.3   RDW Latest Ref Range: 11.5 - 14.5 % 13.6   Platelets Latest Ref Range: 150 - 350 K/uL 92 (L)   MPV Latest Ref Range: 9.2 - 12.9 fL 10.6   Gran% Latest Ref Range: 38.0 - 73.0 % 89.0 (H)   Immature Granulocytes Latest Ref Range: 0.0 - 0.5 % CANCELED   Immature Grans (Abs) Latest Ref Range: 0.00 - 0.04 K/uL CANCELED   Lymph% Latest Ref Range: 18.0 - 48.0 % 2.0 (L)   Lymph # Latest Ref Range: 1.0 - 4.8 K/uL CANCELED   Mono% Latest Ref Range: 4.0 - 15.0 % 6.0   Mono # Latest Ref Range: 0.3 - 1.0 K/uL CANCELED   Eosinophil% Latest Ref Range: 0.0 - 8.0 % 0.0   Eos # Latest Ref Range: 0.0 - 0.5 K/uL CANCELED   Basophil% Latest Ref Range: 0.0 - 1.9 % 0.0   Baso # Latest Ref Range: 0.00 - 0.20 K/uL CANCELED   BANDS Latest Units: % 2.0   Metamyelocytes Latest Units: % 1.0   nRBC Latest Ref Range: 0 /100 WBC 3 (A)   Ovalocytes Unknown Occasional   Aniso Unknown Slight   Poik Unknown Slight   Poly Unknown Occasional   Hypo Unknown Occasional   Differential Method Unknown Manual   Results for ALEXANDAR PARIS (MRN [id number]) as of 1/29/2019 12:45   Ref. Range 1/29/2019 06:02   Sodium Latest Ref Range: 136 - 145 mmol/L 129 (L)   Potassium Latest Ref Range: 3.5 - 5.1 mmol/L 4.8   Chloride Latest Ref Range: 95 - 110 mmol/L 93 (L)   CO2 Latest Ref Range: 23 - 29 mmol/L 22 (L)   Anion Gap Latest Ref Range: 8 - 16 mmol/L 14   BUN, Bld Latest Ref Range: 6 - 20 mg/dL 87 (H)   Creatinine Latest Ref Range: 0.5 - 1.4 mg/dL 5.4 (H)   eGFR if non African American Latest Ref Range: >60 mL/min/1.73 m^2 10.4 (A)   eGFR if African American Latest Ref Range: >60 mL/min/1.73 m^2 12.0 (A)   Glucose Latest Ref  Range: 70 - 110 mg/dL 170 (H)   Calcium Latest Ref Range: 8.7 - 10.5 mg/dL 8.2 (L)   Phosphorus Latest Ref Range: 2.7 - 4.5 mg/dL 8.7 (H)   Magnesium Latest Ref Range: 1.6 - 2.6 mg/dL 2.0   Alkaline Phosphatase Latest Ref Range: 55 - 135 U/L 54 (L)   Total Protein Latest Ref Range: 6.0 - 8.4 g/dL 4.6 (L)   Albumin Latest Ref Range: 3.5 - 5.2 g/dL 2.0 (L)   Total Bilirubin Latest Ref Range: 0.1 - 1.0 mg/dL 0.4   AST Latest Ref Range: 10 - 40 U/L 9 (L)   ALT Latest Ref Range: 10 - 44 U/L 16     Results for ALEXANDRA PARIS (MRN [id number]) as of 1/29/2019 12:45   Ref. Range 1/28/2019 06:11   CPK Latest Ref Range: 20 - 180 U/L 16 (L)     Results for ALEXANDRA PARIS (MRN [id number]) as of 1/28/2019 11:45   Ref. Range 1/11/2018 04:10 1/23/2019 13:50 1/27/2019 15:24   LD Latest Ref Range: 110 - 260 U/L 181 437 (H) 601 (H)   Results for ALEXANDRA PARIS (MRN [id number]) as of 1/28/2019 11:45   Ref. Range 1/27/2019 15:24   Haptoglobin Latest Ref Range: 30 - 250 mg/dL 330 (H)   Results for ALEXANDRA PARIS (MRN [id number]) as of 1/28/2019 11:45   Ref. Range 1/27/2019 15:24   Direct Ryder (JHONNY) Unknown NEG   Results for ALEXANDRA PARIS (MRN [id number]) as of 1/28/2019 11:45   Ref. Range 1/26/2019 18:32   Sed Rate Latest Ref Range: 0 - 36 mm/Hr 83 (H)   Results for ALEXANDRA PARIS (MRN [id number]) as of 1/28/2019 11:45   Ref. Range 1/26/2019 18:32   CRP Latest Ref Range: 0.0 - 8.2 mg/L 147.6 (H)     Results for ALEXANDRA PARIS (MRN [id number]) as of 1/28/2019 11:45   Ref. Range 10/23/2017 14:55 2/12/2018 16:18 1/27/2019 12:12   C3 Complement Latest Ref Range: 82 - 167 mg/dL 73 (L)     Complement (C-3) Latest Ref Range: 50 - 180 mg/dL   146   Complement (C-4) Latest Ref Range: 11 - 44 mg/dL   30   Rheumatoid Factor Latest Ref Range: 0.0 - 13.9 IU/mL  <10.0    Results for ALEXANDRA PARIS (MRN [id number]) as of 1/28/2019 11:45   Ref. Range 1/27/2019 15:10   Specimen UA Unknown Urine, Clean Catch   Color, UA Latest Ref Range: Yellow, Straw, Amanda   Yellow   pH, UA Latest Ref Range: 5.0 - 8.0  5.0   Specific Gravity, UA Latest Ref Range: 1.005 - 1.030  1.015   Appearance, UA Latest Ref Range: Clear  Clear   Protein, UA Latest Ref Range: Negative  2+ (A)   Glucose, UA Latest Ref Range: Negative  Negative   Ketones, UA Latest Ref Range: Negative  Negative   Occult Blood UA Latest Ref Range: Negative  1+ (A)   Nitrite, UA Latest Ref Range: Negative  Negative   Bilirubin (UA) Latest Ref Range: Negative  Negative   Leukocytes, UA Latest Ref Range: Negative  Negative   RBC, UA Latest Ref Range: 0 - 4 /hpf 2   WBC, UA Latest Ref Range: 0 - 5 /hpf 0   Bacteria, UA Latest Ref Range: None-Occ /hpf Rare   Squam Epithel, UA Latest Units: /hpf 3   Hyaline Casts, UA Latest Ref Range: 0-1/lpf /lpf 0   Microscopic Comment Unknown SEE COMMENT   Prot/Creat Ratio, Ur Latest Ref Range: 0.00 - 0.20  2.49 (H)   Protein, Urine Random Latest Ref Range: 0 - 15 mg/dL 187 (H)   Creatinine, Random Ur Latest Ref Range: 15.0 - 325.0 mg/dL 75.0     Significant Imaging:  [facility] CTA 1/23/19:  1. Bilateral multifocal airspace opacities, suspicious for bilateral multifocal pneumonia.  F/u in 4-6 weeks  2. Cardiomegaly with small pericardial effusion  3. Negative for PE    ECHO 1/28/19:  · Normal left ventricular systolic function. The estimated ejection fraction is 55%  · Normal LV diastolic function.  · Normal right ventricular systolic function.  · The estimated PA systolic pressure is 19 mm Hg  · Normal central venous pressure (3 mm Hg).  · Small, hemodynamically inconsequential, posterolateral pericardial effusion.

## 2019-01-29 NOTE — PLAN OF CARE
Problem: Fall Injury Risk  Goal: Absence of Fall and Fall-Related Injury  Outcome: Ongoing (interventions implemented as appropriate)  Intervention: Identify and Manage Contributors to Fall Injury Risk   01/29/19 1721   Manage Acute Allergic Reaction   Medication Review/Management medications reviewed   Identify and Manage Contributors to Fall Injury Risk   Self-Care Promotion independence encouraged     Intervention: Promote Injury-Free Environment   01/29/19 1721   Optimize [place] and Functional Mobility   Environmental Safety Modification assistive device/personal items within reach   Optimize Balance and Safe Activity   Safety Promotion/Fall Prevention Fall Risk reviewed with patient/family;assistive device/personal item within reach;bed alarm set;lighting adjusted;medications reviewed;nonskid shoes/socks when out of bed;side rails raised x 2         Problem: Infection  Goal: Infection Symptom Resolution  Outcome: Ongoing (interventions implemented as appropriate)  Intervention: Prevent or Manage Infection   01/29/19 1721   Prevent or Manage Infection   Fever Reduction/Comfort Measures medication administered         Problem: Skin Injury Risk Increased  Goal: Skin Health and Integrity  Outcome: Ongoing (interventions implemented as appropriate)  Intervention: Optimize Skin Protection   01/29/19 1721   Monitor and Manage Hypervolemia   Skin Protection tubing/devices free from skin contact;incontinence pads utilized;skin-to-device areas padded   Prevent Additional Skin Injury   Head of Bed (HOB) HOB at 30-45 degrees   Pressure Reduction Techniques frequent weight shift encouraged     Intervention: Promote and Optimize Oral Intake   01/29/19 1721   Monitor and Manage Anemia   Oral Nutrition Promotion medicated;rest periods promoted;social interaction promoted

## 2019-01-29 NOTE — PLAN OF CARE
Problem: Skin Injury Risk Increased  Goal: Skin Health and Integrity  Outcome: Ongoing (interventions implemented as appropriate)  Interventions as ordered to wounds on KIKO lower legs. Good nutrition & rest encouraged to help fight infection. Meds as ordered.     Comments: Pt has chronic illnesses that compromise her ability to heal & affect her comfort. Pain meds given as ordered & requested. Hourly rounding assists staff in knowing when pt has a need.

## 2019-01-29 NOTE — ASSESSMENT & PLAN NOTE
- Concern for vasculitis vs cellulitis  - Tender to touch  - Bandaged per wound care at OSH  - Wound care consulted; rebandaged  - Dermatology - no need for bx. Likely acute edema blisters.

## 2019-01-29 NOTE — PROGRESS NOTES
[facility]  Rheumatology  Progress Note    Patient Name: Anitha Swenson  MRN: [id number]  Admission Date: 1/26/2019  Hospital Length of Stay: 3 days  Code Status: Full Code   Attending Provider: Victorina Cummings MD  Primary Care Physician: John Garcia MD  Principal Problem: Lupus    Subjective:     HPI: 24yo F with history of SLE (dx Dec 2017, +LARA, malar rash, arthralgias, oral ulcers, hair loss, pleurisy, sicca symptoms, fatigue, photosensitivity), lupus nephritis (confirmed with kidney biopsy x 3, most recent Dec 2018), HTN, anemia, anxiety/depression was transferred from [facility] for lupus flare.  Patient was admitted to [facility] on 1/16/19 for worsening renal failure, anemia, edema, joint pain, and fatigue. During admission at [facility], patient found to be fluid overloaded and started on diuretics. Also re-started on home dose of prednisone 60 mg daily. Seen by Nephrologist  and given second dose of Cytoxan 500 mg IV on 1/19/19 (first dose on 12/31/18) per Eurolupus protocol. Patient also w/ productive cough. CTA done on 1/23/19 revealing multifocal pneumonia. Sputum growing Stenotrophomonas and patient started on Levaquin.  Patient was transferred to [facility] on 1/26 because no rheumatology inpatient. Day prior to transfer, prednisone 60 mg oral changed to Solumedrol 60 mg IV q 6h after and initial 125 mg IV bolus.     Lupus history:  Patient was diagnosed with SLE in Dec 2017. She was first diagnosed via kidney biopsy and then found thereafter +LARA.  Patient had worsening kidney function for a while and underwent kidney biopsy 12/2017 by Dr. Pendleton in [place].  She was found to have class III/IV lupus nephritis at that time.  She was seeing Dr. Lanza (rheumatology at that time).  At the time of diagnose, patient was admitted. She was started on high dose steroid x 3 days.  Discharge with prednisone 60mg.  Later started on plaquenil 200mg and then Cellcept 500mg BID.  Patient developed  "multiple infections (double PNA, C.diff, and MRSA) at that time and Cellcept was discontinued for around 1 month and then restarted. Attempts were made to taper steroid - she was down to prednisone 10mg and then developed a flare.  Prednisone was increased back to 60mg. Her 2nd renal biopsy was performed in March 2018 and showed class V lupus nephritis.  Patient was started on Benylasta SQ (started since Sept 2018) in addition to her plaquenil, prednisone and Cellcept.  She switched rheumatologist to Dr. Alfred Small (on [place]) just a few months ago.   Her 3rd renal biopsy was done Dec 27, 2018 and showed class III/IV lupus with crescents.  Dr. Pendleton decided to start Cytoxan. Has had in depth conversations about ovarian preservation with Cytoxan and has declined this. She was pulsed with Solumedrol 1g IV x 3 days, last dose on 12/31/18 and also started on Cyclophosphamide 500 mg IV q 2 weeks for 6 sessions per Euro Lupus protocol. Altogether, has received two Cyclophosphomide infusions (12/31/18 and 1/19/19).      Initial SLE presentation (at the time of diagnose) include fatigue, diffused joint pain and kidney dysfunction.  Patient then developed Sicca symptoms, malar rash, photosensitivity, CP, SOB, and pleurisy.      Patient had been compliant with all home medications.  Most of her lupus care had been managed by her outpatient nephrologist (Dr. Pendleton).  Had couple of transfusions in the past because of Cellcept.  Had a bone marrow biopsy for evaluation of pancytopenia (July 2018) - showed hypocellularity with marked megaloblastic erythroid hyperplasia.  Per hem/onc, Dr. Goodwin, "the peripheral blood and bone marrow findings are suggestive of a secondary process, that is, post-treatment megaloblastic anemia in view of the patient's history of vitamin B12 deficiency.  Other possible etiologies for the patient's leukopenia would include immunosuppressive drugs/toxin effect, folate deficiency or chronic " "autoimmune disease. Suspect autoimmune mediated and /or immunosuppressive drug mediated hematopathology and marrow findings." Also had EGD/Colonoscopy in July 2018 which showed "erosive gastritis and small non-bleeding antral ulcerations, minimal non-specific colitis."    Patient just started on HD during this hospitalization (at [facility]).  Still making urine.  Complaining of diffused body swelling.  Denies any recent sick contacts/travels, fever/chills, mouth ulcers, rash. +abdominal discomfort (feels like distention), +productive cough (greenish), pleurisy, sicca symptoms, CP, SOB, and joint swelling.   Last HD session was 1/25 at [facility].      Interval History: Seen at bedside. Reports some improvement in cough. Seen by Dermatology, Dr. Paris but declined biopsy of ankle lesions. Patient's family would like transfer back to [facility] since all specialists are there. They spoke with patient's Nephrologist, Dr. Pendleton who agrees with this plan. Denies joint pain/swelling, new rashes, abdominal pain, n/v, altered mentation, headaches, muscle pain, urinary symptoms, hair loss, oral/nasal ulcers. Does have slight pain in the mid-chest when taking a deep breath.     Current Facility-Administered Medications   Medication Frequency    0.9%  NaCl infusion PRN    acyclovir suspension 200 mg BID    albuterol-ipratropium 2.5 mg-0.5 mg/3 mL nebulizer solution 3 mL Q6H PRN    albuterol-ipratropium 2.5 mg-0.5 mg/3 mL nebulizer solution 3 mL Q6H WAKE    ALPRAZolam tablet 0.5 mg Q6H PRN    atovaquone suspension 1,500 mg Daily    calcium acetate capsule 667 mg TID WM    carvedilol tablet 12.5 mg BID    cetirizine tablet 10 mg Daily    dextrose 50% injection 12.5 g PRN    dextrose 50% injection 25 g PRN    escitalopram oxalate tablet 20 mg Daily    fluconazole tablet 100 mg Daily    gabapentin capsule 300 mg QHS    glucagon (human recombinant) injection 1 mg PRN    glucose chewable tablet 16 g PRN    glucose chewable tablet 24 " g PRN    heparin (porcine) injection 1,000 Units PRN    heparin (porcine) injection 5,000 Units Q8H    hydroxychloroquine tablet 200 mg Daily    [START ON 1/30/2019] levoFLOXacin tablet 750 mg Every other day    morphine injection 3 mg Q4H PRN    ondansetron injection 4 mg Q6H PRN    pantoprazole EC tablet 40 mg Daily    predniSONE tablet 60 mg Daily    ramelteon tablet 8 mg Nightly PRN    senna-docusate 8.6-50 mg per tablet 1 tablet Daily    sodium bicarbonate tablet 650 mg Daily    sodium chloride 0.9% flush 5 mL PRN    vitamin D 1000 units tablet 1,000 Units Daily    zinc oxide 20 % ointment PRN     Objective:     Vital Signs (Most Recent):  Temp: 98.1 °F (36.7 °C) (01/29/19 0845)  Pulse: 96 (01/29/19 0845)  Resp: 20 (01/29/19 0845)  BP: (!) 169/113 (01/29/19 0845)  SpO2: 95 % (01/29/19 0845)  O2 Device (Oxygen Therapy): nasal cannula (01/29/19 0845) Vital Signs (24h Range):  Temp:  [97.7 °F (36.5 °C)-98.2 °F (36.8 °C)] 98.1 °F (36.7 °C)  Pulse:  [] 96  Resp:  [15-20] 20  SpO2:  [94 %-97 %] 95 %  BP: (140-177)/() 169/113     Weight: 102.2 kg (225 lb 5 oz) (01/29/19 0600)  Body mass index is 31.42 kg/m².  Body surface area is 2.26 meters squared.      Intake/Output Summary (Last 24 hours) at 1/29/2019 1246  Last data filed at 1/29/2019 0600  Gross per 24 hour   Intake 850 ml   Output 3330 ml   Net -2480 ml       Physical Exam   Constitutional: She is oriented to person, place, and time and well-developed, well-nourished, and in no distress. No distress.   Obese   +cushoid features    HENT:   Head: Normocephalic and atraumatic.   Dried oral mucosa  No signs of mouth ulcers   Normal salivary pooling    Eyes: EOM are normal. Pupils are equal, round, and reactive to light.   Neck: Normal range of motion. Neck supple.   Cardiovascular: Normal rate, regular rhythm and normal heart sounds.    Pulmonary/Chest: Effort normal and breath sounds normal.   Abdominal: Soft. She exhibits distension.    Neurological: She is alert and oriented to person, place, and time.   Skin: Skin is warm and dry.     Diffused stretch markings in bilateral thighs and abdomen    Bilateral ankles with blister like lesions covered with dressing. No necrotic lesions noted.    Psychiatric: Mood, affect and judgment normal.   Musculoskeletal: Normal range of motion. She exhibits edema and tenderness. She exhibits no deformity.   No signs of synovitis  ROM intact  +2 pitting edema of bilateral LE - to the calves   Mild tenderness of the ankle         Significant Labs:  Results for ALEXANDRA PARIS (MRN [id number]) as of 1/29/2019 12:45   Ref. Range 1/29/2019 06:02   WBC Latest Ref Range: 3.90 - 12.70 K/uL 11.19   RBC Latest Ref Range: 4.00 - 5.40 M/uL 3.03 (L)   Hemoglobin Latest Ref Range: 12.0 - 16.0 g/dL 8.9 (L)   Hematocrit Latest Ref Range: 37.0 - 48.5 % 26.7 (L)   MCV Latest Ref Range: 82 - 98 fL 88   MCH Latest Ref Range: 27.0 - 31.0 pg 29.4   MCHC Latest Ref Range: 32.0 - 36.0 g/dL 33.3   RDW Latest Ref Range: 11.5 - 14.5 % 13.6   Platelets Latest Ref Range: 150 - 350 K/uL 92 (L)   MPV Latest Ref Range: 9.2 - 12.9 fL 10.6   Gran% Latest Ref Range: 38.0 - 73.0 % 89.0 (H)   Immature Granulocytes Latest Ref Range: 0.0 - 0.5 % CANCELED   Immature Grans (Abs) Latest Ref Range: 0.00 - 0.04 K/uL CANCELED   Lymph% Latest Ref Range: 18.0 - 48.0 % 2.0 (L)   Lymph # Latest Ref Range: 1.0 - 4.8 K/uL CANCELED   Mono% Latest Ref Range: 4.0 - 15.0 % 6.0   Mono # Latest Ref Range: 0.3 - 1.0 K/uL CANCELED   Eosinophil% Latest Ref Range: 0.0 - 8.0 % 0.0   Eos # Latest Ref Range: 0.0 - 0.5 K/uL CANCELED   Basophil% Latest Ref Range: 0.0 - 1.9 % 0.0   Baso # Latest Ref Range: 0.00 - 0.20 K/uL CANCELED   BANDS Latest Units: % 2.0   Metamyelocytes Latest Units: % 1.0   nRBC Latest Ref Range: 0 /100 WBC 3 (A)   Ovalocytes Unknown Occasional   Aniso Unknown Slight   Poik Unknown Slight   Poly Unknown Occasional   Hypo Unknown Occasional   Differential  Method Unknown Manual   Results for ALEXANDRA PARIS (MRN [id number]) as of 1/29/2019 12:45   Ref. Range 1/29/2019 06:02   Sodium Latest Ref Range: 136 - 145 mmol/L 129 (L)   Potassium Latest Ref Range: 3.5 - 5.1 mmol/L 4.8   Chloride Latest Ref Range: 95 - 110 mmol/L 93 (L)   CO2 Latest Ref Range: 23 - 29 mmol/L 22 (L)   Anion Gap Latest Ref Range: 8 - 16 mmol/L 14   BUN, Bld Latest Ref Range: 6 - 20 mg/dL 87 (H)   Creatinine Latest Ref Range: 0.5 - 1.4 mg/dL 5.4 (H)   eGFR if non African American Latest Ref Range: >60 mL/min/1.73 m^2 10.4 (A)   eGFR if African American Latest Ref Range: >60 mL/min/1.73 m^2 12.0 (A)   Glucose Latest Ref Range: 70 - 110 mg/dL 170 (H)   Calcium Latest Ref Range: 8.7 - 10.5 mg/dL 8.2 (L)   Phosphorus Latest Ref Range: 2.7 - 4.5 mg/dL 8.7 (H)   Magnesium Latest Ref Range: 1.6 - 2.6 mg/dL 2.0   Alkaline Phosphatase Latest Ref Range: 55 - 135 U/L 54 (L)   Total Protein Latest Ref Range: 6.0 - 8.4 g/dL 4.6 (L)   Albumin Latest Ref Range: 3.5 - 5.2 g/dL 2.0 (L)   Total Bilirubin Latest Ref Range: 0.1 - 1.0 mg/dL 0.4   AST Latest Ref Range: 10 - 40 U/L 9 (L)   ALT Latest Ref Range: 10 - 44 U/L 16     Results for ALEXANDRA PARIS (MRN [id number]) as of 1/29/2019 12:45   Ref. Range 1/28/2019 06:11   CPK Latest Ref Range: 20 - 180 U/L 16 (L)     Results for ALEXANDRA PARIS (MRN [id number]) as of 1/28/2019 11:45   Ref. Range 1/11/2018 04:10 1/23/2019 13:50 1/27/2019 15:24   LD Latest Ref Range: 110 - 260 U/L 181 437 (H) 601 (H)   Results for ALEXANDRA PARIS (MRN [id number]) as of 1/28/2019 11:45   Ref. Range 1/27/2019 15:24   Haptoglobin Latest Ref Range: 30 - 250 mg/dL 330 (H)   Results for ALEXANDRA PARIS (MRN [id number]) as of 1/28/2019 11:45   Ref. Range 1/27/2019 15:24   Direct Ryder (JHONNY) Unknown NEG   Results for ALEXANDRA PARIS (MRN [id number]) as of 1/28/2019 11:45   Ref. Range 1/26/2019 18:32   Sed Rate Latest Ref Range: 0 - 36 mm/Hr 83 (H)   Results for ALEXANDRA PARIS (MRN [id number]) as of  1/28/2019 11:45   Ref. Range 1/26/2019 18:32   CRP Latest Ref Range: 0.0 - 8.2 mg/L 147.6 (H)     Results for ALEXANDRA PARIS (MRN [id number]) as of 1/28/2019 11:45   Ref. Range 10/23/2017 14:55 2/12/2018 16:18 1/27/2019 12:12   C3 Complement Latest Ref Range: 82 - 167 mg/dL 73 (L)     Complement (C-3) Latest Ref Range: 50 - 180 mg/dL   146   Complement (C-4) Latest Ref Range: 11 - 44 mg/dL   30   Rheumatoid Factor Latest Ref Range: 0.0 - 13.9 IU/mL  <10.0    Results for ALEXANDRA PARIS (MRN [id number]) as of 1/28/2019 11:45   Ref. Range 1/27/2019 15:10   Specimen UA Unknown Urine, Clean Catch   Color, UA Latest Ref Range: Yellow, Straw, Amanda  Yellow   pH, UA Latest Ref Range: 5.0 - 8.0  5.0   Specific Gravity, UA Latest Ref Range: 1.005 - 1.030  1.015   Appearance, UA Latest Ref Range: Clear  Clear   Protein, UA Latest Ref Range: Negative  2+ (A)   Glucose, UA Latest Ref Range: Negative  Negative   Ketones, UA Latest Ref Range: Negative  Negative   Occult Blood UA Latest Ref Range: Negative  1+ (A)   Nitrite, UA Latest Ref Range: Negative  Negative   Bilirubin (UA) Latest Ref Range: Negative  Negative   Leukocytes, UA Latest Ref Range: Negative  Negative   RBC, UA Latest Ref Range: 0 - 4 /hpf 2   WBC, UA Latest Ref Range: 0 - 5 /hpf 0   Bacteria, UA Latest Ref Range: None-Occ /hpf Rare   Squam Epithel, UA Latest Units: /hpf 3   Hyaline Casts, UA Latest Ref Range: 0-1/lpf /lpf 0   Microscopic Comment Unknown SEE COMMENT   Prot/Creat Ratio, Ur Latest Ref Range: 0.00 - 0.20  2.49 (H)   Protein, Urine Random Latest Ref Range: 0 - 15 mg/dL 187 (H)   Creatinine, Random Ur Latest Ref Range: 15.0 - 325.0 mg/dL 75.0     Significant Imaging:  [facility] CTA 1/23/19:  1. Bilateral multifocal airspace opacities, suspicious for bilateral multifocal pneumonia.  F/u in 4-6 weeks  2. Cardiomegaly with small pericardial effusion  3. Negative for PE    ECHO 1/28/19:  · Normal left ventricular systolic function. The estimated ejection fraction  is 55%  · Normal LV diastolic function.  · Normal right ventricular systolic function.  · The estimated PA systolic pressure is 19 mm Hg  · Normal central venous pressure (3 mm Hg).  · Small, hemodynamically inconsequential, posterolateral pericardial effusion.    Assessment/Plan:     * Lupus    24yo F with history of SLE (dx Dec 2017 via kidney biopsy), lupus nephritis (confirmed with kidney biopsy x 3, most recent Dec 2018), HTN, anemia, anxiety/depression was transferred from [facility] for lupus flare.  Patient was admitted to [facility] for worsening renal failure, edema, joint pain, and fatigue. During admission at [facility], patient found to be fluid overloaded and started on diuretics. Also seen by Nephrology and given second dose of Cytoxan 500 mg IV on 1/19/19 (first dose on 12/31/18) per Eurolupus protocol. Patient also w/ productive cough. CTA done on 1/23/19 revealing multifocal pneumonia. Sputum growing Stenotrophomonas and patient started on Levaquin.  Patient was transferred to [facility] on 1/26 because no rheumatology inpatient.    Lupus history:  Patient was diagnosed with SLE in Dec 2017. She was first diagnosed via kidney biopsy and then found thereafter +LARA.  Patient had worsening kidney function for a while and underwent kidney biopsy 12/2017 by Dr. Pendleton in [place].  She was found to have class III/IV lupus nephritis at that time.  She was seeing Dr. Lanza (rheumatology at that time).  At the time of diagnose, patient was admitted.  She was started on high dose steroid x 3 days.  Discharge with prednisone 60mg.  Later started on plaquenil 200mg and then Cellcept 500mg BID.  Patient developed multiple infections (double PNA, C.diff, and MRSA) at that time and Cellcept was discontinued for a while.  Attempts were made to taper steroid - she was down to prednisone 10mg and then developed a flare.  Flare consist of worsen renal function.  Prednisone was increased back to 60mg. Her 2nd renal biopsy was performed in  March 2018 and showed class V lupus nephritis.  Patient was started on Benylasta SQ (started since Sept 2018) in addition to her plaquenil, prednisone and Cellcept.  She switched rheumatologist to Dr. Alfred Small (on [place]) just a few months ago.   Her 3rd renal biopsy was done Dec 27 2018 and showed class III/IV lupus with crescents.  Dr. Pendleton decided to start Cytoxan. She was pulsed with Solumedrol 1g IV x 3 days, last dose on 12/31/18 and also started on Cyclophosphamide 500 mg IV q 2 weeks for 6 sessions per Euro Lupus protocol. Altogether, has received two Cyclophosphomide infusions (12/31/18 and 1/19/19).     Initial SLE presentation (at the time of diagnose) include fatigue, diffused joint pain and kidney dysfunction.  Patient then developed Sicca symptoms, joint pain (bilateral hands, ankles, and knees), LE weakness, malar rash, photosensitivity, CP, SOB, and pleurisy.      Labs: leukocytosis (13.06), thrombocytopenia (99).  Elevated inflammatory markers (ESR 83, .6). LDH elevated at 601, Haptoglobin elevated at 330. Complements normal. Direct ruben negative. LARA and dsDNA negative. CPK nl.     Imaging: CXR : NUBIA +opacification.  US kidneys - renal disease, no acute processes.  CTA 1/23/19 revealing multifocal pneumonia.     CXR findings of PNA. CRP also signifiantly elevated and consistent with infection. Currently on Levaquin along with acyclovir ppx. Also on fluconazole for esophageal candidiasis.    ECHO 1/28/19: no abnormalities    Exam: 2-3+ pitting edema of lower extremities, blister like lesions also present on both ankles. No synovitis, effusions, oral/nasal ulcers, muscle weakness    Problem list:   Multifocal PNA: on Levaquin. Should be 7 day course per ID.   Lupus Nephritis: Dec 27 2018 and showed class III/IV lupus with crescents.  Dr. Pendleton decided to start Cyclophosphamide given that patient has progressed despite almost year long treatment with Cellcept. She was pulsed with  Solumedrol 1g IV x 3 days, last dose on 12/31/18 and also started on Cyclophosphamide 500 mg IV q 2 weeks for 6 sessions per Euro Lupus protocol. Altogether, has received two Cyclophosphomide infusions (12/31/18 and 1/19/19).  Next dose due on 2/2/19.  SLE:  SLEDAI: 7 for thrombocytopenia, pleurisy, proteinuria. dsDNA negative. Per Dr. Pendleton, proteinuria normal at 2g. Currently proteinuria at 2.49g, there around 0.5 gm increase. Still unclear if ankle lesions are vasculitis; patient and family declining biopsy at this time.   Skin lesions on ankles: seen by derm and wound care. Per derm, more likely to be acute edema blisters and not vasculitis.   Immunocompromised: on acyclovir, fluconazole and atovaquone.   HTN: likely related to kidney disease and steroids. Management per primary  Chronic anemia: multi-factorial - related to renal disease, medications, acute infection, SLE. Direct ruben negative.      Plan:  - okay with patient transferring back to [facility]. Discussed case with Dr. Pendleton (Nephrology). Proteinuria has been around 2g. Patient had tried Cellcept for 1 year (at max doses) with worsening renal function therefore Cyclophosphamide started. Plan is to transfer back to [facility] to continue Euro-Lupus protocol.   - cont. prednisone 60 mg oral daily   - will need next Cyclophosphamide 500 mg IV infusion on 2/2/19. Patient consented. This will be her 3rd Cyclophosphamide infusion. Plan is to complete a total of 6 infusions done every 2 weeks (Euro-Lupus protocol).  - will need ID clearance prior to Cyclophosphamide infusions and vaccinations  - pan-culture  - management of PNA per primary  - cont PCP ppx with atovaquone (cannot use Bactrim due to sulfa allergy)  - Pending more labs: APS,cryoglobulins, pre-dmards  - plaquenil 200 mg after HD.  - continue PPI  - continue Vitamin D 1000 units daily   - DEXA as outpatient           Jazmine Sandy MD  Rheumatology  [facility]  discussed with Dr. Pendleton her nephrologist who would like transfer back to [facility] for continued Rx, including next dose of cyclophosphamide 500mg IV(#3) due on 2/2/19, after ID clearance and continue to complete 6 infusions over  3months. Also continue atovaquone for PJP prophylaxis while on cyclophosphamide. Cont decreased dose of hydroxychloroquine three times weekly after each HD. If pt fails the Eurolupus 3 month trial, other options include: rituximab v. Low dose mycophenolate + tacrolimus, or proceeding to NIH cyclophosphamide protocol(high dose) but prefer to avoid given age.

## 2019-01-29 NOTE — PROGRESS NOTES
3hr treatment stopped 10 minutes early due to system clotting 2L of fluid removed pt tolerated well. Both lumens of a R subclavian cvc instilled with 1.2cc of Heparin, capped and taped. Report given to COSME Raymond.

## 2019-01-29 NOTE — CONSULTS
[facility]  Dermatology  Consult Note    Patient Name: Anitha Swenson  MRN: [id number]  Admission Date: 1/26/2019  Hospital Length of Stay: 3 days  Attending Physician: Victorina Cummings MD  Primary Care Provider: John Garcia MD     Inpatient consult to Dermatology  Consult performed by: Alondra Morales MD  Consult ordered by: Kim Fox MD        Subjective:     Principal Problem:Lupus    HPI:  23 year old female with history of SLE (dx Dec 2017 via kidney biopsy), lupus nephritis, HTN, anemia, anxiety/depression, fibromyalgia was [facility] [place] [facility] [place] care.  Dermatology consulted for blisters on feet. Patient was admitted to outside hospital for worsening renal failure, edema, joint pain, and fatigue. At outside hospital patient was found to be fluid overloaded and started on diuretics.  Patient states she has had edema in the past, but this is the first times she developed blisters. The blisters began on her LLE ~6 days ago at outside hospital. As the edema improved the blisters improved, however, [place] [facility] [place] edema and blisters worsened. The blisters were filled with clear fluid. At outside hospital on 1/25/19 they cultured the blister fluid, which was found to have no growth and negative gram stain.   Her BLE are tender and warm. She denies fever, chills.     Past Medical History:   Diagnosis Date    Anemia due to chronic blood loss 2/8/2018    Anemia, chronic disease 2/8/2018    Compound heterozygous MTHFR mutation [id number] 3/21/2018    Elevated homocysteine 2/8/2018    GI bleeding 2/8/2018    Hypertension     Lupus nephritis, ISN/RPS class IV 12/2018    diagnosed 12/2018, 3 biopsies, 1st - Class 3-4, no crescents, IFTA 15%, 2nd - Class V, 3rd - Class 4, w/ cressents, IFTA 30%. Treated with cellcept in 2018 until 10-11/2018 (stopped for neutropenic fever), last steroid pulce 12/31/18 and had 2 doses CYC, last  1/19/2018. On HD since 1/2019. Sees Dr. Pendleton, nephrology in [place], [place]. and Dr. Small, rheumatology    Migraine headache with aura     Other pulmonary embolism without acute cor pulmonale 2/8/2018       Past Surgical History:   Procedure Laterality Date    ARTHROSCOPIC FEMOROPLASTY Left 12/26/2014    Performed by [facility] at [facility] OR 1ST F[facility]    ARTHROSCOPY-HIP WITH ACETABULOPLASTY(INCLUDES LABRAL REPAIR Left 12/26/2014    Performed by [facility] at [facility] OR 1ST [facility]LR    HIP SURGERY      REPAIR-LABRAL Left 12/26/2014    Performed [facility] at [facility] OR [facility]ST [facility]    TYMPANOSTOMY TUBE PLACEMENT       Family History     Problem Relation (Age of Onset)    Diabetes Mellitus Maternal Grandfather        Tobacco Use    Smoking status: Never Smoker    Smokeless tobacco: Never Used   Substance and Sexual Activity    Alcohol use: No    Drug use: No    Sexual activity: Not on file       Review of patient's allergies indicates:   Allergen Reactions    Sulfur        Medications:  Continuous Infusions:  Scheduled Meds:   acyclovir  200 mg Oral BID    albuterol-ipratropium  3 mL Nebulization Q6H WAKE    atovaquone  1,500 mg Oral Daily    calcium acetate  667 mg Oral TID WM    carvedilol  12.5 mg Oral BID    cetirizine  10 mg Oral Daily    escitalopram oxalate  20 mg Oral Daily    fluconazole  100 mg Oral Daily    gabapentin  300 mg Oral QHS    heparin (porcine)  5,000 Units Subcutaneous Q8H    hydroxychloroquine  200 mg Oral Daily    levoFLOXacin  500 mg Oral Every other day    pantoprazole  40 mg Oral Daily    predniSONE  60 mg Oral Daily    senna-docusate 8.6-50 mg  1 tablet Oral Daily    sodium bicarbonate  650 mg Oral Daily    vitamin D  1,000 Units Oral Daily     PRN Meds:sodium chloride 0.9%, albuterol-ipratropium, ALPRAZolam, dextrose 50%, dextrose 50%, glucagon (human recombinant), glucose, glucose, heparin (porcine), morphine, ondansetron, ramelteon, sodium chloride  0.9%, zinc oxide    Review of Systems   Constitutional: Negative for fever and chills.   HENT: Negative for mouth sores.    Cardiovascular: Positive for pedal edema.   Genitourinary: Negative for genital sores.   Skin: Negative for itching.     Objective:     Vital Signs (Most Recent):  Temp: 98.1 °F (36.7 °C) (01/29/19 0845)  Pulse: 96 (01/29/19 0845)  Resp: 20 (01/29/19 0845)  BP: (!) 169/113 (01/29/19 0845)  SpO2: 95 % (01/29/19 0845) Vital Signs (24h Range):  Temp:  [97.7 °F (36.5 °C)-98.2 °F (36.8 °C)] 98.1 °F (36.7 °C)  Pulse:  [] 96  Resp:  [15-20] 20  SpO2:  [94 %-99 %] 95 %  BP: (140-177)/() 169/113     Weight: 102.2 kg (225 lb 5 oz)  Body mass index is 31.42 kg/m².    Physical Exam   Constitutional: She appears well-developed and well-nourished.   Psychiatric: She has a normal mood and affect.   Skin:   Areas Examined (abnormalities noted in diagram):   Scalp / Hair Palpated and Inspected  Head / Face Inspection Performed  Neck Inspection Performed  RUE Inspected  LUE Inspection Performed  RLE Inspected  LLE Inspection Performed  Nails and Digits Inspection Performed           Significant Labs:   CBC:   Recent Labs   Lab 01/28/19  0611 01/29/19  0602   WBC 13.06* 11.19   HGB 9.6* 8.9*   HCT 28.8* 26.7*   PLT 99* 92*       Significant Imaging: I have reviewed all pertinent imaging results/findings within the past 24 hours.     RLE          Left medial ankle      Left lateral ankle      Assessment/Plan:     Skin bulla    23 year old female with history of SLE (dx Dec 2017 via kidney biopsy), lupus nephritis, HTN, anemia, anxiety/depression, fibromyalgia was [place] from [facility] [place] [facility] [place] for rheumatology care.  Dermatology consulted for blisters on BLE. Patient recent history significant for pitting edema with subsequent development of bulla. She has not history of bulla prior to this episode. Infectious causes of bulla considered, however, less likely given non inflammatory and superficial  nature of bulla. Also surround skin shows sign of stasis dermatitis secondary to edema which makes Acute Edema Blisters the favored diagnosis.   Recommendations:  - keep affected area moist with vaseline/aquaphor and then covered with nonadherent (ex: tefla) dressing  - avoid adherent dressings  - if concern for infection develops in areas of eroded skin, may use topical mupirocin              Thank you for your consult. I will sign off. Please contact us if you have any additional questions.    Alondra Morales MD  Dermatology  [facility]

## 2019-01-29 NOTE — PROGRESS NOTES
[facility] Hospital Medicine  Progress Note    Patient Name: Anitha Swenson  MRN: [id number]  Patient Class: IP- Inpatient   Admission Date: 1/26/2019  Length of Stay: 3 days  Attending Physician: Victorina Cummings MD  Primary Care Provider: John Garcia MD    [facility] Medicine Team: [facility]P MED 1 Anshul Warner MD    Subjective:     Principal Problem:Lupus    HPI:  24 yo F w/ PMH of SLE, anemia, HTN, and anxiety/depression who presents as a transfer from [facility] for nephrology and rheumatology consults. She presented to [facility] on 1/16 with progressive renal failure, edema, and worsening skin rash, as well as joint pain. Initially during her hospitalization she continued her home prednisone but as symptoms progressed she was started on IV Solumedrol for stress dosing. Her admission has been complicated by progressive renal failure requiring daily HD with worsening peripheral and pulmonary edema, as well as developing pneumonia w/ cultures positive for stenotrophomonas. Due to her immunocompromised status, she was started on antibiotic regimen levaquin. On presentation, she was also noted to have developed a vasculitis skin rash to her lower extremities, which was painful to palpation. Wound care was following. Of note, she was recently diagnosed w/ candidal esophagitis. Her most recent CD4 count was <200. Follows with nephrology who initiated chemotherapy w/ cytoxan which she receives infusions twice a month. No previous cardiac involvement. Reports shortness of breath stable on 2L NC, as well as nausea, bilateral lower back pain. Denies fever, chills, chest pain, abdominal pain, dysuria, hematuria, and hematochezia.        Hospital Course:  Admitted to hospital medicine. Consulted nephrology and rheumatology.    Family requested back to [facility] as management is the same here. Continue steroids. PJP ppx and complete Levaquin for PNA.     Interval History: LE skin lesion evaluated by  dermatology, likely acute edema blisters. Pt will be transferred back to [facility] to continue cyclophosphamide.     Review of Systems  Objective:     Vital Signs (Most Recent):  Temp: 98.1 °F (36.7 °C) (01/29/19 0845)  Pulse: 96 (01/29/19 1342)  Resp: 18 (01/29/19 1342)  BP: (!) 169/113 (01/29/19 0845)  SpO2: 96 % (01/29/19 1342) Vital Signs (24h Range):  Temp:  [97.7 °F (36.5 °C)-98.2 °F (36.8 °C)] 98.1 °F (36.7 °C)  Pulse:  [] 96  Resp:  [18-20] 18  SpO2:  [94 %-97 %] 96 %  BP: (140-169)/() 169/113     Weight: 102.2 kg (225 lb 5 oz)  Body mass index is 31.42 kg/m².    Intake/Output Summary (Last 24 hours) at 1/29/2019 1632  Last data filed at 1/29/2019 0600  Gross per 24 hour   Intake 850 ml   Output 2680 ml   Net -1830 ml      Physical Exam   Constitutional: She is oriented to person, place, and time. She appears well-developed and well-nourished. No distress.   Obese appearing female   HENT:   Head: Normocephalic and atraumatic.   Mouth/Throat: Oropharynx is clear and moist. No oropharyngeal exudate.   Diffuse facial edema; moon facies   Eyes: Conjunctivae and EOM are normal.   Neck: Normal range of motion. Neck supple.   Cardiovascular: Normal rate, regular rhythm, normal heart sounds and intact distal pulses.   No murmur heard.  Pulmonary/Chest: Effort normal and breath sounds normal. No respiratory distress. She has no wheezes.   Breathing comfortably on 2L NC  Decreased breath sound bilateral lower lung fields   Abdominal: Soft. Bowel sounds are normal. She exhibits no distension. There is tenderness (diffuse).   Musculoskeletal: Normal range of motion. She exhibits edema and tenderness. She exhibits no deformity.   Neurological: She is alert and oriented to person, place, and time.   CN largely in tact  Strength full throughout  No sensory deficits    Skin: Skin is warm and dry. Rash (vasculitic like rash BLE; mild erythema w/ desquamation to inner thighs) noted.   Psychiatric: She has a normal mood  and affect. Her behavior is normal.   Nursing note and vitals reviewed.      Significant Labs:   BMP:   Recent Labs   Lab 01/29/19  0602   *   *   K 4.8   CL 93*   CO2 22*   BUN 87*   CREATININE 5.4*   CALCIUM 8.2*   MG 2.0     CBC:   Recent Labs   Lab 01/28/19  0611 01/29/19  0602   WBC 13.06* 11.19   HGB 9.6* 8.9*   HCT 28.8* 26.7*   PLT 99* 92*       Significant Imaging: I have reviewed all pertinent imaging results/findings within the past 24 hours.    Assessment/Plan:      * Lupus    24 yo F w/ PMH of SLE, anemia, HTN, and anxiety/depression who presents as a transfer with worsening SLE symptoms. Presents for nephrology and rheumatology consults. Recent hospitalization complicated by progressive renal failure 2/2 lupus nephritis, currently receiving HD.     Plan:  - Consult nephrology, rheumatology; appreciate assistance  - Discontinued IV solumedrol 60 mg q6 per nephrology and family request  - po Prednisone 60 mg daily  - Continue home hydroxychloroquine  - Resume Cytoxan infusions as previously scheduled per rheumatology  - Holding Benlysta in the setting of active respiratory infection  - F/U extensive rheumatology work up  - IV morphine PRN   - Retroperitoneal US demonstrated medical renal disease w/o evidence of hydronephrosis or obstructive   - Resumed HD per nephrology   Rash    - Concern for vasculitis vs cellulitis  - Tender to touch  - Bandaged per wound care at OSH  - Wound care consulted; rebandaged  - Dermatology - no need for bx. Likely acute edema blisters.    Anemia in CKD (chronic kidney disease)    - Hb 9.6  - No active signs of bleeding  - Nephrology consulted  - EPO w/ HD  - follow up iron studies   Acute renal failure    - See lupus nephritis   Essential hypertension    - Hypertensive w/ systolics 150-170s  - On clonidine, ramipril, coreg at home  - Continue home coreg at this time   - Reassess after HD   Acid reflux disease with ulcer    - Hx of nonbleeding ulcers  - Continue  home pantoprazole daily   Lupus nephritis    - Follows w/ nephrology in [place]  - Inpatient nephrology consulted; appreciate recommendations  - Recently started on Cytoxan in December w/ dosing x2 monthly  - Planning to resume infusions   - HD today  - prednisone po 60 mg daily   Anxiety and depression    - Continue home Lexapro  - Xanax PRN   Shortness of breath    - Not on O2 at home  - Breathing comfortably on 2L NC  - O2 supplementation started following ARF w/ worsening pulmonary edema  - CXR consistent w/ NUBIA opacification, no significant pulmonary edema; peripheral edema present  - ID consulted; continue levaquin  - HD per nephrology   Immunocompromised    - Chronic steroid use  - Recently started Cytoxan in December per nephrology  - CD4 <200  - ID consulted; appreciate assistance  - Respiratory cultures positive for stenotrophomonas, continue levaquin  - Continue acyclovir, fluconazole for ppx  - Obtained blood cultures, urine culture, ECHO, repeat sputum culture as Rheum intends to resume Cytoxan infusions  - Atovaquone for PCP ppx- allergic to sulfa, unable to give Bactrim .      VTE Risk Mitigation (From admission, onward)        Ordered     heparin (porcine) injection 1,000 Units  As needed (PRN)      01/28/19 1542     heparin (porcine) injection 5,000 Units  Every 8 hours      01/26/19 1810     IP VTE HIGH RISK PATIENT  Once      01/26/19 1805          Anshul Warner MD  Department of Hospital Medicine   [facility]

## 2019-01-29 NOTE — HPI
23 year old female with history of SLE (dx Dec 2017 via kidney biopsy), lupus nephritis, HTN, anemia, anxiety/depression, fibromyalgia was transferred from [facility] [place] [facility] [place] care.  Dermatology consulted for blisters on feet. Patient was admitted to outside hospital for worsening renal failure, edema, joint pain, and fatigue. At outside hospital patient was found to be fluid overloaded and started on diuretics. Patient states she has had edema in the past, but this is the first times she developed blisters. The blisters began on her LLE ~6 days ago at outside hospital. As the edema improved the blisters improved, however, [place] [facility] [place] edema and blisters worsened. The blisters were filled with clear fluid. At outside hospital on 1/25/19 they cultured the blister fluid, which was found to have no growth and negative gram stain.   Her BLE are tender and warm. She denies fever, chills.

## 2019-01-29 NOTE — SUBJECTIVE & OBJECTIVE
Past Medical History:   Diagnosis Date    Anemia due to chronic blood loss 2/8/2018    Anemia, chronic disease 2/8/2018    Compound heterozygous MTHFR mutation [id number] 3/21/2018    Elevated homocysteine 2/8/2018    GI bleeding 2/8/2018    Hypertension     Lupus nephritis, ISN/RPS class IV 12/2018    diagnosed 12/2018, 3 biopsies, 1st - Class 3-4, no crescents, IFTA 15%, 2nd - Class V, 3rd - Class 4, w/ cressents, IFTA 30%. Treated with cellcept in 2018 until 10-11/2018 (stopped for neutropenic fever), last steroid pulce 12/31/18 and had 2 doses CYC, last 1/19/2018. On HD since 1/2019. Sees Dr. Pendleton, nephrology in [place], [place]. and Dr. Small, rheumatology    Migraine headache with aura     Other pulmonary embolism without acute cor pulmonale 2/8/2018       Past Surgical History:   Procedure Laterality Date    ARTHROSCOPIC FEMOROPLASTY Left 12/26/2014    Performed by [facility] at [facility] OR [address] [place] [address]    ARTHROSCOPY-HIP WITH ACETABULOPLASTY(INCLUDES LABRAL REPAIR Left 12/26/2014    Performed by [facility] at [facility] OR [address] [place] [address]    HIP SURGERY      REPAIR-LABRAL Left 12/26/2014    Performed by [facility] at [facility] OR [address] [place] [address]    TYMPANOSTOMY TUBE PLACEMENT       Family History     Problem Relation (Age of Onset)    Diabetes Mellitus Maternal Grandfather        Tobacco Use    Smoking status: Never Smoker    Smokeless tobacco: Never Used   Substance and Sexual Activity    Alcohol use: No    Drug use: No    Sexual activity: Not on file       Review of patient's allergies indicates:   Allergen Reactions    Sulfur        Medications:  Continuous Infusions:  Scheduled Meds:   acyclovir  200 mg Oral BID    albuterol-ipratropium  3 mL Nebulization Q6H WAKE    atovaquone  1,500 mg Oral Daily    calcium acetate  667 mg Oral TID WM    carvedilol  12.5 mg Oral BID    cetirizine  10 mg Oral Daily    escitalopram oxalate  20 mg Oral Daily    fluconazole  100 mg Oral Daily     gabapentin  300 mg Oral QHS    heparin (porcine)  5,000 Units Subcutaneous Q8H    hydroxychloroquine  200 mg Oral Daily    levoFLOXacin  500 mg Oral Every other day    pantoprazole  40 mg Oral Daily    predniSONE  60 mg Oral Daily    senna-docusate 8.6-50 mg  1 tablet Oral Daily    sodium bicarbonate  650 mg Oral Daily    vitamin D  1,000 Units Oral Daily     PRN Meds:sodium chloride 0.9%, albuterol-ipratropium, ALPRAZolam, dextrose 50%, dextrose 50%, glucagon (human recombinant), glucose, glucose, heparin (porcine), morphine, ondansetron, ramelteon, sodium chloride 0.9%, zinc oxide    Review of Systems   Constitutional: Negative for fever and chills.   HENT: Negative for mouth sores.    Cardiovascular: Positive for pedal edema.   Genitourinary: Negative for genital sores.   Skin: Negative for itching.     Objective:     Vital Signs (Most Recent):  Temp: 98.1 °F (36.7 °C) (01/29/19 0845)  Pulse: 96 (01/29/19 0845)  Resp: 20 (01/29/19 0845)  BP: (!) 169/113 (01/29/19 0845)  SpO2: 95 % (01/29/19 0845) Vital Signs (24h Range):  Temp:  [97.7 °F (36.5 °C)-98.2 °F (36.8 °C)] 98.1 °F (36.7 °C)  Pulse:  [] 96  Resp:  [15-20] 20  SpO2:  [94 %-99 %] 95 %  BP: (140-177)/() 169/113     Weight: 102.2 kg (225 lb 5 oz)  Body mass index is 31.42 kg/m².    Physical Exam   Constitutional: She appears well-developed and well-nourished.   Psychiatric: She has a normal mood and affect.   Skin:   Areas Examined (abnormalities noted in diagram):   Scalp / Hair Palpated and Inspected  Head / Face Inspection Performed  Neck Inspection Performed  RUE Inspected  LUE Inspection Performed  RLE Inspected  LLE Inspection Performed  Nails and Digits Inspection Performed           Significant Labs:   CBC:   Recent Labs   Lab 01/28/19  0611 01/29/19  0602   WBC 13.06* 11.19   HGB 9.6* 8.9*   HCT 28.8* 26.7*   PLT 99* 92*       Significant Imaging: I have reviewed all pertinent imaging results/findings within the past 24 hours.      RLE          Left medial ankle      Left lateral ankle

## 2019-01-29 NOTE — ASSESSMENT & PLAN NOTE
24yo F with history of SLE (dx Dec 2017 via kidney biopsy), lupus nephritis (confirmed with kidney biopsy x 3, most recent Dec 2018), HTN, anemia, anxiety/depression was transferred from [facility] for lupus flare.  Patient was admitted to [facility] for worsening renal failure, edema, joint pain, and fatigue. During admission at [facility], patient found to be fluid overloaded and started on diuretics. Also seen by Nephrology and given second dose of Cytoxan 500 mg IV on 1/19/19 (first dose on 12/31/18) per Eurolupus protocol. Patient also w/ productive cough. CTA done on 1/23/19 revealing multifocal pneumonia. Sputum growing Stenotrophomonas and patient started on Levaquin.  Patient was transferred to [facility] on 1/26 because no rheumatology inpatient.    Lupus history:  Patient was diagnosed with SLE in Dec 2017. She was first diagnosed via kidney biopsy and then found thereafter +LARA.  Patient had worsening kidney function for a while and underwent kidney biopsy 12/2017 by Dr. Pendleton in [place].  She was found to have class III/IV lupus nephritis at that time.  She was seeing Dr. Lanza (rheumatology at that time).  At the time of diagnose, patient was admitted.  She was started on high dose steroid x 3 days.  Discharge with prednisone 60mg.  Later started on plaquenil 200mg and then Cellcept 500mg BID.  Patient developed multiple infections (double PNA, C.diff, and MRSA) at that time and Cellcept was discontinued for a while.  Attempts were made to taper steroid - she was down to prednisone 10mg and then developed a flare.  Flare consist of worsen renal function.  Prednisone was increased back to 60mg. Her 2nd renal biopsy was performed in March 2018 and showed class V lupus nephritis.  Patient was started on Benylasta SQ (started since Sept 2018) in addition to her plaquenil, prednisone and Cellcept.  She switched rheumatologist to Dr. Alfred Small (on [place]) just a few months ago.   Her 3rd renal biopsy was done Dec 27 2018 and  showed class III/IV lupus with crescents.  Dr. Pendleton decided to start Cytoxan. She was pulsed with Solumedrol 1g IV x 3 days, last dose on 12/31/18 and also started on Cyclophosphamide 500 mg IV q 2 weeks for 6 sessions per Euro Lupus protocol. Altogether, has received two Cyclophosphomide infusions (12/31/18 and 1/19/19).     Initial SLE presentation (at the time of diagnose) include fatigue, diffused joint pain and kidney dysfunction.  Patient then developed Sicca symptoms, joint pain (bilateral hands, ankles, and knees), LE weakness, malar rash, photosensitivity, CP, SOB, and pleurisy.      Labs: leukocytosis (13.06), thrombocytopenia (99).  Elevated inflammatory markers (ESR 83, .6). LDH elevated at 601, Haptoglobin elevated at 330. Complements normal. Direct ruben negative. LARA and dsDNA negative. CPK nl.     Imaging: CXR : NUBIA +opacification.  US kidneys - renal disease, no acute processes.  CTA 1/23/19 revealing multifocal pneumonia.     CXR findings of PNA. CRP also signifiantly elevated and consistent with infection. Currently on Levaquin along with acyclovir ppx. Also on fluconazole for esophageal candidiasis.    ECHO 1/28/19: no abnormalities    Exam: 2-3+ pitting edema of lower extremities, blister like lesions also present on both ankles. No synovitis, effusions, oral/nasal ulcers, muscle weakness    Problem list:   Multifocal PNA: on Levaquin. Should be 7 day course per ID.   Lupus Nephritis: Dec 27 2018 and showed class III/IV lupus with crescents.  Dr. Pendleton decided to start Cyclophosphamide given that patient has progressed despite almost year long treatment with Cellcept. She was pulsed with Solumedrol 1g IV x 3 days, last dose on 12/31/18 and also started on Cyclophosphamide 500 mg IV q 2 weeks for 6 sessions per Euro Lupus protocol. Altogether, has received two Cyclophosphomide infusions (12/31/18 and 1/19/19).  Next dose due on 2/2/19.  SLE:  SLEDAI: 7 for thrombocytopenia,  pleurisy, proteinuria. dsDNA negative. Per Dr. Pendleton, proteinuria normal at 2g. Currently proteinuria at 2.49g, there around 0.5 gm increase. Still unclear if ankle lesions are vasculitis; patient and family declining biopsy at this time.   Skin lesions on ankles: seen by derm and wound care.   Immunocompromised: on acyclovir, fluconazole and atovaquone.   HTN: likely related to kidney disease and steroids. Management per primary  Chronic anemia: multi-factorial - related to renal disease, medications, acute infection, SLE. Direct ruben negative.      Plan:  - okay with patient transferring back to [facility]   - recommend derm consult at [facility] for biopsies of ankle lesions with H&E and DIF.   - cont. prednisone 60 mg oral daily   - will need next Cyclophosphamide 500 mg IV infusion on 2/2/19. Patient consented. This will be her 3rd Cyclophosphamide infusion. Plan is to complete a total of 6 infusions done every 2 weeks (Euro-Lupus protocol).  - will need ID clearance prior to Cyclophosphamide infusions  - pan-culture  - management of PNA per primary  - cont PCP ppx with atovaquone (cannot use Bactrim due to sulfa allergy)  - Pending more labs: APS,cryoglobulins, pre-dmards  - decrease dose of plaquenil to 200 MWF after HD  - continue PPI  - continue Vitamin D 1000 units daily   - DEXA as outpatient

## 2019-01-29 NOTE — SUBJECTIVE & OBJECTIVE
Interval History: no events overnight, pt seen in HD, denies complaints    Review of Systems   Constitutional: Negative for activity change, appetite change, chills, fever and unexpected weight change.   HENT: Negative for dental problem, ear discharge, ear pain, mouth sores, sinus pain, sore throat and trouble swallowing.    Eyes: Negative for pain and discharge.   Respiratory: Positive for cough. Negative for chest tightness, shortness of breath and wheezing.    Cardiovascular: Negative for chest pain and leg swelling.   Gastrointestinal: Negative for abdominal distention, abdominal pain, constipation, diarrhea, nausea and vomiting.   Genitourinary: Negative for difficulty urinating, dysuria, flank pain, frequency, genital sores and hematuria.   Musculoskeletal: Negative for arthralgias, joint swelling, neck pain and neck stiffness.   Skin: Negative for color change, rash and wound.   Allergic/Immunologic: Positive for immunocompromised state.   Neurological: Negative for dizziness, weakness, light-headedness, numbness and headaches.   Psychiatric/Behavioral: Negative for confusion. The patient is not nervous/anxious.      Objective:     Vital Signs (Most Recent):  Temp: 97.9 °F (36.6 °C) (01/28/19 1815)  Pulse: 98 (01/28/19 1815)  Resp: 18 (01/28/19 1815)  BP: (!) 158/99 (01/28/19 1815)  SpO2: 95 % (01/28/19 1358) Vital Signs (24h Range):  Temp:  [97.7 °F (36.5 °C)-98.5 °F (36.9 °C)] 97.9 °F (36.6 °C)  Pulse:  [] 98  Resp:  [14-20] 18  SpO2:  [94 %-99 %] 95 %  BP: (147-177)/() 158/99     Weight: 103.9 kg (229 lb)  Body mass index is 31.94 kg/m².    Estimated Creatinine Clearance: 17.5 mL/min (A) (based on SCr of 6.7 mg/dL (H)).    Physical Exam   Constitutional: She is oriented to person, place, and time. She appears well-developed and well-nourished. No distress.   HENT:   Right Ear: External ear normal.   Left Ear: External ear normal.   Nose: Nose normal.   Mouth/Throat: Oropharynx is clear and  moist.   Eyes: Conjunctivae and EOM are normal.   Neck: Normal range of motion. Neck supple.   Cardiovascular: Normal rate, regular rhythm, normal heart sounds and intact distal pulses.   No murmur heard.  Pulmonary/Chest: Effort normal and breath sounds normal. No respiratory distress. She has no wheezes.   Abdominal: Soft. Bowel sounds are normal. She exhibits no distension. There is no tenderness.   Musculoskeletal: Normal range of motion. She exhibits no edema.   Neurological: She is alert and oriented to person, place, and time. No cranial nerve deficit. Coordination normal.   Skin: Skin is warm and dry. She is not diaphoretic. No erythema.   Psychiatric: She has a normal mood and affect. Her behavior is normal.   Vitals reviewed.      Significant Labs:   CBC:   Recent Labs   Lab 01/27/19  0600 01/28/19  0611   WBC 12.90* 13.06*   HGB 9.7* 9.6*   HCT 29.8* 28.8*   PLT 93* 99*     CMP:   Recent Labs   Lab 01/27/19  0600 01/28/19  0611   * 127*   K 4.5 5.0   CL 97 93*   CO2 20* 18*   * 174*   BUN 88* 115*   CREATININE 5.9* 6.7*   CALCIUM 8.6* 9.2   PROT 5.4* 5.1*   ALBUMIN 1.8* 1.9*   BILITOT 0.3 0.3   ALKPHOS 68 59   AST 8* 7*   ALT 18 16   ANIONGAP 15 16   EGFRNONAA 9.4* 8.0*     Microbiology Results (last 7 days)     Procedure Component Value Units Date/Time    Culture, Respiratory with Gram Stain [[id number]]     Order Status:  No result Specimen:  Respiratory from Sputum, Induced     Blood culture [[id number]]     Order Status:  No result Specimen:  Blood     Blood culture [[id number]]     Order Status:  No result Specimen:  Blood           Significant Imaging: I have reviewed all pertinent imaging results/findings within the past 24 hours.

## 2019-01-29 NOTE — SUBJECTIVE & OBJECTIVE
Interval History: LE skin lesion evaluated by dermatology, likely acute edema blisters. Pt will be transferred back to [facility] to continue cyclophosphamide.     Review of Systems  Objective:     Vital Signs (Most Recent):  Temp: 98.1 °F (36.7 °C) (01/29/19 0845)  Pulse: 96 (01/29/19 1342)  Resp: 18 (01/29/19 1342)  BP: (!) 169/113 (01/29/19 0845)  SpO2: 96 % (01/29/19 1342) Vital Signs (24h Range):  Temp:  [97.7 °F (36.5 °C)-98.2 °F (36.8 °C)] 98.1 °F (36.7 °C)  Pulse:  [] 96  Resp:  [18-20] 18  SpO2:  [94 %-97 %] 96 %  BP: (140-169)/() 169/113     Weight: 102.2 kg (225 lb 5 oz)  Body mass index is 31.42 kg/m².    Intake/Output Summary (Last 24 hours) at 1/29/2019 1632  Last data filed at 1/29/2019 0600  Gross per 24 hour   Intake 850 ml   Output 2680 ml   Net -1830 ml      Physical Exam   Constitutional: She is oriented to person, place, and time. She appears well-developed and well-nourished. No distress.   Obese appearing female   HENT:   Head: Normocephalic and atraumatic.   Mouth/Throat: Oropharynx is clear and moist. No oropharyngeal exudate.   Diffuse facial edema; moon facies   Eyes: Conjunctivae and EOM are normal.   Neck: Normal range of motion. Neck supple.   Cardiovascular: Normal rate, regular rhythm, normal heart sounds and intact distal pulses.   No murmur heard.  Pulmonary/Chest: Effort normal and breath sounds normal. No respiratory distress. She has no wheezes.   Breathing comfortably on 2L NC  Decreased breath sound bilateral lower lung fields   Abdominal: Soft. Bowel sounds are normal. She exhibits no distension. There is tenderness (diffuse).   Musculoskeletal: Normal range of motion. She exhibits edema and tenderness. She exhibits no deformity.   Neurological: She is alert and oriented to person, place, and time.   CN largely in tact  Strength full throughout  No sensory deficits    Skin: Skin is warm and dry. Rash (vasculitic like rash BLE; mild erythema w/ desquamation to inner  thighs) noted.   Psychiatric: She has a normal mood and affect. Her behavior is normal.   Nursing note and vitals reviewed.      Significant Labs:   BMP:   Recent Labs   Lab 01/29/19  0602   *   *   K 4.8   CL 93*   CO2 22*   BUN 87*   CREATININE 5.4*   CALCIUM 8.2*   MG 2.0     CBC:   Recent Labs   Lab 01/28/19  0611 01/29/19  0602   WBC 13.06* 11.19   HGB 9.6* 8.9*   HCT 28.8* 26.7*   PLT 99* 92*       Significant Imaging: I have reviewed all pertinent imaging results/findings within the past 24 hours.

## 2019-01-29 NOTE — TELEPHONE ENCOUNTER
Report printed and on desk for Buddy to see     ----- Message from Sukhjinder Goodwin MD sent at 1/29/2019  8:19 AM CST -----  Where are the results?

## 2019-01-29 NOTE — MEDICAL/APP STUDENT
[facility]  Nephrology  Progress Note    Patient Name: Anitha Swenson  MRN: [id number]  Admission Date: 1/26/2019  Hospital Length of Stay: 3 days  Attending Provider: Victorina Cummings MD   Primary Care Physician: John Garcia MD  Principal Problem:Lupus    Consults  Subjective:     Interval History:     No acute overnight events. Patient had a round of hemodialysis yesterday. There were some problems with her catheter, the treatment was completed but the flow had to be ran at 200. Dialysis unit reports the catheter flushes, but when the flow was started at 350 it was constantly sucking down into the arteriole chamber. Catheter well positioned on XR. Patient is making urine, 850 cc of urine output yesterday.      Review of patient's allergies indicates:   Allergen Reactions    Sulfur      Current Facility-Administered Medications   Medication Frequency    0.9%  NaCl infusion PRN    acyclovir suspension 200 mg BID    albuterol-ipratropium 2.5 mg-0.5 mg/3 mL nebulizer solution 3 mL Q6H PRN    albuterol-ipratropium 2.5 mg-0.5 mg/3 mL nebulizer solution 3 mL Q6H WAKE    ALPRAZolam tablet 0.5 mg Q6H PRN    atovaquone suspension 1,500 mg Daily    calcium acetate capsule 667 mg TID WM    carvedilol tablet 12.5 mg BID    cetirizine tablet 10 mg Daily    dextrose 50% injection 12.5 g PRN    dextrose 50% injection 25 g PRN    escitalopram oxalate tablet 20 mg Daily    fluconazole tablet 100 mg Daily    gabapentin capsule 300 mg QHS    glucagon (human recombinant) injection 1 mg PRN    glucose chewable tablet 16 g PRN    glucose chewable tablet 24 g PRN    heparin (porcine) injection 1,000 Units PRN    heparin (porcine) injection 5,000 Units Q8H    hydroxychloroquine tablet 200 mg Daily    levoFLOXacin tablet 500 mg Every other day    morphine injection 3 mg Q4H PRN    ondansetron injection 4 mg Q6H PRN    pantoprazole EC tablet 40 mg Daily    predniSONE tablet 60 mg Daily     ramelteon tablet 8 mg Nightly PRN    senna-docusate 8.6-50 mg per tablet 1 tablet Daily    sodium bicarbonate tablet 650 mg Daily    sodium chloride 0.9% flush 5 mL PRN    vitamin D 1000 units tablet 1,000 Units Daily    zinc oxide 20 % ointment PRN       Objective:     Vital Signs (Most Recent):  Temp: 98.1 °F (36.7 °C) (01/29/19 0845)  Pulse: 96 (01/29/19 0845)  Resp: 20 (01/29/19 0845)  BP: (!) 169/113 (01/29/19 0845)  SpO2: 95 % (01/29/19 0845)  O2 Device (Oxygen Therapy): nasal cannula (01/29/19 0845) Vital Signs (24h Range):  Temp:  [97.7 °F (36.5 °C)-98.2 °F (36.8 °C)] 98.1 °F (36.7 °C)  Pulse:  [] 96  Resp:  [15-20] 20  SpO2:  [94 %-99 %] 95 %  BP: (140-177)/() 169/113     Weight: 102.2 kg (225 lb 5 oz) (01/29/19 0600)  Body mass index is 31.42 kg/m².  Body surface area is 2.26 meters squared.    I/O last 3 completed shifts:  In: 1050 [P.O.:500; Other:550]  Out: 3630 [Urine:1150; Other:2480]    Physical Exam   Constitutional: She is oriented to person, place, and time. She appears well-developed and well-nourished.   HENT:   Diffuse facial edema, moon facies    Cardiovascular: Normal rate, regular rhythm, normal heart sounds and intact distal pulses.   Pulmonary/Chest: Effort normal and breath sounds normal.   Musculoskeletal: She exhibits edema and tenderness.   Bilateral LE edema   Neurological: She is alert and oriented to person, place, and time.   Skin: Rash noted. There is erythema.   Bilateral LE erythema, w/ lesions on medial left leg        Significant Labs:sure  CMP:   Recent Labs   Lab 01/29/19  0602   *   CALCIUM 8.2*   ALBUMIN 2.0*   PROT 4.6*   *   K 4.8   CO2 22*   CL 93*   BUN 87*   CREATININE 5.4*   ALKPHOS 54*   ALT 16   AST 9*   BILITOT 0.4     All labs within the past 24 hours have been reviewed.    Significant Imaging:  US: Reviewed     1. No evidence of hydronephrosis/obstructive uropathy.  2. Findings suggestive of medical renal  disease.      Assessment/Plan:   22 yo F w/ PMH of SLE, anemia, HTN, and anxiety/depression who presents as a transfer with worsening SLE symptoms. Presents for nephrology and rheumatology consults. Recent hospitalization complicated by progressive renal failure 2/2 lupus nephritis, currently receiving HD.      Lupus Nephritis   Started on cytoxan in December, has received 2 doses to date; dose scheduled every two weeks  Pt's most recent renal biopsy (12/27/18) showed Class IV lupus nephritis w/ crescents  Retroperitoneal US showed evidence of medical renal disease w/o no sign of hydronephrosis or obstruction   C3/C4 WNL  Urine Protein/Creatinine ratio: 2.47   CBC, BMP, Phos daily  Continue Prednisone 60 mg daily   No dialysis today; will dialyze as needed          Active Diagnoses:    Diagnosis Date Noted POA    PRINCIPAL PROBLEM:  Lupus [L93.0] 01/24/2019 Yes    Alteration in skin integrity [R23.9] 01/28/2019 Yes    Anemia in CKD (chronic kidney disease) [N18.9, D63.1] 01/27/2019 Yes    Rash [R21] 01/27/2019 Yes    Immunocompromised [D84.9] 01/26/2019 Yes    Shortness of breath [R06.02] 01/26/2019 Yes    Anxiety and depression [F41.9, F32.9] 01/26/2019 Yes    Lupus nephritis [M32.14] 01/26/2019 Yes    Essential hypertension [I10] 01/26/2019 Yes    Acute renal failure [N17.9] 01/26/2019 Yes      Problems Resolved During this Admission:           Thank you for your consult. I will follow-up with patient. Please contact us if you have any additional questions.    Charlie Krishnamurthy PGY V Nephrology    ATTENDING PHYSICIAN ATTESTATION  I have personally interviewed and examined the patient. I thoroughly reviewed the demographic, clinical, laboratorial and imaging information available in medical records. I agree with the assessment and recommendations provided by the subspecialty resident. Dr. Krishnamurthy was under my supervision.

## 2019-01-29 NOTE — ASSESSMENT & PLAN NOTE
23F PMH SLE on cytoxan, steroids and plaquenil who presents as a transfer for nephrology and rheumatology consults, recently diagnosed w/ stenotrophomonas PNA and candidal esophagitis. Symptoms improving. ID consulted for treatment recommendations.    Recommendations:  - continue levofloxacin x 7 days total for treatment of stenotrophomonas in sputum cx - end date 1/31/2019  - continue fluconazole x 14 days total for treatment of candidal esophagitis  - OK for cytoxan once treatment of pneumonia is finished on 1/31  - confirmed bactrim allergy - hives and swelling - recommend atovaquone 1500mg daily w/ food for PJP ppx

## 2019-01-29 NOTE — PLAN OF CARE
CECILIA received call from Nereida at [facility]. The pt has tentavily been accepted to the [facility] MW 3rd shift. Did request Hep results. CM did fax the Hep results to [phone]. Dr Cummings updated.

## 2019-01-29 NOTE — ASSESSMENT & PLAN NOTE
23 year old female with history of SLE (dx Dec 2017 via kidney biopsy), lupus nephritis, HTN, anemia, anxiety/depression, fibromyalgia was transferred from [facility] to [facility] on 1/26 for rheumatology care.  Dermatology consulted for blisters on BLE. Patient recent history significant for pitting edema with subsequent development of bulla. She has not history of bulla prior to this episode. Infectious causes of bulla considered, however, less likely given non inflammatory and superficial nature of bulla. Also surround skin shows sign of stasis dermatitis secondary to edema which makes Acute Edema Blisters the favored diagnosis.   Recommendations:  - keep affected area moist with vaseline/aquaphor and then covered with nonadherent (ex: tefla) dressing  - avoid adherent dressings  - if concern for infection develops in areas of eroded skin, may use topical mupirocin

## 2019-01-29 NOTE — PLAN OF CARE
CM received VM from Letitia in [facility]. Chloe Babin and Birgit feel the pt would benefit from completing her care here and they are unable to place line that the pt needs. CM called Dr Cummings and left VM. CM also called the pt's nurse and updated her.

## 2019-01-30 LAB
ALBUMIN SERPL BCP-MCNC: 2.2 G/DL
ALDOLASE SERPL-CCNC: 12.8 U/L
ALP SERPL-CCNC: 54 U/L
ALT SERPL W/O P-5'-P-CCNC: 13 U/L
ANION GAP SERPL CALC-SCNC: 16 MMOL/L
ANISOCYTOSIS BLD QL SMEAR: SLIGHT
ANTI SM ANTIBODY: 0 EU
ANTI-SM INTERPRETATION: NEGATIVE
ANTI-SSA ANTIBODY: 0.1 EU
ANTI-SSA INTERPRETATION: NEGATIVE
ANTI-SSB ANTIBODY: 0 EU
ANTI-SSB INTERPRETATION: NEGATIVE
AST SERPL-CCNC: 7 U/L
BACTERIA #/AREA URNS AUTO: NORMAL /HPF
BASOPHILS NFR BLD: 0 %
BILIRUB SERPL-MCNC: 0.4 MG/DL
BILIRUB UR QL STRIP: NEGATIVE
BUN SERPL-MCNC: 111 MG/DL
CALCIUM SERPL-MCNC: 8.7 MG/DL
CHLORIDE SERPL-SCNC: 93 MMOL/L
CLARITY UR REFRACT.AUTO: ABNORMAL
CO2 SERPL-SCNC: 21 MMOL/L
COLOR UR AUTO: YELLOW
CREAT SERPL-MCNC: 6.3 MG/DL
DIFFERENTIAL METHOD: ABNORMAL
EOSINOPHIL NFR BLD: 0 %
ERYTHROCYTE [DISTWIDTH] IN BLOOD BY AUTOMATED COUNT: 14.1 %
EST. GFR  (AFRICAN AMERICAN): 10 ML/MIN/1.73 M^2
EST. GFR  (NON AFRICAN AMERICAN): 8.7 ML/MIN/1.73 M^2
GIANT PLATELETS BLD QL SMEAR: PRESENT
GLUCOSE SERPL-MCNC: 121 MG/DL
GLUCOSE UR QL STRIP: ABNORMAL
HCT VFR BLD AUTO: 27 %
HGB BLD-MCNC: 9 G/DL
HGB UR QL STRIP: ABNORMAL
HYALINE CASTS UR QL AUTO: 0 /LPF
IMM GRANULOCYTES # BLD AUTO: ABNORMAL K/UL
IMM GRANULOCYTES NFR BLD AUTO: ABNORMAL %
KETONES UR QL STRIP: NEGATIVE
LEUKOCYTE ESTERASE UR QL STRIP: NEGATIVE
LYMPHOCYTES NFR BLD: 5 %
MAGNESIUM SERPL-MCNC: 2 MG/DL
MCH RBC QN AUTO: 29.4 PG
MCHC RBC AUTO-ENTMCNC: 33.3 G/DL
MCV RBC AUTO: 88 FL
METAMYELOCYTES NFR BLD MANUAL: 1 %
MICROSCOPIC COMMENT: NORMAL
MONOCYTES NFR BLD: 4 %
NEUTROPHILS NFR BLD: 87 %
NEUTS BAND NFR BLD MANUAL: 3 %
NITRITE UR QL STRIP: NEGATIVE
NRBC BLD-RTO: 1 /100 WBC
PH UR STRIP: 5 [PH] (ref 5–8)
PHOSPHATE SERPL-MCNC: 10 MG/DL
PLATELET # BLD AUTO: 94 K/UL
PLATELET BLD QL SMEAR: ABNORMAL
PMV BLD AUTO: 10.5 FL
POIKILOCYTOSIS BLD QL SMEAR: ABNORMAL
POLYCHROMASIA BLD QL SMEAR: ABNORMAL
POTASSIUM SERPL-SCNC: 5.4 MMOL/L
PROT SERPL-MCNC: 4.6 G/DL
PROT UR QL STRIP: ABNORMAL
RBC # BLD AUTO: 3.06 M/UL
RBC #/AREA URNS AUTO: 2 /HPF (ref 0–4)
SODIUM SERPL-SCNC: 130 MMOL/L
SP GR UR STRIP: 1.01 (ref 1–1.03)
SQUAMOUS #/AREA URNS AUTO: 1 /HPF
STRONGYLOIDES ANTIBODY IGG: NEGATIVE
TOXIC GRANULES BLD QL SMEAR: PRESENT
URN SPEC COLLECT METH UR: ABNORMAL
VARICELLA INTERPRETATION: NEGATIVE
VARICELLA ZOSTER IGG: 0.83 ISR
WBC # BLD AUTO: 10.36 K/UL
WBC #/AREA URNS AUTO: 2 /HPF (ref 0–5)

## 2019-01-30 PROCEDURE — 85007 BL SMEAR W/DIFF WBC COUNT: CPT

## 2019-01-30 PROCEDURE — 83735 ASSAY OF MAGNESIUM: CPT

## 2019-01-30 PROCEDURE — 25000003 PHARM REV CODE 250: Performed by: STUDENT IN AN ORGANIZED HEALTH CARE EDUCATION/TRAINING PROGRAM

## 2019-01-30 PROCEDURE — 99232 SBSQ HOSP IP/OBS MODERATE 35: CPT | Mod: ,,, | Performed by: INTERNAL MEDICINE

## 2019-01-30 PROCEDURE — 84100 ASSAY OF PHOSPHORUS: CPT

## 2019-01-30 PROCEDURE — 20600001 HC STEP DOWN PRIVATE ROOM

## 2019-01-30 PROCEDURE — 99233 SBSQ HOSP IP/OBS HIGH 50: CPT | Mod: ,,, | Performed by: HOSPITALIST

## 2019-01-30 PROCEDURE — 63600175 PHARM REV CODE 636 W HCPCS: Performed by: STUDENT IN AN ORGANIZED HEALTH CARE EDUCATION/TRAINING PROGRAM

## 2019-01-30 PROCEDURE — 25000003 PHARM REV CODE 250: Performed by: HOSPITALIST

## 2019-01-30 PROCEDURE — 90935 HEMODIALYSIS ONE EVALUATION: CPT

## 2019-01-30 PROCEDURE — 36415 COLL VENOUS BLD VENIPUNCTURE: CPT

## 2019-01-30 PROCEDURE — 99232 PR SUBSEQUENT HOSPITAL CARE,LEVL II: ICD-10-PCS | Mod: ,,, | Performed by: INTERNAL MEDICINE

## 2019-01-30 PROCEDURE — 63600175 PHARM REV CODE 636 W HCPCS: Mod: JG | Performed by: HOSPITALIST

## 2019-01-30 PROCEDURE — 80053 COMPREHEN METABOLIC PANEL: CPT

## 2019-01-30 PROCEDURE — 94761 N-INVAS EAR/PLS OXIMETRY MLT: CPT

## 2019-01-30 PROCEDURE — 25000003 PHARM REV CODE 250: Performed by: INTERNAL MEDICINE

## 2019-01-30 PROCEDURE — 36593 DECLOT VASCULAR DEVICE: CPT

## 2019-01-30 PROCEDURE — 94640 AIRWAY INHALATION TREATMENT: CPT

## 2019-01-30 PROCEDURE — 25000242 PHARM REV CODE 250 ALT 637 W/ HCPCS: Performed by: STUDENT IN AN ORGANIZED HEALTH CARE EDUCATION/TRAINING PROGRAM

## 2019-01-30 PROCEDURE — 99233 PR SUBSEQUENT HOSPITAL CARE,LEVL III: ICD-10-PCS | Mod: ,,, | Performed by: HOSPITALIST

## 2019-01-30 PROCEDURE — 85027 COMPLETE CBC AUTOMATED: CPT

## 2019-01-30 PROCEDURE — 63600175 PHARM REV CODE 636 W HCPCS: Performed by: INTERNAL MEDICINE

## 2019-01-30 PROCEDURE — 63600175 PHARM REV CODE 636 W HCPCS: Performed by: HOSPITALIST

## 2019-01-30 RX ORDER — SODIUM CHLORIDE 9 MG/ML
INJECTION, SOLUTION INTRAVENOUS ONCE
Status: COMPLETED | OUTPATIENT
Start: 2019-01-30 | End: 2019-01-30

## 2019-01-30 RX ORDER — SODIUM CHLORIDE 9 MG/ML
INJECTION, SOLUTION INTRAVENOUS
Status: DISCONTINUED | OUTPATIENT
Start: 2019-01-30 | End: 2019-01-31 | Stop reason: HOSPADM

## 2019-01-30 RX ORDER — NIFEDIPINE 30 MG/1
30 TABLET, EXTENDED RELEASE ORAL DAILY
Status: DISCONTINUED | OUTPATIENT
Start: 2019-01-30 | End: 2019-01-31 | Stop reason: HOSPADM

## 2019-01-30 RX ORDER — CLONIDINE HYDROCHLORIDE 0.1 MG/1
0.1 TABLET ORAL 3 TIMES DAILY PRN
Status: DISCONTINUED | OUTPATIENT
Start: 2019-01-30 | End: 2019-01-31 | Stop reason: HOSPADM

## 2019-01-30 RX ADMIN — CALCIUM ACETATE 667 MG: 667 CAPSULE ORAL at 06:01

## 2019-01-30 RX ADMIN — Medication 3 MG: at 09:01

## 2019-01-30 RX ADMIN — HYDROXYCHLOROQUINE SULFATE 200 MG: 200 TABLET, FILM COATED ORAL at 08:01

## 2019-01-30 RX ADMIN — ALTEPLASE 4 MG: 2.2 INJECTION, POWDER, LYOPHILIZED, FOR SOLUTION INTRAVENOUS at 11:01

## 2019-01-30 RX ADMIN — FLUCONAZOLE 100 MG: 100 TABLET ORAL at 08:01

## 2019-01-30 RX ADMIN — ESCITALOPRAM OXALATE 20 MG: 20 TABLET, FILM COATED ORAL at 08:01

## 2019-01-30 RX ADMIN — CLONIDINE HYDROCHLORIDE 0.1 MG: 0.1 TABLET ORAL at 09:01

## 2019-01-30 RX ADMIN — HEPARIN SODIUM 5000 UNITS: 5000 INJECTION, SOLUTION INTRAVENOUS; SUBCUTANEOUS at 06:01

## 2019-01-30 RX ADMIN — CALCIUM ACETATE 667 MG: 667 CAPSULE ORAL at 12:01

## 2019-01-30 RX ADMIN — HEPARIN SODIUM 1000 UNITS: 1000 INJECTION INTRAVENOUS; SUBCUTANEOUS at 04:01

## 2019-01-30 RX ADMIN — CETIRIZINE HYDROCHLORIDE 10 MG: 5 TABLET ORAL at 08:01

## 2019-01-30 RX ADMIN — CLONIDINE HYDROCHLORIDE 0.1 MG: 0.1 TABLET ORAL at 08:01

## 2019-01-30 RX ADMIN — ALPRAZOLAM 0.5 MG: 0.5 TABLET ORAL at 08:01

## 2019-01-30 RX ADMIN — SODIUM CHLORIDE 300 ML: 0.9 INJECTION, SOLUTION INTRAVENOUS at 04:01

## 2019-01-30 RX ADMIN — PREDNISONE 60 MG: 20 TABLET ORAL at 08:01

## 2019-01-30 RX ADMIN — IPRATROPIUM BROMIDE AND ALBUTEROL SULFATE 3 ML: .5; 3 SOLUTION RESPIRATORY (INHALATION) at 08:01

## 2019-01-30 RX ADMIN — HEPARIN SODIUM 5000 UNITS: 5000 INJECTION, SOLUTION INTRAVENOUS; SUBCUTANEOUS at 09:01

## 2019-01-30 RX ADMIN — Medication 3 MG: at 08:01

## 2019-01-30 RX ADMIN — ACYCLOVIR 200 MG: 200 SUSPENSION ORAL at 08:01

## 2019-01-30 RX ADMIN — CARVEDILOL 12.5 MG: 12.5 TABLET, FILM COATED ORAL at 08:01

## 2019-01-30 RX ADMIN — LEVOFLOXACIN 750 MG: 250 TABLET, FILM COATED ORAL at 08:01

## 2019-01-30 RX ADMIN — CALCIUM ACETATE 667 MG: 667 CAPSULE ORAL at 08:01

## 2019-01-30 RX ADMIN — PANTOPRAZOLE SODIUM 40 MG: 40 TABLET, DELAYED RELEASE ORAL at 08:01

## 2019-01-30 RX ADMIN — VITAMIN D, TAB 1000IU (100/BT) 1000 UNITS: 25 TAB at 08:01

## 2019-01-30 RX ADMIN — SODIUM BICARBONATE 650 MG TABLET 650 MG: at 08:01

## 2019-01-30 RX ADMIN — GABAPENTIN 300 MG: 300 CAPSULE ORAL at 08:01

## 2019-01-30 NOTE — ASSESSMENT & PLAN NOTE
- Concern for vasculitis vs cellulitis  - Tender to touch  - Bandaged per wound care at OSH  - Wound care consulted; rebandaged  - Assessed by Dermatology; no need for bx. Likely acute edema blisters.

## 2019-01-30 NOTE — PROGRESS NOTES
Dialysis tx completed. Due to poor BFR tx ended @ 2.5 hours. 2 liters removed. MD aware of poor blood flow rate. CVC locked with heparin, capped and secured. Tolerated tx well. Report called to Kim AGUIAR.

## 2019-01-30 NOTE — NURSING
01/29/19 - 2004- Placed call to team MD on call to notify of pt's BP of 166/120. Pt just asked for pain meds & her Coreg is due now. No new orders received.

## 2019-01-30 NOTE — PLAN OF CARE
CM called [facility] of [phone] to discuss patient going back to [facility] at the patient and family's request as discussed in huddle.  CM spoke to Thuy who verified that patient's transport to [facility] would not be covered.  The patient would have to cover the cost of transport.  She states that the hospitalization would only be considered to be covered if the patient still has a need to be hospitalized.     3:30 PM  CM informed Dr. Cummings of above who requested CM call patient's family to inform them.  CM spoke to patient's mother, Johanna, and informed her of the above.  Johanna asked if the patient could be discharged and have her permacath placed as an outpatient.  CECILIA called Dr. Cummings and asked if this could be done, Dr. Cummings stated that they do not discharge patient's with temporary lines because of risk of infection is too high.  CECILIA called Johanna back and informed her.  CECILIA also told her Dr. Cummings is hopeful she will get the line placed tomorrow and then she can be discharged.

## 2019-01-30 NOTE — ASSESSMENT & PLAN NOTE
23F PMH SLE on cytoxan, steroids and plaquenil who presents as a transfer for nephrology and rheumatology consults, recently diagnosed w/ stenotrophomonas PNA and candidal esophagitis. Symptoms improving. ID consulted for treatment recommendations.    Recommendations:  - continue levofloxacin x 7 days total for treatment of stenotrophomonas in sputum cx - end date 1/31/2019  - continue fluconazole x 14 days total for treatment of candidal esophagitis  - OK for cytoxan once treatment of pneumonia is finished on 1/31  - confirmed bactrim allergy - hives and swelling - recommend atovaquone 1500mg daily w/ food for PJP ppx  - Pneumovax vaccine due 2/2019 per patient  - Follow-up quantiferon gold, if indeterminant, please sent T-spot.  If positive, please reconsult ID

## 2019-01-30 NOTE — PROGRESS NOTES
Activase 2 mg instilled to each lumen of right subclavian dialysis catheter per orders to dwell one hour then withdraw.

## 2019-01-30 NOTE — PT/OT/SLP PROGRESS
"Occupational Therapy      Patient Name:  Anitha Swenson   MRN:  [id number]    Pt reports that she is likely discharging home today with family support and has a RW at home. Grandmother present and both state that she will have good support. Pt declines OOB to participate with OT stating that she is "good for now". Pt/family told that OT will recommend HH and explained what the purpose of HH is and the benefits. Pt stated that she will consider it.    1 TA for education. Time = [id number].    JOSE Lopes  1/30/2019  "

## 2019-01-30 NOTE — PT/OT/SLP PROGRESS
"     Physical Therapy  Pt Not Seen    Patient Name:  Anitha Swenson   MRN:  [id number]    Patient not seen today secondary to  Other (Comment)(Pt states "I just finished working with therapy.  Besides, I'm about to go to dialysis" 1:13 pm). Will follow-up on next scheduled visit.    Teresa Doe, PTA  1/30/2019      "

## 2019-01-30 NOTE — PROGRESS NOTES
[facility] Hospital Medicine  Progress Note    Patient Name: Anitha Swenson  MRN: [id number]  Patient Class: IP- Inpatient   Admission Date: 1/26/2019  Length of Stay: 4 days  Attending Physician: Victorina Cummings MD  Primary Care Provider: John Garcia MD    [facility] Medicine Team: [facility] MED 1 Kim Fox MD    Subjective:     Principal Problem:Lupus    HPI:  22 yo F w/ PMH of SLE, anemia, HTN, and anxiety/depression who presents as a transfer from [place] for nephrology and rheumatology consults. She presented to [facility] on 1/16 with progressive renal failure, edema, and worsening skin rash, as well as joint pain. Initially during her hospitalization she continued her home prednisone but as symptoms progressed she was started on IV Solumedrol for stress dosing. Her admission has been complicated by progressive renal failure requiring daily HD with worsening peripheral and pulmonary edema, as well as developing pneumonia w/ cultures positive for stenotrophomonas. Due to her immunocompromised status, she was started on antibiotic regimen levaquin. On presentation, she was also noted to have developed a vasculitis skin rash to her lower extremities, which was painful to palpation. Wound care was following. Of note, she was recently diagnosed w/ candidal esophagitis. Her most recent CD4 count was <200. Follows with nephrology who initiated chemotherapy w/ cytoxan which she receives infusions twice a month. No previous cardiac involvement. Reports shortness of breath stable on 2L NC, as well as nausea, bilateral lower back pain. Denies fever, chills, chest pain, abdominal pain, dysuria, hematuria, and hematochezia.        Hospital Course:  Admitted to hospital medicine. Consulted nephrology and rheumatology. Family requested to go back to [place] as management has been the same here. Continue steroids. PJP ppx w/ atovoquone 2/2 bactrim allergy and complete Levaquin for PNA. Due to insurance  restrictions, patient may not be able to transfer back to [place] as insurance may not cover costs. Patient in need permacath placement; tentatively scheduled for tomorrow or Friday.     Interval History: Vitals stable overnight. Afebrile. Breathing comfortably on RA. Family requested to be transferred back to [place], however insurance may not cover costs. Doing well overall. Denies worsening shortness of breath, chest pain.     Review of Systems   Constitutional: Negative for appetite change, chills and fever.   HENT: Negative for congestion, sore throat and trouble swallowing.    Eyes: Negative for visual disturbance.   Respiratory: Negative for cough and shortness of breath.    Cardiovascular: Positive for leg swelling. Negative for chest pain.   Gastrointestinal: Positive for abdominal pain (improving). Negative for abdominal distention, blood in stool, nausea and vomiting.   Genitourinary: Negative for dysuria and hematuria.   Musculoskeletal: Positive for arthralgias.   Skin: Positive for rash and wound.   Neurological: Positive for weakness. Negative for dizziness and headaches.   Psychiatric/Behavioral: Negative for confusion.     Objective:     Vital Signs (Most Recent):  Temp: 97.8 °F (36.6 °C) (01/30/19 1400)  Pulse: 93 (01/30/19 1430)  Resp: 18 (01/30/19 1205)  BP: (!) 150/103 (01/30/19 1430)  SpO2: (!) 94 % (01/30/19 1205) Vital Signs (24h Range):  Temp:  [97.6 °F (36.4 °C)-98.4 °F (36.9 °C)] 97.8 °F (36.6 °C)  Pulse:  [87-96] 93  Resp:  [16-18] 18  SpO2:  [91 %-97 %] 94 %  BP: (150-176)/(103-120) 150/103     Weight: 105.3 kg (232 lb 2.3 oz)  Body mass index is 32.38 kg/m².    Intake/Output Summary (Last 24 hours) at 1/30/2019 1446  Last data filed at 1/29/2019 2100  Gross per 24 hour   Intake --   Output 350 ml   Net -350 ml      Physical Exam   Constitutional: She is oriented to person, place, and time. She appears well-developed and well-nourished. No distress.   Obese appearing female, no acute  distress   HENT:   Head: Normocephalic and atraumatic.   Mouth/Throat: Oropharynx is clear and moist. No oropharyngeal exudate.   Diffuse facial edema; moon facies (improving)   Eyes: Conjunctivae and EOM are normal.   Neck: Normal range of motion. Neck supple.   Cardiovascular: Normal rate, regular rhythm, normal heart sounds and intact distal pulses.   No murmur heard.  Pulmonary/Chest: Effort normal and breath sounds normal. No respiratory distress. She has no wheezes.   Decreased breath sound bilateral lower lung fields   Abdominal: Soft. Bowel sounds are normal. She exhibits no distension. There is tenderness (diffuse).   Musculoskeletal: Normal range of motion. She exhibits edema and tenderness. She exhibits no deformity.   Neurological: She is alert and oriented to person, place, and time.   CN largely in tact  Strength full throughout  No sensory deficits    Skin: Skin is warm and dry. Rash (vasculitic like rash BLE; superficial bullae) noted.   Psychiatric: She has a normal mood and affect. Her behavior is normal.   Nursing note and vitals reviewed.      Significant Labs:   A1C:   Recent Labs   Lab 01/26/19  1832   HGBA1C 5.7*     CBC:   Recent Labs   Lab 01/29/19  0602 01/30/19  0537   WBC 11.19 10.36   HGB 8.9* 9.0*   HCT 26.7* 27.0*   PLT 92* 94*     CMP:   Recent Labs   Lab 01/29/19  0602 01/30/19  0537   * 130*   K 4.8 5.4*   CL 93* 93*   CO2 22* 21*   * 121*   BUN 87* 111*   CREATININE 5.4* 6.3*   CALCIUM 8.2* 8.7   PROT 4.6* 4.6*   ALBUMIN 2.0* 2.2*   BILITOT 0.4 0.4   ALKPHOS 54* 54*   AST 9* 7*   ALT 16 13   ANIONGAP 14 16   EGFRNONAA 10.4* 8.7*       Significant Imaging: I have reviewed all pertinent imaging results/findings within the past 24 hours.    Assessment/Plan:      * Lupus    22 yo F w/ PMH of SLE, anemia, HTN, and anxiety/depression who presents as a transfer with worsening SLE symptoms. Presents for nephrology and rheumatology consults. Recent hospitalization complicated  by progressive renal failure 2/2 lupus nephritis, currently receiving HD.     Plan:  - Consult nephrology, rheumatology; appreciate assistance  - po Prednisone 60 mg daily  - Continue home hydroxychloroquine  - Resume Cytoxan infusions; next infusion 2/2/19  - Holding Benlysta in the setting of active respiratory infection  - F/U extensive rheumatology work up  - IV morphine PRN   - Retroperitoneal US demonstrated medical renal disease w/o evidence of hydronephrosis or obstructive   - Resume HD per nephrology     Skin bulla    - Dermatology consulted; appreciate recommendations  - Unlikely to be infectious bullae  - Likely 2/2 stasis dermatitis       Alteration in skin integrity    - Per wound care       Rash    - Concern for vasculitis vs cellulitis  - Tender to touch  - Bandaged per wound care at OSH  - Wound care consulted; rebandaged  - Assessed by Dermatology; no need for bx. Likely acute edema blisters.         Anemia in CKD (chronic kidney disease)    - Hb 9.0  - No active signs of bleeding  - Nephrology consulted  - EPO w/ HD         Acute renal failure    - See lupus nephritis       Essential hypertension    - Hypertensive w/ systolics 150-170s  - On ramipril, coreg at home  - Started clonidine as PRN prior to transfer  - Continue home coreg 12.5 mg BID  - Start nifedipine 30 mg daily           Acid reflux disease with ulcer    - Hx of nonbleeding ulcers  - Continue home pantoprazole daily       Lupus nephritis    - Follows w/ nephrology in [place]  - Inpatient nephrology consulted; appreciate recommendations  - Recently started on Cytoxan in December w/ plans to complete 6 total infusions  - Planning to resume infusions on 2/2/19  - HD today  - Tentative permacath tomorrow or Friday  - NPO at midnight  - prednisone po 60 mg daily         Anxiety and depression    - Continue home Lexapro  - Xanax PRN       Shortness of breath    - Currently breathing comfortably on RA  - CXR consistent w/ NUBIA opacification,  no significant pulmonary edema; peripheral edema present  - ID consulted; continue levaquin for 7 days  - repeat respiratory culture pending  - HD per nephrology       Immunocompromised    - Chronic steroid use  - Recently started Cytoxan in December per nephrology  - CD4 <200  - ID consulted; appreciate assistance  - Respiratory cultures positive for stenotrophomonas, continue levaquin for 7 days  - Continue fluconazole for 14 days for esophageal candidiasis, acyclovir for ppx  - Ordered blood cultures, urine culture, repeat sputum culture to r/o infxn to resume Cytoxan infusions  - Atovaquone for PCP ppx- allergic to sulfa, unable to give Bactrim .          VTE Risk Mitigation (From admission, onward)        Ordered     heparin (porcine) injection 1,000 Units  As needed (PRN)      01/28/19 1542     heparin (porcine) injection 5,000 Units  Every 8 hours      01/26/19 1810     IP VTE HIGH RISK PATIENT  Once      01/26/19 1805              Kim Fox MD  Department of Hospital Medicine   [facility]

## 2019-01-30 NOTE — ASSESSMENT & PLAN NOTE
- Hypertensive w/ systolics 150-170s  - On ramipril, coreg at home  - Started clonidine as PRN prior to transfer  - Continue home coreg 12.5 mg BID  - Start nifedipine 30 mg daily

## 2019-01-30 NOTE — ASSESSMENT & PLAN NOTE
24 yo F w/ PMH of SLE, anemia, HTN, and anxiety/depression who presents as a transfer with worsening SLE symptoms. Presents for nephrology and rheumatology consults. Recent hospitalization complicated by progressive renal failure 2/2 lupus nephritis, currently receiving HD.     Plan:  - Consult nephrology, rheumatology; appreciate assistance  - po Prednisone 60 mg daily  - Continue home hydroxychloroquine  - Resume Cytoxan infusions; next infusion 2/2/19  - Holding Benlysta in the setting of active respiratory infection  - F/U extensive rheumatology work up  - IV morphine PRN   - Retroperitoneal US demonstrated medical renal disease w/o evidence of hydronephrosis or obstructive   - Resume HD per nephrology

## 2019-01-30 NOTE — PROGRESS NOTES
Patient arrived on unit via stretcher.. Unable to obtain patient weight. Dialysis tx initiated via right CVC. Ports aspirated and flushed without difficulty. Lines connected and secured. Orders and machine settings verified. Tx started.

## 2019-01-30 NOTE — ASSESSMENT & PLAN NOTE
- Dermatology consulted; appreciate recommendations  - Unlikely to be infectious bullae  - Likely 2/2 stasis dermatitis

## 2019-01-30 NOTE — ASSESSMENT & PLAN NOTE
- Follows w/ nephrology in [place]  - Inpatient nephrology consulted; appreciate recommendations  - Recently started on Cytoxan in December w/ plans to complete 6 total infusions  - Planning to resume infusions on 2/2/19  - HD today  - Tentative permacath tomorrow or Friday  - NPO at midnight  - prednisone po 60 mg daily

## 2019-01-30 NOTE — SUBJECTIVE & OBJECTIVE
Interval History: Vitals stable overnight. Afebrile. Breathing comfortably on RA. Family requested to be transferred back to [place], however insurance may not cover costs. Doing well overall. Denies worsening shortness of breath, chest pain.     Review of Systems   Constitutional: Negative for appetite change, chills and fever.   HENT: Negative for congestion, sore throat and trouble swallowing.    Eyes: Negative for visual disturbance.   Respiratory: Negative for cough and shortness of breath.    Cardiovascular: Positive for leg swelling. Negative for chest pain.   Gastrointestinal: Positive for abdominal pain (improving). Negative for abdominal distention, blood in stool, nausea and vomiting.   Genitourinary: Negative for dysuria and hematuria.   Musculoskeletal: Positive for arthralgias.   Skin: Positive for rash and wound.   Neurological: Positive for weakness. Negative for dizziness and headaches.   Psychiatric/Behavioral: Negative for confusion.     Objective:     Vital Signs (Most Recent):  Temp: 97.8 °F (36.6 °C) (01/30/19 1400)  Pulse: 93 (01/30/19 1430)  Resp: 18 (01/30/19 1205)  BP: (!) 150/103 (01/30/19 1430)  SpO2: (!) 94 % (01/30/19 1205) Vital Signs (24h Range):  Temp:  [97.6 °F (36.4 °C)-98.4 °F (36.9 °C)] 97.8 °F (36.6 °C)  Pulse:  [87-96] 93  Resp:  [16-18] 18  SpO2:  [91 %-97 %] 94 %  BP: (150-176)/(103-120) 150/103     Weight: 105.3 kg (232 lb 2.3 oz)  Body mass index is 32.38 kg/m².    Intake/Output Summary (Last 24 hours) at 1/30/2019 1446  Last data filed at 1/29/2019 2100  Gross per 24 hour   Intake --   Output 350 ml   Net -350 ml      Physical Exam   Constitutional: She is oriented to person, place, and time. She appears well-developed and well-nourished. No distress.   Obese appearing female, no acute distress   HENT:   Head: Normocephalic and atraumatic.   Mouth/Throat: Oropharynx is clear and moist. No oropharyngeal exudate.   Diffuse facial edema; moon facies (improving)   Eyes:  Conjunctivae and EOM are normal.   Neck: Normal range of motion. Neck supple.   Cardiovascular: Normal rate, regular rhythm, normal heart sounds and intact distal pulses.   No murmur heard.  Pulmonary/Chest: Effort normal and breath sounds normal. No respiratory distress. She has no wheezes.   Decreased breath sound bilateral lower lung fields   Abdominal: Soft. Bowel sounds are normal. She exhibits no distension. There is tenderness (diffuse).   Musculoskeletal: Normal range of motion. She exhibits edema and tenderness. She exhibits no deformity.   Neurological: She is alert and oriented to person, place, and time.   CN largely in tact  Strength full throughout  No sensory deficits    Skin: Skin is warm and dry. Rash (vasculitic like rash BLE; superficial bullae) noted.   Psychiatric: She has a normal mood and affect. Her behavior is normal.   Nursing note and vitals reviewed.      Significant Labs:   A1C:   Recent Labs   Lab 01/26/19  1832   HGBA1C 5.7*     CBC:   Recent Labs   Lab 01/29/19  0602 01/30/19  0537   WBC 11.19 10.36   HGB 8.9* 9.0*   HCT 26.7* 27.0*   PLT 92* 94*     CMP:   Recent Labs   Lab 01/29/19  0602 01/30/19  0537   * 130*   K 4.8 5.4*   CL 93* 93*   CO2 22* 21*   * 121*   BUN 87* 111*   CREATININE 5.4* 6.3*   CALCIUM 8.2* 8.7   PROT 4.6* 4.6*   ALBUMIN 2.0* 2.2*   BILITOT 0.4 0.4   ALKPHOS 54* 54*   AST 9* 7*   ALT 16 13   ANIONGAP 14 16   EGFRNONAA 10.4* 8.7*       Significant Imaging: I have reviewed all pertinent imaging results/findings within the past 24 hours.

## 2019-01-30 NOTE — PROGRESS NOTES
[facility]  Infectious Disease  Progress Note    Patient Name: Anitha Swenson  MRN: [id number]  Admission Date: 1/26/2019  Length of Stay: 3 days  Attending Physician: Victorina Cummings MD  Primary Care Provider: John Garcia MD    Isolation Status: No active isolations  Assessment/Plan:      Immunocompromised    23F PMH SLE on cytoxan, steroids and plaquenil who presents as a transfer for nephrology and rheumatology consults, recently diagnosed w/ stenotrophomonas PNA and candidal esophagitis. Patient is immune to Hepatitis A/B.  HIV/HepC/RPR negative    Recommendations:  - continue levofloxacin x 7 days total for treatment of stenotrophomonas in sputum cx - end date 1/31/2019  - continue fluconazole x 14 days total for treatment of candidal esophagitis  - confirmed bactrim allergy - hives and swelling - recommend atovaquone 1500mg daily w/ food for PJP ppx  - OK for cytoxan once treatment of pneumonia is finished on 1/31  - Pneumovax vaccine due 2/2019 per patient  - Follow-up strongyloides, VZV,   - Follow-up quantiferon gold, if indeterminant, please sent T-spot.  If positive, please reconsult ID         Anticipated Disposition: clinically stable    Thank you for your consult. I will sign off. Please contact us if you have any additional questions.    Candy Johnson MD  Infectious Disease  [facility]    Subjective:     Principal Problem:Lupus    HPI: 23F PMH SLE on cytoxan, plaquenil and steroids, HTN, anxiety and depression who was transferred to [facility] after presenting w/ worsening renal failure, edema, skin rash and joint pain. She was started on HD for worsening peripheral and pulmonary edema. Sputum cx + stenotrophomonas on 1/22. She also has recent diagnosis of candidal esophagitis. ID consulted for recommendations regarding these issues. She states her respiratory status has improved, denies fevers, pain w/ swallowing has improved. No other complaints.   Interval  History:   Patient reports cough has improved  Patient has traveled to [place], [place], [place], [place], [place]  She has 3 dogs at home  She eat sushi, no raw oysters  She had TB screening more than a year ago which was negative  She had TDaP 2 years ago, Prevnar.  She is due for Pneumovax 2/2019    Review of Systems   Constitutional: Negative for chills, diaphoresis and fever.   HENT: Negative for rhinorrhea and sore throat.    Respiratory: Negative for cough and shortness of breath.    Cardiovascular: Negative for chest pain and leg swelling.   Gastrointestinal: Negative for abdominal pain, diarrhea, nausea and vomiting.   Genitourinary: Negative for dysuria and hematuria.   Musculoskeletal: Negative for arthralgias and myalgias.   Skin: Negative for rash.   Neurological: Negative for headaches.     Objective:     Vital Signs (Most Recent):  Temp: 97.6 °F (36.4 °C) (01/29/19 1657)  Pulse: 89 (01/29/19 1657)  Resp: 18 (01/29/19 1657)  BP: (!) 154/109 (01/29/19 1657)  SpO2: (!) 91 % (01/29/19 1657) Vital Signs (24h Range):  Temp:  [97.6 °F (36.4 °C)-98.2 °F (36.8 °C)] 97.6 °F (36.4 °C)  Pulse:  [89-98] 89  Resp:  [18-20] 18  SpO2:  [91 %-97 %] 91 %  BP: (140-169)/(101-118) 154/109     Weight: 102.2 kg (225 lb 5 oz)  Body mass index is 31.42 kg/m².    Estimated Creatinine Clearance: 21.5 mL/min (A) (based on SCr of 5.4 mg/dL (H)).    Physical Exam   Constitutional: She is oriented to person, place, and time. She appears well-developed and well-nourished. No distress.   HENT:   Head: Normocephalic and atraumatic.   Cushingoid faces   Eyes: Conjunctivae and EOM are normal.   Neck: Normal range of motion. Neck supple.   Pulmonary/Chest: Effort normal. No respiratory distress.   Abdominal: Soft. She exhibits no distension.   Musculoskeletal: Normal range of motion. She exhibits no edema.   Neurological: She is alert and oriented to person, place, and time.   Skin: Skin is warm and dry. No rash noted. She is not  diaphoretic. No erythema.   Psychiatric: She has a normal mood and affect. Her behavior is normal.   Vitals reviewed.      Significant Labs: All pertinent labs within the past 24 hours have been reviewed.    Significant Imaging: I have reviewed all pertinent imaging results/findings within the past 24 hours.

## 2019-01-30 NOTE — ASSESSMENT & PLAN NOTE
- Currently breathing comfortably on RA  - CXR consistent w/ NUBIA opacification, no significant pulmonary edema; peripheral edema present  - ID consulted; continue levaquin for 7 days  - repeat respiratory culture pending  - HD per nephrology

## 2019-01-30 NOTE — MEDICAL/APP STUDENT
[facility]  Nephrology  Progress Note    Patient Name: Anitha Swenson  MRN: [id number]  Admission Date: 1/26/2019  Hospital Length of Stay: 4 days  Attending Provider: Victorina Cummings MD   Primary Care Physician: John Garcia MD  Principal Problem:Lupus    Consults  Subjective:     Interval History:   No acute overnight events. No dialysis yesterday. The patient had planned to transfer back to [facility] today, but [facility] will not be accepting the transfer. Patient's BUN and Cr elevated this morning (111 and 6.3, respectively). Serum potassium also elevated. Will plan for dialysis this afternoon with current catheter, but will consider placing perm cath on Friday.     Review of patient's allergies indicates:   Allergen Reactions    Sulfur      Current Facility-Administered Medications   Medication Frequency    0.9%  NaCl infusion PRN    acyclovir suspension 200 mg BID    albuterol-ipratropium 2.5 mg-0.5 mg/3 mL nebulizer solution 3 mL Q6H PRN    albuterol-ipratropium 2.5 mg-0.5 mg/3 mL nebulizer solution 3 mL Q6H WAKE    ALPRAZolam tablet 0.5 mg Q6H PRN    atovaquone suspension 1,500 mg Daily    calcium acetate capsule 667 mg TID WM    carvedilol tablet 12.5 mg BID    cetirizine tablet 10 mg Daily    dextrose 50% injection 12.5 g PRN    dextrose 50% injection 25 g PRN    escitalopram oxalate tablet 20 mg Daily    fluconazole tablet 100 mg Daily    gabapentin capsule 300 mg QHS    glucagon (human recombinant) injection 1 mg PRN    glucose chewable tablet 16 g PRN    glucose chewable tablet 24 g PRN    heparin (porcine) injection 1,000 Units PRN    heparin (porcine) injection 5,000 Units Q8H    hydroxychloroquine tablet 200 mg Daily    levoFLOXacin tablet 750 mg Every other day    morphine injection 3 mg Q4H PRN    ondansetron injection 4 mg Q6H PRN    pantoprazole EC tablet 40 mg Daily    predniSONE tablet 60 mg Daily    ramelteon tablet 8 mg Nightly PRN     senna-docusate 8.6-50 mg per tablet 1 tablet Daily    sodium bicarbonate tablet 650 mg Daily    sodium chloride 0.9% flush 5 mL PRN    vitamin D 1000 units tablet 1,000 Units Daily    zinc oxide 20 % ointment PRN       Objective:     Vital Signs (Most Recent):  Temp: 98.4 °F (36.9 °C) (01/30/19 0745)  Pulse: 90 (01/30/19 0745)  Resp: 16 (01/30/19 0745)  BP: (!) 176/115 (01/30/19 0745)  SpO2: 97 % (01/30/19 0745)  O2 Device (Oxygen Therapy): room air (01/30/19 0745) Vital Signs (24h Range):  Temp:  [97.6 °F (36.4 °C)-98.4 °F (36.9 °C)] 98.4 °F (36.9 °C)  Pulse:  [87-96] 90  Resp:  [16-20] 16  SpO2:  [91 %-97 %] 97 %  BP: (154-176)/(109-120) 176/115     Weight: 105.3 kg (232 lb 2.3 oz) (01/30/19 0600)  Body mass index is 32.38 kg/m².  Body surface area is 2.3 meters squared.    I/O last 3 completed shifts:  In: 300 [P.O.:300]  Out: 550 [Urine:550]    Physical Exam   Constitutional: She is oriented to person, place, and time. She appears well-developed and well-nourished.   HENT:   Diffuse facial edema, moon facies    Cardiovascular: Normal rate, regular rhythm, normal heart sounds and intact distal pulses.   Pulmonary/Chest: Effort normal and breath sounds normal.   Musculoskeletal: She exhibits edema and tenderness.   Bilateral LE edema   Neurological: She is alert and oriented to person, place, and time.   Skin: Rash noted. There is erythema.   Bilateral LE erythema, w/ lesions on medial left leg             Significant Labs:sure  CBC:   Recent Labs   Lab 01/30/19  0537   WBC 10.36   RBC 3.06*   HGB 9.0*   HCT 27.0*   PLT 94*   MCV 88   MCH 29.4   MCHC 33.3     CMP:   Recent Labs   Lab 01/30/19  0537   *   CALCIUM 8.7   ALBUMIN 2.2*   PROT 4.6*   *   K 5.4*   CO2 21*   CL 93*   *   CREATININE 6.3*   ALKPHOS 54*   ALT 13   AST 7*   BILITOT 0.4     All labs within the past 24 hours have been reviewed.    Significant Imaging:  US: Reviewed     1. No evidence of hydronephrosis/obstructive  uropathy.  2. Findings suggestive of medical renal disease.      Assessment/Plan:   24 yo F w/ PMH of SLE, anemia, HTN, and anxiety/depression who presents as a transfer with worsening SLE symptoms. Presents for nephrology and rheumatology consults. Recent hospitalization complicated by progressive renal failure 2/2 lupus nephritis, currently receiving HD.      Lupus Nephritis   Started on cytoxan in December, has received 2 doses to date; dose scheduled every two weeks  Pt's most recent renal biopsy (12/27/18) showed Class IV lupus nephritis w/ crescents  Retroperitoneal US showed evidence of medical renal disease w/o no sign of hydronephrosis or obstruction   C3/C4 WNL  Urine Protein/Creatinine ratio: 2.47   CBC, BMP, Phos daily  Continue Prednisone 60 mg daily   Recommend dialysis today if patient not transferred; will dialyze as needed         Active Diagnoses:    Diagnosis Date Noted POA    PRINCIPAL PROBLEM:  Lupus [L93.0] 01/24/2019 Yes    Skin bulla [R23.8] 01/29/2019 Unknown    Alteration in skin integrity [R23.9] 01/28/2019 Yes    Anemia in CKD (chronic kidney disease) [N18.9, D63.1] 01/27/2019 Yes    Rash [R21] 01/27/2019 Yes    Immunocompromised [D84.9] 01/26/2019 Yes    Shortness of breath [R06.02] 01/26/2019 Yes    Anxiety and depression [F41.9, F32.9] 01/26/2019 Yes    Lupus nephritis [M32.14] 01/26/2019 Yes    Essential hypertension [I10] 01/26/2019 Yes    Acute renal failure [N17.9] 01/26/2019 Yes      Problems Resolved During this Admission:           Thank you for your consult. I will follow-up with patient. Please contact us if you have any additional questions.    Charlie Miramontes  Nephrology  [facility]    ATTENDING PHYSICIAN ATTESTATION  I have personally interviewed and examined the patient. I thoroughly reviewed the demographic, clinical, laboratorial and imaging information available in medical records. I agree with the assessment and recommendations provided above.

## 2019-01-30 NOTE — SUBJECTIVE & OBJECTIVE
Interval History:   Patient reports cough has improved  Patient has traveled to [place], [place], [place], [place], [place]  She has 3 dogs at home  She eat sushi, no raw oysters  She had TB screening more than a year ago which was negative  She had TDaP 2 years ago, Prevnar.  She is due for Pneumovax 2/2019    Review of Systems   Constitutional: Negative for chills, diaphoresis and fever.   HENT: Negative for rhinorrhea and sore throat.    Respiratory: Negative for cough and shortness of breath.    Cardiovascular: Negative for chest pain and leg swelling.   Gastrointestinal: Negative for abdominal pain, diarrhea, nausea and vomiting.   Genitourinary: Negative for dysuria and hematuria.   Musculoskeletal: Negative for arthralgias and myalgias.   Skin: Negative for rash.   Neurological: Negative for headaches.     Objective:     Vital Signs (Most Recent):  Temp: 97.6 °F (36.4 °C) (01/29/19 1657)  Pulse: 89 (01/29/19 1657)  Resp: 18 (01/29/19 1657)  BP: (!) 154/109 (01/29/19 1657)  SpO2: (!) 91 % (01/29/19 1657) Vital Signs (24h Range):  Temp:  [97.6 °F (36.4 °C)-98.2 °F (36.8 °C)] 97.6 °F (36.4 °C)  Pulse:  [89-98] 89  Resp:  [18-20] 18  SpO2:  [91 %-97 %] 91 %  BP: (140-169)/(101-118) 154/109     Weight: 102.2 kg (225 lb 5 oz)  Body mass index is 31.42 kg/m².    Estimated Creatinine Clearance: 21.5 mL/min (A) (based on SCr of 5.4 mg/dL (H)).    Physical Exam   Constitutional: She is oriented to person, place, and time. She appears well-developed and well-nourished. No distress.   HENT:   Head: Normocephalic and atraumatic.   Cushingoid faces   Eyes: Conjunctivae and EOM are normal.   Neck: Normal range of motion. Neck supple.   Pulmonary/Chest: Effort normal. No respiratory distress.   Abdominal: Soft. She exhibits no distension.   Musculoskeletal: Normal range of motion. She exhibits no edema.   Neurological: She is alert and oriented to person, place, and time.   Skin: Skin is warm and dry. No rash noted. She is not  diaphoretic. No erythema.   Psychiatric: She has a normal mood and affect. Her behavior is normal.   Vitals reviewed.      Significant Labs: All pertinent labs within the past 24 hours have been reviewed.    Significant Imaging: I have reviewed all pertinent imaging results/findings within the past 24 hours.

## 2019-01-30 NOTE — ASSESSMENT & PLAN NOTE
- Chronic steroid use  - Recently started Cytoxan in December per nephrology  - CD4 <200  - ID consulted; appreciate assistance  - Respiratory cultures positive for stenotrophomonas, continue levaquin for 7 days  - Continue fluconazole for 14 days for esophageal candidiasis, acyclovir for ppx  - Ordered blood cultures, urine culture, repeat sputum culture to r/o infxn to resume Cytoxan infusions  - Atovaquone for PCP ppx- allergic to sulfa, unable to give Bactrim .

## 2019-01-31 VITALS
WEIGHT: 229.5 LBS | RESPIRATION RATE: 16 BRPM | OXYGEN SATURATION: 95 % | HEIGHT: 71 IN | TEMPERATURE: 99 F | BODY MASS INDEX: 32.13 KG/M2 | DIASTOLIC BLOOD PRESSURE: 107 MMHG | SYSTOLIC BLOOD PRESSURE: 160 MMHG | HEART RATE: 82 BPM

## 2019-01-31 LAB
ALBUMIN SERPL BCP-MCNC: 2 G/DL
ALP SERPL-CCNC: 48 U/L
ALT SERPL W/O P-5'-P-CCNC: 12 U/L
ANCA AB TITR SER IF: NORMAL TITER
ANION GAP SERPL CALC-SCNC: 14 MMOL/L
ANISOCYTOSIS BLD QL SMEAR: SLIGHT
APTT HEX PL PPP: POSITIVE S
AST SERPL-CCNC: 8 U/L
B2 GLYCOPROT1 IGA SER QL: <9 SAU
B2 GLYCOPROT1 IGG SER QL: <9 SGU
B2 GLYCOPROT1 IGM SER QL: <9 SMU
BASO STIPL BLD QL SMEAR: ABNORMAL
BASOPHILS # BLD AUTO: ABNORMAL K/UL
BASOPHILS NFR BLD: 0 %
BILIRUB SERPL-MCNC: 0.4 MG/DL
BUN SERPL-MCNC: 80 MG/DL
CALCIUM SERPL-MCNC: 8.3 MG/DL
CHLORIDE SERPL-SCNC: 95 MMOL/L
CO2 SERPL-SCNC: 21 MMOL/L
CREAT SERPL-MCNC: 5.2 MG/DL
DIFFERENTIAL METHOD: ABNORMAL
EOSINOPHIL # BLD AUTO: ABNORMAL K/UL
EOSINOPHIL NFR BLD: 0 %
ERYTHROCYTE [DISTWIDTH] IN BLOOD BY AUTOMATED COUNT: 14.2 %
EST. GFR  (AFRICAN AMERICAN): 12.6 ML/MIN/1.73 M^2
EST. GFR  (NON AFRICAN AMERICAN): 10.9 ML/MIN/1.73 M^2
GLUCOSE SERPL-MCNC: 114 MG/DL
HCT VFR BLD AUTO: 26.4 %
HGB BLD-MCNC: 8.5 G/DL
IMM GRANULOCYTES # BLD AUTO: ABNORMAL K/UL
IMM GRANULOCYTES NFR BLD AUTO: ABNORMAL %
LYMPHOCYTES # BLD AUTO: ABNORMAL K/UL
LYMPHOCYTES NFR BLD: 4 %
M TB IFN-G CD4+ BCKGRND COR BLD-ACNC: 0 IU/ML
MAGNESIUM SERPL-MCNC: 2 MG/DL
MCH RBC QN AUTO: 29 PG
MCHC RBC AUTO-ENTMCNC: 32.2 G/DL
MCV RBC AUTO: 90 FL
METAMYELOCYTES NFR BLD MANUAL: 1 %
MITOGEN IGNF BCKGRD COR BLD-ACNC: 0.02 IU/ML
MITOGEN IGNF BCKGRD COR BLD-ACNC: ABNORMAL [IU]/ML
MONOCYTES # BLD AUTO: ABNORMAL K/UL
MONOCYTES NFR BLD: 5 %
MYELOPEROXIDASE AB SER-ACNC: 2 UNITS
NEUTROPHILS NFR BLD: 86 %
NEUTS BAND NFR BLD MANUAL: 4 %
NIL: 0.02 IU/ML
NRBC BLD-RTO: 1 /100 WBC
P-ANCA TITR SER IF: NORMAL TITER
PHOSPHATE SERPL-MCNC: 8.3 MG/DL
PLATELET # BLD AUTO: 100 K/UL
PMV BLD AUTO: 10.9 FL
POIKILOCYTOSIS BLD QL SMEAR: SLIGHT
POLYCHROMASIA BLD QL SMEAR: ABNORMAL
POTASSIUM SERPL-SCNC: 5.2 MMOL/L
PROT SERPL-MCNC: 4.3 G/DL
RBC # BLD AUTO: 2.93 M/UL
SODIUM SERPL-SCNC: 130 MMOL/L
TB2 - NIL: 0 IU/ML
WBC # BLD AUTO: 9.04 K/UL

## 2019-01-31 PROCEDURE — 25000003 PHARM REV CODE 250: Performed by: STUDENT IN AN ORGANIZED HEALTH CARE EDUCATION/TRAINING PROGRAM

## 2019-01-31 PROCEDURE — 36558 INSERT TUNNELED CV CATH: CPT | Performed by: INTERNAL MEDICINE

## 2019-01-31 PROCEDURE — C1750 CATH, HEMODIALYSIS,LONG-TERM: HCPCS | Performed by: INTERNAL MEDICINE

## 2019-01-31 PROCEDURE — 80053 COMPREHEN METABOLIC PANEL: CPT

## 2019-01-31 PROCEDURE — 76937 US GUIDE VASCULAR ACCESS: CPT | Performed by: INTERNAL MEDICINE

## 2019-01-31 PROCEDURE — 99152 MOD SED SAME PHYS/QHP 5/>YRS: CPT | Mod: ,,, | Performed by: INTERNAL MEDICINE

## 2019-01-31 PROCEDURE — 85027 COMPLETE CBC AUTOMATED: CPT

## 2019-01-31 PROCEDURE — 77001 FLUOROGUIDE FOR VEIN DEVICE: CPT | Performed by: INTERNAL MEDICINE

## 2019-01-31 PROCEDURE — 99152 PR MOD CONSCIOUS SEDATION, SAME PHYS, 5+ YRS, FIRST 15 MIN: ICD-10-PCS | Mod: ,,, | Performed by: INTERNAL MEDICINE

## 2019-01-31 PROCEDURE — 25000003 PHARM REV CODE 250: Performed by: INTERNAL MEDICINE

## 2019-01-31 PROCEDURE — 99153 MOD SED SAME PHYS/QHP EA: CPT | Performed by: INTERNAL MEDICINE

## 2019-01-31 PROCEDURE — C1751 CATH, INF, PER/CENT/MIDLINE: HCPCS | Performed by: INTERNAL MEDICINE

## 2019-01-31 PROCEDURE — 77001 CHG FLUOROGUIDE CNTRL VEN ACCESS,PLACE,REPLACE,REMOVE: ICD-10-PCS | Mod: 26,,, | Performed by: INTERNAL MEDICINE

## 2019-01-31 PROCEDURE — 76937 PR  US GUIDE, VASCULAR ACCESS: ICD-10-PCS | Mod: 26,,, | Performed by: INTERNAL MEDICINE

## 2019-01-31 PROCEDURE — 85007 BL SMEAR W/DIFF WBC COUNT: CPT

## 2019-01-31 PROCEDURE — 99152 MOD SED SAME PHYS/QHP 5/>YRS: CPT | Performed by: INTERNAL MEDICINE

## 2019-01-31 PROCEDURE — 36558 PR INSERT TUNNELED CV CATH W/O PORT OR PUMP: ICD-10-PCS | Mod: ,,, | Performed by: INTERNAL MEDICINE

## 2019-01-31 PROCEDURE — 63600175 PHARM REV CODE 636 W HCPCS: Performed by: INTERNAL MEDICINE

## 2019-01-31 PROCEDURE — 63600175 PHARM REV CODE 636 W HCPCS: Performed by: STUDENT IN AN ORGANIZED HEALTH CARE EDUCATION/TRAINING PROGRAM

## 2019-01-31 PROCEDURE — 99232 PR SUBSEQUENT HOSPITAL CARE,LEVL II: ICD-10-PCS | Mod: ,,, | Performed by: INTERNAL MEDICINE

## 2019-01-31 PROCEDURE — 63600175 PHARM REV CODE 636 W HCPCS: Performed by: HOSPITALIST

## 2019-01-31 PROCEDURE — C1894 INTRO/SHEATH, NON-LASER: HCPCS | Performed by: INTERNAL MEDICINE

## 2019-01-31 PROCEDURE — 83735 ASSAY OF MAGNESIUM: CPT

## 2019-01-31 PROCEDURE — 84100 ASSAY OF PHOSPHORUS: CPT

## 2019-01-31 PROCEDURE — 77001 FLUOROGUIDE FOR VEIN DEVICE: CPT | Mod: 26,,, | Performed by: INTERNAL MEDICINE

## 2019-01-31 PROCEDURE — 76937 US GUIDE VASCULAR ACCESS: CPT | Mod: 26,,, | Performed by: INTERNAL MEDICINE

## 2019-01-31 PROCEDURE — 99238 HOSP IP/OBS DSCHRG MGMT 30/<: CPT | Mod: ,,, | Performed by: HOSPITALIST

## 2019-01-31 PROCEDURE — 99238 PR HOSPITAL DISCHARGE DAY,<30 MIN: ICD-10-PCS | Mod: ,,, | Performed by: HOSPITALIST

## 2019-01-31 PROCEDURE — 36558 INSERT TUNNELED CV CATH: CPT | Mod: ,,, | Performed by: INTERNAL MEDICINE

## 2019-01-31 PROCEDURE — 99232 SBSQ HOSP IP/OBS MODERATE 35: CPT | Mod: ,,, | Performed by: INTERNAL MEDICINE

## 2019-01-31 DEVICE — GLIDEPATH HEMODIALYSIS CATH, ST, DL 14.5 FR. 19CM
Type: IMPLANTABLE DEVICE | Site: HEART | Status: FUNCTIONAL
Brand: GLIDEPATH LONG-TERM HEMODIALYSIS CATHETER WITH PRELOADED STYLET

## 2019-01-31 RX ORDER — LEVOFLOXACIN 750 MG/1
750 TABLET ORAL EVERY OTHER DAY
Qty: 1 TABLET | Refills: 0 | Status: SHIPPED | OUTPATIENT
Start: 2019-02-01 | End: 2019-02-02

## 2019-01-31 RX ORDER — SODIUM BICARBONATE 650 MG/1
650 TABLET ORAL DAILY
Refills: 0 | COMMUNITY
Start: 2019-01-31 | End: 2019-05-30

## 2019-01-31 RX ORDER — CLONIDINE HYDROCHLORIDE 0.1 MG/1
0.1 TABLET ORAL
Start: 2019-01-31 | End: 2019-09-04 | Stop reason: CLARIF

## 2019-01-31 RX ORDER — HEPARIN SODIUM 1000 [USP'U]/ML
INJECTION, SOLUTION INTRAVENOUS; SUBCUTANEOUS
Status: DISCONTINUED | OUTPATIENT
Start: 2019-01-31 | End: 2019-01-31 | Stop reason: HOSPADM

## 2019-01-31 RX ORDER — ATOVAQUONE 750 MG/5ML
1500 SUSPENSION ORAL DAILY
Qty: 300 ML | Refills: 2 | Status: SHIPPED | OUTPATIENT
Start: 2019-02-01 | End: 2019-05-30

## 2019-01-31 RX ORDER — FLUCONAZOLE 100 MG/1
100 TABLET ORAL DAILY
Qty: 9 TABLET | Refills: 0 | Status: SHIPPED | OUTPATIENT
Start: 2019-02-01 | End: 2019-02-10

## 2019-01-31 RX ORDER — LIDOCAINE HYDROCHLORIDE 20 MG/ML
INJECTION, SOLUTION EPIDURAL; INFILTRATION; INTRACAUDAL; PERINEURAL
Status: DISCONTINUED | OUTPATIENT
Start: 2019-01-31 | End: 2019-01-31 | Stop reason: HOSPADM

## 2019-01-31 RX ORDER — MIDAZOLAM HYDROCHLORIDE 1 MG/ML
INJECTION, SOLUTION INTRAMUSCULAR; INTRAVENOUS
Status: DISCONTINUED | OUTPATIENT
Start: 2019-01-31 | End: 2019-01-31 | Stop reason: HOSPADM

## 2019-01-31 RX ORDER — CETIRIZINE HYDROCHLORIDE 10 MG/1
10 TABLET ORAL DAILY
Refills: 0 | COMMUNITY
Start: 2019-02-01 | End: 2019-10-16

## 2019-01-31 RX ORDER — SODIUM CHLORIDE 9 MG/ML
INJECTION, SOLUTION INTRAVENOUS ONCE
Status: DISCONTINUED | OUTPATIENT
Start: 2019-01-31 | End: 2019-01-31 | Stop reason: HOSPADM

## 2019-01-31 RX ORDER — SODIUM CHLORIDE 9 MG/ML
INJECTION, SOLUTION INTRAVENOUS
Status: DISCONTINUED | OUTPATIENT
Start: 2019-01-31 | End: 2019-01-31 | Stop reason: HOSPADM

## 2019-01-31 RX ORDER — CALCIUM ACETATE 667 MG/1
667 CAPSULE ORAL
Qty: 90 CAPSULE | Refills: 2 | Status: SHIPPED | OUTPATIENT
Start: 2019-01-31 | End: 2019-05-30

## 2019-01-31 RX ORDER — BELIMUMAB 200 MG/ML
200 SOLUTION SUBCUTANEOUS
Start: 2019-02-01

## 2019-01-31 RX ORDER — FENTANYL CITRATE 50 UG/ML
INJECTION, SOLUTION INTRAMUSCULAR; INTRAVENOUS
Status: DISCONTINUED | OUTPATIENT
Start: 2019-01-31 | End: 2019-01-31 | Stop reason: HOSPADM

## 2019-01-31 RX ORDER — ACYCLOVIR 200 MG/5ML
200 SUSPENSION ORAL 2 TIMES DAILY
Qty: 300 ML | Refills: 2 | Status: SHIPPED | OUTPATIENT
Start: 2019-01-31 | End: 2019-05-30

## 2019-01-31 RX ORDER — NIFEDIPINE 30 MG/1
30 TABLET, EXTENDED RELEASE ORAL DAILY
Qty: 30 TABLET | Refills: 2 | Status: SHIPPED | OUTPATIENT
Start: 2019-02-01 | End: 2019-05-30

## 2019-01-31 RX ORDER — PANTOPRAZOLE SODIUM 40 MG/1
40 TABLET, DELAYED RELEASE ORAL DAILY
Start: 2019-01-31

## 2019-01-31 RX ORDER — GABAPENTIN 300 MG/1
300 CAPSULE ORAL NIGHTLY
Refills: 0
Start: 2019-01-31

## 2019-01-31 RX ADMIN — ALPRAZOLAM 0.5 MG: 0.5 TABLET ORAL at 12:01

## 2019-01-31 RX ADMIN — ATOVAQUONE 1500 MG: 750 SUSPENSION ORAL at 09:01

## 2019-01-31 RX ADMIN — CETIRIZINE HYDROCHLORIDE 10 MG: 5 TABLET ORAL at 09:01

## 2019-01-31 RX ADMIN — SODIUM BICARBONATE 650 MG TABLET 650 MG: at 09:01

## 2019-01-31 RX ADMIN — PREDNISONE 60 MG: 20 TABLET ORAL at 09:01

## 2019-01-31 RX ADMIN — CALCIUM ACETATE 667 MG: 667 CAPSULE ORAL at 02:01

## 2019-01-31 RX ADMIN — NIFEDIPINE 30 MG: 30 TABLET, FILM COATED, EXTENDED RELEASE ORAL at 09:01

## 2019-01-31 RX ADMIN — ALPRAZOLAM 0.5 MG: 0.5 TABLET ORAL at 11:01

## 2019-01-31 RX ADMIN — FLUCONAZOLE 100 MG: 100 TABLET ORAL at 09:01

## 2019-01-31 RX ADMIN — CARVEDILOL 12.5 MG: 12.5 TABLET, FILM COATED ORAL at 09:01

## 2019-01-31 RX ADMIN — ACYCLOVIR 200 MG: 200 SUSPENSION ORAL at 09:01

## 2019-01-31 RX ADMIN — Medication 3 MG: at 06:01

## 2019-01-31 RX ADMIN — CALCIUM ACETATE 667 MG: 667 CAPSULE ORAL at 09:01

## 2019-01-31 RX ADMIN — HYDROXYCHLOROQUINE SULFATE 200 MG: 200 TABLET, FILM COATED ORAL at 09:01

## 2019-01-31 RX ADMIN — PANTOPRAZOLE SODIUM 40 MG: 40 TABLET, DELAYED RELEASE ORAL at 09:01

## 2019-01-31 RX ADMIN — VITAMIN D, TAB 1000IU (100/BT) 1000 UNITS: 25 TAB at 09:01

## 2019-01-31 RX ADMIN — ESCITALOPRAM OXALATE 20 MG: 20 TABLET, FILM COATED ORAL at 09:01

## 2019-01-31 RX ADMIN — HEPARIN SODIUM 5000 UNITS: 5000 INJECTION, SOLUTION INTRAVENOUS; SUBCUTANEOUS at 05:01

## 2019-01-31 NOTE — PLAN OF CARE
Problem: Adult Inpatient Plan of Care  Goal: Plan of Care Review  Pt AAO x 4 & up to bedside commode. Pt remains free from falls. Side rails up x 2. Call bell in reach. Family member at bedside. Pt received HD Wednesday & anticipates line placement today. After line placed, pt hopes to be discharged home or transferred back to [place], which is closer to home. Pt medicated for pain & anxiety x 1. BP remains elevated, but w/in parameters. Pt w/out any questions or concerns. Instructed to call w/ any needs.

## 2019-01-31 NOTE — ASSESSMENT & PLAN NOTE
- Breathing comfortably on RA w/ intermittent O2 use w/ exertion  - CXR consistent w/ NUBIA opacification, no significant pulmonary edema; peripheral edema present  - ID consulted; continue levaquin for 7 days  - repeat respiratory culture pending   - HD per nephrology

## 2019-01-31 NOTE — PT/OT/SLP PROGRESS
"     Physical Therapy  Pt Not Seen    Patient Name:  Anitha Swenson   MRN:  [id number]    Patient not seen today secondary to pt Unavailable (Comment), Patient unwilling to participate(2 attempts for tx session: 1.  Pt OMAYRA away at cath lab for procedure (12:59 pm); 2. Pt refused tx session stating "I just came back from a procedure and I'm still affected by the meds they gave me"  Also, pt reports "I'm going home today" 2:13 pm).     Teresa Doe, PTA  1/31/2019      "

## 2019-01-31 NOTE — DISCHARGE SUMMARY
[facility] Hospital Medicine  Discharge Summary      Patient Name: Anitha Swenson  MRN: [id number]  Admission Date: 1/26/2019  Hospital Length of Stay: 5 days  Discharge Date and Time:  01/31/2019 2:41 PM  Attending Physician: Victorina Cummings MD   Discharging Provider: Kim Fox MD  Primary Care Provider: John Garcia MD  Hospital Medicine Team: [facility] 1 Kim Fox MD    HPI:   24 yo F w/ PMH of SLE, anemia, HTN, and anxiety/depression who [place] as a transfer from [place] for nephrology and rheumatology consults. She presented to OSH on 1/16 with progressive renal failure, edema, and worsening skin rash, as well as joint pain. Initially during her hospitalization she continued her home prednisone but as symptoms progressed she was started on IV Solumedrol for stress dosing. Her admission has been complicated by progressive renal failure requiring daily HD with worsening peripheral and pulmonary edema, as well as developing pneumonia w/ cultures positive for stenotrophomonas. Due to her immunocompromised status, she was started on antibiotic regimen levaquin. On presentation, she was also noted to have developed a vasculitis skin rash to her lower extremities, which was painful to palpation. Wound care was following. Of note, she was recently diagnosed w/ candidal esophagitis. Her most recent CD4 count was <200. Follows with nephrology who initiated chemotherapy w/ cytoxan which she receives infusions twice a month. No previous cardiac involvement. Reports shortness of breath stable on 2L NC, as well as nausea, bilateral lower back pain. Denies fever, chills, chest pain, abdominal pain, dysuria, hematuria, and hematochezia.        Procedure(s) (LRB):  INSERTION, CATHETER, CENTRAL VENOUS, HEMODIALYSIS, TUNNELED (N/A)      Hospital Course:   Admitted to hospital medicine. Consulted nephrology and rheumatology. Family requested to go back to [place] as management has been the  same here. Continue steroids. PJP ppx w/ atovoquone 2/2 bactrim allergy and complete Levaquin for PNA. Pending permacath placement today, scheduled for today. Patient and family planning to discharge home following line placement. Confirmed scheduled HD chair on 2/1/10.     Consults:   Consults (From admission, onward)        Status Ordering Provider     Inpatient consult to Dermatology  Once     Provider:  (Not yet assigned)    Completed ANTONIO SWANSON     Inpatient consult to Infectious Diseases  Once     Provider:  (Not yet assigned)    Completed ANTONIO SWANSON     Inpatient consult to Infectious Diseases  Once     Provider:  (Not yet assigned)    Completed ANTONIO SWANSON     Inpatient consult to Nephrology  Once     Provider:  (Not yet assigned)    Completed MARGARITA WAITE     Inpatient consult to Registered Dietitian/Nutritionist  Once     Provider:  (Not yet assigned)    Completed MARGARITA WAITE     Inpatient consult to Rheumatology  Once     Provider:  (Not yet assigned)    Completed MARGARITA WAITE          * Lupus    22 yo F w/ PMH of SLE, anemia, HTN, and anxiety/depression who presents as a transfer with worsening SLE symptoms. Presents for nephrology and rheumatology consults. Recent hospitalization complicated by progressive renal failure 2/2 lupus nephritis, currently receiving HD.     Plan:  - Consult nephrology, rheumatology; appreciate assistance  - po Prednisone 60 mg daily  - Continue home hydroxychloroquine  - Resume Cytoxan infusions; next infusion 2/2/19 following Levaquin treatment for PNA  - Holding Benlysta in the setting of active respiratory infection  - F/U extensive rheumatology work up  - IV morphine PRN   - Retroperitoneal US demonstrated medical renal disease w/o evidence of hydronephrosis or obstructive   - Outpatient HD scheduled MWF  - Permacath line placed  - Cleared for discharge home     Skin bulla    - Dermatology consulted;  appreciate recommendations  - Unlikely to be infectious bullae  - Likely 2/2 stasis dermatitis       Alteration in skin integrity    - Per wound care       Rash    - Initial concern for vasculitis vs cellulitis w/ tenderness to palpation  - Bandaged per wound care at OSH  - Wound care consulted  - Assessed by Dermatology; no need for bx. Likely acute edema blisters.         Anemia in CKD (chronic kidney disease)    - Hb 8.5  - No active signs of bleeding  - Nephrology consulted  - EPO w/ HD         Acute renal failure    - See lupus nephritis       Essential hypertension    - Hypertensive w/ systolics 150-170s  - On ramipril, coreg at home  - Continue home coreg 12.5 mg BID  - Nifedipine 30 mg daily  - Clonidine PRN           Lupus nephritis    - Follows w/ nephrology in [place]  - Inpatient nephrology consulted; appreciate recommendations  - Recently started on Cytoxan in December w/ plans to complete 6 total infusions  - Planning to resume infusions on 2/2/19  - Pending permacath placement today  - continue prednisone po 60 mg daily  - Outpatient HD scheduled MWF         Anxiety and depression    - Continue home Lexapro  - Xanax PRN       Shortness of breath    - Breathing comfortably on RA w/ intermittent O2 use w/ exertion  - CXR consistent w/ NUBIA opacification, no significant pulmonary edema; peripheral edema present  - ID consulted; continue levaquin for 7 days  - repeat respiratory culture pending   - HD per nephrology       Immunocompromised    - Chronic steroid use  - Recently started Cytoxan in December per nephrology  - CD4 <200  - ID consulted; appreciate assistance  - Respiratory cultures positive for stenotrophomonas, continue levaquin for 7 days  - Continue fluconazole for 14 days for esophageal candidiasis, acyclovir for ppx  - Ordered blood cultures, urine culture, repeat sputum culture to r/o infxn to resume Cytoxan infusions  - Atovaquone for PCP ppx- allergic to sulfa, unable to give Bactrim .  "         Final Active Diagnoses:    Diagnosis Date Noted POA    PRINCIPAL PROBLEM:  Lupus [L93.0] 01/24/2019 Yes    Skin bulla [R23.8] 01/29/2019 Unknown    Alteration in skin integrity [R23.9] 01/28/2019 Yes    Anemia in CKD (chronic kidney disease) [N18.9, D63.1] 01/27/2019 Yes    Rash [R21] 01/27/2019 Yes    Immunocompromised [D84.9] 01/26/2019 Yes    Shortness of breath [R06.02] 01/26/2019 Yes    Anxiety and depression [F41.9, F32.9] 01/26/2019 Yes    Lupus nephritis [M32.14] 01/26/2019 Yes    Essential hypertension [I10] 01/26/2019 Yes    Acute renal failure [N17.9] 01/26/2019 Yes      Problems Resolved During this Admission:       Discharged Condition: fair    Disposition: Home or Self Care    Follow Up:    Patient Instructions:      WALKER FOR HOME USE     Order Specific Question Answer Comments   Type of Walker: Rollator    With wheels? Yes    Height: 5' 11" (1.803 m)    Weight: 104.1 kg (229 lb 8 oz)    Length of need (1-99 months): 99    Please check all that apply: Patient's condition impairs ambulation.        Significant Diagnostic Studies: Labs:   CMP   Recent Labs   Lab 01/30/19  0537 01/31/19  0500   * 130*   K 5.4* 5.2*   CL 93* 95   CO2 21* 21*   * 114*   * 80*   CREATININE 6.3* 5.2*   CALCIUM 8.7 8.3*   PROT 4.6* 4.3*   ALBUMIN 2.2* 2.0*   BILITOT 0.4 0.4   ALKPHOS 54* 48*   AST 7* 8*   ALT 13 12   ANIONGAP 16 14   ESTGFRAFRICA 10.0* 12.6*   EGFRNONAA 8.7* 10.9*   , CBC   Recent Labs   Lab 01/30/19  0537 01/31/19  0500   WBC 10.36 9.04   HGB 9.0* 8.5*   HCT 27.0* 26.4*   PLT 94* 100*    and All labs within the past 24 hours have been reviewed    Pending Diagnostic Studies:     Procedure Component Value Units Date/Time    Cryoglobulin [[id number]] Collected:  01/28/19 1458    Order Status:  Sent Lab Status:  In process Updated:  01/28/19 1546    Specimen:  Blood     DRVVT [[id number]] Collected:  01/27/19 1524    Order Status:  Sent Lab Status:  In process Updated:  " 01/27/19 1539    Specimen:  Blood          Medications:  Reconciled Home Medications:      Medication List      START taking these medications    acyclovir 200 mg/5 mL suspension  Commonly known as:  ZOVIRAX  Take 5 mLs (200 mg total) by mouth 2 (two) times daily.     atovaquone 750 mg/5 mL Susp  Commonly known as:  MEPRON  Take 10 mLs (1,500 mg total) by mouth once daily.  Start taking on:  2/1/2019     cetirizine 10 MG tablet  Commonly known as:  ZYRTEC  Take 1 tablet (10 mg total) by mouth once daily.  Start taking on:  2/1/2019     fluconazole 100 MG tablet  Commonly known as:  DIFLUCAN  Take 1 tablet (100 mg total) by mouth once daily. for 9 days  Start taking on:  2/1/2019     levoFLOXacin 750 MG tablet  Commonly known as:  LEVAQUIN  Take 1 tablet (750 mg total) by mouth every other day. for 1 dose  Start taking on:  2/1/2019     NIFEdipine 30 MG (OSM) 24 hr tablet  Commonly known as:  PROCARDIA-XL  Take 1 tablet (30 mg total) by mouth once daily.  Start taking on:  2/1/2019        CHANGE how you take these medications    BENLYSTA 200 mg/mL Atin  Generic drug:  belimumab  Inject 1 mL (200 mg total) as directed Every Friday. HOLD UNTIL TOLD TO RESUME BY RHEUMATOLOGIST  Start taking on:  2/1/2019  What changed:  additional instructions     calcium acetate 667 mg capsule  Commonly known as:  PHOSLO  Take 1 capsule (667 mg total) by mouth 3 (three) times daily with meals.  What changed:    · how much to take  · how to take this  · when to take this     cloNIDine 0.1 MG tablet  Commonly known as:  CATAPRES  Take 1 tablet (0.1 mg total) by mouth as needed (for systolic blood pressure greater than 170 mmHg).  What changed:  reasons to take this     gabapentin 300 MG capsule  Commonly known as:  NEURONTIN  Take 1 capsule (300 mg total) by mouth every evening.  What changed:  when to take this     pantoprazole 40 MG tablet  Commonly known as:  PROTONIX  Take 1 tablet (40 mg total) by mouth once daily.  What changed:   when to take this     sodium bicarbonate 650 MG tablet  Take 1 tablet (650 mg total) by mouth once daily.  What changed:    · how much to take  · how to take this  · when to take this        CONTINUE taking these medications    acetaminophen 325 MG tablet  Commonly known as:  TYLENOL  Take 325 mg by mouth every 6 (six) hours as needed for Pain.     ALPRAZolam 1 MG tablet  Commonly known as:  XANAX  Take 1 mg by mouth daily as needed for Anxiety.     carvedilol 12.5 MG tablet  Commonly known as:  COREG  Take 1 tablet by mouth twice daily     escitalopram oxalate 20 MG tablet  Commonly known as:  LEXAPRO  Take 1 tablet by mouth once daily     HYDROcodone-acetaminophen 5-325 mg per tablet  Commonly known as:  NORCO  Take 1 tablet by mouth every 12 (twelve) hours as needed for Pain.     hydroxychloroquine 200 mg tablet  Commonly known as:  PLAQUENIL  Take 1 tablet by mouth nightly     loperamide 2 mg Tab  Commonly known as:  IMODIUM A-D  Take 2 mg by mouth 4 (four) times daily as needed (diarrhea).     medroxyPROGESTERone 150 mg/mL Syrg  Commonly known as:  DEPO-PROVERA  Inject 150 mg into the muscle every 3 (three) months.     ondansetron 4 MG tablet  Commonly known as:  ZOFRAN  Take 4 mg by mouth every 24 hours as needed for Nausea.     PREDNISONE ORAL  Take 60 mg by mouth once daily.     temazepam 15 mg Cap  Commonly known as:  RESTORIL  Take 15 mg by mouth nightly as needed (insomnia).     VITAMIN D3 5,000 unit Tab  Generic drug:  cholecalciferol (vitamin D3)  Take 5,000 Units by mouth every morning.        STOP taking these medications    bumetanide 2 MG tablet  Commonly known as:  BUMEX     CARTIA  MG 24 hr capsule  Generic drug:  diltiaZEM     metOLazone 5 MG tablet  Commonly known as:  ZAROXOLYN            Indwelling Lines/Drains at time of discharge:   Lines/Drains/Airways     Peripherally Inserted Central Catheter Line                 PICC Double Lumen left brachial -- days          Central Venous  Catheter Line                 Tunneled Central Line Insertion/Assessment - Double Lumen  01/31/19 1237 right subclavian;right internal jugular less than 1 day                Time spent on the discharge of patient: 50 minutes  Patient was seen and examined on the date of discharge and determined to be suitable for discharge.         Kim Fox MD  Department of Hospital Medicine  [facility]

## 2019-01-31 NOTE — MEDICAL/APP STUDENT
[facility]  Nephrology  Progress Note    Patient Name: Anitha Swenson  MRN: [id number]  Admission Date: 1/26/2019  Hospital Length of Stay: 5 days  Attending Provider: Victorina Cummings MD   Primary Care Physician: John Garcia MD  Principal Problem:Lupus    Consults  Subjective:     Interval History:    No acute overnight events, patient had round of dialysis yesterday afternoon. Due to poor flow rate dialysis was ended at 2.5 hours. Patient will have permacath placed this afternoon. Discharge is planned following permacath placement. Patient has chair at outpatient HD, which was set-up prior being transferred to [facility].     Review of patient's allergies indicates:   Allergen Reactions    Sulfur      Current Facility-Administered Medications   Medication Frequency    0.9%  NaCl infusion PRN    0.9%  NaCl infusion PRN    acyclovir suspension 200 mg BID    albuterol-ipratropium 2.5 mg-0.5 mg/3 mL nebulizer solution 3 mL Q6H PRN    ALPRAZolam tablet 0.5 mg Q6H PRN    atovaquone suspension 1,500 mg Daily    calcium acetate capsule 667 mg TID WM    carvedilol tablet 12.5 mg BID    cetirizine tablet 10 mg Daily    cloNIDine tablet 0.1 mg TID PRN    dextrose 50% injection 12.5 g PRN    dextrose 50% injection 25 g PRN    escitalopram oxalate tablet 20 mg Daily    fluconazole tablet 100 mg Daily    gabapentin capsule 300 mg QHS    glucagon (human recombinant) injection 1 mg PRN    glucose chewable tablet 16 g PRN    glucose chewable tablet 24 g PRN    heparin (porcine) injection 1,000 Units PRN    heparin (porcine) injection 5,000 Units Q8H    hydroxychloroquine tablet 200 mg Daily    levoFLOXacin tablet 750 mg Every other day    morphine injection 3 mg Q4H PRN    NIFEdipine 24 hr tablet 30 mg Daily    ondansetron injection 4 mg Q6H PRN    pantoprazole EC tablet 40 mg Daily    predniSONE tablet 60 mg Daily    ramelteon tablet 8 mg Nightly PRN    senna-docusate 8.6-50 mg per  tablet 1 tablet Daily    sodium bicarbonate tablet 650 mg Daily    sodium chloride 0.9% flush 5 mL PRN    vitamin D 1000 units tablet 1,000 Units Daily    zinc oxide 20 % ointment PRN       Objective:     Vital Signs (Most Recent):  Temp: 98.4 °F (36.9 °C) (01/31/19 0417)  Pulse: 86 (01/31/19 0417)  Resp: 18 (01/31/19 0417)  BP: (!) 141/102(nurse notified) (01/31/19 0417)  SpO2: 98 % (01/31/19 0417)  O2 Device (Oxygen Therapy): room air (01/30/19 2015) Vital Signs (24h Range):  Temp:  [97.5 °F (36.4 °C)-98.4 °F (36.9 °C)] 98.4 °F (36.9 °C)  Pulse:  [84-99] 86  Resp:  [16-18] 18  SpO2:  [94 %-98 %] 98 %  BP: (137-169)/() 141/102     Weight: 104.1 kg (229 lb 8 oz) (01/31/19 0600)  Body mass index is 32.01 kg/m².  Body surface area is 2.28 meters squared.    I/O last 3 completed shifts:  In: 1030 [P.O.:430; Other:600]  Out: 3419 [Urine:750; Other:2669]    Physical Exam  Constitutional: She is oriented to person, place, and time. She appears well-developed and well-nourished. (Cushingnoid appearance)  HENT:   Diffuse facial edema, moon facies  (improving)  Cardiovascular: Normal rate, regular rhythm, normal heart sounds and intact distal pulses.   Pulmonary/Chest: Effort normal and breath sounds normal.   Musculoskeletal: She exhibits edema and tenderness.   Bilateral LE edema   Neurological: She is alert and oriented to person, place, and time.   Skin: Rash noted. There is erythema.   Bilateral LE erythema, w/ lesions on medial left leg           Significant Labs:sure  CMP:   Recent Labs   Lab 01/31/19  0500   *   CALCIUM 8.3*   ALBUMIN 2.0*   PROT 4.3*   *   K 5.2*   CO2 21*   CL 95   BUN 80*   CREATININE 5.2*   ALKPHOS 48*   ALT 12   AST 8*   BILITOT 0.4     All labs within the past 24 hours have been reviewed.      Assessment/Plan:   24 yo F w/ PMH of SLE, anemia, HTN, and anxiety/depression who presents as a transfer with worsening SLE symptoms. Presents for nephrology and rheumatology  consults. Recent hospitalization complicated by progressive renal failure 2/2 lupus nephritis, currently receiving HD.      Lupus Nephritis   Started on cytoxan in December, has received 2 doses to date; dose scheduled every two weeks  Pt's most recent renal biopsy (12/27/18) showed Class IV lupus nephritis w/ crescents  Retroperitoneal US showed evidence of medical renal disease w/o no sign of hydronephrosis or obstruction   C3/C4 WNL  Urine Protein/Creatinine ratio: 2.47   CBC, BMP, Phos daily  Continue Prednisone 60 mg daily   Continue home hydroxychloroquine 200 mg daily  Next dose of cytoxan due 2/2/19  Permacath to be placed today  Planning on discharge following permacath placement              Active Diagnoses:    Diagnosis Date Noted POA    PRINCIPAL PROBLEM:  Lupus [L93.0] 01/24/2019 Yes    Skin bulla [R23.8] 01/29/2019 Unknown    Alteration in skin integrity [R23.9] 01/28/2019 Yes    Anemia in CKD (chronic kidney disease) [N18.9, D63.1] 01/27/2019 Yes    Rash [R21] 01/27/2019 Yes    Immunocompromised [D84.9] 01/26/2019 Yes    Shortness of breath [R06.02] 01/26/2019 Yes    Anxiety and depression [F41.9, F32.9] 01/26/2019 Yes    Lupus nephritis [M32.14] 01/26/2019 Yes    Essential hypertension [I10] 01/26/2019 Yes    Acute renal failure [N17.9] 01/26/2019 Yes      Problems Resolved During this Admission:       Thank you for your consult. I will follow-up with patient. Please contact us if you have any additional questions.    Charlie Miramontes  Nephrology  [facility]    ATTENDING PHYSICIAN ATTESTATION  I have personally interviewed and examined the patient. I thoroughly reviewed the demographic, clinical, laboratorial and imaging information available in medical records. I agree with the assessment and recommendations provided above.

## 2019-01-31 NOTE — SUBJECTIVE & OBJECTIVE
Interval History: Vitals stable overnight. Afebrile. On 2L NC this morning 2/2 BOOGIE. Otherwise breathing comfortably. Pending permacath placement w/ plans to transfer to [place] after completion. Denies chest pain and worsening abdominal pain.     Review of Systems   Constitutional: Negative for appetite change, chills and fever.   HENT: Negative for congestion, sore throat and trouble swallowing.    Eyes: Negative for visual disturbance.   Respiratory: Positive for shortness of breath. Negative for cough.    Cardiovascular: Positive for leg swelling. Negative for chest pain.   Gastrointestinal: Positive for abdominal pain (improving). Negative for abdominal distention, blood in stool, nausea and vomiting.   Genitourinary: Negative for dysuria and hematuria.   Musculoskeletal: Positive for arthralgias.   Skin: Positive for rash and wound.   Neurological: Positive for weakness. Negative for dizziness and headaches.   Psychiatric/Behavioral: Negative for confusion.     Objective:     Vital Signs (Most Recent):  Temp: 98.2 °F (36.8 °C) (01/31/19 0822)  Pulse: 89 (01/31/19 0822)  Resp: 18 (01/31/19 0822)  BP: (!) 130/97 (01/31/19 0822)  SpO2: 99 % (01/31/19 0822) Vital Signs (24h Range):  Temp:  [97.5 °F (36.4 °C)-98.4 °F (36.9 °C)] 98.2 °F (36.8 °C)  Pulse:  [84-99] 89  Resp:  [18] 18  SpO2:  [94 %-99 %] 99 %  BP: (130-169)/() 130/97     Weight: 104.1 kg (229 lb 8 oz)  Body mass index is 32.01 kg/m².    Intake/Output Summary (Last 24 hours) at 1/31/2019 0856  Last data filed at 1/31/2019 0600  Gross per 24 hour   Intake 1030 ml   Output 3069 ml   Net -2039 ml      Physical Exam   Constitutional: She is oriented to person, place, and time. She appears well-developed and well-nourished. No distress.   Obese appearing female, no acute distress   HENT:   Head: Normocephalic and atraumatic.   Mouth/Throat: Oropharynx is clear and moist. No oropharyngeal exudate.   Diffuse facial edema; moon facies (improving)   Eyes:  Conjunctivae and EOM are normal. No scleral icterus.   Neck: Normal range of motion. Neck supple.   Cardiovascular: Normal rate, regular rhythm, normal heart sounds and intact distal pulses.   No murmur heard.  Pulmonary/Chest: Effort normal and breath sounds normal. No respiratory distress. She has no wheezes.   Decreased breath sound bilateral lower lung fields   Abdominal: Soft. Bowel sounds are normal. She exhibits no distension. There is tenderness (diffuse; improving).   Musculoskeletal: Normal range of motion. She exhibits edema and tenderness. She exhibits no deformity.   Neurological: She is alert and oriented to person, place, and time.   CN largely in tact  Strength full throughout  No sensory deficits    Skin: Skin is warm and dry. Rash (vasculitic like rash BLE; superficial bullae) noted.   Psychiatric: She has a normal mood and affect. Her behavior is normal.   Nursing note and vitals reviewed.      Significant Labs:   A1C:   Recent Labs   Lab 01/26/19  1832   HGBA1C 5.7*     CBC:   Recent Labs   Lab 01/30/19  0537 01/31/19  0500   WBC 10.36 9.04   HGB 9.0* 8.5*   HCT 27.0* 26.4*   PLT 94* 100*     CMP:   Recent Labs   Lab 01/30/19  0537 01/31/19  0500   * 130*   K 5.4* 5.2*   CL 93* 95   CO2 21* 21*   * 114*   * 80*   CREATININE 6.3* 5.2*   CALCIUM 8.7 8.3*   PROT 4.6* 4.3*   ALBUMIN 2.2* 2.0*   BILITOT 0.4 0.4   ALKPHOS 54* 48*   AST 7* 8*   ALT 13 12   ANIONGAP 16 14   EGFRNONAA 8.7* 10.9*       Significant Imaging: I have reviewed all pertinent imaging results/findings within the past 24 hours.

## 2019-01-31 NOTE — ASSESSMENT & PLAN NOTE
22 yo F w/ PMH of SLE, anemia, HTN, and anxiety/depression who presents as a transfer with worsening SLE symptoms. Presents for nephrology and rheumatology consults. Recent hospitalization complicated by progressive renal failure 2/2 lupus nephritis, currently receiving HD.     Plan:  - Consult nephrology, rheumatology; appreciate assistance  - po Prednisone 60 mg daily  - Continue home hydroxychloroquine  - Resume Cytoxan infusions; next infusion 2/2/19 following Levaquin treatment for PNA  - Holding Benlysta in the setting of active respiratory infection  - F/U extensive rheumatology work up  - IV morphine PRN   - Retroperitoneal US demonstrated medical renal disease w/o evidence of hydronephrosis or obstructive   - Outpatient HD scheduled MWF  - Pending permacath placement  - Plan to discharge home today or tomorrow

## 2019-01-31 NOTE — ASSESSMENT & PLAN NOTE
- Follows w/ nephrology in [place]  - Inpatient nephrology consulted; appreciate recommendations  - Recently started on Cytoxan in December w/ plans to complete 6 total infusions  - Planning to resume infusions on 2/2/19  - Pending permacath placement today  - continue prednisone po 60 mg daily  - Outpatient HD scheduled MWF

## 2019-01-31 NOTE — ASSESSMENT & PLAN NOTE
22 yo F w/ PMH of SLE, anemia, HTN, and anxiety/depression who presents as a transfer with worsening SLE symptoms. Presents for nephrology and rheumatology consults. Recent hospitalization complicated by progressive renal failure 2/2 lupus nephritis, currently receiving HD.     Plan:  - Consult nephrology, rheumatology; appreciate assistance  - po Prednisone 60 mg daily  - Continue home hydroxychloroquine  - Resume Cytoxan infusions; next infusion 2/2/19 following Levaquin treatment for PNA  - Holding Benlysta in the setting of active respiratory infection  - F/U extensive rheumatology work up  - IV morphine PRN   - Retroperitoneal US demonstrated medical renal disease w/o evidence of hydronephrosis or obstructive   - Outpatient HD scheduled MWF  - Permacath line placed  - Cleared for discharge home

## 2019-01-31 NOTE — PT/OT/SLP PROGRESS
Occupational Therapy      Patient Name:  Anitha Swenson   MRN:  [id number]    Patient not seen today secondary to off the floor for procedure. Will follow-up per schedule.    JOSE Lopes  1/31/2019

## 2019-01-31 NOTE — NURSING TRANSFER
Nursing Transfer Note      1/31/2019     Transfer to cath lab    Transfer via stretcher    Transfer with telemetry monitor    Transported by transport        Chart send with patient:     Notified: yes

## 2019-01-31 NOTE — ASSESSMENT & PLAN NOTE
- Initial concern for vasculitis vs cellulitis w/ tenderness to palpation  - Bandaged per wound care at OSH  - Wound care consulted  - Assessed by Dermatology; no need for bx. Likely acute edema blisters.

## 2019-01-31 NOTE — PROGRESS NOTES
[facility]  Nephrology  Progress Note     Patient Name: Anitha Swenson  MRN: [id number]  Admission Date: 1/26/2019  Hospital Length of Stay: 3 days  Attending Provider: Victorina Cummings MD   Primary Care Physician: John Garcia MD  Principal Problem:Lupus     Consults  Subjective:      Interval History:      No acute overnight events. right temp subclavian HD catheter malfunction. [facility] consulted for permacath.           Review of patient's allergies indicates:   Allergen Reactions    Sulfur             Current Facility-Administered Medications   Medication Frequency    0.9%  NaCl infusion PRN    acyclovir suspension 200 mg BID    albuterol-ipratropium 2.5 mg-0.5 mg/3 mL nebulizer solution 3 mL Q6H PRN    albuterol-ipratropium 2.5 mg-0.5 mg/3 mL nebulizer solution 3 mL Q6H WAKE    ALPRAZolam tablet 0.5 mg Q6H PRN    atovaquone suspension 1,500 mg Daily    calcium acetate capsule 667 mg TID WM    carvedilol tablet 12.5 mg BID    cetirizine tablet 10 mg Daily    dextrose 50% injection 12.5 g PRN    dextrose 50% injection 25 g PRN    escitalopram oxalate tablet 20 mg Daily    fluconazole tablet 100 mg Daily    gabapentin capsule 300 mg QHS    glucagon (human recombinant) injection 1 mg PRN    glucose chewable tablet 16 g PRN    glucose chewable tablet 24 g PRN    heparin (porcine) injection 1,000 Units PRN    heparin (porcine) injection 5,000 Units Q8H    hydroxychloroquine tablet 200 mg Daily    levoFLOXacin tablet 500 mg Every other day    morphine injection 3 mg Q4H PRN    ondansetron injection 4 mg Q6H PRN    pantoprazole EC tablet 40 mg Daily    predniSONE tablet 60 mg Daily    ramelteon tablet 8 mg Nightly PRN    senna-docusate 8.6-50 mg per tablet 1 tablet Daily    sodium bicarbonate tablet 650 mg Daily    sodium chloride 0.9% flush 5 mL PRN    vitamin D 1000 units tablet 1,000 Units Daily    zinc oxide 20 % ointment PRN         Objective:      Vital Signs (Most  Recent):  Temp: 98.1 °F (36.7 °C) (01/29/19 0845)  Pulse: 96 (01/29/19 0845)  Resp: 20 (01/29/19 0845)  BP: (!) 169/113 (01/29/19 0845)  SpO2: 95 % (01/29/19 0845)  O2 Device (Oxygen Therapy): nasal cannula (01/29/19 0845) Vital Signs (24h Range):  Temp:  [97.7 °F (36.5 °C)-98.2 °F (36.8 °C)] 98.1 °F (36.7 °C)  Pulse:  [] 96  Resp:  [15-20] 20  SpO2:  [94 %-99 %] 95 %  BP: (140-177)/() 169/113      Weight: 102.2 kg (225 lb 5 oz) (01/29/19 0600)  Body mass index is 31.42 kg/m².  Body surface area is 2.26 meters squared.     I/O last 3 completed shifts:  In: 1050 [P.O.:500; Other:550]  Out: 3630 [Urine:1150; Other:2480]     Physical Exam   Constitutional: She is oriented to person, place, and time. She appears well-developed and well-nourished.   HENT:   Diffuse facial edema, moon facies    Cardiovascular: Normal rate, regular rhythm, normal heart sounds and intact distal pulses.   Pulmonary/Chest: Effort normal and breath sounds normal.   Musculoskeletal: She exhibits edema and tenderness.   Bilateral LE edema   Neurological: She is alert and oriented to person, place, and time.   Skin: Rash noted. There is erythema.   Bilateral LE erythema, w/ lesions on medial left leg          Significant Labs:sure  CMP:       Recent Labs   Lab 01/29/19  0602   *   CALCIUM 8.2*   ALBUMIN 2.0*   PROT 4.6*   *   K 4.8   CO2 22*   CL 93*   BUN 87*   CREATININE 5.4*   ALKPHOS 54*   ALT 16   AST 9*   BILITOT 0.4      All labs within the past 24 hours have been reviewed.     Significant Imaging:  US: Reviewed     1. No evidence of hydronephrosis/obstructive uropathy.  2. Findings suggestive of medical renal disease.        Assessment/Plan:        Lupus Nephritis     Will place tunneled HD catheter for ongoing HD

## 2019-01-31 NOTE — PROCEDURES
Procedure Date: 1/31/19    (s) and Role:   Nam Tucker MD    Indication: HD access     Pre-op Diagnosis:    SANDY requiring HD    Post-op Diagnosis;  SANDY requiring HD    Procedure:   1. Insertion Tunneled HD Catheter with Fluoro   2. Conscious Sedation     Anesthesia: IV Sedation + Local     Findings/Key Components:   Catheter placed in RA. Good flush and draw through each port.  Estimated Blood Loss: 5mL     Specimens     None         PROCEDURE: After obtaining consent, the patient was taken to the [facility] suite. Sedation was administered. The right neck and chest were prepped and draped in usual sterile fashion. We began using ultrasound guidance to examine the right internal jugular vein. It appeared to be widely patent and was free of thrombus that appeared suitable for access for HD catheter. We accessed the internal jugular vein using a Seldinger technique with an micropunture needle without difficulty, then the thin wire was passed into the superior vena cava through the heart into the inferior vena cava. The wire position was confirmed with intraoperative fluoroscopy, then a 5Fr sheath was introduced over the wire. The wire was removed and the the guidewire was passed into the IVC under fluoroscopic guidance. Counterincision was made at the venotomy and on the chest wall just below the clavicle. Local anesthesia was used to anesthetize the track and a tunneler was used to pass the 19cm GlidePath catheter underneath the chest wall until the felt cuff was just underneath the counter incision. The 5fr sheath was removed and the vein was dilated using serial dilators. Then the large peel-away sheath was then inserted over the guidewire under fluoroscopic guidance. The dilator and wire were removed. The catheter was placed through the peel-away sheath and positioned appropriately at center of RA. Fluoroscopy was used to confirm that the catheter tip was in appropriate position and that there was no  kinking at the apex of the catheter. A permanent recording of the catheter position was also taken with fluoroscopy. Both lumens had excellent draw and flush. The catheter was then secured in place with 3-0 Prolene. The counterincision in the neck was closed with a 3-0 Vicryl. There were no immediate complications. Patient tolerated the procedure well and discharged in stable condition.     Anesthesia:  Conscious sedation was achieved with 100 mcg of Fentanyl and 2 mg of Midazolam (Versed) 1MG/1ML. Local anesthesia was achieved with 20 ml of Lidocaine 2%. Moderate conscious sedation was performed and cardiorespiratory functions were monitored the entire procedure by me and RN. Sedation time 30 minutes.

## 2019-01-31 NOTE — PROGRESS NOTES
[facility] Hospital Medicine  Progress Note    Patient Name: Anitha Swenson  MRN: [id number]  Patient Class: IP- Inpatient   Admission Date: 1/26/2019  Length of Stay: 5 days  Attending Physician: Victorina Cummings MD  Primary Care Provider: John Garcia MD    [facility] [place] Medicine Team: [facility] HOSP MED 1 Kim Fox MD    Subjective:     Principal Problem:Lupus    HPI:  24 yo F w/ PMH of SLE, anemia, HTN, and anxiety/depression who presents as a transfer from [facility] for nephrology and rheumatology consults. She presented to [facility] on 1/16 with progressive renal failure, edema, and worsening skin rash, as well as joint pain. Initially during her hospitalization she continued her home prednisone but as symptoms progressed she was started on IV Solumedrol for stress dosing. Her admission has been complicated by progressive renal failure requiring daily HD with worsening peripheral and pulmonary edema, as well as developing pneumonia w/ cultures positive for stenotrophomonas. Due to her immunocompromised status, she was started on antibiotic regimen levaquin. On presentation, she was also noted to have developed a vasculitis skin rash to her lower extremities, which was painful to palpation. Wound care was following. Of note, she was recently diagnosed w/ candidal esophagitis. Her most recent CD4 count was <200. Follows with nephrology who initiated chemotherapy w/ cytoxan which she receives infusions twice a month. No previous cardiac involvement. Reports shortness of breath stable on 2L NC, as well as nausea, bilateral lower back pain. Denies fever, chills, chest pain, abdominal pain, dysuria, hematuria, and hematochezia.        Hospital Course:  Admitted to hospital medicine. Consulted nephrology and rheumatology. Family requested to go back to [place] as management has been the same here. Continue steroids. PJP ppx w/ atovoquone 2/2 bactrim allergy and complete Levaquin for PNA. Pending permacath  placement today, scheduled for today. Patient and family planning to discharge home following line placement.     Interval History: Vitals stable overnight. Afebrile. On 2L NC this morning 2/2 BOOGIE. Otherwise breathing comfortably. Pending permacath placement w/ plans to transfer to [place] after completion. Denies chest pain and worsening abdominal pain.     Review of Systems   Constitutional: Negative for appetite change, chills and fever.   HENT: Negative for congestion, sore throat and trouble swallowing.    Eyes: Negative for visual disturbance.   Respiratory: Positive for shortness of breath. Negative for cough.    Cardiovascular: Positive for leg swelling. Negative for chest pain.   Gastrointestinal: Positive for abdominal pain (improving). Negative for abdominal distention, blood in stool, nausea and vomiting.   Genitourinary: Negative for dysuria and hematuria.   Musculoskeletal: Positive for arthralgias.   Skin: Positive for rash and wound.   Neurological: Positive for weakness. Negative for dizziness and headaches.   Psychiatric/Behavioral: Negative for confusion.     Objective:     Vital Signs (Most Recent):  Temp: 98.2 °F (36.8 °C) (01/31/19 0822)  Pulse: 89 (01/31/19 0822)  Resp: 18 (01/31/19 0822)  BP: (!) 130/97 (01/31/19 0822)  SpO2: 99 % (01/31/19 0822) Vital Signs (24h Range):  Temp:  [97.5 °F (36.4 °C)-98.4 °F (36.9 °C)] 98.2 °F (36.8 °C)  Pulse:  [84-99] 89  Resp:  [18] 18  SpO2:  [94 %-99 %] 99 %  BP: (130-169)/() 130/97     Weight: 104.1 kg (229 lb 8 oz)  Body mass index is 32.01 kg/m².    Intake/Output Summary (Last 24 hours) at 1/31/2019 0856  Last data filed at 1/31/2019 0600  Gross per 24 hour   Intake 1030 ml   Output 3069 ml   Net -2039 ml      Physical Exam   Constitutional: She is oriented to person, place, and time. She appears well-developed and well-nourished. No distress.   Obese appearing female, no acute distress   HENT:   Head: Normocephalic and atraumatic.   Mouth/Throat:  Oropharynx is clear and moist. No oropharyngeal exudate.   Diffuse facial edema; moon facies (improving)   Eyes: Conjunctivae and EOM are normal. No scleral icterus.   Neck: Normal range of motion. Neck supple.   Cardiovascular: Normal rate, regular rhythm, normal heart sounds and intact distal pulses.   No murmur heard.  Pulmonary/Chest: Effort normal and breath sounds normal. No respiratory distress. She has no wheezes.   Decreased breath sound bilateral lower lung fields   Abdominal: Soft. Bowel sounds are normal. She exhibits no distension. There is tenderness (diffuse; improving).   Musculoskeletal: Normal range of motion. She exhibits edema and tenderness. She exhibits no deformity.   Neurological: She is alert and oriented to person, place, and time.   CN largely in tact  Strength full throughout  No sensory deficits    Skin: Skin is warm and dry. Rash (vasculitic like rash BLE; superficial bullae) noted.   Psychiatric: She has a normal mood and affect. Her behavior is normal.   Nursing note and vitals reviewed.      Significant Labs:   A1C:   Recent Labs   Lab 01/26/19  1832   HGBA1C 5.7*     CBC:   Recent Labs   Lab 01/30/19  0537 01/31/19  0500   WBC 10.36 9.04   HGB 9.0* 8.5*   HCT 27.0* 26.4*   PLT 94* 100*     CMP:   Recent Labs   Lab 01/30/19  0537 01/31/19  0500   * 130*   K 5.4* 5.2*   CL 93* 95   CO2 21* 21*   * 114*   * 80*   CREATININE 6.3* 5.2*   CALCIUM 8.7 8.3*   PROT 4.6* 4.3*   ALBUMIN 2.2* 2.0*   BILITOT 0.4 0.4   ALKPHOS 54* 48*   AST 7* 8*   ALT 13 12   ANIONGAP 16 14   EGFRNONAA 8.7* 10.9*       Significant Imaging: I have reviewed all pertinent imaging results/findings within the past 24 hours.    Assessment/Plan:      * Lupus    22 yo F w/ PMH of SLE, anemia, HTN, and anxiety/depression who presents as a transfer with worsening SLE symptoms. Presents for nephrology and rheumatology consults. Recent hospitalization complicated by progressive renal failure 2/2 lupus  nephritis, currently receiving HD.     Plan:  - Consult nephrology, rheumatology; appreciate assistance  - po Prednisone 60 mg daily  - Continue home hydroxychloroquine  - Resume Cytoxan infusions; next infusion 2/2/19 following Levaquin treatment for PNA  - Holding Benlysta in the setting of active respiratory infection  - F/U extensive rheumatology work up  - IV morphine PRN   - Retroperitoneal US demonstrated medical renal disease w/o evidence of hydronephrosis or obstructive   - Outpatient HD scheduled MWF  - Pending permacath placement  - Plan to discharge home today or tomorrow     Skin bulla    - Dermatology consulted; appreciate recommendations  - Unlikely to be infectious bullae  - Likely 2/2 stasis dermatitis       Alteration in skin integrity    - Per wound care       Rash    - Initial concern for vasculitis vs cellulitis w/ tenderness to palpation  - Bandaged per wound care at OSH  - Wound care consulted  - Assessed by Dermatology; no need for bx. Likely acute edema blisters.         Anemia in CKD (chronic kidney disease)    - Hb 8.5  - No active signs of bleeding  - Nephrology consulted  - EPO w/ HD         Acute renal failure    - See lupus nephritis       Essential hypertension    - Hypertensive w/ systolics 150-170s  - On ramipril, coreg at home  - Continue home coreg 12.5 mg BID  - Nifedipine 30 mg daily  - Clonidine PRN           Acid reflux disease with ulcer    - Hx of nonbleeding ulcers  - Continue home pantoprazole daily       Lupus nephritis    - Follows w/ nephrology in [place]  - Inpatient nephrology consulted; appreciate recommendations  - Recently started on Cytoxan in December w/ plans to complete 6 total infusions  - Planning to resume infusions on 2/2/19  - Pending permacath placement today  - continue prednisone po 60 mg daily  - Outpatient HD scheduled MWF         Anxiety and depression    - Continue home Lexapro  - Xanax PRN       Shortness of breath    - Breathing comfortably on RA  w/ intermittent O2 use w/ exertion  - CXR consistent w/ NUBIA opacification, no significant pulmonary edema; peripheral edema present  - ID consulted; continue levaquin for 7 days  - repeat respiratory culture pending   - HD per nephrology       Immunocompromised    - Chronic steroid use  - Recently started Cytoxan in December per nephrology  - CD4 <200  - ID consulted; appreciate assistance  - Respiratory cultures positive for stenotrophomonas, continue levaquin for 7 days  - Continue fluconazole for 14 days for esophageal candidiasis, acyclovir for ppx  - Ordered blood cultures, urine culture, repeat sputum culture to r/o infxn to resume Cytoxan infusions  - Atovaquone for PCP ppx- allergic to sulfa, unable to give Bactrim .          VTE Risk Mitigation (From admission, onward)        Ordered     heparin (porcine) injection 1,000 Units  As needed (PRN)      01/28/19 1542     heparin (porcine) injection 5,000 Units  Every 8 hours      01/26/19 1810     IP VTE HIGH RISK PATIENT  Once      01/26/19 1805              Kim Fox MD  Department of Hospital Medicine   [facility]

## 2019-01-31 NOTE — NURSING
Patient seen by dermatology and orders changed by derm.    Wounds and edema appear to be improving.     Wound care will sign off.   Katharine Duarte RN CN CN   [phone]      Dressing removed and nursing to cleanse and reapply the dressings.

## 2019-01-31 NOTE — PLAN OF CARE
01/31/19 1503   Final Note   Assessment Type Final Discharge Note   Anticipated Discharge Disposition Home   Hospital Follow Up  Appt(s) scheduled? (none needed per MD. family has made f/u.)   Discharge plans and expectations educations in teach back method with documentation complete? Yes   Per Deb at [facility], pt can't qualify just yet for rollator bc she just received RW not long ago.

## 2019-01-31 NOTE — ASSESSMENT & PLAN NOTE
- Follows w/ nephrology in [place]  - Inpatient nephrology consulted; appreciate recommendations  - Recently started on Cytoxan in December w/ plans to complete 6 total infusions  - Planning to resume infusions on 2/2/19  - Pending permacath placement today  - continue prednisone po 60 mg daily  - Outpatient HD scheduled MWF

## 2019-01-31 NOTE — ASSESSMENT & PLAN NOTE
- Hypertensive w/ systolics 150-170s  - On ramipril, coreg at home  - Continue home coreg 12.5 mg BID  - Nifedipine 30 mg daily  - Clonidine PRN

## 2019-02-01 ENCOUNTER — PATIENT OUTREACH (OUTPATIENT)
Dept: ADMINISTRATIVE | Facility: CLINIC | Age: 24
End: 2019-02-01

## 2019-02-01 NOTE — CARE UPDATE
Educated on discharge instructions and handout given to patient. PICC left in place. Transported to personal vehicle via wheelchair and transport attendee. Family transporting patient home. Follow up appointment reviewed.

## 2019-02-01 NOTE — PATIENT INSTRUCTIONS
Systemic Lupus Erythematosus (Sle)  Lupus is a chronic inflammatory disease that mainly affects the joints and muscles. Other parts of the body, such as the skin, blood cells, kidney, heart and brain may also be affected. Lupus is an autoimmune disease. This means that immune cells in the body that usually attack and destroy viruses and harmful bacteria begin attacking normal parts of the body. The cause of lupus is not known.  Common symptoms include:  Butterfly shaped rash across the bridge of the nose and cheeks or a disk-shaped rash on the face, neck or chest.  Sunlight sensitivity -- a short time in the sun may lead to severe sunburn or rash.  Arthritis -- stiff, painful or swollen joints  Fatigue or depression  The leading cause of death from lupus is heart disease. So, it is important to manage other risk factors for heart disease such as high blood pressure, smoking and cholesterol. There is no cure for lupus, but most people can lead normal, active lives with proper care. Your doctor may prescribe oral steroids or drugs that suppress the immune system in order to slow down the progress of the disease. Some people benefit from anti-malarial drugs as well.  Home Care:  1) If you were prescribed a medicine, take it as directed.  2) You may use acetaminophen (Tylenol) or ibuprofen (Motrin, Advil) to control pain, unless another medicine was prescribed. [ NOTE : If you have chronic liver or kidney disease or ever had a stomach ulcer or GI bleeding, talk with your doctor before using these medicines.] Dont take ibuprofen or other NSAID (non-steroidal anti-inflammatory) medicine if you were prescribed prednisone.  3) Avoid sun exposure. Use sun protection (hats, cover-up clothing) and sun screen (at least SPF 15).  4) Get enough rest and reduce stress to help your immune system stay balanced.  5) Light exercise and physical activity will help you feel your best.  6) If you have high blood pressure, consider  buying an automatic blood pressure machine (available at most pharmacies). Use this to monitor your blood pressure and report to your doctor.  7) Limit alcohol intake and eat a healthy, balanced diet low in fat and cholesterol.  8) If you smoke, quit. Smoking increases the risk of lupus related complications.  Follow Up  with your doctor or as advised by our staff. For more information contact:  Lupus Foundation [phone], www.lupus.org  Get Prompt Medical Attention  if any of the following occur:  -- Increasing weakness, fainting  -- Chest pain or shortness of breath or pain with breathing  -- Severe headache with fever  -- Seizures  -- Leg swelling, redness or tenderness (sign of blood clot)  -- Unusual bruising or bleeding anywhere on your body  -- Blood in your stool (black or red color)  -- Abdominal pain, repeated vomiting  © 0964-7044 [employer], [address] [place] [address], [place] [address]. All rights reserved. This information is not intended as a substitute for professional medical care. Always follow your healthcare professional's instructions.

## 2019-02-01 NOTE — PT/OT/SLP PROGRESS
Physical Therapy Discharge      Anitha Swenson   MRN: [id number]     Pt discharge home using RW. Pt evaluated only no goals met, last recommendations were Rehab.     Joe Dos Santos, PT, DPT  2/1/2019

## 2019-02-01 NOTE — PLAN OF CARE
Problem: Physical Therapy Goal  Goal: Physical Therapy Goal  Goals to be met by: 2019     Patient will increase functional independence with mobility by performin. Supine to sit with Stand-by Assistance  2. Sit to supine with Stand-by Assistance  3. Gait  x 25' feet with Stand-by Assistance using Rolling Walker.   4. Stand for 3 minutes with Stand-by Assistance using Rolling Walker  5. Lower extremity exercise program x15 reps per handout, with independence     pt d/c prior to completing treatment sesison with no goals met. Eval only completed

## 2019-02-05 LAB — LA PPP-IMP: NORMAL

## 2019-02-07 LAB — CRYOGLOB SER QL: NORMAL

## 2019-02-19 NOTE — PLAN OF CARE
Problem: Occupational Therapy Goal  Goal: Occupational Therapy Goal  Goals to be met by: 2/10     Patient will increase functional independence with ADLs by performing:    UE Dressing with Modified [place]. - not met  LE Dressing with Supervision. - not met  Grooming while standing with Stand-by Assistance. - not met  Toileting from toilet with Supervision for hygiene and clothing management. - not met  Supine to sit with Modified [place]. - not met  Stand pivot transfers with Supervision using RW. - not met     Outcome: Outcome(s) achieved Date Met: 02/19/19  No goals met.  Hospital discharge.

## 2019-02-19 NOTE — PT/OT/SLP DISCHARGE
Occupational Therapy Discharge Summary    Anitha Swenson  MRN: [id number]   Principal Problem: Lupus      Patient Discharged from acute Occupational Therapy on 2/19/19.  Please refer to prior OT note dated 1/28/19 for functional status.    Assessment:      Patient has not met goals.    Objective:     GOALS:   Multidisciplinary Problems     Occupational Therapy Goals     Not on file          Multidisciplinary Problems (Resolved)        Problem: Occupational Therapy Goal    Goal Priority Disciplines Outcome Interventions   Occupational Therapy Goal   (Resolved)     OT, PT/OT Outcome(s) achieved    Description:  Goals to be met by: 2/10     Patient will increase functional independence with ADLs by performing:    UE Dressing with Modified [place]. - not met  LE Dressing with Supervision. - not met  Grooming while standing with Stand-by Assistance. - not met  Toileting from toilet with Supervision for hygiene and clothing management. - not met  Supine to sit with Modified [place]. - not met  Stand pivot transfers with Supervision using RW. - not met                       Reasons for Discontinuation of Therapy Services  Transfer to alternate level of care.      Plan:     Patient Discharged to: home with therapy    JOSE Pimentel  2/19/2019

## 2019-05-20 ENCOUNTER — OFFICE VISIT (OUTPATIENT)
Dept: HEMATOLOGY/ONCOLOGY | Facility: CLINIC | Age: 24
End: 2019-05-20
Payer: COMMERCIAL

## 2019-05-20 VITALS
HEART RATE: 108 BPM | DIASTOLIC BLOOD PRESSURE: 102 MMHG | BODY MASS INDEX: 28.93 KG/M2 | SYSTOLIC BLOOD PRESSURE: 137 MMHG | WEIGHT: 207.38 LBS | TEMPERATURE: 99 F | RESPIRATION RATE: 20 BRPM

## 2019-05-20 DIAGNOSIS — Z15.89 COMPOUND HETEROZYGOUS MTHFR MUTATION C677T/A1298C: ICD-10-CM

## 2019-05-20 DIAGNOSIS — D63.1 ANEMIA IN CHRONIC KIDNEY DISEASE, UNSPECIFIED CKD STAGE: ICD-10-CM

## 2019-05-20 DIAGNOSIS — R79.89 ELEVATED HOMOCYSTEINE: ICD-10-CM

## 2019-05-20 DIAGNOSIS — D64.89 ANEMIA DUE TO MULTIPLE MECHANISMS: ICD-10-CM

## 2019-05-20 DIAGNOSIS — K92.2 GASTROINTESTINAL HEMORRHAGE, UNSPECIFIED GASTROINTESTINAL HEMORRHAGE TYPE: ICD-10-CM

## 2019-05-20 DIAGNOSIS — R79.89 ELEVATED FERRITIN: ICD-10-CM

## 2019-05-20 DIAGNOSIS — I26.99 OTHER PULMONARY EMBOLISM WITHOUT ACUTE COR PULMONALE, UNSPECIFIED CHRONICITY: Primary | ICD-10-CM

## 2019-05-20 DIAGNOSIS — D50.0 ANEMIA DUE TO CHRONIC BLOOD LOSS: ICD-10-CM

## 2019-05-20 DIAGNOSIS — N18.9 ANEMIA IN CHRONIC KIDNEY DISEASE, UNSPECIFIED CKD STAGE: ICD-10-CM

## 2019-05-20 PROCEDURE — 3075F PR MOST RECENT SYSTOLIC BLOOD PRESS GE 130-139MM HG: ICD-10-PCS | Mod: ,,, | Performed by: INTERNAL MEDICINE

## 2019-05-20 PROCEDURE — 3075F SYST BP GE 130 - 139MM HG: CPT | Mod: ,,, | Performed by: INTERNAL MEDICINE

## 2019-05-20 PROCEDURE — 3080F DIAST BP >= 90 MM HG: CPT | Mod: ,,, | Performed by: INTERNAL MEDICINE

## 2019-05-20 PROCEDURE — 99213 PR OFFICE/OUTPT VISIT, EST, LEVL III, 20-29 MIN: ICD-10-PCS | Mod: ,,, | Performed by: INTERNAL MEDICINE

## 2019-05-20 PROCEDURE — 3008F BODY MASS INDEX DOCD: CPT | Mod: ,,, | Performed by: INTERNAL MEDICINE

## 2019-05-20 PROCEDURE — 3008F PR BODY MASS INDEX (BMI) DOCUMENTED: ICD-10-PCS | Mod: ,,, | Performed by: INTERNAL MEDICINE

## 2019-05-20 PROCEDURE — 99213 OFFICE O/P EST LOW 20 MIN: CPT | Mod: ,,, | Performed by: INTERNAL MEDICINE

## 2019-05-20 PROCEDURE — 3080F PR MOST RECENT DIASTOLIC BLOOD PRESSURE >= 90 MM HG: ICD-10-PCS | Mod: ,,, | Performed by: INTERNAL MEDICINE

## 2019-05-20 RX ORDER — OXYCODONE HCL 10 MG/1
10 TABLET, FILM COATED, EXTENDED RELEASE ORAL EVERY 12 HOURS PRN
COMMUNITY
End: 2019-09-04 | Stop reason: CLARIF

## 2019-05-20 NOTE — LETTER
May 20, 2019      Oj Hernández MD  [address]  [address]  [facility]  [place] [address]           [facility]  [address]  [address]  [place] [address]  Phone: [phone]  Fax: [phone]          Patient: Anitha Swenson   MR Number: [id number]   YOB: 1995   Date of Visit: 5/20/2019       Dear Dr. Oj Hernández:    Thank you for referring Anitha Swenson to me for evaluation. Attached you will find relevant portions of my assessment and plan of care.    If you have questions, please do not hesitate to call me. I look forward to following Anitha Swenson along with you.    Sincerely,    Sukhjinder Goodwin MD    Enclosure  CC:  No Recipients    If you would like to receive this communication electronically, please contact [email or web address] or [phone] to request more information on [employer] access.    For providers and/or their staff who would like to refer a patient to [facility], please contact us through our one-stop-shop provider referral line, [facility], at [phone].    If you feel you have received this communication in error or would no longer like to receive these types of communications, please e-mail [email or web address]

## 2019-05-20 NOTE — PROGRESS NOTES
[facility] Hematology/Oncology  PROGRESS NOTE      Subjective:       Patient ID:   NAME: Anitha Swenson : 1995     24 y.o. female    Referring Doc: Radha (peds)  Other Physicians:  Keerthi Camarillo; Alfred Small    Chief Complaint:  Anemia f/u    History of Present Illness:     Patient returns today for a regularly scheduled follow-up visit.  She was been on steroids with 20mg daily for next week with plans to drop back down to 10mg. She went to ER at [facility] couple weeks ago. She saw Dr Pendleton with nephrology recently and they are preparing for transition to peritoneal dialysis and she is going on the kidney transplant list. . I spoke to her mom on the speaker phone.       ROS:   GEN: normal without any fever, night sweats or weight loss  HEENT: normal with no HA's, sore throat, stiff neck, changes in vision  CV: normal with no CP, SOB, PND, BOOGIE or orthopnea  PULM: normal with no SOB, cough, hemoptysis, sputum or pleuritic pain  GI: normal with no abdominal pain, nausea, vomiting, constipation, diarrhea, melanotic stools, BRBPR, or hematemesis  : normal with no hematuria, dysuria  BREAST: normal with no mass, discharge, pain  SKIN: normal with no rash, erythema, bruising, or swelling    Allergies:  Review of patient's allergies indicates:   Allergen Reactions    Sulfur        Medications:    Current Outpatient Medications:     acetaminophen (TYLENOL) 325 MG tablet, Take 325 mg by mouth every 6 (six) hours as needed for Pain., Disp: , Rfl:     ALPRAZolam (XANAX) 1 MG tablet, Take 1 mg by mouth daily as needed for Anxiety., Disp: , Rfl:     atovaquone (MEPRON) 750 mg/5 mL Susp, Take 10 mLs (1,500 mg total) by mouth once daily., Disp: 300 mL, Rfl: 2    BENLYSTA 200 mg/mL AtIn, Inject 1 mL (200 mg total) as directed Every Friday. HOLD UNTIL TOLD TO RESUME BY RHEUMATOLOGIST, Disp: , Rfl:     calcium acetate (PHOSLO) 667 mg capsule, Take 1 capsule (667 mg total) by mouth 3 (three) times daily with  meals., Disp: 90 capsule, Rfl: 2    carvedilol (COREG) 12.5 MG tablet, Take 1 tablet by mouth twice daily, Disp: , Rfl: 0    cetirizine (ZYRTEC) 10 MG tablet, Take 1 tablet (10 mg total) by mouth once daily., Disp: , Rfl: 0    cholecalciferol, vitamin D3, (VITAMIN D3) 5,000 unit Tab, Take 5,000 Units by mouth every morning., Disp: , Rfl:     cloNIDine (CATAPRES) 0.1 MG tablet, Take 1 tablet (0.1 mg total) by mouth as needed (for systolic blood pressure greater than 170 mmHg)., Disp: , Rfl:     gabapentin (NEURONTIN) 300 MG capsule, Take 1 capsule (300 mg total) by mouth every evening., Disp: , Rfl: 0    hydroxychloroquine (PLAQUENIL) 200 mg tablet, Take 1 tablet by mouth nightly, Disp: , Rfl: 6    loperamide (IMODIUM A-D) 2 mg Tab, Take 2 mg by mouth 4 (four) times daily as needed (diarrhea)., Disp: , Rfl:     medroxyPROGESTERone (DEPO-PROVERA) 150 mg/mL Syrg, Inject 150 mg into the muscle every 3 (three) months. , Disp: , Rfl:     ondansetron (ZOFRAN) 4 MG tablet, Take 4 mg by mouth every 24 hours as needed for Nausea. , Disp: , Rfl:     oxyCODONE (OXYCONTIN) 10 mg 12 hr tablet, Take 10 mg by mouth every 12 (twelve) hours as needed for Pain., Disp: , Rfl:     pantoprazole (PROTONIX) 40 MG tablet, Take 1 tablet (40 mg total) by mouth once daily., Disp: , Rfl:     PREDNISONE ORAL, Take 60 mg by mouth once daily. , Disp: , Rfl: 0    sodium bicarbonate 650 MG tablet, Take 1 tablet (650 mg total) by mouth once daily., Disp: , Rfl: 0    temazepam (RESTORIL) 15 mg Cap, Take 15 mg by mouth nightly as needed (insomnia)., Disp: , Rfl:     acyclovir (ZOVIRAX) 200 mg/5 mL suspension, Take 5 mLs (200 mg total) by mouth 2 (two) times daily., Disp: 300 mL, Rfl: 2    escitalopram oxalate (LEXAPRO) 20 MG tablet, Take 1 tablet by mouth once daily, Disp: , Rfl: 3    HYDROcodone-acetaminophen (NORCO) 5-325 mg per tablet, Take 1 tablet by mouth every 12 (twelve) hours as needed for Pain., Disp: , Rfl:     NIFEdipine  (PROCARDIA-XL) 30 MG (OSM) 24 hr tablet, Take 1 tablet (30 mg total) by mouth once daily., Disp: 30 tablet, Rfl: 2    Current Facility-Administered Medications:     cyanocobalamin injection 1,000 mcg, 1,000 mcg, Subcutaneous, Q30 Days, Sukhjinder Goodwin MD, 1,000 mcg at 03/13/18 1430    PMHx/PSHx Updates:  See patient's last visit with me on 11/8/2018.  See H&P on 2/8/2018        Pathology:  Bone Marrow biopsy 7/19/2018:    FINAL DIAGNOSIS:  PERIPHERAL BLOOD:   PANCYTOPENIA:   SEVERE LEUKOPENIA WITH ABSOLUTE NEUTROPENIA AND SLIGHT ABSOLUTE  LYMPHOPENIA.   MILD NORMOCHROMIC NORMOCYTIC ANEMIA AND MILD PERIPHERAL THROMBOCYTOPENIA.    BONE MARROW ASPIRATE, BIOPSY AND CLOT SECTION:   CELLULARITY: 40% ON THE CLOT, 40-50% ON THE BIOPSY.   HYPOCELLULAR MARROW FOR THE PATIENT'S AGE.   TRILINEAGE HEMATOPOEITIC ACTIVITY WITH MARKED MEGALOBLASTIC ERYTHROID HYPERPLASIA.   RELATIVELY MARKED DECREASED LEFT SHIFTED MYELOID MATURATION TO IMMATURE FORMS AND DYSPOIESIS.   NO INCREASED IN CD34+ MYELOID BLASTS (1%) BY FLOW CYTOMETRY.   ADEQUATE MEGAKARYOCYTES WITH A FEW ATYPICAL HYPOLOBULATED FORMS.   ADEQUATE TO MILDLY INCREASED IRON STORES.   SPECIAL STAINS FOR AFB AND FUNGI ARE NEGATIVE.    COMMENT: In view of the patient's clinical history, the peripheral  blood and bone marrow findings are suggestive of a secondary process,  that is, post-treatment megaloblastic anemia in view of the patient's  history of vitamin B12 deficiency.  Other possible etiologies for the  patient's leukopenia would include immunosuppressive drugs/toxin  effect, folate deficiency or chronic autoimmune disease.    Objective:     Vitals:  Blood pressure (!) 137/102, pulse 108, temperature 99.1 °F (37.3 °C), resp. rate 20, weight 94.1 kg (207 lb 6.4 oz).    Physical Examination:   GEN: no apparent distress, comfortable; AAOx3  HEAD: atraumatic and normocephalic; general swelling form [place]ushings  EYES: no pallor, no icterus, PERRLA  ENT: OMM, no pharyngeal  erythema, external ears WNL; no nasal discharge; no thrush  NECK: no masses, thyroid normal, trachea midline, no LAD/LN's, supple  CV: RRR with no murmur; normal pulse; normal S1 and S2; mild pedal swelling  CHEST: Normal respiratory effort; CTAB; normal breath sounds; no wheeze or crackles  ABDOM: nontender and nondistended; soft; normal bowel sounds; no rebound/guarding  MUSC/Skeletal: ROM normal; no crepitus; joints normal; no deformities or arthropathy  EXTREM: no clubbing, cyanosis, bilateral LE swelling much improved  SKIN: no rashes, lesions, ulcers, petechiae or subcutaneous nodules; no current rash of erythema; + bandage on right knee  : no preciado  NEURO: grossly intact; motor/sensory WNL; AAOx3; no tremors  PSYCH: normal mood, affect and behavior  LYMPH: normal cervical, supraclavicular, axillary and groin LN's            Labs:       5/11/2019    Wbc 15.4  hgb 8.2 with .5  Plat  165      Iron 53  TIBC was 187  Ferritin  684      1/31/2019  Lab Results   Component Value Date    WBC 9.04 01/31/2019    HGB 8.5 (L) 01/31/2019    HCT 26.4 (L) 01/31/2019    MCV 90 01/31/2019     (L) 01/31/2019     BMP  Lab Results   Component Value Date     (L) 01/31/2019    K 5.2 (H) 01/31/2019    CL 95 01/31/2019    CO2 21 (L) 01/31/2019    BUN 80 (H) 01/31/2019    CREATININE 5.2 (H) 01/31/2019    CALCIUM 8.3 (L) 01/31/2019    ANIONGAP 14 01/31/2019    ESTGFRAFRICA 12.6 (A) 01/31/2019    EGFRNONAA 10.9 (A) 01/31/2019     Lab Results   Component Value Date    ALT 12 01/31/2019    AST 8 (L) 01/31/2019    ALKPHOS 48 (L) 01/31/2019    BILITOT 0.4 01/31/2019             Radiology/Diagnostic Studies:    Head CT  5/9/2019 - negative    MRI head  5/10/2019  Normal    CXR 5/9/2019 stable            Head CT 4/3/2018    I have reviewed all available lab results and radiology reports.    Assessment/Plan:     (1) 23 y.o. female with diagnosis of SLE/lupus with recent admission at [facility] for HTN crisis, renal failure and  anasarca.  She was seen by hematology a consult for evaluation of her anemia.  - suspected multifactorial process with anemia of chronic disorders, lupus mediated anemia, anemia of renal disease and prior GI bleed component; could also have marrow suppression from the cellcept  - seen by Dr Camarillo with GI for stool OBS positive studies previously  - bili and LDH were normal so no hemolysis was suspected      Anemia of chronic disorders and anemia of chronic renal:  - iron was 53 and TIBC was 187  - ferritin was 684;   - SPEP with no M-protein previously  - latest hgb was  8.2    - recent bone marrow biopsy on 7/19/2018: In view of the patient's clinical history, the peripheral blood and bone marrow findings are suggestive of a secondary process, that is, post-treatment megaloblastic anemia in view of the patient's history of vitamin B12 deficiency.  Other possible etiologies for the patient's leukopenia would include immunosuppressive drugs/toxin effect, folate deficiency or chronic autoimmune disease.   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 - followed by Dr Pendleton with nephrology   - latest creatinine at 6.33  - she had kidney biopsy couple of weeks ago which seemed to show improved findings per patient  - seeing Dr Pendleton   - transitioning to peritoneal dialysis and plans to go on the kidney transplant list    (3) SLE/Lupus - followed by Dr Rohan Lanza with Rheum previously; she is on cellcept and prednisone; she is seeing Dr Alfred Small in [place] now  - now on Benlysta infusions for the Lupus     (4) Questionable pulmonary emboli with indeterminant VQ - 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  - clot workup was ordered while in hospital: ATIII was 135, prothrombin II was normal;  Factor V leiden was negative; LA  was negative; protein C and S were adequate; antiphospholipid and anticardiolipin negative;   - homocysteine was a little elevated at 16.8  - she is now off the eliquis for about 3 weeks per nephrology's directives  - MTHFR A and C genes were both abnormal and heterozygous  - discussed genetic implications    (5) PTSD     (6) B12 deficiency with prior level at 240 - she has been on B12 supplements; latest level improved to 503     (7) Prior Leucocytosis and leucopenia issues    - prior leukemia screen was negative; she is on steroids  - wbc was 15.4 and up due to the steroids most likley    (8) Low Vit D - I will defer to discretion of Dr Pendleton    (9) Prior admit for candidal esophagitis    (10) recent MRSA Pneumonia and C. Diff:   - recent admission at [facility] with MRSA pneumonia and bacteremia  - she was seen by Dr An with ID    (11) Thrombocytopenia - most likely due to autoimmune disorder itself and/or the  immunosuppressive therapy with subsequent marrow suppression  - latest platelet count at 165,000    (12) Mildly elevated ferritin with latest level at 684 - suspect that this is a reactive process         1. Other pulmonary embolism without acute cor pulmonale, unspecified chronicity     2. Anemia due to multiple mechanisms     3. Anemia in chronic kidney disease, unspecified CKD stage     4. Anemia due to chronic blood loss     5. Elevated homocysteine     6. Compound heterozygous MTHFR mutation [id number]     7. Gastrointestinal hemorrhage, unspecified gastrointestinal hemorrhage type         PLAN:  1. F/u with Dr Small with Rheum  2. F/u with Dr Pendleton with neph, Dr An with ID  3. Encouraged compliance with labs  4. Check labs once a month  5. She should be a candidate fr procrit or aranesp - will leave up to discretion of Dr Pendleton      RTC in 3 months  Fax note to Alfred Garcia; Marquise; Nataliya Pendleton; Edgar CHANEL    Discussion:     I have explained all of the above in detail  and the patient understands all of the current recommendation(s). I have answered all of their questions to the best of my ability and to their complete satisfaction.   The patient is to continue with the current management plan.            Electronically signed by Sukhjinder Goodwin MD

## 2019-06-04 PROBLEM — N18.6 ESRD (END STAGE RENAL DISEASE): Status: ACTIVE | Noted: 2019-06-04

## 2019-08-06 DIAGNOSIS — M25.552 LEFT HIP PAIN: Primary | ICD-10-CM

## 2019-08-07 ENCOUNTER — HOSPITAL ENCOUNTER (OUTPATIENT)
Dept: RADIOLOGY | Facility: HOSPITAL | Age: 24
Discharge: HOME | End: 2019-08-07
Attending: ORTHOPAEDIC SURGERY
Payer: COMMERCIAL

## 2019-08-07 DIAGNOSIS — M25.552 LEFT HIP PAIN: ICD-10-CM

## 2019-08-07 PROCEDURE — 73721 MRI JNT OF LWR EXTRE W/O DYE: CPT | Mod: TC,LT

## 2019-08-12 ENCOUNTER — TELEPHONE (OUTPATIENT)
Dept: TRANSPLANT | Facility: CLINIC | Age: 24
End: 2019-08-12

## 2019-08-12 DIAGNOSIS — Z76.82 ORGAN TRANSPLANT CANDIDATE: Primary | ICD-10-CM

## 2019-08-12 NOTE — TELEPHONE ENCOUNTER
Nurse spoke with Amy and informed her that the referral was received and once medical records scanned and cleared by the insurance we will call to schedule. Amy verbalized understanding of the above information.

## 2019-08-12 NOTE — TELEPHONE ENCOUNTER
----- Message from Yola Martines sent at 8/12/2019  2:25 PM CDT -----  Contact: Amy diaz/ Melanie  Needs Advice    Reason for call: Check pt's referral status         Communication Preference: [phone]    Additional Information: N/A

## 2019-08-14 ENCOUNTER — OFFICE VISIT (OUTPATIENT)
Dept: SPORTS MEDICINE | Facility: CLINIC | Age: 24
End: 2019-08-14
Payer: COMMERCIAL

## 2019-08-14 ENCOUNTER — HOSPITAL ENCOUNTER (OUTPATIENT)
Dept: RADIOLOGY | Facility: HOSPITAL | Age: 24
Discharge: HOME | End: 2019-08-14
Attending: ORTHOPAEDIC SURGERY
Payer: COMMERCIAL

## 2019-08-14 ENCOUNTER — TELEPHONE (OUTPATIENT)
Dept: TRANSPLANT | Facility: CLINIC | Age: 24
End: 2019-08-14

## 2019-08-14 VITALS
HEART RATE: 100 BPM | SYSTOLIC BLOOD PRESSURE: 126 MMHG | DIASTOLIC BLOOD PRESSURE: 88 MMHG | WEIGHT: 195 LBS | BODY MASS INDEX: 27.3 KG/M2 | HEIGHT: 71 IN

## 2019-08-14 DIAGNOSIS — M25.552 PAIN OF BOTH HIP JOINTS: ICD-10-CM

## 2019-08-14 DIAGNOSIS — M25.551 PAIN OF BOTH HIP JOINTS: ICD-10-CM

## 2019-08-14 DIAGNOSIS — M87.052 AVASCULAR NECROSIS OF FEMORAL HEAD, LEFT: ICD-10-CM

## 2019-08-14 DIAGNOSIS — Z09 SURGERY FOLLOW-UP: ICD-10-CM

## 2019-08-14 DIAGNOSIS — M25.551 PAIN OF BOTH HIP JOINTS: Primary | ICD-10-CM

## 2019-08-14 DIAGNOSIS — M25.552 PAIN OF BOTH HIP JOINTS: Primary | ICD-10-CM

## 2019-08-14 DIAGNOSIS — M25.552 LEFT HIP PAIN: ICD-10-CM

## 2019-08-14 DIAGNOSIS — S73.192S TEAR OF LEFT ACETABULAR LABRUM, SEQUELA: Primary | ICD-10-CM

## 2019-08-14 PROCEDURE — 99204 OFFICE O/P NEW MOD 45 MIN: CPT | Mod: S$GLB,TXP,, | Performed by: ORTHOPAEDIC SURGERY

## 2019-08-14 PROCEDURE — 99999 PR PBB SHADOW E&M-EST. PATIENT-LVL III: CPT | Mod: PBBFAC,TXP,, | Performed by: ORTHOPAEDIC SURGERY

## 2019-08-14 PROCEDURE — 99999 PR PBB SHADOW E&M-EST. PATIENT-LVL III: ICD-10-PCS | Mod: PBBFAC,TXP,, | Performed by: ORTHOPAEDIC SURGERY

## 2019-08-14 PROCEDURE — 3008F BODY MASS INDEX DOCD: CPT | Mod: CPTII,S$GLB,TXP, | Performed by: ORTHOPAEDIC SURGERY

## 2019-08-14 PROCEDURE — 3008F PR BODY MASS INDEX (BMI) DOCUMENTED: ICD-10-PCS | Mod: CPTII,S$GLB,TXP, | Performed by: ORTHOPAEDIC SURGERY

## 2019-08-14 PROCEDURE — 99204 PR OFFICE/OUTPT VISIT, NEW, LEVL IV, 45-59 MIN: ICD-10-PCS | Mod: S$GLB,TXP,, | Performed by: ORTHOPAEDIC SURGERY

## 2019-08-14 RX ORDER — TORSEMIDE 20 MG/1
20 TABLET ORAL DAILY
Status: ON HOLD | COMMUNITY
End: 2019-09-12

## 2019-08-14 RX ORDER — ERGOCALCIFEROL 1.25 MG/1
50000 CAPSULE ORAL
COMMUNITY
End: 2019-10-16

## 2019-08-14 NOTE — LETTER
August 14, 2019        Mauricio Pendleton MD  [address] [facility] [place]  [place] [address]             [place] [address]  [facility] [address]  Phone: [phone]   Patient: Anitha Swenson   MR Number: [id number]   YOB: 1995   Date of Visit: 8/14/2019       Dear Dr. Pendleton:    Thank you for referring Anitha Swenson to me for evaluation. Below are the relevant portions of my assessment and plan of care.        Encounter Diagnoses   Name Primary?    Tear of left acetabular labrum, sequela Yes    Left hip pain             1. Ye Hip Score was not filled out today in clinic.     RTC in 4 weeks with Dr. Avery Blake. Patient will fill out Ye Hip Score and hip xray series on return.    2. We reviewed with Anitha today, the pathology and natural history of her diagnosis. We have discussed a variety of treatment options including medications, physical therapy and other alternative treatments. I also explained the indications, risks and benefits of surgery. After discussion, Anitha decided to proceed with surgery. The decision was made to go forward with   1. Left Hip Femoral Head Subchondroplasty  2. Left Hip Arthroscopic Femoroplasty  3. Left Hip Arthroscopic Chondroplasty    Patient will need Clearance from Nephrologist Dr. Pendleton  The details of the surgical procedure were explained, including the location of probable incisions and a description of likely hardware and/or grafts to be used.  The patient understands the likely convalescence after surgery.  Also, we have thoroughly discussed the risks, benefits and alternatives to surgery, including, but not limited to, the risk of infection, joint stiffness, blood clot (including DVT and/or pulmonary embolus), neurologic and vascular injury.  It was explained that, if tissue has been repaired or reconstructed, there is a chance of failure, which may require further management.      All of  the patient's questions were answered and informed consent was obtained. The patient will contact us if they have any questions or concerns in the interim.    3. PT orders placed for [place]    4. Motorized Scooter/Wheelchair orders placed today.      If you have questions, please do not hesitate to call me. I look forward to following Anitha along with you.    Sincerely,      MD LINDA Lane

## 2019-08-14 NOTE — PROGRESS NOTES
Subjective:          Chief Complaint: Anitha Swenson is a 24 y.o. female who had concerns including Pain of the Left Hip.    Patient is here for left hip follow up. Over the last month she has had increased pain in her left hip. She recently sustained a dog scratch which caused her to require stitches and TTWB on left  With crutch use. She received and MRI with Dr. 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 She is currently on home dialysis and long term steroid use. She is currently using a cane to ambulate. She has tried to use a wheelchair but is unable to maneuver around [facility] [employer]. Hoping to receive additional options for ambulation devices and discussion of MRI results and plan of treatment.      DATE OF PROCEDURE:  12/26/2014.     ATTENDING SURGEON:  Avery Blake M.D.     FIRST ASSISTANT:  Vahe Thomas M.D. (RES).     SECOND ASSISTANT:  Yolis Luna.     PREOPERATIVE DIAGNOSIS:  Left hip labral tear.     POSTOPERATIVE DIAGNOSES:  1.  Left hip labral tear.  2.  Left hip synovitis.     PROCEDURES PERFORMED:  1.  Left hip arthroscopic labral repair.  2.  Left hip arthroscopic synovectomy.    Handedness: right-handed  Sport played:    Level:            Pain   Associated symptoms include joint swelling. Pertinent negatives include no chest pain, chills, diaphoresis, myalgias, nausea, numbness, rash, vomiting or weakness.       Review of Systems   Constitution: Negative for chills, decreased appetite, diaphoresis, malaise/fatigue, night sweats, weight gain and weight loss.   Eyes: Negative for blurred vision, double vision and pain.   Cardiovascular: Negative for chest pain, cyanosis, dyspnea on exertion, leg swelling, orthopnea and palpitations.   Respiratory: Negative for hemoptysis, shortness of breath and wheezing.    Skin: Negative for color change and rash.   Musculoskeletal: Positive for joint pain, joint swelling, muscle weakness and stiffness. Negative for arthritis, back pain, falls,  gout, muscle cramps and myalgias.   Gastrointestinal: Negative for hematemesis, hematochezia, melena, nausea and vomiting.   Genitourinary: Negative for dysuria, frequency and hematuria.   Neurological: Negative for dizziness, light-headedness, loss of balance, numbness and weakness.   Psychiatric/Behavioral: Negative for altered mental status, depression and memory loss. The patient does not have insomnia and is not nervous/anxious.        Pain Related Questions  Over the past 3 days, what was your average pain during activity? (I.e. running, jogging, walking, climbing stairs, getting dressed, ect.): 9  Over the past 3 days, what was your highest pain level?: 9  Over the past 3 days, what was your lowest pain level? : 8    Other  How many nights a week are you awakened by your affected body part?: 7  Was the patient's HEIGHT measured or patient reported?: Patient Reported  Was the patient's WEIGHT measured or patient reported?: Measured      Objective:        General: Anitha is well-developed, well-nourished, appears stated age, in no acute distress, alert and oriented to time, place and person.     General    Vitals reviewed.  Constitutional: She is oriented to person, place, and time. She appears well-developed and well-nourished. No distress.   HENT:   Head: Normocephalic and atraumatic.   Mouth/Throat: No oropharyngeal exudate.   Eyes: Right eye exhibits no discharge. Left eye exhibits no discharge.   Neck: Normal range of motion. No tracheal deviation present.   Cardiovascular: Normal rate and intact distal pulses.    Pulmonary/Chest: Effort normal and breath sounds normal. No respiratory distress.   Abdominal: She exhibits no distension.   Neurological: She is alert and oriented to person, place, and time. She has normal reflexes. No cranial nerve deficit. She exhibits normal muscle tone. Coordination normal.   Psychiatric: She has a normal mood and affect. Her behavior is normal. Judgment and thought  content normal.     General Musculoskeletal Exam   Gait: antalgic   Pelvic Obliquity: none      Right Knee Exam     Inspection   Alignment:  normal  Effusion: absent    Left Knee Exam     Inspection   Alignment:  normal  Effusion: absent    Right Hip Exam     Inspection   Scars: absent  Swelling: absent  Bruising: absent  No deformity of hip.  Quadriceps Atrophy:  Negative  Erythema: absent    Range of Motion   Abduction:  40 normal   Adduction:  20 normal   Extension:  0 normal   Flexion:  110 normal   External rotation:  60 normal   Internal rotation:  15 normal     Tests   Pain w/ forced internal rotation (CHEPE): absent  Pain w/ forced external rotation (FADIR): absent  Gina: negative  Trendelenburg Test: negative  Circumduction test: negative  Stinchfield test: negative  Log Roll: negative  Snapping Hip (internal): negative  Step-down test: negative  Resisted sit-up pain: negative  Unresisted sit-up pain: negative  Resisted sit-up pain with adductor contraction pain: negative    Other   Sensation: normal  Left Hip Exam     Inspection   Scars: present  Swelling: absent  No deformity of hip.  Quadriceps Atrophy:  negative  Erythema: absent  Bruising: present    Range of Motion   Abduction:  40 normal   Adduction:  20 normal   Extension:  0 normal   Flexion:  120 normal   External rotation:  60 normal   Internal rotation: 15 normal     Tests   Pain w/ forced internal rotation (CHEPE): absent  Pain w/ forced external rotation (FADIR): present  Gina: negative  Trendelenburg Test: negative  Circumduction test: negative  Stinchfield test: negative  Log Roll: negative  Snapping Hip (internal): negative  Step-down test: negative  Resisted sit-up pain: negative  Resisted sit-up pain with adductor contraction pain: negative  Unresisted sit-up pain: negative    Other   Sensation: normal    Comments:  Mild to moderate quad atrophy    Incisions healed    Decreased sensation ankle distally   Normal cap refill        Back  (L-Spine & T-Spine) / Neck (C-Spine) Exam   Back exam is normal.      Muscle Strength   Right Lower Extremity   Hip Abduction: 5/5   Hip Adduction: 5/5   Hip Flexion: 5/5   Left Lower Extremity   Hip Abduction: 4/5   Hip Adduction: 4/5   Hip Flexion: 4/5   Ankle Dorsiflexion:  4/5     Reflexes     Left Side  Quadriceps:  2+  Achilles:  2+    Right Side   Quadriceps:  2+  Achilles:  2+    Vascular Exam     Right Pulses  Dorsalis Pedis:      2+  Posterior Tibial:      2+        Left Pulses  Dorsalis Pedis:      2+  Posterior Tibial:      2+        Capillary Refill  Right Hand: normal capillary refill  Left Hand: normal capillary refill    Edema  Right Upper Leg: absent  Left Upper Leg: absent    EXAMINATION:  MRI HIP WITHOUT CONTRAST LEFT    CLINICAL HISTORY:  M25.552;  Pain in left hip    TECHNIQUE:  Left hip MRI without IV contrast.    COMPARISON:  Left hip radiographs 11/26/2014    FINDINGS:  Bilateral femoral head osteonecrosis is evident.    Osteonecrosis involving the left femoral head involves 40% of the articular surface superiorly.  There is no evidence of subchondral collapse.    Left hip joint space is maintained.  Negative for left paralabral cyst or other evidence of left acetabular labral tear.  Physiologic amount of fluid lies in the left hip joint.  Left ligamentum teres, although suboptimally characterized without intra-articular contrast, does appear intact albeit somewhat indistinct.    Pubic symphysis and bilateral sacroiliac joints appear normal.    Left sartorius, rectus femoris, and hamstrings origins are normal.  Insertions of left ileus psoas, gluteus minimus, and gluteus medius are normal.  Muscles about the left hip maintain normal signal.  Course of the left sciatic nerve is unremarkable.    Small amount of fluid within the pelvis is within physiologic limits, given patient's age.  Intrapelvic soft tissues are otherwise unremarkable.    Increased T2 and low T1 bone marrow signal alteration  affects junction of left S1 sacral ala and sacral body, with nonspecific appearance but likely benign given patient's age.      Impression       1. Bilateral femoral head osteonecrosis, with no evidence of subchondral collapse about the left hip.  2. Nonspecific bone marrow signal alteration involving junction of left S1 sacral ala and sacral body.  Given patient's age, this is likely benign in nature, although etiology remains ultimately undetermined.  If history of recent trauma, bone contusion could give this appearance, although location would be unusual.  Reactive bone marrow edema is an alternative consideration.             Assessment:       Encounter Diagnoses   Name Primary?    Tear of left acetabular labrum, sequela Yes    Left hip pain           Plan:       1. Ye Hip Score was not filled out today in clinic.     RTC in 4 weeks with Dr. Avery Blake. Patient will fill out Ye Hip Score and hip xray series on return.    2. We reviewed with Anitha today, the pathology and natural history of her diagnosis. We have discussed a variety of treatment options including medications, physical therapy and other alternative treatments. I also explained the indications, risks and benefits of surgery. After discussion, Anitha decided to proceed with surgery. The decision was made to go forward with   1. Left Hip Femoral Head Subchondroplasty  2. Left Hip Arthroscopic Femoroplasty  3. Left Hip Arthroscopic Chondroplasty    Patient will need Clearance from Nephrologist Dr. Pendleton  The details of the surgical procedure were explained, including the location of probable incisions and a description of likely hardware and/or grafts to be used.  The patient understands the likely convalescence after surgery.  Also, we have thoroughly discussed the risks, benefits and alternatives to surgery, including, but not limited to, the risk of infection, joint stiffness, blood clot (including DVT and/or pulmonary embolus),  neurologic and vascular injury.  It was explained that, if tissue has been repaired or reconstructed, there is a chance of failure, which may require further management.      All of the patient's questions were answered and informed consent was obtained. The patient will contact us if they have any questions or concerns in the interim.    3. PT orders placed for [facility]    4. Motorized Scooter/Wheelchair orders placed today.              Patient questionnaires may have been collected.

## 2019-08-14 NOTE — LETTER
August 14, 2019        Mauricio Pendleton MD  [address] [facility]  [facility] [address]             [address] [address]  Phone: [phone]   Patient: Anitha Swenson   MR Number: [id number]   YOB: 1995   Date of Visit: 8/14/2019       Dear Dr. Pendleton:    Thank you for referring Anitha Swenson to me for evaluation. Below are the relevant portions of my assessment and plan of care.            If you have questions, please do not hesitate to call me. I look forward to following Anitha along with you.    Sincerely,      Avery Blake MD             Anitha Swenson

## 2019-08-22 DIAGNOSIS — M87.052 AVASCULAR NECROSIS OF BONE OF LEFT HIP: Primary | ICD-10-CM

## 2019-09-03 ENCOUNTER — HOSPITAL ENCOUNTER (OUTPATIENT)
Dept: PREADMISSION TESTING | Facility: HOSPITAL | Age: 24
Discharge: HOME | End: 2019-09-03
Attending: NURSE PRACTITIONER
Payer: COMMERCIAL

## 2019-09-03 DIAGNOSIS — R94.31 NONSPECIFIC ABNORMAL ELECTROCARDIOGRAM (ECG) (EKG): Primary | ICD-10-CM

## 2019-09-03 DIAGNOSIS — R94.31 NONSPECIFIC ABNORMAL ELECTROCARDIOGRAM (ECG) (EKG): ICD-10-CM

## 2019-09-04 ENCOUNTER — ANESTHESIA EVENT (OUTPATIENT)
Dept: SURGERY | Facility: OTHER | Age: 24
End: 2019-09-04
Payer: COMMERCIAL

## 2019-09-04 ENCOUNTER — OFFICE VISIT (OUTPATIENT)
Dept: SPORTS MEDICINE | Facility: CLINIC | Age: 24
End: 2019-09-04
Payer: COMMERCIAL

## 2019-09-04 ENCOUNTER — HOSPITAL ENCOUNTER (OUTPATIENT)
Dept: PREADMISSION TESTING | Facility: OTHER | Age: 24
Discharge: HOME | End: 2019-09-04
Attending: ORTHOPAEDIC SURGERY
Payer: COMMERCIAL

## 2019-09-04 VITALS
TEMPERATURE: 99 F | WEIGHT: 186 LBS | OXYGEN SATURATION: 98 % | SYSTOLIC BLOOD PRESSURE: 112 MMHG | HEIGHT: 71 IN | HEART RATE: 101 BPM | RESPIRATION RATE: 16 BRPM | DIASTOLIC BLOOD PRESSURE: 65 MMHG | BODY MASS INDEX: 26.04 KG/M2

## 2019-09-04 VITALS
DIASTOLIC BLOOD PRESSURE: 80 MMHG | HEIGHT: 71 IN | SYSTOLIC BLOOD PRESSURE: 128 MMHG | WEIGHT: 186 LBS | HEART RATE: 103 BPM | BODY MASS INDEX: 26.04 KG/M2

## 2019-09-04 DIAGNOSIS — M87.052 AVASCULAR NECROSIS OF BONE OF LEFT HIP: Primary | ICD-10-CM

## 2019-09-04 PROCEDURE — 99999 PR PBB SHADOW E&M-EST. PATIENT-LVL III: ICD-10-PCS | Mod: PBBFAC,TXP,, | Performed by: PHYSICIAN ASSISTANT

## 2019-09-04 PROCEDURE — 99499 NO LOS: ICD-10-PCS | Mod: S$GLB,TXP,, | Performed by: PHYSICIAN ASSISTANT

## 2019-09-04 PROCEDURE — 99999 PR PBB SHADOW E&M-EST. PATIENT-LVL III: CPT | Mod: PBBFAC,TXP,, | Performed by: PHYSICIAN ASSISTANT

## 2019-09-04 PROCEDURE — 99499 UNLISTED E&M SERVICE: CPT | Mod: S$GLB,TXP,, | Performed by: PHYSICIAN ASSISTANT

## 2019-09-04 RX ORDER — POTASSIUM CHLORIDE 20 MEQ/1
20 TABLET, EXTENDED RELEASE ORAL 2 TIMES DAILY
COMMUNITY
End: 2019-10-16

## 2019-09-04 RX ORDER — ROPIVACAINE/EPI/CLONIDINE/KET 2.46-0.005
SYRINGE (ML) INJECTION ONCE
Status: CANCELLED | OUTPATIENT
Start: 2019-09-04 | End: 2019-09-04

## 2019-09-04 RX ORDER — LIDOCAINE HYDROCHLORIDE 10 MG/ML
0.5 INJECTION, SOLUTION EPIDURAL; INFILTRATION; INTRACAUDAL; PERINEURAL ONCE
Status: CANCELLED | OUTPATIENT
Start: 2019-09-04 | End: 2019-09-04

## 2019-09-04 RX ORDER — SODIUM CHLORIDE, SODIUM LACTATE, POTASSIUM CHLORIDE, CALCIUM CHLORIDE 600; 310; 30; 20 MG/100ML; MG/100ML; MG/100ML; MG/100ML
INJECTION, SOLUTION INTRAVENOUS CONTINUOUS
Status: CANCELLED | OUTPATIENT
Start: 2019-09-04

## 2019-09-04 RX ORDER — MIDAZOLAM HYDROCHLORIDE 1 MG/ML
2 INJECTION INTRAMUSCULAR; INTRAVENOUS ONCE AS NEEDED
Status: CANCELLED | OUTPATIENT
Start: 2019-09-04 | End: 2031-01-31

## 2019-09-04 RX ORDER — FAMOTIDINE 20 MG/1
20 TABLET, FILM COATED ORAL
Status: CANCELLED | OUTPATIENT
Start: 2019-09-04 | End: 2019-09-04

## 2019-09-04 NOTE — DISCHARGE INSTRUCTIONS
PRE-ADMIT TESTING -  [phone]    [address]  [place] [facility]          Your surgery has been scheduled at [facility]. We are pleased to have the opportunity to serve you. For Further Information please call [phone].    On the day of surgery please report to the Information Desk on the 1st floor.    · CONTACT YOUR PHYSICIAN'S OFFICE THE DAY PRIOR TO YOUR SURGERY TO OBTAIN YOUR ARRIVAL TIME.     · The evening before surgery do not eat anything after 9 p.m. ( this includes hard candy, chewing gum and mints).  You may only have GATORADE, POWERADE AND WATER  from 9 p.m. until you leave your home.   DO NOT DRINK ANY LIQUIDS ON THE WAY TO THE HOSPITAL.      SPECIAL MEDICATION INSTRUCTIONS: TAKE medications checked off by the Anesthesiologist on your Medication List.    Angiogram Patients: Take medications as instructed by your physician, including aspirin.     Surgery Patients:    If you take ASPIRIN - Your PHYSICIAN/SURGEON will need to inform you IF/OR when you need to stop taking aspirin prior to your surgery.     Do Not take any medications containing IBUPROFEN.  Do Not Wear any make-up or dark nail polish   (especially eye make-up) to surgery. If you come to surgery with makeup on you will be required to remove the makeup or nail polish.    Do not shave your surgical area at least 5 days prior to your surgery. The surgical prep will be performed at the hospital according to Infection Control regulations.    Leave all valuables at home.   Do Not wear any jewelry or watches, including any metal in body piercings. Jewelry must be removed prior to coming to the hospital.  There is a possibility that rings that are unable to be removed may be cut off if they are on the surgical extremity.    Contact Lens must be removed before surgery. Either do not wear the contact lens or bring a case and solution for storage.  Please bring a container for eyeglasses or dentures as required.  Bring  any paperwork your physician has provided, such as consent forms,  history and physicals, doctor's orders, etc.   Bring comfortable clothes that are loose fitting to wear upon discharge. Take into consideration the type of surgery being performed.  Maintain your diet as advised per your physician the day prior to surgery.      Adequate rest the night before surgery is advised.   Park in the Parking lot behind the hospital or in the [facility] across the street from the parking lot. Parking is complimentary.  If you will be discharged the same day as your procedure, please arrange for a responsible adult to drive you home or to accompany you if traveling by taxi.   YOU WILL NOT BE PERMITTED TO DRIVE OR TO LEAVE THE HOSPITAL ALONE AFTER SURGERY.   It is strongly recommended that you arrange for someone to remain with you for the first 24 hrs following your surgery.    The Surgeon will speak to your family/visitor after your surgery regarding the outcome of your surgery and post op care.  The Surgeon may speak to you after your surgery, but there is a possibility you may not remember the details.  Please check with your family members regarding the conversation with the Surgeon.    We strongly recommend whoever is bringing you home be present for discharge instructions.  This will ensure a thorough understanding for your post op home care.      Thank you for your cooperation.  The Staff of [facility].                Bathing Instructions with Hibiclens     Shower the evening before and morning of your procedure with Hibiclens:   Wash your face with water and your regular face wash/soap   Apply Hibiclens directly on your skin or on a wet washcloth and wash gently. When showering: Move away from the shower stream when applying Hibiclens to avoid rinsing off too soon.   Rinse thoroughly with warm water   Do not dilute Hibiclens         Dry off as usual, do not use any deodorant,  powder, body lotions, perfume, after shave or cologne.

## 2019-09-04 NOTE — ANESTHESIA PREPROCEDURE EVALUATION
09/04/2019  Anitha Swenson is a 24 y.o., female.    Anesthesia Evaluation    I have reviewed the Patient Summary Reports.    I have reviewed the Nursing Notes.   I have reviewed the Medications.     Review of Systems  Anesthesia Hx:  No problems with previous Anesthesia    Social:  Non-Smoker    EENT/Dental:EENT/Dental Normal   Cardiovascular:   Hypertension    Pulmonary:   Shortness of breath    Renal/:   Chronic Renal Disease, Dialysis    Hepatic/GI:   PUD, GERD, well controlled    Neurological:   Headaches    Endocrine:  Endocrine Normal        Physical Exam  General:  Obesity    Airway/Jaw/Neck:  Airway Findings: Mouth Opening: Normal Tongue: Normal  General Airway Assessment: Adult  Mallampati: II  TM Distance: Normal, at least 6 cm  Jaw/Neck Findings:     Neck ROM: Normal ROM      Dental:  Dental Findings: In tact             Anesthesia Plan  Type of Anesthesia, risks & benefits discussed:  Anesthesia Type:  general  Patient's Preference:   Intra-op Monitoring Plan: standard ASA monitors  Intra-op Monitoring Plan Comments:   Post Op Pain Control Plan: multimodal analgesia and per primary service following discharge from PACU  Post Op Pain Control Plan Comments:   Induction:   IV  Beta Blocker:         Informed Consent: Patient understands risks and agrees with Anesthesia plan.  Questions answered. Anesthesia consent signed with patient.  ASA Score: 3     Day of Surgery Review of History & Physical:    H&P update referred to the surgeon.     Anesthesia Plan Notes: Lupus, clearance in chart from nephrology.. On peritoneal dialysis.  Pt has outside labs.        Ready For Surgery From Anesthesia Perspective.

## 2019-09-04 NOTE — H&P
".  Anitha Swenson  is here for a completion of her perioperative paperwork. she  Is scheduled to undergo 1. Left Hip Femoral Head Subchondroplasty  2. Left Hip Arthroscopic Femoroplasty  3. Left Hip Arthroscopic Chondroplasty on 9/12/2019.  She is a healthy individual and does need clearance for this procedure.     Dr. Pendleton, nephrologist, stated that "patient is okay for patient to proceed with scheduled surgery (hip)."    Risks, indications and benefits of the surgical procedure were discussed with the patient. All questions with regard to surgery, rehab, expected return to functional activities, activities of daily living and recreational endeavors were answered to her satisfaction.    Patient was informed and understands the risks of surgery are greater for patients with a current condition or history of heart disease, obesity, clotting disorders, recurrent infections, steroid use, current or past smoking, and factors such as sedentary lifestyle and noncompliance with medications, therapy or follow-up. The degree of the increased risk is hard to estimate with any degree of precision.    Once no other questions were asked, a brief history and physical exam was then performed.    PAST MEDICAL HISTORY:   Past Medical History:   Diagnosis Date    Anemia due to chronic blood loss 2/8/2018    Anemia, chronic disease 2/8/2018    Arthritis     Elevated homocysteine 2/8/2018    Encounter for blood transfusion     GI bleeding 2/8/2018    Hypertension     Kidney failure 12/2017    Lupus nephritis, ISN/RPS class IV 12/2018    diagnosed 12/2018, 3 biopsies, 1st - Class 3-4, no crescents, IFTA 15%, 2nd - Class V, 3rd - Class 4, w/ cressents, IFTA 30%. Treated with cellcept in 2018 until 10-11/2018 (stopped for neutropenic fever), last steroid pulce 12/31/18 and had 2 doses CYC, last 1/19/2018. On HD since 1/2019. Sees Dr. Pendleton, nephrology in [place], [place]. and Dr. Small, rheumatology    Lupus nephritis, " ISN/RPS class IV 12/2017    Migraine headache with aura     Other pulmonary embolism without acute cor pulmonale     Patient and mother deny     PAST SURGICAL HISTORY:   Past Surgical History:   Procedure Laterality Date    ARTHROSCOPIC FEMOROPLASTY Left 12/26/2014    Performed by [facility] Blake [facility] at [facility] OR 1ST FLR    ARTHROSCOPY-HIP WITH ACETABULOPLASTY(INCLUDES LABRAL REPAIR Left 12/26/2014    Performed by [facility] at [facility] OR 1ST FLR    HIP SURGERY      INSERTION, CATHETER, CENTRAL VENOUS, HEMODIALYSIS, TUNNELED N/A 1/31/2019    Performed by Nam Tucker MD at [facility] CATH LAB    INSERTION, CATHETER, DIALYSIS, PERITONEAL, LAPAROSCOPIC N/A 6/4/2019    Per[facility] by [facility] at [facility] OR    LAPAROSCOPIC INSERTION OF PERITONEAL DIALYSIS CATHETER  07/2019    REPAIR-LABRAL Left 12/26/2014    Performed by [facility]very Blaek MD at [facility] OR 1ST FLR    TYMPANOSTOMY TUBE PLACEMENT       FAMILY HISTORY:   Family History   Problem Relation Age of Onset    Anxiety disorder Father     Hypertension Father     Diabetes Mellitus Maternal Grandfather      SOCIAL HISTORY:   Social History     Socioeconomic History    Marital status: Single     Spouse name: Not on file    Number of children: Not on file    Years of education: Not on file    Highest education level: Not on file   Occupational History    Not on file   Social Needs    Financial resource strain: Not on file    Food insecurity:     Worry: Not on file     Inability: Not on file    Transportation needs:     Medical: Not on file     Non-medical: Not on file   Tobacco Use    Smoking status: Former Smoker     Types: Vaping with nicotine    Smokeless tobacco: Never Used   Substance and Sexual Activity    Alcohol use: No    Drug use: No    Sexual activity: Not on file   Lifestyle    Physical activity:     Days per week: Not on file     Minutes per session: Not on file    Stress: Not on file   Relationships    Social  connections:     Talks on phone: Not on file     Gets together: Not on file     Attends [employer] service: Not on file     Active member of club or organization: Not on file     Attends meetings of clubs or organizations: Not on file     Relationship status: Not on file   Other Topics Concern    Not on file   Social History Narrative    Not on file       MEDICATIONS:   Current Outpatient Medications:     acetaminophen (TYLENOL) 325 MG tablet, Take 325 mg by mouth every 6 (six) hours as needed for Pain., Disp: , Rfl:     BENLYSTA 200 mg/mL AtIn, Inject 1 mL (200 mg total) as directed Every Friday. HOLD UNTIL TOLD TO RESUME BY RHEUMATOLOGIST, Disp: , Rfl:     calcium acetate (PHOSLO) 667 mg capsule, Take 667 mg by mouth 3 (three) times daily., Disp: , Rfl:     carvedilol (COREG) 25 MG tablet, Take 25 mg by mouth 2 (two) times daily with meals., Disp: , Rfl:     cetirizine (ZYRTEC) 10 MG tablet, Take 1 tablet (10 mg total) by mouth once daily., Disp: , Rfl: 0    cholecalciferol, vitamin D3, (VITAMIN D3) 5,000 unit Tab, Take 5,000 Units by mouth every morning., Disp: , Rfl:     ergocalciferol (VITAMIN D2) 50,000 unit Cap, Take 50,000 Units by mouth every 7 days., Disp: , Rfl:     FLUoxetine 40 MG capsule, Take 40 mg by mouth once daily., Disp: , Rfl:     gabapentin (NEURONTIN) 300 MG capsule, Take 1 capsule (300 mg total) by mouth every evening. (Patient taking differently: Take 300 mg by mouth 3 (three) times daily. ), Disp: , Rfl: 0    hydroxychloroquine (PLAQUENIL) 200 mg tablet, Take 1 tablet by mouth nightly, Disp: , Rfl: 6    loperamide (IMODIUM A-D) 2 mg Tab, Take 2 mg by mouth 4 (four) times daily as needed (diarrhea)., Disp: , Rfl:     medroxyPROGESTERone (DEPO-PROVERA) 150 mg/mL Syrg, Inject 150 mg into the muscle every 3 (three) months. , Disp: , Rfl:     mycophenolate mofetil (CELLCEPT ORAL), Take 1,000 mg by mouth 2 (two) times daily., Disp: , Rfl:     ondansetron (ZOFRAN) 4 MG tablet, Take  4 mg by mouth every 24 hours as needed for Nausea. , Disp: , Rfl:     pantoprazole (PROTONIX) 40 MG tablet, Take 1 tablet (40 mg total) by mouth once daily. (Patient taking differently: Take 40 mg by mouth 2 (two) times daily. ), Disp: , Rfl:     potassium chloride SA (K-DUR,KLOR-CON) 20 MEQ tablet, Take 20 mEq by mouth 2 (two) times daily., Disp: , Rfl:     PREDNISONE ORAL, Take 7.5 mg by mouth once daily. , Disp: , Rfl: 0    torsemide (DEMADEX) 20 MG Tab, Take 20 mg by mouth once daily., Disp: , Rfl:     Current Facility-Administered Medications:     cyanocobalamin injection 1,000 mcg, 1,000 mcg, Subcutaneous, Q30 Days, Sukhjinder Goodwin MD, 1,000 mcg at 03/13/18 1430  ALLERGIES:   Review of patient's allergies indicates:   Allergen Reactions    Sulfa (sulfonamide antibiotics) Hives       Review of Systems   Constitution: Negative. Negative for chills, fever and night sweats.   HENT: Negative for congestion and headaches.    Eyes: Negative for blurred vision, left vision loss and right vision loss.   Cardiovascular: Negative for chest pain and syncope.   Respiratory: Negative for cough and shortness of breath.    Endocrine: Negative for polydipsia, polyphagia and polyuria.   Hematologic/Lymphatic: Negative for bleeding problem. Does not bruise/bleed easily.   Skin: Negative for dry skin, itching and rash.   Musculoskeletal: Negative for falls and muscle weakness.   Gastrointestinal: Negative for abdominal pain and bowel incontinence.   Genitourinary: Negative for bladder incontinence and nocturia.   Neurological: Negative for disturbances in coordination, loss of balance and seizures.   Psychiatric/Behavioral: Negative for depression. The patient does not have insomnia.    Allergic/Immunologic: Negative for hives and persistent infections.     PHYSICAL EXAM:  GEN: A&Ox3, WD WN NAD  HEENT: WNL  CHEST: CTAB, no W/R/R  HEART: RRR, no M/R/G   ABD: Soft, NT ND, BS x4 QUADS  MS: Refer to previous note for  detailed MS exam  NEURO: CN II-XII intact       The surgical consent was then reviewed with the patient, who agreed with all the contents of the consent form and it was signed. she was then given the [facility] surgery packet to bring with her to [facility] for the anesthesia portion of her perioperative paperwork.     PHYSICAL THERAPY:  She was also instructed regarding physical therapy and will begin POD # 1-3 at [facility] in [place].    POST OP CARE:instructions were reviewed including care of the wound and dressing after surgery and when she can shower.     PAIN MANAGEMENT: Anitha Swenson was also given a pain management regime, which includes the TENS unit which she has from previous surgery along with the education required for its use. She was also instructed regarding the Polar ice unit that will be in place after surgery and her postoperative pain medications.     PAIN MEDICATION:  Patient currently takes oxycodone 10mg every 6-8 hours for pain-prescribed by Dr. Hernández. Patient will no longer be seeing this physician  Percocet 10/325mg 1 po q 4-6 hours prn pain  Ultram 50 mg one p.o. q.4-6 hours p.r.n. breakthrough pain,   Zofran 4 mg once every 8 hours prn nausea  Patient cannot take NSAIDS due to renal failure. Will check with her PCP regarding Eliquis for DVT prophylaxis    DVT prophylaxis was discussed with the patient today including risk factors for developing DVTs and history of DVTs. The patient was asked if any specific recommendations were given from the doctor/s that did pre-operative surgical clearance.      Patient was asked if they were taking or using OCP pills or devices. If they answered yes, then they were instructed to stop using OCPs at this pre-operative appointment until 2 months post-op to help prevent DVT development. They understand that there are other forms of birth control that do not involve hormones. They expressed understanding that ignoring/not following this instruction  could result in a DVT which could turn into a deadly pulmonary embolism.      The patient was told that narcotic pain medications may make them drowsy and instructions were given to not sign legal documents, drive or operate heavy machinery, cars, or equipment while under the influence of narcotic medications.     As there were no other questions to be asked, she was given my business card along with Avery Blake MD business card if she has any questions or concerns prior to surgery or in the postop period.

## 2019-09-04 NOTE — H&P (VIEW-ONLY)
".  Anitha Swenson  is here for a completion of her perioperative paperwork. she  Is scheduled to undergo 1. Left Hip Femoral Head Subchondroplasty  2. Left Hip Arthroscopic Femoroplasty  3. Left Hip Arthroscopic Chondroplasty on 9/12/2019.  She is a healthy individual and does need clearance for this procedure.     Dr. Pendleton, nephrologist, stated that "patient is okay for patient to proceed with scheduled surgery (hip)."    Risks, indications and benefits of the surgical procedure were discussed with the patient. All questions with regard to surgery, rehab, expected return to functional activities, activities of daily living and recreational endeavors were answered to her satisfaction.    Patient was informed and understands the risks of surgery are greater for patients with a current condition or history of heart disease, obesity, clotting disorders, recurrent infections, steroid use, current or past smoking, and factors such as sedentary lifestyle and noncompliance with medications, therapy or follow-up. The degree of the increased risk is hard to estimate with any degree of precision.    Once no other questions were asked, a brief history and physical exam was then performed.    PAST MEDICAL HISTORY:   Past Medical History:   Diagnosis Date    Anemia due to chronic blood loss 2/8/2018    Anemia, chronic disease 2/8/2018    Arthritis     Elevated homocysteine 2/8/2018    Encounter for blood transfusion     GI bleeding 2/8/2018    Hypertension     Kidney failure 12/2017    Lupus nephritis, ISN/RPS class IV 12/2018    diagnosed 12/2018, 3 biopsies, 1st - Class 3-4, no crescents, IFTA 15%, 2nd - Class V, 3rd - Class 4, w/ cressents, IFTA 30%. Treated with cellcept in 2018 until 10-11/2018 (stopped for neutropenic fever), last steroid pulce 12/31/18 and had 2 doses CYC, last 1/19/2018. On HD since 1/2019. Sees Dr. Pendleton, nephrology in [place], [place]. and Dr. Small, rheumatology    Lupus nephritis, " ISN/RPS class IV 12/2017    Migraine headache with aura     Other pulmonary embolism without acute cor pulmonale     Patient and mother deny     PAST SURGICAL HISTORY:   Past Surgical History:   Procedure Laterality Date    ARTHROSCOPIC FEMOROPLASTY Left 12/26/2014    Performed by [facility] at [facility] OR 1ST FLR    ARTHROSCOPY-HIP WITH ACETABULOPLASTY(INCLUDES LABRAL REPAIR Left 12/26/2014    Performed by A[facility] Blake MD at [facility] OR 1ST FLR    HIP SURGERY      INSERTION, CATHETER, CENTRAL VENOUS, HEMODIALYSIS, TUNNELED N/A 1/31/2019    Performed by Nam Tucker MD at [facility] CATH LAB    INSERTION, CATHETER, DIALYSIS, PERITONEAL, LAPAROSCOPIC N/A 6/4/2019    [facility]    LAPAROSCOPIC INSERTION OF PERITONEAL DIALYSIS CATHETER  07/2019    REPAIR-LABRAL Left 12/26/2014    Performed by [facility] Blake MD at [facility] OR 1ST FLR    TYMPANOSTOMY TUBE PLACEMENT       FAMILY HISTORY:   Family History   Problem Relation Age of Onset    Anxiety disorder Father     Hypertension Father     Diabetes Mellitus Maternal Grandfather      SOCIAL HISTORY:   Social History     Socioeconomic History    Marital status: Single     Spouse name: Not on file    Number of children: Not on file    Years of education: Not on file    Highest education level: Not on file   Occupational History    Not on file   Social Needs    Financial resource strain: Not on file    Food insecurity:     Worry: Not on file     Inability: Not on file    Transportation needs:     Medical: Not on file     Non-medical: Not on file   Tobacco Use    Smoking status: Former Smoker     Types: Vaping with nicotine    Smokeless tobacco: Never Used   Substance and Sexual Activity    Alcohol use: No    Drug use: No    Sexual activity: Not on file   Lifestyle    Physical activity:     Days per week: Not on file     Minutes per session: Not on file    Stress: Not on file   Relationships    Social  connections:     Talks on phone: Not on file     Gets together: Not on file     Attends [employer] service: Not on file     Active member of club or organization: Not on file     Attends meetings of clubs or organizations: Not on file     Relationship status: Not on file   Other Topics Concern    Not on file   Social History Narrative    Not on file       MEDICATIONS:   Current Outpatient Medications:     acetaminophen (TYLENOL) 325 MG tablet, Take 325 mg by mouth every 6 (six) hours as needed for Pain., Disp: , Rfl:     BENLYSTA 200 mg/mL AtIn, Inject 1 mL (200 mg total) as directed Every Friday. HOLD UNTIL TOLD TO RESUME BY RHEUMATOLOGIST, Disp: , Rfl:     calcium acetate (PHOSLO) 667 mg capsule, Take 667 mg by mouth 3 (three) times daily., Disp: , Rfl:     carvedilol (COREG) 25 MG tablet, Take 25 mg by mouth 2 (two) times daily with meals., Disp: , Rfl:     cetirizine (ZYRTEC) 10 MG tablet, Take 1 tablet (10 mg total) by mouth once daily., Disp: , Rfl: 0    cholecalciferol, vitamin D3, (VITAMIN D3) 5,000 unit Tab, Take 5,000 Units by mouth every morning., Disp: , Rfl:     ergocalciferol (VITAMIN D2) 50,000 unit Cap, Take 50,000 Units by mouth every 7 days., Disp: , Rfl:     FLUoxetine 40 MG capsule, Take 40 mg by mouth once daily., Disp: , Rfl:     gabapentin (NEURONTIN) 300 MG capsule, Take 1 capsule (300 mg total) by mouth every evening. (Patient taking differently: Take 300 mg by mouth 3 (three) times daily. ), Disp: , Rfl: 0    hydroxychloroquine (PLAQUENIL) 200 mg tablet, Take 1 tablet by mouth nightly, Disp: , Rfl: 6    loperamide (IMODIUM A-D) 2 mg Tab, Take 2 mg by mouth 4 (four) times daily as needed (diarrhea)., Disp: , Rfl:     medroxyPROGESTERone (DEPO-PROVERA) 150 mg/mL Syrg, Inject 150 mg into the muscle every 3 (three) months. , Disp: , Rfl:     mycophenolate mofetil (CELLCEPT ORAL), Take 1,000 mg by mouth 2 (two) times daily., Disp: , Rfl:     ondansetron (ZOFRAN) 4 MG tablet, Take  4 mg by mouth every 24 hours as needed for Nausea. , Disp: , Rfl:     pantoprazole (PROTONIX) 40 MG tablet, Take 1 tablet (40 mg total) by mouth once daily. (Patient taking differently: Take 40 mg by mouth 2 (two) times daily. ), Disp: , Rfl:     potassium chloride SA (K-DUR,KLOR-CON) 20 MEQ tablet, Take 20 mEq by mouth 2 (two) times daily., Disp: , Rfl:     PREDNISONE ORAL, Take 7.5 mg by mouth once daily. , Disp: , Rfl: 0    torsemide (DEMADEX) 20 MG Tab, Take 20 mg by mouth once daily., Disp: , Rfl:     Current Facility-Administered Medications:     cyanocobalamin injection 1,000 mcg, 1,000 mcg, Subcutaneous, Q30 Days, Sukhjinder Goodwin MD, 1,000 mcg at 03/13/18 1430  ALLERGIES:   Review of patient's allergies indicates:   Allergen Reactions    Sulfa (sulfonamide antibiotics) Hives       Review of Systems   Constitution: Negative. Negative for chills, fever and night sweats.   HENT: Negative for congestion and headaches.    Eyes: Negative for blurred vision, left vision loss and right vision loss.   Cardiovascular: Negative for chest pain and syncope.   Respiratory: Negative for cough and shortness of breath.    Endocrine: Negative for polydipsia, polyphagia and polyuria.   Hematologic/Lymphatic: Negative for bleeding problem. Does not bruise/bleed easily.   Skin: Negative for dry skin, itching and rash.   Musculoskeletal: Negative for falls and muscle weakness.   Gastrointestinal: Negative for abdominal pain and bowel incontinence.   Genitourinary: Negative for bladder incontinence and nocturia.   Neurological: Negative for disturbances in coordination, loss of balance and seizures.   Psychiatric/Behavioral: Negative for depression. The patient does not have insomnia.    Allergic/Immunologic: Negative for hives and persistent infections.     PHYSICAL EXAM:  GEN: A&Ox3, WD WN NAD  HEENT: WNL  CHEST: CTAB, no W/R/R  HEART: RRR, no M/R/G   ABD: Soft, NT ND, BS x4 QUADS  MS: Refer to previous note for  detailed MS exam  NEURO: CN II-XII intact       The surgical consent was then reviewed with the patient, who agreed with all the contents of the consent form and it was signed. she was then given the [facility] surgery packet to bring with her to [facility] for the anesthesia portion of her perioperative paperwork.     PHYSICAL THERAPY:  She was also instructed regarding physical therapy and will begin POD # 1-3 at [facility] in [place].    POST OP CARE:instructions were reviewed including care of the wound and dressing after surgery and when she can shower.     PAIN MANAGEMENT: Anitha Swenson was also given a pain management regime, which includes the TENS unit which she has from previous surgery along with the education required for its use. She was also instructed regarding the Polar ice unit that will be in place after surgery and her postoperative pain medications.     PAIN MEDICATION:  Patient currently takes oxycodone 10mg every 6-8 hours for pain-prescribed by Dr. Hernández. Patient will no longer be seeing this physician  Percocet 10/325mg 1 po q 4-6 hours prn pain  Ultram 50 mg one p.o. q.4-6 hours p.r.n. breakthrough pain,   Zofran 4 mg once every 8 hours prn nausea  Patient cannot take NSAIDS due to renal failure. Will check with her PCP regarding Eliquis for DVT prophylaxis    DVT prophylaxis was discussed with the patient today including risk factors for developing DVTs and history of DVTs. The patient was asked if any specific recommendations were given from the doctor/s that did pre-operative surgical clearance.      Patient was asked if they were taking or using OCP pills or devices. If they answered yes, then they were instructed to stop using OCPs at this pre-operative appointment until 2 months post-op to help prevent DVT development. They understand that there are other forms of birth control that do not involve hormones. They expressed understanding that ignoring/not following this instruction  could result in a DVT which could turn into a deadly pulmonary embolism.      The patient was told that narcotic pain medications may make them drowsy and instructions were given to not sign legal documents, drive or operate heavy machinery, cars, or equipment while under the influence of narcotic medications.     As there were no other questions to be asked, she was given my business card along with Avery Blake MD business card if she has any questions or concerns prior to surgery or in the postop period.

## 2019-09-10 ENCOUNTER — TELEPHONE (OUTPATIENT)
Dept: SPORTS MEDICINE | Facility: CLINIC | Age: 24
End: 2019-09-10

## 2019-09-10 NOTE — TELEPHONE ENCOUNTER
Spoke to pt's mother and explained the following information provided by Chana in PreService    Since the primary is approved, patient can go ahead. As long as the pt is aware that the secondary denied the procedure so she will be responsible from any charges from the Primary insurance.    Pt's mother states that she has met deductibles for primary insurances.    ----- Message from Chana Marte sent at 9/10/2019  3:33 PM CDT -----  Regarding: RE: Surgery Auth  Hi Leann,      Since the primary is approved, patient can go ahead. As long as the pt is aware that the secondary denied the procedure so she will be responsible from any charges from the Primary insurance.  ----- Message -----  From: Leann Abreu MA  Sent: 9/10/2019   3:25 PM  To: Chana Marte  Subject: RE: Surgery Auth                                 Hi Chana,    We have not received a call for this p2p again today. Do you have any insight on if we can proceed with just BCBS or do we have to have healthy blue as well?    Thank you,    Leann Abreu  Sports Medicine Assistant  [facility]  [phone] (p)  [phone](f)    ----- Message -----  From: Chana Marte  Sent: 9/10/2019   9:35 AM  To: Leann Abreu MA  Subject: RE: Surgery Auth                                 Hi Leann,    The Primary insurance, [facility] approved all the [id number]. The secondary insurance [employer] denied the codes [id number].  ----- Message -----  From: Leann Abreu MA  Sent: 9/10/2019   8:59 AM  To: Chana Marte  Subject: Surgery Auth                                     Hi Chana,    Do you know which codes are still pending with this case? We will be rescheduling it but I just want to give them a heads up.    Thank you,    Leann Abreu  Sports Medicine Assistant  [facility]  [phone] (p)  [phone](f)

## 2019-09-11 ENCOUNTER — TELEPHONE (OUTPATIENT)
Dept: SPORTS MEDICINE | Facility: CLINIC | Age: 24
End: 2019-09-11

## 2019-09-11 NOTE — TELEPHONE ENCOUNTER
I spoke with Anitha's PCP Tash Marroquin NP who said she thinks Eliquis would be fine but to also check with her nephrologist.  Tash Marroquin contact number: [phone]

## 2019-09-11 NOTE — TELEPHONE ENCOUNTER
Called Dr. Tash Marroquin's office and left a message with her assistant. Let her know Anitha has a hx of PE and is having a hip subchondroplasty with Dr. Blake. Asked if she can call back with a recommendation for DVT prophylaxis and that Dr. Blake was going to prescribe Eliquis.

## 2019-09-12 ENCOUNTER — ANESTHESIA (OUTPATIENT)
Dept: SURGERY | Facility: OTHER | Age: 24
End: 2019-09-12
Payer: COMMERCIAL

## 2019-09-12 ENCOUNTER — HOSPITAL ENCOUNTER (OUTPATIENT)
Facility: OTHER | Age: 24
Discharge: HOME | End: 2019-09-12
Attending: ORTHOPAEDIC SURGERY | Admitting: ORTHOPAEDIC SURGERY
Payer: COMMERCIAL

## 2019-09-12 VITALS
HEART RATE: 112 BPM | OXYGEN SATURATION: 99 % | DIASTOLIC BLOOD PRESSURE: 63 MMHG | WEIGHT: 186 LBS | BODY MASS INDEX: 26.04 KG/M2 | SYSTOLIC BLOOD PRESSURE: 107 MMHG | HEIGHT: 71 IN | TEMPERATURE: 98 F | RESPIRATION RATE: 16 BRPM

## 2019-09-12 DIAGNOSIS — M87.052 AVASCULAR NECROSIS OF BONE OF LEFT HIP: ICD-10-CM

## 2019-09-12 DIAGNOSIS — M87.052 AVASCULAR NECROSIS OF BONE OF LEFT HIP: Primary | ICD-10-CM

## 2019-09-12 LAB
B-HCG UR QL: NEGATIVE
B-HCG UR QL: NEGATIVE
CTP QC/QA: YES
CTP QC/QA: YES

## 2019-09-12 PROCEDURE — 63600175 PHARM REV CODE 636 W HCPCS: Mod: TXP | Performed by: ANESTHESIOLOGY

## 2019-09-12 PROCEDURE — 71000016 HC POSTOP RECOV ADDL HR: Mod: TXP | Performed by: ORTHOPAEDIC SURGERY

## 2019-09-12 PROCEDURE — 37000008 HC ANESTHESIA 1ST 15 MINUTES: Mod: TXP | Performed by: ORTHOPAEDIC SURGERY

## 2019-09-12 PROCEDURE — 25000003 PHARM REV CODE 250: Mod: NTX | Performed by: ORTHOPAEDIC SURGERY

## 2019-09-12 PROCEDURE — C1713 ANCHOR/SCREW BN/BN,TIS/BN: HCPCS | Mod: TXP | Performed by: ORTHOPAEDIC SURGERY

## 2019-09-12 PROCEDURE — 25000003 PHARM REV CODE 250: Mod: TXP | Performed by: ANESTHESIOLOGY

## 2019-09-12 PROCEDURE — S0020 INJECTION, BUPIVICAINE HYDRO: HCPCS | Mod: NTX | Performed by: ORTHOPAEDIC SURGERY

## 2019-09-12 PROCEDURE — 29862 PR HIP SCOPE/REMV BODY,PLASTY/RESECTN: ICD-10-PCS | Mod: LT,TXP,, | Performed by: ORTHOPAEDIC SURGERY

## 2019-09-12 PROCEDURE — 37000009 HC ANESTHESIA EA ADD 15 MINS: Mod: NTX | Performed by: ORTHOPAEDIC SURGERY

## 2019-09-12 PROCEDURE — 71000039 HC RECOVERY, EACH ADD'L HOUR: Mod: NTX | Performed by: ORTHOPAEDIC SURGERY

## 2019-09-12 PROCEDURE — 27599 UNLISTED PX FEMUR/KNEE: CPT | Mod: TXP,,, | Performed by: ORTHOPAEDIC SURGERY

## 2019-09-12 PROCEDURE — 29862 HIP ARTHR0 W/DEBRIDEMENT: CPT | Mod: LT,TXP,, | Performed by: ORTHOPAEDIC SURGERY

## 2019-09-12 PROCEDURE — 27201423 OPTIME MED/SURG SUP & DEVICES STERILE SUPPLY: Mod: TXP | Performed by: ORTHOPAEDIC SURGERY

## 2019-09-12 PROCEDURE — 36000711: Mod: NTX | Performed by: ORTHOPAEDIC SURGERY

## 2019-09-12 PROCEDURE — 81025 URINE PREGNANCY TEST: CPT | Mod: NTX | Performed by: ANESTHESIOLOGY

## 2019-09-12 PROCEDURE — 36000710: Mod: NTX | Performed by: ORTHOPAEDIC SURGERY

## 2019-09-12 PROCEDURE — 71000033 HC RECOVERY, INTIAL HOUR: Mod: NTX | Performed by: ORTHOPAEDIC SURGERY

## 2019-09-12 PROCEDURE — 81025 URINE PREGNANCY TEST: CPT | Mod: TXP | Performed by: PHYSICIAN ASSISTANT

## 2019-09-12 PROCEDURE — 25000003 PHARM REV CODE 250: Mod: NTX | Performed by: NURSE ANESTHETIST, CERTIFIED REGISTERED

## 2019-09-12 PROCEDURE — 63600175 PHARM REV CODE 636 W HCPCS: Mod: NTX | Performed by: NURSE ANESTHETIST, CERTIFIED REGISTERED

## 2019-09-12 PROCEDURE — 27599 PR SUBCHONDROPLASTY, KNEE, OPEN: ICD-10-PCS | Mod: TXP,,, | Performed by: ORTHOPAEDIC SURGERY

## 2019-09-12 PROCEDURE — 63600175 PHARM REV CODE 636 W HCPCS: Mod: NTX | Performed by: SPECIALIST

## 2019-09-12 PROCEDURE — 25000003 PHARM REV CODE 250: Mod: TXP | Performed by: SPECIALIST

## 2019-09-12 PROCEDURE — 63600175 PHARM REV CODE 636 W HCPCS: Mod: TXP | Performed by: PHYSICIAN ASSISTANT

## 2019-09-12 PROCEDURE — 71000015 HC POSTOP RECOV 1ST HR: Mod: NTX | Performed by: ORTHOPAEDIC SURGERY

## 2019-09-12 PROCEDURE — 63600175 PHARM REV CODE 636 W HCPCS: Mod: TXP | Performed by: NURSE ANESTHETIST, CERTIFIED REGISTERED

## 2019-09-12 DEVICE — FILLER BONE GRAFT ACCUFILL 5ML: Type: IMPLANTABLE DEVICE | Site: HIP | Status: FUNCTIONAL

## 2019-09-12 RX ORDER — SODIUM CHLORIDE, SODIUM LACTATE, POTASSIUM CHLORIDE, CALCIUM CHLORIDE 600; 310; 30; 20 MG/100ML; MG/100ML; MG/100ML; MG/100ML
INJECTION, SOLUTION INTRAVENOUS CONTINUOUS
Status: DISCONTINUED | OUTPATIENT
Start: 2019-09-12 | End: 2019-09-12 | Stop reason: HOSPADM

## 2019-09-12 RX ORDER — FAMOTIDINE 20 MG/1
20 TABLET, FILM COATED ORAL
Status: DISCONTINUED | OUTPATIENT
Start: 2019-09-12 | End: 2019-09-12 | Stop reason: HOSPADM

## 2019-09-12 RX ORDER — PROPOFOL 10 MG/ML
VIAL (ML) INTRAVENOUS
Status: DISCONTINUED | OUTPATIENT
Start: 2019-09-12 | End: 2019-09-12

## 2019-09-12 RX ORDER — ONDANSETRON 2 MG/ML
INJECTION INTRAMUSCULAR; INTRAVENOUS
Status: DISCONTINUED | OUTPATIENT
Start: 2019-09-12 | End: 2019-09-12

## 2019-09-12 RX ORDER — OXYCODONE HYDROCHLORIDE 5 MG/1
10 TABLET ORAL EVERY 4 HOURS PRN
Status: DISCONTINUED | OUTPATIENT
Start: 2019-09-12 | End: 2019-09-12 | Stop reason: HOSPADM

## 2019-09-12 RX ORDER — ROCURONIUM BROMIDE 10 MG/ML
INJECTION, SOLUTION INTRAVENOUS
Status: DISCONTINUED | OUTPATIENT
Start: 2019-09-12 | End: 2019-09-12

## 2019-09-12 RX ORDER — MIDAZOLAM HYDROCHLORIDE 1 MG/ML
INJECTION INTRAMUSCULAR; INTRAVENOUS
Status: DISCONTINUED | OUTPATIENT
Start: 2019-09-12 | End: 2019-09-12

## 2019-09-12 RX ORDER — BUPIVACAINE HYDROCHLORIDE 5 MG/ML
INJECTION, SOLUTION EPIDURAL; INTRACAUDAL
Status: DISCONTINUED | OUTPATIENT
Start: 2019-09-12 | End: 2019-09-12 | Stop reason: HOSPADM

## 2019-09-12 RX ORDER — SODIUM CHLORIDE 0.9 % (FLUSH) 0.9 %
3 SYRINGE (ML) INJECTION
Status: DISCONTINUED | OUTPATIENT
Start: 2019-09-12 | End: 2019-09-12 | Stop reason: HOSPADM

## 2019-09-12 RX ORDER — HYDROMORPHONE HYDROCHLORIDE 2 MG/ML
0.4 INJECTION, SOLUTION INTRAMUSCULAR; INTRAVENOUS; SUBCUTANEOUS EVERY 5 MIN PRN
Status: DISCONTINUED | OUTPATIENT
Start: 2019-09-12 | End: 2019-09-12 | Stop reason: HOSPADM

## 2019-09-12 RX ORDER — FENTANYL CITRATE 50 UG/ML
INJECTION, SOLUTION INTRAMUSCULAR; INTRAVENOUS
Status: DISCONTINUED | OUTPATIENT
Start: 2019-09-12 | End: 2019-09-12

## 2019-09-12 RX ORDER — LIDOCAINE HCL/PF 100 MG/5ML
SYRINGE (ML) INTRAVENOUS
Status: DISCONTINUED | OUTPATIENT
Start: 2019-09-12 | End: 2019-09-12

## 2019-09-12 RX ORDER — PROMETHAZINE HYDROCHLORIDE 25 MG/1
25 TABLET ORAL EVERY 6 HOURS PRN
Qty: 42 TABLET | Refills: 0 | Status: SHIPPED | OUTPATIENT
Start: 2019-09-12 | End: 2019-09-12

## 2019-09-12 RX ORDER — OXYCODONE HYDROCHLORIDE 5 MG/1
5 TABLET ORAL EVERY 4 HOURS PRN
Status: DISCONTINUED | OUTPATIENT
Start: 2019-09-12 | End: 2019-09-12 | Stop reason: HOSPADM

## 2019-09-12 RX ORDER — PROMETHAZINE HYDROCHLORIDE 25 MG/1
25 TABLET ORAL EVERY 4 HOURS PRN
Qty: 30 TABLET | Refills: 0 | Status: SHIPPED | OUTPATIENT
Start: 2019-09-12 | End: 2019-09-23 | Stop reason: SDUPTHER

## 2019-09-12 RX ORDER — ACETAMINOPHEN 325 MG/1
650 TABLET ORAL EVERY 4 HOURS PRN
Status: DISCONTINUED | OUTPATIENT
Start: 2019-09-12 | End: 2019-09-12 | Stop reason: HOSPADM

## 2019-09-12 RX ORDER — FENTANYL CITRATE 50 UG/ML
50 INJECTION, SOLUTION INTRAMUSCULAR; INTRAVENOUS ONCE
Status: COMPLETED | OUTPATIENT
Start: 2019-09-12 | End: 2019-09-12

## 2019-09-12 RX ORDER — OXYCODONE HYDROCHLORIDE 5 MG/1
5 TABLET ORAL ONCE
Status: COMPLETED | OUTPATIENT
Start: 2019-09-12 | End: 2019-09-12

## 2019-09-12 RX ORDER — MIDAZOLAM HYDROCHLORIDE 1 MG/ML
2 INJECTION INTRAMUSCULAR; INTRAVENOUS ONCE AS NEEDED
Status: DISCONTINUED | OUTPATIENT
Start: 2019-09-12 | End: 2019-09-12 | Stop reason: HOSPADM

## 2019-09-12 RX ORDER — OXYCODONE HYDROCHLORIDE 5 MG/1
5 TABLET ORAL
Status: DISCONTINUED | OUTPATIENT
Start: 2019-09-12 | End: 2019-09-12 | Stop reason: HOSPADM

## 2019-09-12 RX ORDER — DIPHENHYDRAMINE HYDROCHLORIDE 50 MG/ML
25 INJECTION INTRAMUSCULAR; INTRAVENOUS EVERY 6 HOURS PRN
Status: DISCONTINUED | OUTPATIENT
Start: 2019-09-12 | End: 2019-09-12 | Stop reason: HOSPADM

## 2019-09-12 RX ORDER — ONDANSETRON 8 MG/1
8 TABLET, ORALLY DISINTEGRATING ORAL EVERY 8 HOURS PRN
Status: DISCONTINUED | OUTPATIENT
Start: 2019-09-12 | End: 2019-09-12 | Stop reason: HOSPADM

## 2019-09-12 RX ORDER — LIDOCAINE HYDROCHLORIDE 10 MG/ML
0.5 INJECTION, SOLUTION EPIDURAL; INFILTRATION; INTRACAUDAL; PERINEURAL ONCE
Status: DISCONTINUED | OUTPATIENT
Start: 2019-09-12 | End: 2019-09-12 | Stop reason: HOSPADM

## 2019-09-12 RX ORDER — ROPIVACAINE/EPI/CLONIDINE/KET 2.46-0.005
SYRINGE (ML) INJECTION
Status: DISCONTINUED | OUTPATIENT
Start: 2019-09-12 | End: 2019-09-12 | Stop reason: HOSPADM

## 2019-09-12 RX ORDER — MEPERIDINE HYDROCHLORIDE 25 MG/ML
12.5 INJECTION INTRAMUSCULAR; INTRAVENOUS; SUBCUTANEOUS ONCE AS NEEDED
Status: DISCONTINUED | OUTPATIENT
Start: 2019-09-12 | End: 2019-09-12 | Stop reason: HOSPADM

## 2019-09-12 RX ORDER — ONDANSETRON 2 MG/ML
4 INJECTION INTRAMUSCULAR; INTRAVENOUS DAILY PRN
Status: DISCONTINUED | OUTPATIENT
Start: 2019-09-12 | End: 2019-09-12 | Stop reason: HOSPADM

## 2019-09-12 RX ORDER — OXYCODONE AND ACETAMINOPHEN 10; 325 MG/1; MG/1
1 TABLET ORAL EVERY 6 HOURS PRN
Qty: 28 TABLET | Refills: 0 | Status: SHIPPED | OUTPATIENT
Start: 2019-09-12 | End: 2019-09-23 | Stop reason: SDUPTHER

## 2019-09-12 RX ORDER — ASPIRIN 325 MG
TABLET ORAL
Qty: 42 TABLET | Refills: 0 | Status: SHIPPED | OUTPATIENT
Start: 2019-09-12 | End: 2019-10-16

## 2019-09-12 RX ORDER — TRAMADOL HYDROCHLORIDE 50 MG/1
50 TABLET ORAL EVERY 6 HOURS
Qty: 42 TABLET | Refills: 0 | Status: SHIPPED | OUTPATIENT
Start: 2019-09-12 | End: 2019-10-16

## 2019-09-12 RX ORDER — TRAMADOL HYDROCHLORIDE 50 MG/1
50 TABLET ORAL EVERY 6 HOURS PRN
Qty: 28 TABLET | Refills: 0 | Status: SHIPPED | OUTPATIENT
Start: 2019-09-12 | End: 2019-09-19

## 2019-09-12 RX ORDER — ROPIVACAINE/EPI/CLONIDINE/KET 2.46-0.005
SYRINGE (ML) INJECTION ONCE
Status: DISCONTINUED | OUTPATIENT
Start: 2019-09-12 | End: 2019-09-12 | Stop reason: HOSPADM

## 2019-09-12 RX ORDER — EPHEDRINE SULFATE 50 MG/ML
INJECTION, SOLUTION INTRAVENOUS
Status: DISCONTINUED | OUTPATIENT
Start: 2019-09-12 | End: 2019-09-12

## 2019-09-12 RX ORDER — CEFAZOLIN SODIUM 1 G/3ML
2 INJECTION, POWDER, FOR SOLUTION INTRAMUSCULAR; INTRAVENOUS
Status: DISCONTINUED | OUTPATIENT
Start: 2019-09-12 | End: 2019-09-12 | Stop reason: HOSPADM

## 2019-09-12 RX ORDER — SODIUM CHLORIDE 9 MG/ML
INJECTION, SOLUTION INTRAVENOUS CONTINUOUS PRN
Status: DISCONTINUED | OUTPATIENT
Start: 2019-09-12 | End: 2019-09-12

## 2019-09-12 RX ORDER — OXYCODONE AND ACETAMINOPHEN 10; 325 MG/1; MG/1
1 TABLET ORAL EVERY 6 HOURS PRN
Qty: 42 TABLET | Refills: 0 | Status: SHIPPED | OUTPATIENT
Start: 2019-09-12 | End: 2019-09-12

## 2019-09-12 RX ORDER — NEOSTIGMINE METHYLSULFATE 1 MG/ML
INJECTION, SOLUTION INTRAVENOUS
Status: DISCONTINUED | OUTPATIENT
Start: 2019-09-12 | End: 2019-09-12

## 2019-09-12 RX ORDER — GLYCOPYRROLATE 0.2 MG/ML
INJECTION INTRAMUSCULAR; INTRAVENOUS
Status: DISCONTINUED | OUTPATIENT
Start: 2019-09-12 | End: 2019-09-12

## 2019-09-12 RX ADMIN — EPHEDRINE SULFATE 10 MG: 50 INJECTION INTRAMUSCULAR; INTRAVENOUS; SUBCUTANEOUS at 11:09

## 2019-09-12 RX ADMIN — HYDROMORPHONE HYDROCHLORIDE 0.4 MG: 2 INJECTION INTRAMUSCULAR; INTRAVENOUS; SUBCUTANEOUS at 12:09

## 2019-09-12 RX ADMIN — EPHEDRINE SULFATE 10 MG: 50 INJECTION INTRAMUSCULAR; INTRAVENOUS; SUBCUTANEOUS at 10:09

## 2019-09-12 RX ADMIN — GLYCOPYRROLATE 0.8 MG: 0.2 INJECTION, SOLUTION INTRAMUSCULAR; INTRAVENOUS at 11:09

## 2019-09-12 RX ADMIN — ONDANSETRON 4 MG: 2 INJECTION INTRAMUSCULAR; INTRAVENOUS at 11:09

## 2019-09-12 RX ADMIN — MIDAZOLAM HYDROCHLORIDE 2 MG: 1 INJECTION, SOLUTION INTRAMUSCULAR; INTRAVENOUS at 09:09

## 2019-09-12 RX ADMIN — FENTANYL CITRATE 100 MCG: 50 INJECTION, SOLUTION INTRAMUSCULAR; INTRAVENOUS at 11:09

## 2019-09-12 RX ADMIN — ACETAMINOPHEN 650 MG: 325 TABLET, FILM COATED ORAL at 02:09

## 2019-09-12 RX ADMIN — CARBOXYMETHYLCELLULOSE SODIUM 2 DROP: 2.5 SOLUTION/ DROPS OPHTHALMIC at 09:09

## 2019-09-12 RX ADMIN — FENTANYL CITRATE 50 MCG: 50 INJECTION, SOLUTION INTRAMUSCULAR; INTRAVENOUS at 04:09

## 2019-09-12 RX ADMIN — HYDROMORPHONE HYDROCHLORIDE 0.4 MG: 2 INJECTION INTRAMUSCULAR; INTRAVENOUS; SUBCUTANEOUS at 01:09

## 2019-09-12 RX ADMIN — OXYCODONE HYDROCHLORIDE 5 MG: 5 TABLET ORAL at 12:09

## 2019-09-12 RX ADMIN — CEFAZOLIN 2 G: 330 INJECTION, POWDER, FOR SOLUTION INTRAMUSCULAR; INTRAVENOUS at 10:09

## 2019-09-12 RX ADMIN — PHENYLEPHRINE HYDROCHLORIDE 0.5 MCG/KG/MIN: 10 INJECTION INTRAVENOUS at 11:09

## 2019-09-12 RX ADMIN — ROCURONIUM BROMIDE 40 MG: 10 INJECTION, SOLUTION INTRAVENOUS at 09:09

## 2019-09-12 RX ADMIN — NEOSTIGMINE METHYLSULFATE 5 MG: 1 INJECTION INTRAVENOUS at 11:09

## 2019-09-12 RX ADMIN — FENTANYL CITRATE 50 MCG: 50 INJECTION, SOLUTION INTRAMUSCULAR; INTRAVENOUS at 09:09

## 2019-09-12 RX ADMIN — FENTANYL CITRATE 50 MCG: 50 INJECTION INTRAMUSCULAR; INTRAVENOUS at 03:09

## 2019-09-12 RX ADMIN — FENTANYL CITRATE 100 MCG: 50 INJECTION, SOLUTION INTRAMUSCULAR; INTRAVENOUS at 10:09

## 2019-09-12 RX ADMIN — PROPOFOL 180 MG: 10 INJECTION, EMULSION INTRAVENOUS at 09:09

## 2019-09-12 RX ADMIN — SODIUM CHLORIDE: 0.9 INJECTION, SOLUTION INTRAVENOUS at 09:09

## 2019-09-12 RX ADMIN — LIDOCAINE HYDROCHLORIDE 50 MG: 20 INJECTION, SOLUTION INTRAVENOUS at 09:09

## 2019-09-12 RX ADMIN — OXYCODONE HYDROCHLORIDE 5 MG: 5 TABLET ORAL at 02:09

## 2019-09-12 NOTE — PLAN OF CARE
Anitha Swenson has met all discharge criteria from Phase II. Vital Signs are stable, ambulating  without difficulty.Pain is now under control and tolerable for the pt. Pain score 4/10 at this time.  Discharge instructions given, patient verbalized understanding. Discharged from facility via wheelchair in stable condition.

## 2019-09-12 NOTE — PROGRESS NOTES
Dr. Jarrett at bedside and states he will send scripts to [facility] pharmacy.

## 2019-09-12 NOTE — BRIEF OP NOTE
[facility]  Brief Operative Note     SUMMARY     Surgery Date: 9/12/2019     Surgeon(s) and Role:     * Avery Blake MD - Primary    Assisting Surgeon: None    Pre-op Diagnosis:  Avascular necrosis of bone of left hip [M87.052]    Post-op Diagnosis:  Post-Op Diagnosis Codes:     * Avascular necrosis of bone of left hip [M87.052]    Procedure(s) (LRB):  SUBCHONDROPLASTY,Femoral Head (Left)  DEBRIDEMENT, LABRUM, HIP, ARTHROSCOPIC,shaving of articular cartilage(chondroplasty,abrasion arthroplasty and/or labrum(includes labral repair) (Left)    Anesthesia: General    Description of the findings of the procedure:   1.  Left hip Avascular necrosis    Findings/Key Components:   See Op Note    Estimated Blood Loss: * No values recorded between 9/12/2019 10:45 AM and 9/12/2019 11:37 AM *         Specimens:   Specimen (12h ago, onward)    None          Discharge Note    SUMMARY     Admit Date: 9/12/2019    Discharge Date and Time:  09/12/2019 11:37 AM    Hospital Course (synopsis of major diagnoses, care, treatment, and services provided during the course of the hospital stay): Home from PACU     Final Diagnosis: Post-Op Diagnosis Codes:     * Avascular necrosis of bone of left hip [M87.052]    Disposition: Home or Self Care    Follow Up/Patient Instructions:     Medications:  Reconciled Home Medications:      Medication List      CHANGE how you take these medications    gabapentin 300 MG capsule  Commonly known as:  NEURONTIN  Take 1 capsule (300 mg total) by mouth every evening.  What changed:  when to take this     pantoprazole 40 MG tablet  Commonly known as:  PROTONIX  Take 1 tablet (40 mg total) by mouth once daily.  What changed:  when to take this        CONTINUE taking these medications    acetaminophen 325 MG tablet  Commonly known as:  TYLENOL  Take 325 mg by mouth every 6 (six) hours as needed for Pain.     BENLYSTA 200 mg/mL Atin  Generic drug:  belimumab  Inject 1 mL (200 mg total) as  directed Every Friday. HOLD UNTIL TOLD TO RESUME BY RHEUMATOLOGIST     calcium acetate 667 mg capsule  Commonly known as:  PHOSLO  Take 667 mg by mouth 3 (three) times daily.     carvedilol 25 MG tablet  Commonly known as:  COREG  Take 25 mg by mouth 2 (two) times daily with meals.     CELLCEPT ORAL  Take 1,000 mg by mouth 2 (two) times daily.     cetirizine 10 MG tablet  Commonly known as:  ZYRTEC  Take 1 tablet (10 mg total) by mouth once daily.     FLUoxetine 40 MG capsule  Take 40 mg by mouth once daily.     hydroxychloroquine 200 mg tablet  Commonly known as:  PLAQUENIL  Take 1 tablet by mouth nightly     loperamide 2 mg Tab  Commonly known as:  IMODIUM A-D  Take 2 mg by mouth 4 (four) times daily as needed (diarrhea).     medroxyPROGESTERone 150 mg/mL Syrg  Commonly known as:  DEPO-PROVERA  Inject 150 mg into the muscle every 3 (three) months.     ondansetron 4 MG tablet  Commonly known as:  ZOFRAN  Take 4 mg by mouth every 24 hours as needed for Nausea.     potassium chloride SA 20 MEQ tablet  Commonly known as:  K-DUR,KLOR-CON  Take 20 mEq by mouth 2 (two) times daily.     PREDNISONE ORAL  Take 5 mg by mouth once daily.     VITAMIN D2 50,000 unit Cap  Generic drug:  ergocalciferol  Take 50,000 Units by mouth every 7 days.     VITAMIN D3 5,000 unit Tab  Generic drug:  cholecalciferol (vitamin D3)  Take 5,000 Units by mouth every morning.          Discharge Procedure Orders   Diet general     Call MD for:  temperature >100.4     Call MD for:  persistent nausea and vomiting     Call MD for:  severe uncontrolled pain     Call MD for:  difficulty breathing, headache or visual disturbances     Call MD for:  redness, tenderness, or signs of infection (pain, swelling, redness, odor or green/yellow discharge around incision site)     Call MD for:  hives     Call MD for:  persistent dizziness or light-headedness     Call MD for:  extreme fatigue     Remove dressing in 72 hours     Weight bearing restrictions (specify)    Order Comments: Toe Touch Weight bearing to Left lower extremity     Follow-up Information     Follow up In 2 weeks.

## 2019-09-12 NOTE — OR NURSING
Reviewed  hip arthroscopy discharge instructions and demonstrated the postoperative equipment (polar ice), with verbalized understanding per patient.and family.  Reviewed crutch teaching with verbalized understanding and  return demonstration per patient.  Fitted for crutches and hand  adjusted.  Height of crutches set on 5'9  and the height of the hand  placed on the 3rd hole from the top of the crutch..

## 2019-09-12 NOTE — ANESTHESIA POSTPROCEDURE EVALUATION
Anesthesia Post Evaluation    Patient: Anitha Swenson    Procedure(s) Performed: Procedure(s) (LRB):  SUBCHONDROPLASTY,Femoral Head (Left)  DEBRIDEMENT, LABRUM, HIP, ARTHROSCOPIC,shaving of articular cartilage(chondroplasty,abrasion arthroplasty and/or labrum(includes labral repair) (Left)    Final Anesthesia Type: general  Patient location during evaluation: PACU  Patient participation: Yes- Able to Participate  Level of consciousness: awake and alert  Post-procedure vital signs: reviewed and stable  Pain management: adequate  Airway patency: patent  PONV status at discharge: No PONV  Anesthetic complications: no      Cardiovascular status: hemodynamically stable  Respiratory status: unassisted  Hydration status: euvolemic  Follow-up not needed.          Vitals Value Taken Time   /63 9/12/2019  4:00 PM   Temp 36.7 °C (98 °F) 9/12/2019  1:55 PM   Pulse 112 9/12/2019  4:00 PM   Resp 16 9/12/2019  4:00 PM   SpO2 99 % 9/12/2019  4:00 PM         Event Time     Out of Recovery 13:50:00          Pain/Tia Score: Pain Rating Prior to Med Admin: 6 (9/12/2019  4:06 PM)  Pain Rating Post Med Admin: 4 (9/12/2019  4:19 PM)  Tia Score: 10 (9/12/2019  4:19 PM)

## 2019-09-12 NOTE — DISCHARGE INSTRUCTIONS
[address], [place]                                                                          [phone]                   Postoperative Instructions for Hip Surgery                 Your Surgery Included: Subchondroplasty of Left hip AVN   Open               Arthroscopic   [] Fracture Fixation       []  Diagnostic   [] Tendon Repair      [] Synovectomy [] Joint Replacement      [x] Debridement / Chondroplasty [] Articular Cartilage Repair      [] Articular Cartilage Repair           [] Anterior Approach             []   Microfracture          [] Anterolateral Approach       [] Labral Repair         [] Posterior Approach      [] Osteoplasty               []   Femoral  []  Acetabular        [] Psoas Tendon Release          [] PRP Arthrocentesis  [] Amniox Arthrocentesis                  Call our office ([phone]) immediately or message us through [facility] if you experience any of the following:       Excessive bleeding or pus like drainage at the incision site       Uncontrollable pain not relieved by pain medication       Excessive swelling or redness at the incision site       Fever above 101.5 degrees not controlled with Tylenol or Motrin       Shortness of Breath or severe calf pain       Any foul odor or blistering from the surgery site    FOR EMERGENCIES: If any unusual problems or difficulties occur, call our office at [phone], or page the  at [phone] who will direct your call appropriately    1.   Pain Management: A cold therapy cuff, pain medications, local injections, TENs unit, and in some cases, regional anesthesia injections are used to manage your post-operative pain. The decision to use each of these options is based on their risks and benefits.     Medications: You were given one or more of the following medication prescriptions during your preoperative appointment. Follow the instruction on the bottles.     Narcotic Medication  (usually Percocet, Roxicodone, or Norco): Begin taking the medication before your hip starts to hurt. Some patients do not like to take any medication, but if you wait until your pain is severe before taking, you will be very uncomfortable for several hours waiting for the narcotic to work. Always take with food.     Nausea / Vomiting: For this issue, we prescribe Phenergan or Zofran, use this medication as directed.     Cold Therapy: You may have been sent home with a [facility] cold therapy unit and wrap for your hip. Fill with ice and water to the indicated fill line. You can use 20-30 minutes on then off, several times a day. This will help relieve pain and control swelling. Do not sleep with on.     Regional Anesthesia Injections (Blocks): You may have been given a regional nerve block either before or after surgery. This may make your entire leg numb for 24-36 hours.                            * Proceed with caution when bearing weight on your leg.     2.   Diet: Eat a bland diet for the first day after surgery. Progress your diet as tolerated. Constipation may occur with Narcotic usage. We recommend Colace 100 mg twice a day while taking narcotics.    3.   Activity: Limit your activity during the first 48 hours, keep your leg elevated with pillows under your heel. After the first 48 hours at home, increase your activity level based on your symptoms.    4.   Dressing Change: (a) The soft, bulky dressing will be removed on the 3rd day after surgery. Place waterproof bandages at this time. Keep wounds as dry as possible for first 2 weeks. It is normal for some blood to be seen on the dressings. It is also normal for you to see apparent bruising on the skin around your incisions. If you are concerned by the drainage or the appearance of your wound site, you can send a picture via [facility].    5.   Showering: (a) You may shower on the 3rd day after surgery. Place waterproof bandages prior to shower. It is  "recommended to use Saran wrap before showering. Do not submerge limb for 4 weeks or incisions completely healed in any water.     6.   Your procedure did not require a post-operative brace.    7.   Your procedure did not require a Continuous Passive Motion (CPM) device.    8.   Weight Bearing: You may have been sent home with crutches. If instructed (see below), use these crutches at all times unless at complete rest.      [x] Partial weight bearing for  2 weeks    [x] 25% Body Weight   [] 50% Body Weight    9.  Home Exercises: Begin these exercises the first day after surgery in order to help you regain your motion and strength. You may do the following marked exercises:       [] Straight Leg Raise (SLR) - While kyle your quadriceps muscle, lift     your fully extended leg to the level of your non-operative knee (as shown)         [] Heel Slides - With the knee straight, slide your heel slowly toward your   buttocks, hold at the endpoint for 10-15 seconds, then slowly straighten       [] Ankle pumps - With your knee straight, move your ankle in a "pumping"    fashion to activate your calf and leg muscles      10.  Physical Therapy: Physical therapy is an essential component to your recovery from surgery. Your physical therapy will start in 3 days.    11.  Dislocation Precautions: Dislocation of an artificial hip is uncommon but may occur within the first three months after surgery. The problem usually starts with a popping or slipping sensation. If the ball dislocates, you will be unable to put weight on the affected limb and will most likely experience discomfort in your hip. You should contact your orthopedic surgeon immediately and probably have someone take you to the emergency room.    Anterior Approach:  Avoid extending the hip, turning the operative leg outward, take shorter steps with your operative leg and longer steps with your nonoperative leg      Posterior Approach: Avoid crossing your legs, " turning the operative leg inward or bending the hip more than 90 degrees    FIRST POSTOPERATIVE VISIT: As scheduled.                   Anesthesia: After Your Surgery  Youve just had surgery. During surgery, you received medication called anesthesia to keep you comfortable and pain-free. After surgery, you may experience some pain or nausea. This is common. Here are some tips for feeling better and recovering after surgery.    Going home  Your doctor or nurse will show you how to take care of yourself when you go home. He or she will also answer your questions. Have an adult family member or friend drive you home. For the first 24 hours after your surgery:    · Do not drive or use heavy equipment.  · Do not make important decisions or sign legal documents.  · Avoid alcohol.  · Have someone stay with you, if needed. He or she can watch for problems and help keep you safe.  · Take your time getting up from a seated or lying position. You may experience dizziness for 24 hours.    Be sure to keep all follow-up appointments with your doctor. And rest after your procedure for as long as your doctor tells you to.    Coping with pain  If you have pain after surgery, pain medication will help you feel better. Take it as directed, before pain becomes severe. Also, ask your doctor or pharmacist about other ways to control pain, such as with heat, ice, and relaxation. And follow any other instructions your surgeon or nurse gives you.    URINARY RETENTION  Should you experience a decrease in your urine output or are unable to urinate following surgery, this can be due to the medications given during surgery.  We recommend you going to the nearest Emergency Department.    Tips for taking pain medication  To get the best relief possible, remember these points:    · Pain medications can upset your stomach. Taking them with a little food may help.  · Most pain relievers taken by mouth need at least 20 to 30 minutes to take  effect.  · Taking medication on a schedule can help you remember to take it. Try to time your medication so that you can take it before beginning an activity, such as dressing, walking, or sitting down for dinner.  · Constipation is a common side effect of pain medications. Contact your doctor before taking any medications like laxatives or stool softeners to help relieve constipation. Also ask about any dietary restrictions, because drinking lots of fluids and eating foods like fruits and vegetables that are high in fiber can also help. Remember, dont take laxatives unless your surgeon has prescribed them.  · Mixing alcohol and pain medication can cause dizziness and slow your breathing. It can even be fatal. Dont drink alcohol while taking pain medication.  · Pain medication can slow your reflexes. Dont drive or operate machinery while taking pain medication.    If your health care provider tells you to take acetaminophen to help relieve your pain, ask him or her how much you are supposed to take each day. (Acetaminophen is the generic name for Tylenol and other brand-name pain relievers.) Acetaminophen or other pain relievers may interact with your prescription medicines or other over-the-counter (OTC) drugs. Some prescription medications contain acetaminophen along with other active ingredients. Using both prescription and OTC acetaminophen for pain can cause you to overdose. The FDA recommends that you read the labels on your OTC medications carefully. This will help you to clearly understand the list of active ingredients, dosing instructions, and any warnings. It may also help you avoid taking too much acetaminophen. If you have questions or don't understand the information, ask your pharmacist or health care provider to explain it to you before you take the OTC medication.    Managing nausea  Some people have an upset stomach after surgery. This is often due to anesthesia, pain, pain medications, or the  stress of surgery. The following tips will help you manage nausea and get good nutrition as you recover. If you were on a special diet before surgery, ask your doctor if you should follow it during recovery. These tips may help:    · Dont push yourself to eat. Your body will tell you when to eat and how much.  · Start off with clear liquids and soup. They are easier to digest.  · Progress to semi-solid foods (mashed potatoes, applesauce, and gelatin) as you feel ready.  · Slowly move to solid foods. Dont eat fatty, rich, or spicy foods at first.  · Dont force yourself to have three large meals a day. Instead, eat smaller amounts more often.  · Take pain medications with a small amount of solid food, such as crackers or toast to avoid nausea.      Call your surgeon if    · You feel too sleepy, dizzy, or groggy (medication may be too strong).  · You have side effects like nausea, vomiting, or skin changes (rash, itching, or hives).     © 4230-4876 [employer]. [address] [place] [address], [place] [address]. All rights reserved. This information is not intended as a substitute for professional medical care. Always follow your healthcare professional's instructions.    PLEASE FOLLOW ANY OTHER INSTRUCTIONS PROVIDED TO YOU BY DR. ARAYA!

## 2019-09-12 NOTE — TRANSFER OF CARE
"Anesthesia Transfer of Care Note    Patient: Anitha Swenson    Procedure(s) Performed: Procedure(s) (LRB):  SUBCHONDROPLASTY,Femoral Head (Left)  DEBRIDEMENT, LABRUM, HIP, ARTHROSCOPIC,shaving of articular cartilage(chondroplasty,abrasion arthroplasty and/or labrum(includes labral repair) (Left)    Patient location: PACU    Anesthesia Type: general    Transport from OR: Transported from OR on 2-3 L/min O2 by NC with adequate spontaneous ventilation    Post pain: adequate analgesia    Post assessment: no apparent anesthetic complications    Post vital signs: stable    Level of consciousness: awake, alert and oriented    Nausea/Vomiting: no nausea/vomiting    Complications: none    Transfer of care protocol was followed      Last vitals:   Visit Vitals  BP (!) 143/91 (BP Location: Right arm, Patient Position: Sitting)   Pulse 99   Temp 36.4 °C (97.5 °F) (Oral)   Resp 16   Ht 5' 11" (1.803 m)   Wt 84.4 kg (186 lb)   LMP  (LMP Unknown)   SpO2 100%   Breastfeeding? No   BMI 25.94 kg/m²     "

## 2019-09-14 NOTE — OP NOTE
Operative Note       Surgery Date: 9/12/2019     Surgeon(s) and Role:     * Avery Blake MD - Primary    Assistant:  Benjie Jarrett MD- Fellow  [facility]    Pre-op Diagnosis:  Avascular necrosis of bone of left hip [M87.052]    Post-op Diagnosis: Post-Op Diagnosis Codes:     * Avascular necrosis of bone of left hip [M87.052]    Procedure(s) (LRB):  SUBCHONDROPLASTY,Femoral Head (Left)  DEBRIDEMENT, LABRUM, HIP, ARTHROSCOPIC,shaving of articular cartilage(chondroplasty,abrasion arthroplasty and/or labrum(includes labral repair) (Left)    Anesthesia: General     ANESTHESIA:   General with LMA, concomitant local anesthetic using 30 mL of 0.5%   ropivacaine mixture.     FLUIDS IN THE CASE: 500  ml.     COMPLICATIONS: none.     CONDITION ON RETURN TO RECOVERY ROOM: Good.       INDICATIONS FOR OPERATIVE PROCEDURE: Anitha Swenson is a 24 y.o.  female with history of left hip pain and pathology. The patient's history and physical examination findings consistent with the procedure performed. He noted significant problems in the area of concern with problems on activities of daily living and aggressive use of the left leg. As a result of these problems and problems with overall activity level, the patient was deemed to be an appropriate candidate for operative intervention. Nonoperative versus operative options were discussed. The risks and benefits were discussed with the patient. The patient acknowledged understanding and wished to proceed with operative intervention. Informed consent was obtained prior to the procedure. Details of the surgical procedure were explained, including incisions and probable rehabilitation course. The patient understands the likely length of convalescence after surgery; and we have explained the risks, benefits, and alternatives of surgery. Reasonable expectations and potential complications were discussed and acknowledged, including but not limited to infection, bleeding, blood  clots, (DVT and/or PE), nerve injury, retear, instability, continued pain and stiffness. It was also explained that there was a chance of failure which may require further management. The patient agreed and understood and wished to proceed.      IMPLANTS UTILIZED: Anthony SCP x 1 (5 cc)     DESCRIPTION OF PROCEDURE: The patient was brought into the Operating Room,   placed in supine position. Upon application of general anesthetic as well as   placement of LMA to stabilize the airway, the patient was given appropriate dose  of antibiotics based on body weight. The pudendal post was applied to the area  of concern. The area was prepped and draped in sterile fashion along the   lateral aspect of the hip. left leg was placed in a well-padded well-leg shaw. left arm was carefully draped across the chest. left arm was placed on to a well-padded well-arm shaw. Pudendal post was applied with appropriate padding. Right leg was then placed into the traction device. Traction was applied with appropriate subluxation of the hip noted. With this performed, we were then able to place a spinal needle along the anterolateral aspect of the hip for the anterolateral portal placement. A nitinol guidewire was applied. A#11 blade was then used to make the portal. Blunt trocar and sheath was inserted into the area of concern. The area of concern was visualized,   demonstrating evidence of synovitic change and labral pathology superiorly. A   70-degree scope was utilized. We then localized the distal accessory anterior   portal, which was placed with a spinal needle from inside-out technique and   taking care to stay clear of the labral tissue. A #11 blade was then used to   make this portal as well. Blunt trocar was inserted into this area and we   visualized the area. Probe analysis revealed evidence of an occult tear with   further evidence with probe.     The arthroscope was then placed into the anterolateral portal and using the  anterior portal, we visualized the labral structures in this area demonstrating intact labral tissue with mild fraying which was stabilized with aMitek VAPR probe.  The previous labral repair was completely intact.    Further synovitic tissue was removed from the hip joint with a shaver as well as a Mitek VAPR probe. Final chondromalacia along the acetabulum regions were debrided. Fluid was extravasated from the joint. Attention was then turned to the extra-articular portion of the procedure.    Arthroscopic instrumentation was removed from the left Hip and the C-arm was used to visualize the  Superior and lateral region. A spinal needle was used to localize the location for incision based on flouroscopic visualization laterally and anteriorly. A # 11 blade was used to create a small incision and the trochar from the Anthony set was used to place the application system into the femur superolateral femoral head articular subchondral bone plate. The holes were positioned towards the joint line using flouroscopic guidance and the AccuFill injected; all vials were placed and the trochar was reapplied. We allowed 15 minutes for the calcium phosphate to harden and solidify. The trochars were removed. We used arthroscopic instrumentation to verify that no calcium phosphate had entered the hip joint or anterior hip compartment. The trochars were removed and we verified no material in the hip soft tissue structures as well.        Arthroscopic instrumentation was removed. We then injected   around the peripheral arthroscopic portals with the use of a 21-gauge needle and  30 mL of 0.5% ropivacaine mixture. Xeroform was applied along with application  of gauze, ABD pads and Medipore tape. Cooling unit was applied along with   sterile electrode pads peripherally around the hip and abduction pillow was   applied as well. The patient was allowed to recover from the anesthetic in a   hip brace making the hip locked at full  extension and allowing to unlock at rest  to 60 degrees flexion. Upon stabilization with the hip brace, the patient was   extubated, was taken to Recovery Room in stable condition. At the completion of  the case, all instrument and sponge counts were correct.     NOTE: Dr. Avery Blake was present for the key portions of the procedure and did  perform the key parts of the procedure.     PHYSICAL THERAPY: The patient should begin physical therapy in 3 to 5 days   following surgery with a full weightbearing status along the area of   concern, advancing the weightbearing as tolerated with use of the brace on the   left hip.     Discharge home when stable  Follow-up as scheduled  Activity per instruction sheet  Condition Stable    Estimated Blood Loss: * No values recorded between 9/12/2019 10:45 AM and 9/12/2019 12:00 PM *           Specimens (From admission, onward)    None        Implants:   Implant Name Type Inv. Item Serial No.  Lot No. LRB No. Used   FILLER BONE GRAFT ACCUFILL 5ML - [id number]  FILLER BONE GRAFT ACCUFILL 5ML  [employer] [id number] Left 1              Disposition: PACU - hemodynamically stable.           Condition: Good    Attestation:  I was present and scrubbed for the key portions of the procedure.           Discharge Note    Admit Date: 9/12/2019    Attending Physician: No att. providers found     Discharge Physician: No att. providers found    Final Diagnosis: Post-Op Diagnosis Codes:     * Avascular necrosis of bone of left hip [M87.052]    Disposition: Home or Self Care    Patient Instructions:   Discharge Medication List as of 9/12/2019  4:36 PM      START taking these medications    Details   apixaban (ELIQUIS) 2.5 mg Tab Take 1 tablet (2.5 mg total) by mouth 2 (two) times daily. for 12 days, Starting Thu 9/12/2019, Until Tue 9/24/2019, Normal      aspirin 325 MG tablet Take 1 tablet at lunch daily starting after surgery for 6 weeks to prevent DVT., Normal      !! traMADol  (ULTRAM) 50 mg tablet Take 1 tablet (50 mg total) by mouth every 6 (six) hours., Starting Thu 9/12/2019, Print      !! traMADol (ULTRAM) 50 mg tablet Take 1 tablet (50 mg total) by mouth every 6 (six) hours as needed for Pain (for severe pain)., Starting Thu 9/12/2019, Until Thu 9/19/2019, Normal       !! - Potential duplicate medications found. Please discuss with provider.      CONTINUE these medications which have CHANGED    Details   oxyCODONE-acetaminophen (PERCOCET)  mg per tablet Take 1 tablet by mouth every 6 (six) hours as needed for Pain., Starting Thu 9/12/2019, Normal      promethazine (PHENERGAN) 25 MG tablet Take 1 tablet (25 mg total) by mouth every 4 (four) hours as needed for Nausea., Starting Thu 9/12/2019, Normal         CONTINUE these medications which have NOT CHANGED    Details   acetaminophen (TYLENOL) 325 MG tablet Take 325 mg by mouth every 6 (six) hours as needed for Pain., Historical Med      BENLYSTA 200 mg/mL AtIn Inject 1 mL (200 mg total) as directed Every Friday. HOLD UNTIL TOLD TO RESUME BY RHEUMATOLOGIST, Starting Fri 2/1/2019, No Print      calcium acetate (PHOSLO) 667 mg capsule Take 667 mg by mouth 3 (three) times daily., Historical Med      carvedilol (COREG) 25 MG tablet Take 25 mg by mouth 2 (two) times daily with meals., Historical Med      cetirizine (ZYRTEC) 10 MG tablet Take 1 tablet (10 mg total) by mouth once daily., Starting Fri 2/1/2019, Until Sat 2/1/2020, OTC      cholecalciferol, vitamin D3, (VITAMIN D3) 5,000 unit Tab Take 5,000 Units by mouth every morning., Historical Med      ergocalciferol (VITAMIN D2) 50,000 unit Cap Take 50,000 Units by mouth every 7 days., Historical Med      FLUoxetine 40 MG capsule Take 40 mg by mouth once daily., Historical Med      gabapentin (NEURONTIN) 300 MG capsule Take 1 capsule (300 mg total) by mouth every evening., Starting Thu 1/31/2019, No Print      hydroxychloroquine (PLAQUENIL) 200 mg tablet Take 1 tablet by mouth  nightly, Historical Med      loperamide (IMODIUM A-D) 2 mg Tab Take 2 mg by mouth 4 (four) times daily as needed (diarrhea)., Historical Med      medroxyPROGESTERone (DEPO-PROVERA) 150 mg/mL Syrg Inject 150 mg into the muscle every 3 (three) months. , Starting Sat 12/23/2017, Historical Med      mycophenolate mofetil (CELLCEPT ORAL) Take 1,000 mg by mouth 2 (two) times daily., Historical Med      ondansetron (ZOFRAN) 4 MG tablet Take 4 mg by mouth every 24 hours as needed for Nausea. , Starting Thu 1/18/2018, Historical Med      pantoprazole (PROTONIX) 40 MG tablet Take 1 tablet (40 mg total) by mouth once daily., Starting u 1/31/2019, No Print      potassium chloride SA (K-DUR,KLOR-CON) 20 MEQ tablet Take 20 mEq by mouth 2 (two) times daily., Historical Med      PREDNISONE ORAL Take 5 mg by mouth once daily. , Starting u 7/26/2018, Historical Med             Discharge Procedure Orders (must include Diet, Follow-up, Activity)   Discharge Procedure Orders (must include Diet, Follow-up, Activity)   Diet general     Call MD for:  temperature >100.4     Call MD for:  persistent nausea and vomiting     Call MD for:  severe uncontrolled pain     Call MD for:  difficulty breathing, headache or visual disturbances     Call MD for:  redness, tenderness, or signs of infection (pain, swelling, redness, odor or green/yellow discharge around incision site)     Call MD for:  hives     Call MD for:  persistent dizziness or light-headedness     Call MD for:  extreme fatigue     Remove dressing in 72 hours     Weight bearing restrictions (specify)   Order Comments: Toe Touch Weight bearing to Left lower extremity        Discharge Date: 9/12/2019  4:55 PM

## 2019-09-15 ENCOUNTER — NURSE TRIAGE (OUTPATIENT)
Dept: ADMINISTRATIVE | Facility: CLINIC | Age: 24
End: 2019-09-15

## 2019-09-15 NOTE — TELEPHONE ENCOUNTER
"Mom call concerned about daughter,patient  C/O painful throat, had procedure on Thursday  Rates pain on pain scale 6-7 pain    Advised per protocol    Reason for Disposition   [1] Sore throat is the only symptom AND [2] sore throat present < 48 hours    Additional Information   Negative: [1] Difficulty breathing AND [2] not severe   Negative: Fever > 104 F (40 C)   Negative: [1] Drinking very little AND [2] dehydration suspected (e.g., no urine > 12 hours, very dry mouth, very lightheaded)   Negative: [1] Refuses to drink anything AND [2] for > 12 hours   Negative: Patient sounds very sick or weak to the triager   Negative: SEVERE (e.g., excruciating) throat pain   Negative: [1] Pus on tonsils (back of throat) AND [2]  fever AND [3] swollen neck lymph nodes ("glands")   Negative: Earache also present   Negative: [1] Rash AND [2] widespread (especially chest and abdomen)   Negative: Fever present > 3 days (72 hours)   Negative: Diabetes mellitus or weak immune system (e.g., HIV positive, cancer chemo, splenectomy, organ transplant)   Negative: [1] Adult is leaving on a trip AND [2] requests an antibiotic NOW   Negative: [1] Positive throat culture or rapid strep test (according to lab, PCP, caller, etc.) AND [2] NO  standing order to call in prescription for antibiotic   Negative: [1] Exposure to family member (or spouse or boyfriend/girlfriend) with test-proven strep AND [2] within last 10 days   Negative: [1] Sore throat is the only symptom AND [2] present > 48 hours   Negative: [1] Sore throat with cough/cold symptoms AND [2] present > 5 days    Protocols used: SORE THROAT-A-AH      "

## 2019-09-16 DIAGNOSIS — Z76.82 ORGAN TRANSPLANT CANDIDATE: Primary | ICD-10-CM

## 2019-09-19 ENCOUNTER — TELEPHONE (OUTPATIENT)
Dept: SPORTS MEDICINE | Facility: CLINIC | Age: 24
End: 2019-09-19

## 2019-09-19 NOTE — TELEPHONE ENCOUNTER
Spoke to Fide. Faxed over documents requested ;Op Note  Fax [phone]  ----- Message from Jasmina Avalos sent at 9/19/2019  2:58 PM CDT -----  Contact: [facility]  Fide with [facility] called in regards to procedure that was done on 9/12, requesting information to be faxed over            Patient [phone] ext3   Fax [phone]

## 2019-09-23 ENCOUNTER — OFFICE VISIT (OUTPATIENT)
Dept: SPORTS MEDICINE | Facility: CLINIC | Age: 24
End: 2019-09-23
Payer: COMMERCIAL

## 2019-09-23 ENCOUNTER — HOSPITAL ENCOUNTER (OUTPATIENT)
Dept: RADIOLOGY | Facility: HOSPITAL | Age: 24
Discharge: HOME | End: 2019-09-23
Attending: PHYSICIAN ASSISTANT
Payer: COMMERCIAL

## 2019-09-23 VITALS
DIASTOLIC BLOOD PRESSURE: 95 MMHG | SYSTOLIC BLOOD PRESSURE: 135 MMHG | WEIGHT: 186 LBS | BODY MASS INDEX: 26.04 KG/M2 | HEIGHT: 71 IN | HEART RATE: 79 BPM

## 2019-09-23 DIAGNOSIS — M25.552 LEFT HIP PAIN: Primary | ICD-10-CM

## 2019-09-23 DIAGNOSIS — M87.052 AVASCULAR NECROSIS OF BONE OF HIP, LEFT: ICD-10-CM

## 2019-09-23 DIAGNOSIS — M25.552 LEFT HIP PAIN: ICD-10-CM

## 2019-09-23 PROCEDURE — 99024 PR POST-OP FOLLOW-UP VISIT: ICD-10-PCS | Mod: S$GLB,TXP,, | Performed by: PHYSICIAN ASSISTANT

## 2019-09-23 PROCEDURE — 73502 X-RAY EXAM HIP UNI 2-3 VIEWS: CPT | Mod: TC,FY,PO,LT,TXP

## 2019-09-23 PROCEDURE — 99999 PR PBB SHADOW E&M-EST. PATIENT-LVL III: CPT | Mod: PBBFAC,TXP,, | Performed by: PHYSICIAN ASSISTANT

## 2019-09-23 PROCEDURE — 73502 XR HIP 2 VIEW LEFT: ICD-10-PCS | Mod: 26,LT,TXP, | Performed by: RADIOLOGY

## 2019-09-23 PROCEDURE — 99999 PR PBB SHADOW E&M-EST. PATIENT-LVL III: ICD-10-PCS | Mod: PBBFAC,TXP,, | Performed by: PHYSICIAN ASSISTANT

## 2019-09-23 PROCEDURE — 99024 POSTOP FOLLOW-UP VISIT: CPT | Mod: S$GLB,TXP,, | Performed by: PHYSICIAN ASSISTANT

## 2019-09-23 PROCEDURE — 73502 X-RAY EXAM HIP UNI 2-3 VIEWS: CPT | Mod: 26,LT,TXP, | Performed by: RADIOLOGY

## 2019-09-23 RX ORDER — OXYCODONE AND ACETAMINOPHEN 10; 325 MG/1; MG/1
1 TABLET ORAL EVERY 12 HOURS PRN
Qty: 14 TABLET | Refills: 0 | Status: SHIPPED | OUTPATIENT
Start: 2019-09-23 | End: 2019-10-16 | Stop reason: SDUPTHER

## 2019-09-23 RX ORDER — PROMETHAZINE HYDROCHLORIDE 25 MG/1
25 TABLET ORAL EVERY 6 HOURS PRN
Qty: 30 TABLET | Refills: 0 | Status: SHIPPED | OUTPATIENT
Start: 2019-09-23 | End: 2019-10-23

## 2019-09-23 NOTE — PROGRESS NOTES
Subjective:          Chief Complaint: Anitha Swenson is a 24 y.o. female who had concerns including Post-op Evaluation and Pain of the Left Hip.    HPI    Patient presents to clinic for 2 week post op evaluation of left hip. Pain today is 2-3/10. Pain was 8/10 prior to surgery. She has been taking percocet 10mg one to two times a day as needed for pain. Has been experiencing nausea and vomiting and taking phenergan. She needs a refill on percocet and phenergan. Denies fever, chills, CP, SOB, and calf pain. She has been going to PT at [facility] 2x a week.     Surgery Date: 9/12/2019   Surgeon(s) and Role:     * Avery Blake MD - Primary  Assistant:  Benjie Jarrett MD- Fellow  [facility]     Pre-op Diagnosis:  Avascular necrosis of bone of left hip [M87.052]     Post-op Diagnosis: Post-Op Diagnosis Codes:     * Avascular necrosis of bone of left hip [M87.052]     Procedure(s) (LRB):  SUBCHONDROPLASTY,Femoral Head (Left)  DEBRIDEMENT, LABRUM, HIP, ARTHROSCOPIC,shaving of articular cartilage(chondroplasty,abrasion arthroplasty and/or labrum(includes labral repair) (Left)  Review of Systems   Constitution: Negative. Negative for chills, fever, weight gain and weight loss.   HENT: Negative for congestion and sore throat.    Eyes: Negative for blurred vision and double vision.   Cardiovascular: Negative for chest pain, leg swelling and palpitations.   Respiratory: Negative for cough and shortness of breath.    Hematologic/Lymphatic: Does not bruise/bleed easily.   Skin: Negative for itching, poor wound healing and rash.   Musculoskeletal: Positive for joint pain, joint swelling and stiffness. Negative for back pain, muscle weakness and myalgias.   Gastrointestinal: Negative for abdominal pain, constipation, diarrhea, nausea and vomiting.   Genitourinary: Negative.  Negative for frequency and hematuria.   Neurological: Negative for dizziness, headaches, numbness, paresthesias and sensory change.    Psychiatric/Behavioral: Negative for altered mental status and depression. The patient is not nervous/anxious.    Allergic/Immunologic: Negative for hives.                   Objective:        General: Anitha is well-developed, well-nourished, appears stated age, in no acute distress, alert and oriented to time, place and person.     General    Vitals reviewed.  Constitutional: She is oriented to person, place, and time. She appears well-developed and well-nourished. No distress.   Neck: Normal range of motion.   Cardiovascular: Normal rate and regular rhythm.    Pulmonary/Chest: Effort normal. No respiratory distress.   Neurological: She is alert and oriented to person, place, and time.   Psychiatric: She has a normal mood and affect. Her behavior is normal. Thought content normal.     General Musculoskeletal Exam   Gait: abnormal and antalgic         Left Knee Exam     Inspection   Alignment:  normal  Effusion: absentLeft Hip Exam     Inspection   Scars: present  Swelling: present  No deformity of hip.  Quadriceps Atrophy:  negative  Erythema: absent  Bruising: absent    Range of Motion   Extension:  0 normal   Flexion:  90 normal     Other   Sensation: normal    Comments:  Sutures not removed today. Incisions healing well. No signs of infection or necrosis. NVI. No purulent drainage.         RADIOGRAPHS TODAY:  Left hip:  FINDINGS:  Abnormal sclerosis and subchondral cystic lucency are identified within the left femoral head are identified, representing avascular necrosis.  This is a detrimental change since the 2014 examination, but was noted on the MR examination referenced above.  The MR exam also demonstrated avascular necrosis of the right femoral head, though this is much less evident on conventional radiography.  Contour of the femoral head on each side appears essentially unremarkable, without significant flattening.  No significant interval hip joint space narrowing is observed on either side.   Remaining osseous structures are intact, with no evidence of recent or healing fracture or lytic destructive process.  Peritoneal dialysis catheter is incidentally observed.          Assessment:       Encounter Diagnoses   Name Primary?    Left hip pain Yes    Avascular necrosis of bone of hip, left           Plan:       1. Provided patient with operative note.  2. Sutures left in place. No ready for removal today.  3. Refilled phenergan and Percocet  4. Continue  mg once a day.  5. Continue PT per protocol.  6. RTC in 1 weeks with Kiki Prieto PA-C for suture removal  7. PHYSICAL THERAPY: The patient should begin physical therapy in 3 to 5 days   following surgery with a full weightbearing status along the area of   concern, advancing the weightbearing as tolerated with use of the brace on the   left hip.                      Patient questionnaires may have been collected.

## 2019-09-26 ENCOUNTER — TELEPHONE (OUTPATIENT)
Dept: HEMATOLOGY/ONCOLOGY | Facility: CLINIC | Age: 24
End: 2019-09-26

## 2019-09-27 NOTE — TELEPHONE ENCOUNTER
Called to see if the pt had any labs done prior to f/u appt.    ANGELIC w/ the pt mothere  for the labs to be done @ [facility]

## 2019-09-30 ENCOUNTER — OFFICE VISIT (OUTPATIENT)
Dept: SPORTS MEDICINE | Facility: CLINIC | Age: 24
End: 2019-09-30
Payer: COMMERCIAL

## 2019-09-30 ENCOUNTER — OFFICE VISIT (OUTPATIENT)
Dept: HEMATOLOGY/ONCOLOGY | Facility: CLINIC | Age: 24
End: 2019-09-30
Payer: COMMERCIAL

## 2019-09-30 ENCOUNTER — LAB VISIT (OUTPATIENT)
Dept: LAB | Facility: HOSPITAL | Age: 24
End: 2019-09-30
Attending: INTERNAL MEDICINE
Payer: COMMERCIAL

## 2019-09-30 VITALS
WEIGHT: 189.88 LBS | SYSTOLIC BLOOD PRESSURE: 117 MMHG | HEART RATE: 112 BPM | TEMPERATURE: 98 F | BODY MASS INDEX: 26.49 KG/M2 | RESPIRATION RATE: 20 BRPM | DIASTOLIC BLOOD PRESSURE: 88 MMHG

## 2019-09-30 VITALS
HEART RATE: 111 BPM | DIASTOLIC BLOOD PRESSURE: 103 MMHG | WEIGHT: 186 LBS | SYSTOLIC BLOOD PRESSURE: 148 MMHG | BODY MASS INDEX: 26.04 KG/M2 | HEIGHT: 71 IN

## 2019-09-30 DIAGNOSIS — Z15.89 COMPOUND HETEROZYGOUS MTHFR MUTATION C677T/A1298C: ICD-10-CM

## 2019-09-30 DIAGNOSIS — D63.1 ANEMIA IN CHRONIC KIDNEY DISEASE, UNSPECIFIED CKD STAGE: ICD-10-CM

## 2019-09-30 DIAGNOSIS — K92.2 GASTROINTESTINAL HEMORRHAGE, UNSPECIFIED GASTROINTESTINAL HEMORRHAGE TYPE: ICD-10-CM

## 2019-09-30 DIAGNOSIS — M87.052 AVASCULAR NECROSIS OF BONE OF HIP, LEFT: Primary | ICD-10-CM

## 2019-09-30 DIAGNOSIS — D50.0 ANEMIA DUE TO CHRONIC BLOOD LOSS: Primary | ICD-10-CM

## 2019-09-30 DIAGNOSIS — I26.99 OTHER PULMONARY EMBOLISM WITHOUT ACUTE COR PULMONALE, UNSPECIFIED CHRONICITY: ICD-10-CM

## 2019-09-30 DIAGNOSIS — D64.89 ANEMIA DUE TO MULTIPLE MECHANISMS: ICD-10-CM

## 2019-09-30 DIAGNOSIS — R79.89 ELEVATED HOMOCYSTEINE: ICD-10-CM

## 2019-09-30 DIAGNOSIS — D50.0 ANEMIA DUE TO CHRONIC BLOOD LOSS: ICD-10-CM

## 2019-09-30 DIAGNOSIS — N18.9 ANEMIA IN CHRONIC KIDNEY DISEASE, UNSPECIFIED CKD STAGE: ICD-10-CM

## 2019-09-30 LAB
ALBUMIN SERPL BCP-MCNC: 3.3 G/DL (ref 3.5–5.2)
ALP SERPL-CCNC: 57 U/L (ref 55–135)
ALT SERPL W/O P-5'-P-CCNC: 7 U/L (ref 10–44)
ANION GAP SERPL CALC-SCNC: 14 MMOL/L (ref 8–16)
AST SERPL-CCNC: 16 U/L (ref 10–40)
BASOPHILS # BLD AUTO: 0.08 K/UL (ref 0–0.2)
BASOPHILS NFR BLD: 1 % (ref 0–1.9)
BILIRUB SERPL-MCNC: 0.6 MG/DL (ref 0.1–1)
BUN SERPL-MCNC: 37 MG/DL (ref 6–20)
CALCIUM SERPL-MCNC: 8.6 MG/DL (ref 8.7–10.5)
CHLORIDE SERPL-SCNC: 105 MMOL/L (ref 95–110)
CO2 SERPL-SCNC: 17 MMOL/L (ref 23–29)
CREAT SERPL-MCNC: 9 MG/DL (ref 0.5–1.4)
DIFFERENTIAL METHOD: ABNORMAL
EOSINOPHIL # BLD AUTO: 0 K/UL (ref 0–0.5)
EOSINOPHIL NFR BLD: 0 % (ref 0–8)
ERYTHROCYTE [DISTWIDTH] IN BLOOD BY AUTOMATED COUNT: 14.1 % (ref 11.5–14.5)
EST. GFR  (AFRICAN AMERICAN): 6.4 ML/MIN/1.73 M^2
EST. GFR  (NON AFRICAN AMERICAN): 5.6 ML/MIN/1.73 M^2
FERRITIN SERPL-MCNC: 808 NG/ML (ref 20–300)
FOLATE SERPL-MCNC: 8.1 NG/ML (ref 4–24)
GLUCOSE SERPL-MCNC: 89 MG/DL (ref 70–110)
HCT VFR BLD AUTO: 31.1 % (ref 37–48.5)
HGB BLD-MCNC: 9 G/DL (ref 12–16)
IMM GRANULOCYTES # BLD AUTO: 0.28 K/UL (ref 0–0.04)
IMM GRANULOCYTES NFR BLD AUTO: 3.6 % (ref 0–0.5)
IRON SERPL-MCNC: 133 UG/DL (ref 30–160)
LYMPHOCYTES # BLD AUTO: 0.7 K/UL (ref 1–4.8)
LYMPHOCYTES NFR BLD: 9 % (ref 18–48)
MCH RBC QN AUTO: 28 PG (ref 27–31)
MCHC RBC AUTO-ENTMCNC: 28.9 G/DL (ref 32–36)
MCV RBC AUTO: 97 FL (ref 82–98)
MONOCYTES # BLD AUTO: 0.6 K/UL (ref 0.3–1)
MONOCYTES NFR BLD: 7.6 % (ref 4–15)
NEUTROPHILS # BLD AUTO: 6.2 K/UL (ref 1.8–7.7)
NEUTROPHILS NFR BLD: 78.8 % (ref 38–73)
NRBC BLD-RTO: 0 /100 WBC
PLATELET # BLD AUTO: 224 K/UL (ref 150–350)
PMV BLD AUTO: 10.2 FL (ref 9.2–12.9)
POTASSIUM SERPL-SCNC: 4.1 MMOL/L (ref 3.5–5.1)
PROT SERPL-MCNC: 5.9 G/DL (ref 6–8.4)
RBC # BLD AUTO: 3.21 M/UL (ref 4–5.4)
SATURATED IRON: 73 % (ref 20–50)
SODIUM SERPL-SCNC: 136 MMOL/L (ref 136–145)
TOTAL IRON BINDING CAPACITY: 181 UG/DL (ref 250–450)
TRANSFERRIN SERPL-MCNC: 129 MG/DL (ref 200–375)
VIT B12 SERPL-MCNC: 1149 PG/ML (ref 210–950)
WBC # BLD AUTO: 7.86 K/UL (ref 3.9–12.7)

## 2019-09-30 PROCEDURE — 99213 PR OFFICE/OUTPT VISIT, EST, LEVL III, 20-29 MIN: ICD-10-PCS | Mod: S$GLB,,, | Performed by: INTERNAL MEDICINE

## 2019-09-30 PROCEDURE — 99999 PR PBB SHADOW E&M-EST. PATIENT-LVL IV: ICD-10-PCS | Mod: PBBFAC,TXP,, | Performed by: PHYSICIAN ASSISTANT

## 2019-09-30 PROCEDURE — 82746 ASSAY OF FOLIC ACID SERUM: CPT

## 2019-09-30 PROCEDURE — 99024 POSTOP FOLLOW-UP VISIT: CPT | Mod: S$GLB,TXP,, | Performed by: PHYSICIAN ASSISTANT

## 2019-09-30 PROCEDURE — 3008F BODY MASS INDEX DOCD: CPT | Mod: S$GLB,,, | Performed by: INTERNAL MEDICINE

## 2019-09-30 PROCEDURE — 83540 ASSAY OF IRON: CPT

## 2019-09-30 PROCEDURE — 3008F PR BODY MASS INDEX (BMI) DOCUMENTED: ICD-10-PCS | Mod: S$GLB,,, | Performed by: INTERNAL MEDICINE

## 2019-09-30 PROCEDURE — 99024 PR POST-OP FOLLOW-UP VISIT: ICD-10-PCS | Mod: S$GLB,TXP,, | Performed by: PHYSICIAN ASSISTANT

## 2019-09-30 PROCEDURE — 36415 COLL VENOUS BLD VENIPUNCTURE: CPT

## 2019-09-30 PROCEDURE — 82728 ASSAY OF FERRITIN: CPT

## 2019-09-30 PROCEDURE — 85025 COMPLETE CBC W/AUTO DIFF WBC: CPT

## 2019-09-30 PROCEDURE — 99213 OFFICE O/P EST LOW 20 MIN: CPT | Mod: S$GLB,,, | Performed by: INTERNAL MEDICINE

## 2019-09-30 PROCEDURE — 80053 COMPREHEN METABOLIC PANEL: CPT

## 2019-09-30 PROCEDURE — 99999 PR PBB SHADOW E&M-EST. PATIENT-LVL IV: CPT | Mod: PBBFAC,TXP,, | Performed by: PHYSICIAN ASSISTANT

## 2019-09-30 PROCEDURE — 82607 VITAMIN B-12: CPT

## 2019-09-30 RX ORDER — OXYCODONE AND ACETAMINOPHEN 10; 325 MG/1; MG/1
1 TABLET ORAL EVERY 12 HOURS PRN
Qty: 14 TABLET | Refills: 0 | Status: ON HOLD | OUTPATIENT
Start: 2019-09-30 | End: 2019-11-08 | Stop reason: ALTCHOICE

## 2019-09-30 RX ORDER — PROMETHAZINE HYDROCHLORIDE 25 MG/1
25 TABLET ORAL EVERY 6 HOURS PRN
Qty: 30 TABLET | Refills: 0 | Status: SHIPPED | OUTPATIENT
Start: 2019-09-30 | End: 2019-10-16 | Stop reason: SDUPTHER

## 2019-09-30 NOTE — PROGRESS NOTES
[facility]  PROGRESS NOTE      Subjective:       Patient ID:   NAME: Anitha Swenson : 1995     24 y.o. female    Referring Doc: Radha (peds)  Other Physicians:  Keerthi Camarillo; Alfred Small    Chief Complaint:  Anemia f/u    History of Present Illness:     Patient returns today for a regularly scheduled follow-up visit.  She was been on steroids with 5mg daily.  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 She has been followed by  Dr Pendleton with nephrology and she has been on peritoneal dialysis. She denies any CP, SOB, HA's or N/V. She is healing well from surgery and uses a crutch to ambulate. She is back in school.     ROS:   GEN: normal without any fever, night sweats or weight loss  HEENT: normal with no HA's, sore throat, stiff neck, changes in vision  CV: normal with no CP, SOB, PND, BOOGIE or orthopnea  PULM: normal with no SOB, cough, hemoptysis, sputum or pleuritic pain  GI: normal with no abdominal pain, nausea, vomiting, constipation, diarrhea, melanotic stools, BRBPR, or hematemesis  : normal with no hematuria, dysuria  BREAST: normal with no mass, discharge, pain  SKIN: normal with no rash, erythema, bruising, or swelling    Allergies:  Review of patient's allergies indicates:   Allergen Reactions    Sulfur        Medications:    Current Outpatient Medications:     acetaminophen (TYLENOL) 325 MG tablet, Take 325 mg by mouth every 6 (six) hours as needed for Pain., Disp: , Rfl:     BENLYSTA 200 mg/mL AtIn, Inject 1 mL (200 mg total) as directed Every Friday. HOLD UNTIL TOLD TO RESUME BY RHEUMATOLOGIST, Disp: , Rfl:     calcium acetate (PHOSLO) 667 mg capsule, Take 667 mg by mouth 3 (three) times daily., Disp: , Rfl:     carvedilol (COREG) 25 MG tablet, Take 25 mg by mouth 2 (two) times daily with meals., Disp: , Rfl:     ergocalciferol (VITAMIN D2) 50,000 unit Cap, Take 50,000 Units by  mouth every 7 days., Disp: , Rfl:     FLUoxetine 40 MG capsule, Take 40 mg by mouth once daily., Disp: , Rfl:     gabapentin (NEURONTIN) 300 MG capsule, Take 1 capsule (300 mg total) by mouth every evening. (Patient taking differently: Take 300 mg by mouth 3 (three) times daily. ), Disp: , Rfl: 0    hydroxychloroquine (PLAQUENIL) 200 mg tablet, Take 1 tablet by mouth nightly, Disp: , Rfl: 6    loperamide (IMODIUM A-D) 2 mg Tab, Take 2 mg by mouth 4 (four) times daily as needed (diarrhea)., Disp: , Rfl:     medroxyPROGESTERone (DEPO-PROVERA) 150 mg/mL Syrg, Inject 150 mg into the muscle every 3 (three) months. , Disp: , Rfl:     mycophenolate mofetil (CELLCEPT ORAL), Take 1,000 mg by mouth 2 (two) times daily., Disp: , Rfl:     ondansetron (ZOFRAN) 4 MG tablet, Take 4 mg by mouth every 24 hours as needed for Nausea. , Disp: , Rfl:     oxyCODONE-acetaminophen (PERCOCET)  mg per tablet, Take 1 tablet by mouth every 12 (twelve) hours as needed., Disp: 14 tablet, Rfl: 0    oxyCODONE-acetaminophen (PERCOCET)  mg per tablet, Take 1 tablet by mouth every 12 (twelve) hours as needed., Disp: 14 tablet, Rfl: 0    pantoprazole (PROTONIX) 40 MG tablet, Take 1 tablet (40 mg total) by mouth once daily. (Patient taking differently: Take 40 mg by mouth 2 (two) times daily. ), Disp: , Rfl:     potassium chloride SA (K-DUR,KLOR-CON) 20 MEQ tablet, Take 20 mEq by mouth 2 (two) times daily., Disp: , Rfl:     PREDNISONE ORAL, Take 5 mg by mouth once daily. , Disp: , Rfl: 0    promethazine (PHENERGAN) 25 MG tablet, Take 1 tablet (25 mg total) by mouth every 6 (six) hours as needed., Disp: 30 tablet, Rfl: 0    promethazine (PHENERGAN) 25 MG tablet, Take 1 tablet (25 mg total) by mouth every 6 (six) hours as needed., Disp: 30 tablet, Rfl: 0    aspirin 325 MG tablet, Take 1 tablet at lunch daily starting after surgery for 6 weeks to prevent DVT., Disp: 42 tablet, Rfl: 0    cetirizine (ZYRTEC) 10 MG tablet, Take 1  tablet (10 mg total) by mouth once daily., Disp: , Rfl: 0    cholecalciferol, vitamin D3, (VITAMIN D3) 5,000 unit Tab, Take 5,000 Units by mouth every morning., Disp: , Rfl:     traMADol (ULTRAM) 50 mg tablet, Take 1 tablet (50 mg total) by mouth every 6 (six) hours., Disp: 42 tablet, Rfl: 0    Current Facility-Administered Medications:     cyanocobalamin injection 1,000 mcg, 1,000 mcg, Subcutaneous, Q30 Days, Sukhjinder Goodwin MD, 1,000 mcg at 03/13/18 1430    PMHx/PSHx Updates:  See patient's last visit with me on 5/20/2019.  See H&P on 2/8/2018        Pathology:  Bone Marrow biopsy 7/19/2018:    FINAL DIAGNOSIS:  PERIPHERAL BLOOD:   PANCYTOPENIA:   SEVERE LEUKOPENIA WITH ABSOLUTE NEUTROPENIA AND SLIGHT ABSOLUTE  LYMPHOPENIA.   MILD NORMOCHROMIC NORMOCYTIC ANEMIA AND MILD PERIPHERAL THROMBOCYTOPENIA.    BONE MARROW ASPIRATE, BIOPSY AND CLOT SECTION:   CELLULARITY: 40% ON THE CLOT, 40-50% ON THE BIOPSY.   HYPOCELLULAR MARROW FOR THE PATIENT'S AGE.   TRILINEAGE HEMATOPOEITIC ACTIVITY WITH MARKED MEGALOBLASTIC ERYTHROID HYPERPLASIA.   RELATIVELY MARKED DECREASED LEFT SHIFTED MYELOID MATURATION TO IMMATURE FORMS AND DYSPOIESIS.   NO INCREASED IN CD34+ MYELOID BLASTS (1%) BY FLOW CYTOMETRY.   ADEQUATE MEGAKARYOCYTES WITH A FEW ATYPICAL HYPOLOBULATED FORMS.   ADEQUATE TO MILDLY INCREASED IRON STORES.   SPECIAL STAINS FOR AFB AND FUNGI ARE NEGATIVE.    COMMENT: In view of the patient's clinical history, the peripheral  blood and bone marrow findings are suggestive of a secondary process,  that is, post-treatment megaloblastic anemia in view of the patient's  history of vitamin B12 deficiency.  Other possible etiologies for the  patient's leukopenia would include immunosuppressive drugs/toxin  effect, folate deficiency or chronic autoimmune disease.    Objective:     Vitals:  Blood pressure 117/88, pulse (!) 112, temperature 98.1 °F (36.7 °C), temperature source Axillary, resp. rate 20, weight 86.1 kg (189 lb 14.4  oz).    Physical Examination:   GEN: no apparent distress, comfortable; AAOx3  HEAD: atraumatic and normocephalic; general swelling form Cushings  EYES: no pallor, no icterus, PERRLA  ENT: OMM, no pharyngeal erythema, external ears WNL; no nasal discharge; no thrush  NECK: no masses, thyroid normal, trachea midline, no LAD/LN's, supple  CV: RRR with no murmur; normal pulse; normal S1 and S2; mild pedal swelling  CHEST: Normal respiratory effort; CTAB; normal breath sounds; no wheeze or crackles  ABDOM: nontender and nondistended; soft; normal bowel sounds; no rebound/guarding  MUSC/Skeletal: ROM normal; no crepitus; joints normal; no deformities or arthropathy  EXTREM: no clubbing, cyanosis, bilateral LE swelling much improved  SKIN: no rashes, lesions, ulcers, petechiae or subcutaneous nodules; no current rash of erythema; + bandage on right knee  : no preciado  NEURO: grossly intact; motor/sensory WNL; AAOx3; no tremors  PSYCH: normal mood, affect and behavior  LYMPH: normal cervical, supraclavicular, axillary and groin LN's            Labs:       9/30/2019    Lab Results   Component Value Date    WBC 7.86 09/30/2019    HGB 9.0 (L) 09/30/2019    HCT 31.1 (L) 09/30/2019    MCV 97 09/30/2019     09/30/2019     Labs from today that are still pending;  CMP  IRON  B12/folate        Radiology/Diagnostic Studies:    Head CT  5/9/2019 - negative    MRI head  5/10/2019  Normal    CXR 5/9/2019 stable            Head CT 4/3/2018    I have reviewed all available lab results and radiology reports.    Assessment/Plan:     (1) 24 y.o. female with diagnosis of SLE/lupus with recent admission at [facility] for HTN crisis, renal failure and anasarca.  She was seen by hematology a consult for evaluation of her anemia.  - suspected multifactorial process with anemia of chronic disorders, lupus mediated anemia, anemia of renal disease and prior GI bleed component; could also have marrow suppression from the cellcept  - seen by   [place] with GI for stool OBS positive studies previously  - bili and LDH were normal so no hemolysis was suspected      Anemia of chronic disorders and anemia of chronic renal:  - latest hgb is at 9.0    - recent bone marrow biopsy on 7/19/2018: In view of the patient's clinical history, the peripheral blood and bone marrow findings are suggestive of a secondary process, that is, post-treatment megaloblastic anemia in view of the patient's history of vitamin B12 deficiency.  Other possible etiologies for the patient's leukopenia would include immunosuppressive drugs/toxin effect, folate deficiency or chronic autoimmune disease.   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 - followed by Dr Pendleton with nephrology  - she had kidney biopsy couple of weeks ago which seemed to show improved findings per patient  - seeing Dr Pendleton   - now on peritoneal dialysis and plans to go on the kidney transplant list - sees the transplant team Oct 16th at [facility]    (3) SLE/Lupus - followed by Dr Rohan Lanza with Rheum previously; she is on cellcept and prednisone; she is seeing Dr Alfred Small in [place] now  - now on Benlysta infusions for the Lupus     (4) Questionable pulmonary emboli with indeterminant VQ - 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  - clot workup was ordered while in hospital: ATIII was 135, prothrombin II was normal;  Factor V leiden was negative; LA was negative; protein C and S were adequate; antiphospholipid and anticardiolipin negative;   - homocysteine was a little elevated at 16.8  - she is now off the eliquis for about 3 weeks per nephrology's directives  - MTHFR A and C genes were both abnormal and heterozygous  - discussed genetic implications    (5) PTSD     (6) B12 deficiency with prior level at 240 - she  has been on B12 supplements; latest level improved to 503     (7) Prior Leucocytosis and leucopenia issues    - prior leukemia screen was negative; she is on steroids  - wbc was 7.86 and normal currently    (8) Low Vit D - I will defer to discretion of Dr Pendleton    (9) Prior admit for candidal esophagitis    (10) recent MRSA Pneumonia and C. Diff:   - recent admission at [facility] with MRSA pneumonia and bacteremia  - she was seen by Dr An with ID    (11) Thrombocytopenia - most likely due to autoimmune disorder itself and/or the  immunosuppressive therapy with subsequent marrow suppression  - latest platelet count at 224,000    (12) Mildly elevated ferritin with latest level at 684 - suspect that this is a reactive process         1. Anemia due to chronic blood loss     2. Anemia due to multiple mechanisms     3. Anemia in chronic kidney disease, unspecified CKD stage     4. Other pulmonary embolism without acute cor pulmonale, unspecified chronicity     5. Compound heterozygous MTHFR mutation [id number]     6. Elevated homocysteine     7. Gastrointestinal hemorrhage, unspecified gastrointestinal hemorrhage type         PLAN:  1. F/u with Dr Small with Rheum  2. F/u with Dr Pendleton with neph, Dr An with ID  3. Encouraged compliance with labs  4. Check labs once a month  5. She should be a candidate fr procrit or aranesp - will leave up to discretion of Dr Pendleton  6. Proceed with kidney transplant team evaluation on Oct 16th  7. Await pending labs      RTC in 3-4 months  Fax note to Alfred Garcia; Marquise; Nataliya Pendleton; Clarence CHANEL    Discussion:     I have explained all of the above in detail and the patient understands all of the current recommendation(s). I have answered all of their questions to the best of my ability and to their complete satisfaction.   The patient is to continue with the current management plan.            Electronically signed by Sukhjinder Goodwin MD

## 2019-09-30 NOTE — PROGRESS NOTES
Subjective:          Chief Complaint: Anitha Swenson is a 24 y.o. female who had concerns including Post-op Evaluation of the Left Hip.    HPI  Patient presents to clinic for 2.5 week post op evaluation of left hip. Here today for suture removal. Pain today is 2-3/10. Pain was 8/10 prior to surgery. She has been taking percocet 10mg one to two times a day as needed for pain. She needs a refill on pain medication. Has been experiencing nausea and vomiting and taking phenergan. Denies fever, chills, CP, SOB, and calf pain. She has been going to PT at [facility] 2x a week.     Surgery Date: 9/12/2019   Surgeon(s) and Role:     * Avery Blake MD - Primary  Assistant:  Benjie Jarrett MD- Fellow  SMA Nicky     Pre-op Diagnosis:  Avascular necrosis of bone of left hip [M87.052]     Post-op Diagnosis: Post-Op Diagnosis Codes:     * Avascular necrosis of bone of left hip [M87.052]     Procedure(s) (LRB):  SUBCHONDROPLASTY,Femoral Head (Left)  DEBRIDEMENT, LABRUM, HIP, ARTHROSCOPIC,shaving of articular cartilage(chondroplasty,abrasion arthroplasty and/or labrum(includes labral repair) (Left)  Review of Systems   Constitution: Negative. Negative for chills, fever, weight gain and weight loss.   HENT: Negative for congestion and sore throat.    Eyes: Negative for blurred vision and double vision.   Cardiovascular: Negative for chest pain, leg swelling and palpitations.   Respiratory: Negative for cough and shortness of breath.    Hematologic/Lymphatic: Does not bruise/bleed easily.   Skin: Negative for itching, poor wound healing and rash.   Musculoskeletal: Positive for joint pain, joint swelling and stiffness. Negative for back pain, muscle weakness and myalgias.   Gastrointestinal: Negative for abdominal pain, constipation, diarrhea, nausea and vomiting.   Genitourinary: Negative.  Negative for frequency and hematuria.   Neurological: Negative for dizziness, headaches, numbness, paresthesias and sensory  change.   Psychiatric/Behavioral: Negative for altered mental status and depression. The patient is not nervous/anxious.    Allergic/Immunologic: Negative for hives.                   Objective:        General: Anitha is well-developed, well-nourished, appears stated age, in no acute distress, alert and oriented to time, place and person.     General    Vitals reviewed.  Constitutional: She is oriented to person, place, and time. She appears well-developed and well-nourished. No distress.   Neck: Normal range of motion.   Cardiovascular: Normal rate and regular rhythm.    Pulmonary/Chest: Effort normal. No respiratory distress.   Neurological: She is alert and oriented to person, place, and time.   Psychiatric: She has a normal mood and affect. Her behavior is normal. Thought content normal.     General Musculoskeletal Exam   Gait: abnormal and antalgic         Left Knee Exam     Inspection   Alignment:  normal  Effusion: absentLeft Hip Exam     Inspection   Scars: present  Swelling: present  No deformity of hip.  Quadriceps Atrophy:  negative  Erythema: absent  Bruising: absent    Range of Motion   The patient has normal left hip ROM.  Abduction: 40   Extension:  0 normal   Flexion:  120 normal   External rotation:  60 normal   Internal rotation: 30 normal     Other   Sensation: normal    Comments:  Sutures removed today. Incisions healing well. No signs of infection or necrosis. NVI. No purulent drainage.         RADIOGRAPHS TODAY:  Left hip:  FINDINGS:  Abnormal sclerosis and subchondral cystic lucency are identified within the left femoral head are identified, representing avascular necrosis.  This is a detrimental change since the 2014 examination, but was noted on the MR examination referenced above.  The MR exam also demonstrated avascular necrosis of the right femoral head, though this is much less evident on conventional radiography.  Contour of the femoral head on each side appears essentially  unremarkable, without significant flattening.  No significant interval hip joint space narrowing is observed on either side.  Remaining osseous structures are intact, with no evidence of recent or healing fracture or lytic destructive process.  Peritoneal dialysis catheter is incidentally observed.        Assessment:       Encounter Diagnosis   Name Primary?    Avascular necrosis of bone of hip, left Yes          Plan:       1. Sutures removed. No signs of infection.  2. Continue to wean off percocet  3. Continue  mg once a day.  4. Continue PT per protocol.  5. RTC in 4 weeks with Dr. Avery Blake for 6 week post op  6. PHYSICAL THERAPY: The patient should begin physical therapy in 3 to 5 days   following surgery with a full weightbearing status along the area of   concern, advancing the weightbearing as tolerated with use of the brace on the   left hip.  7. Refilled pain medication.                     Patient questionnaires may have been collected.

## 2019-10-07 DIAGNOSIS — F32.A ANXIETY AND DEPRESSION: Primary | ICD-10-CM

## 2019-10-07 DIAGNOSIS — F41.9 ANXIETY AND DEPRESSION: Primary | ICD-10-CM

## 2019-10-07 RX ORDER — FLUOXETINE HYDROCHLORIDE 40 MG/1
40 CAPSULE ORAL 2 TIMES DAILY
Qty: 60 CAPSULE | Refills: 5 | Status: SHIPPED | OUTPATIENT
Start: 2019-10-07

## 2019-10-07 RX ORDER — FLUOXETINE HYDROCHLORIDE 40 MG/1
40 CAPSULE ORAL DAILY
Qty: 30 CAPSULE | Refills: 1 | OUTPATIENT
Start: 2019-10-07

## 2019-10-07 NOTE — TELEPHONE ENCOUNTER
----- Message from Colleen Ornelas sent at 10/7/2019  8:44 AM CDT -----  Pt is needing refills on sluoxetin pharm [facility] on [address]   Pt [facility] [place] [phone]

## 2019-10-14 ENCOUNTER — TELEPHONE (OUTPATIENT)
Dept: FAMILY MEDICINE | Facility: CLINIC | Age: 24
End: 2019-10-14

## 2019-10-14 ENCOUNTER — TELEPHONE (OUTPATIENT)
Dept: SPORTS MEDICINE | Facility: CLINIC | Age: 24
End: 2019-10-14

## 2019-10-14 NOTE — TELEPHONE ENCOUNTER
----- Message from Mary Gordon sent at 10/14/2019  3:53 PM CDT -----  Contact: Anitha  The patient went to her nephrologist. She told him she has been having a lot of diarrhea. He told her to call her PCP and see if we will do a stool test . pts # [phone]

## 2019-10-16 ENCOUNTER — TELEPHONE (OUTPATIENT)
Dept: TRANSPLANT | Facility: CLINIC | Age: 24
End: 2019-10-16

## 2019-10-16 ENCOUNTER — OFFICE VISIT (OUTPATIENT)
Dept: TRANSPLANT | Facility: CLINIC | Age: 24
End: 2019-10-16
Payer: COMMERCIAL

## 2019-10-16 ENCOUNTER — CLINICAL SUPPORT (OUTPATIENT)
Dept: INFECTIOUS DISEASES | Facility: CLINIC | Age: 24
End: 2019-10-16
Payer: COMMERCIAL

## 2019-10-16 ENCOUNTER — HOSPITAL ENCOUNTER (OUTPATIENT)
Dept: RADIOLOGY | Facility: HOSPITAL | Age: 24
Discharge: HOME | End: 2019-10-16
Attending: NURSE PRACTITIONER
Payer: COMMERCIAL

## 2019-10-16 VITALS
HEIGHT: 68 IN | DIASTOLIC BLOOD PRESSURE: 104 MMHG | WEIGHT: 188.25 LBS | HEART RATE: 88 BPM | TEMPERATURE: 98 F | SYSTOLIC BLOOD PRESSURE: 146 MMHG | OXYGEN SATURATION: 100 % | BODY MASS INDEX: 28.53 KG/M2 | RESPIRATION RATE: 16 BRPM

## 2019-10-16 DIAGNOSIS — Z76.82 ORGAN TRANSPLANT CANDIDATE: ICD-10-CM

## 2019-10-16 DIAGNOSIS — Z99.2 ESRD ON PERITONEAL DIALYSIS: ICD-10-CM

## 2019-10-16 DIAGNOSIS — I10 ESSENTIAL HYPERTENSION: ICD-10-CM

## 2019-10-16 DIAGNOSIS — M32.14 LUPUS NEPHRITIS: ICD-10-CM

## 2019-10-16 DIAGNOSIS — Z23 NEED FOR VARICELLA VACCINE: ICD-10-CM

## 2019-10-16 DIAGNOSIS — Z15.89 COMPOUND HETEROZYGOUS MTHFR MUTATION C677T/A1298C: ICD-10-CM

## 2019-10-16 DIAGNOSIS — N18.6 ESRD ON PERITONEAL DIALYSIS: ICD-10-CM

## 2019-10-16 DIAGNOSIS — Z09 SURGERY FOLLOW-UP: ICD-10-CM

## 2019-10-16 DIAGNOSIS — D84.9 IMMUNOCOMPROMISED: ICD-10-CM

## 2019-10-16 DIAGNOSIS — N18.6 ESRD (END STAGE RENAL DISEASE): Primary | ICD-10-CM

## 2019-10-16 PROCEDURE — 99204 OFFICE O/P NEW MOD 45 MIN: CPT | Mod: S$GLB,TXP,, | Performed by: INTERNAL MEDICINE

## 2019-10-16 PROCEDURE — 90715 TDAP VACCINE GREATER THAN OR EQUAL TO 7YO IM: ICD-10-PCS | Mod: S$GLB,TXP,, | Performed by: INTERNAL MEDICINE

## 2019-10-16 PROCEDURE — 71046 X-RAY EXAM CHEST 2 VIEWS: CPT | Mod: TC,TXP

## 2019-10-16 PROCEDURE — 99204 OFFICE O/P NEW MOD 45 MIN: CPT | Mod: S$GLB,TXP,, | Performed by: TRANSPLANT SURGERY

## 2019-10-16 PROCEDURE — 3008F BODY MASS INDEX DOCD: CPT | Mod: CPTII,S$GLB,TXP, | Performed by: INTERNAL MEDICINE

## 2019-10-16 PROCEDURE — 3008F PR BODY MASS INDEX (BMI) DOCUMENTED: ICD-10-PCS | Mod: CPTII,S$GLB,TXP, | Performed by: INTERNAL MEDICINE

## 2019-10-16 PROCEDURE — 71046 XR CHEST PA AND LATERAL: ICD-10-PCS | Mod: 26,TXP,, | Performed by: RADIOLOGY

## 2019-10-16 PROCEDURE — 72170 XR PELVIS ROUTINE AP: ICD-10-PCS | Mod: 26,TXP,, | Performed by: RADIOLOGY

## 2019-10-16 PROCEDURE — 99204 PR OFFICE/OUTPT VISIT, NEW, LEVL IV, 45-59 MIN: ICD-10-PCS | Mod: S$GLB,TXP,, | Performed by: INTERNAL MEDICINE

## 2019-10-16 PROCEDURE — 99999 PR PBB SHADOW E&M-EST. PATIENT-LVL IV: ICD-10-PCS | Mod: PBBFAC,TXP,, | Performed by: INTERNAL MEDICINE

## 2019-10-16 PROCEDURE — 99204 PR OFFICE/OUTPT VISIT, NEW, LEVL IV, 45-59 MIN: ICD-10-PCS | Mod: S$GLB,TXP,, | Performed by: TRANSPLANT SURGERY

## 2019-10-16 PROCEDURE — 71046 X-RAY EXAM CHEST 2 VIEWS: CPT | Mod: 26,TXP,, | Performed by: RADIOLOGY

## 2019-10-16 PROCEDURE — 72170 X-RAY EXAM OF PELVIS: CPT | Mod: 26,TXP,, | Performed by: RADIOLOGY

## 2019-10-16 PROCEDURE — 90471 IMMUNIZATION ADMIN: CPT | Mod: S$GLB,TXP,, | Performed by: INTERNAL MEDICINE

## 2019-10-16 PROCEDURE — 90471 PR IMMUNIZ ADMIN,1 SINGLE/COMB VAC/TOXOID: ICD-10-PCS | Mod: S$GLB,TXP,, | Performed by: INTERNAL MEDICINE

## 2019-10-16 PROCEDURE — 72170 X-RAY EXAM OF PELVIS: CPT | Mod: TC,TXP

## 2019-10-16 PROCEDURE — 99999 PR PBB SHADOW E&M-EST. PATIENT-LVL IV: CPT | Mod: PBBFAC,TXP,, | Performed by: INTERNAL MEDICINE

## 2019-10-16 PROCEDURE — 90715 TDAP VACCINE 7 YRS/> IM: CPT | Mod: S$GLB,TXP,, | Performed by: INTERNAL MEDICINE

## 2019-10-16 RX ORDER — METHYLPHENIDATE HYDROCHLORIDE 5 MG/1
5 TABLET ORAL 2 TIMES DAILY
Status: ON HOLD | COMMUNITY
End: 2019-11-08 | Stop reason: CLARIF

## 2019-10-16 NOTE — PROGRESS NOTES
INITIAL PATIENT EDUCATION NOTE    Ms. Anitha Swenson was seen in pre-kidney transplant clinic for evaluation for kidney, kidney/pancreas or pancreas only transplant.  The patient attended a group education session that discussed/reviewed the following aspects of transplantation: evaluation and selection committee process, UNOS waitlist management/multiple listings, types of organs offered (KDPI < 85%, KDPI > 85%, PHS increased risk, DCD, HCV+), financial aspects, surgical procedures, dietary instruction pre- and post-transplant, health maintenance pre- and post-transplant, post-transplant hospitalization and outpatient follow-up, potential to participate in a research protocol, and medication management and side effects.  A question and answer session was provided after the presentation.    The patient was seen by all members of the multi-disciplinary team to include: Nephrologist/PA, Surgeon, , Transplant Coordinator, , Pharmacist and Dietician (if applicable).    The patient reviewed and signed all consents for evaluation which were witnessed and sent to scanning into the EPIC chart.    The patient was given an education book and plan for further evaluation based on her individual assessment.      The patient was encouraged to call with any questions or concerns.

## 2019-10-16 NOTE — LETTER
October 16, 2019        Mauricio Pendleton  [address] [facility] NEPHROLOGY [place]  [place] [address]  Phone: [phone]  Fax: [phone]             Anand Estraday- Transplant  [address]  [facility] [address]  Phone: [phone]   Patient: Anitha Swenson   MR Number: [id number]   YOB: 1995   Date of Visit: 10/16/2019       Dear Dr. Mauricio Pendleton    Thank you for referring Anitha Swenson to me for evaluation. Attached you will find relevant portions of my assessment and plan of care.    If you have questions, please do not hesitate to call me. I look forward to following Anitha Swenson along with you.    Sincerely,    Campbell Zuniga MD    Enclosure    If you would like to receive this communication electronically, please contact [email or web address] or [phone] to request [employer] access.    [employer] is a tool which provides read-only access to select patient information with whom you have a relationship. Its easy to use and provides real time access to review your patients record including encounter summaries, notes, results, and demographic information.    If you feel you have received this communication in error or would no longer like to receive these types of communications, please e-mail [email or web address]

## 2019-10-16 NOTE — PROGRESS NOTES
PHARM.D. PRE-TRANSPLANT NOTE:    This patient's medication therapy was evaluated as part of her pre-transplant evaluation.      The following general pharmacologic concerns were noted: Patient currently on mycophenolate, oxycodone/APAP, Prednisone, and methylphenidate.    The following pharmacologic concerns related to HCV therapy were noted: None      This patient's medication profile was reviewed for considerations for DAA Hepatitis C therapy:    [X]  No current inducers of CYP 3A4 or PGP  [X]  No amiodarone on this patient's EMR profile in the last 24 months  [X]  No past or current atrial fibrillation on this patient's EMR profile       Current Outpatient Medications   Medication Sig Dispense Refill    BENLYSTA 200 mg/mL AtIn Inject 1 mL (200 mg total) as directed Every Friday. HOLD UNTIL TOLD TO RESUME BY RHEUMATOLOGIST      calcium acetate (PHOSLO) 667 mg capsule Take 667 mg by mouth 3 (three) times daily.      carvedilol (COREG) 12.5 MG tablet Take 12.5 mg by mouth 2 (two) times daily with meals.       cholecalciferol, vitamin D3, (VITAMIN D3) 5,000 unit Tab Take 5,000 Units by mouth every morning.      FLUoxetine 40 MG capsule Take 1 capsule (40 mg total) by mouth 2 (two) times daily. 60 capsule 5    gabapentin (NEURONTIN) 300 MG capsule Take 1 capsule (300 mg total) by mouth every evening. (Patient taking differently: Take 300 mg by mouth 3 (three) times daily. )  0    hydroxychloroquine (PLAQUENIL) 200 mg tablet Take 1 tablet by mouth nightly  6    medroxyPROGESTERone (DEPO-PROVERA) 150 mg/mL Syrg Inject 150 mg into the muscle every 3 (three) months.       methylphenidate HCl (RITALIN) 5 MG tablet Take 5 mg by mouth 2 (two) times daily.      mycophenolate mofetil (CELLCEPT ORAL) Take 1,000 mg by mouth 2 (two) times daily.      oxyCODONE-acetaminophen (PERCOCET)  mg per tablet Take 1 tablet by mouth every 12 (twelve) hours as needed. 14 tablet 0    pantoprazole (PROTONIX) 40 MG tablet Take  1 tablet (40 mg total) by mouth once daily. (Patient taking differently: Take 40 mg by mouth 2 (two) times daily. )      PREDNISONE ORAL Take 5 mg by mouth once daily.   0    promethazine (PHENERGAN) 25 MG tablet Take 1 tablet (25 mg total) by mouth every 6 (six) hours as needed. 30 tablet 0     No current facility-administered medications for this visit.          Currently the patient is responsible for preparing / administering this patient's medications on a daily basis.  I am available for consultation and can be contacted, as needed by the other members of the Kidney Transplant team.

## 2019-10-16 NOTE — PROGRESS NOTES
Transplant Nephrology  Kidney Transplant Recipient Evaluation    Referring Physician: Mauricio Pendleton  Current Nephrologist: Mauricio Pendleton    Subjective:   CC:  Initial evaluation of kidney transplant candidacy.    HPI:  Ms. Swenson is a 24 y.o. year old White female who has presented to be evaluated as a potential kidney transplant recipient.  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  Patient is currently on hemodialysis started on January 2019. Patient is dialyzing on cyclic peritoneal dialysis.  Patient reports that she is tolerating dialysis well.. She has a PD catheter for dialysis access.     She was diagnosed with SLE 3 yrs ago in 2016. She had joint pain fatigue, butterfly rash, hypersomnolence. She saw her NP and the serologies for lupus were positive. She was also was tested for lupus nephritis and test were positive. The biopsy was positive for class 4 and more recently class 5. She received SM pulses. And also oral prednisone, currently 5 mg daily. As a complication she underwent left hip surgery sub chondroplasty  due to avascular necrosis . She was also on MMF 1000 bid and now on 500 bis. She was also on Cytoxan 6 injections last in March 2019.     Cur ameely she is on Benlysta,  bid prednisone 5 mg  and plaquenil 200 daily.    She refers fatigue. Multiple joint and generalized pain. She is trying to get a pain management specialist and use non-opioid meds for pain control.     She denied opportunistic infections associated with immunosuppressive medications. No other extrarenal manifestations from SLE    He is following with Rheumatologist at [facility]. Her nephrologist is Dr MARCELO Pendleton. She still makes some urine. No chest pain No SOB. Appetite is good. Diarrhea in the past 2 weeks.    She is doing fine with PD she was on HD for 3 months and switched to PD and is very content with this modality. Able to go to [facility].    Previous Transplant: no    Past Medical and Surgical  "History: Ms. Swenson  has a past medical history of Anemia due to chronic blood loss, Anemia, chronic disease, Arthritis, Elevated homocysteine, Encounter for blood transfusion, ESRD (end stage renal disease), Fibromyalgia, GI bleeding, Hypertension, Kidney failure, Lupus nephritis, ISN/RPS class IV, Lupus nephritis, ISN/RPS class IV, Migraine headache with aura, and Other pulmonary embolism without acute cor pulmonale.  She has a past surgical history that includes Tympanostomy tube placement; Hip surgery; Insertion of tunneled central venous hemodialysis catheter (N/A, 1/31/2019); Laparoscopic insertion of peritoneal dialysis catheter (07/2019); Subchondroplasty (Left, 9/12/2019); and Hip arthroscopy w/ labral debridement (Left, 9/12/2019).    Past Social and Family History: Ms. Swenson reports that she quit smoking about 3 years ago. Her smoking use included vaping with nicotine. She started smoking about 3 years ago. She has a 0.25 pack-year smoking history. She has never used smokeless tobacco. She reports that she does not drink alcohol or use drugs. Her family history includes Anxiety disorder in her father; Diabetes Mellitus in her maternal grandfather; Eating disorder in her sister; Goiter in her mother; Hypertension in her father.    Review of Systems     Skin: no skin rash  CNS; no headaches, blurred vision, seizure, or syncope  ENT: No JVD,  Adenopathies,  nasal congestion. No oral lesions  Cardiac: No chest pain, dyspnea, claudication, edema or palpitations  Respiratory: No SOB, cough, hemoptysis   Gastro-intestinal: No diarrhea, constipation, abdominal pain, nausea, vomit. No ascitis  Genitourinary: no hematuria, dysuria, frequency, frequency  Musculoskeletal: joint pain, arthritis or vasculitic changes  Psych: alert awake, oriented, No cranial nerves deficit.      Objective:   Blood pressure (!) 146/104, pulse 88, temperature 98.3 °F (36.8 °C), temperature source Oral, resp. rate 16, height 5' 7.72" (1.72 " m), weight 85.4 kg (188 lb 4.4 oz), SpO2 100 %.body mass index is 28.87 kg/m².    Physical Exam     Head: normocephalic  Neck: No JVD, cervical axillary, or femoral adenopathies  Heart: no murmurs, Normal s1 and s2, No gallops, no rubs, No murmurs  Lungs; CTA, good respiratory effort, no crackles  Abdomen: soft, non tender, no splenomegaly or hepatomegaly, no massess, no bruits  Extremities: No edema, skin rash, joint pain  SNC: awake, alert oriented. Cranial nerves are intact, no focalized, sensitivity and strength preserved      Labs:  Lab Results   Component Value Date    WBC 5.51 10/16/2019    HGB 8.0 (L) 10/16/2019    HCT 28.1 (L) 10/16/2019     10/16/2019    K 3.5 10/16/2019     10/16/2019    CO2 16 (L) 10/16/2019    BUN 46 (H) 10/16/2019    CREATININE 9.3 (H) 10/16/2019    EGFRNONAA 5.4 (A) 10/16/2019    CALCIUM 8.4 (L) 10/16/2019    PHOS 4.8 (H) 10/16/2019    MG 2.0 01/31/2019    ALBUMIN 3.1 (L) 10/16/2019    AST 13 10/16/2019    ALT 7 (L) 10/16/2019    LDYT710EZ8 9.84 (L) 02/12/2018    UTPCR 2.49 (H) 01/27/2019    UTPCR 2.49 (H) 01/27/2019    .0 (H) 10/16/2019       Lab Results   Component Value Date    BILIRUBINUA Negative 01/29/2019    PROTEINUA 2+ (A) 01/29/2019    NITRITE Negative 01/29/2019    RBCUA 2 01/29/2019    WBCUA 2 01/29/2019       No results found for: HLAABCTYPE    Labs were reviewed with the patient.    Assessment:     1. ESRD (end stage renal disease)    2. Lupus nephritis    3. Essential hypertension    4. Compound heterozygous MTHFR mutation [id number]/[id number]    5. Immunocompromised    6. Surgery follow-up, L hip arthroscopic labrum repair, synovectomy (12/26/14)        Plan:     Transplant Candidacy:   Based on available information, Ms. Swenson is a suitable kidney transplant candidate.   Meets center eligibility for accepting HCV+ donor offer - yes.  Patient educated on HCV+ donors. Anitha is willing to accept HCV+ donor offer - yes   Patient is a candidate for KDPI > 85  kidney donor offer - no.  Final determination of transplant candidacy will be made once workup is complete and reviewed by the selection committee.    At the present time no contra-indication to proceed with evaluation.    Data from today showed anemia electrolytes stable. ANC and WBC satisfactory.     As per  recommendation will request letter from his Psychiatrist due to her ongoing psychotherapy associated with anxiety and PTSD.    We discussed at length risk for infections and malignancy due to the accumulative effect of immunosuppression in the last three years plus additional induction therapy and triple therapy after kidney transplantation.     We also spoke about risk of lupus recurrence in the allograft.     Education provided    All questions answered             Campbell Zuniga MD       [facility] Patient Status  Functional Status: 70% - Cares for self: unable to carry on normal activity or active work  Physical Capacity: No Limitations

## 2019-10-16 NOTE — PROGRESS NOTES
Transplant Surgery  Kidney Transplant Recipient Evaluation    Referring Physician: Mauricio Pendleton  Current Nephrologist: Mauricio Pendleton    Subjective:     Reason for Visit: evaluate transplant candidacy    History of Present Illness: Anitha Swenson is a 24 y.o. year old female undergoing transplant evaluation.    Dialysis History: Anitha is on peritoneal dialysis.      Transplant History: N/A    Etiology of Renal Disease:  (based on medical records from referral).    Review of Systems   Constitutional: Negative for activity change, appetite change, chills, fatigue and fever.   HENT: Negative for sore throat and trouble swallowing.    Eyes: Negative for visual disturbance.   Respiratory: Negative for cough, chest tightness and shortness of breath.    Cardiovascular: Negative for chest pain, palpitations and leg swelling.   Gastrointestinal: Negative for abdominal distention, abdominal pain, blood in stool, constipation, diarrhea, nausea and vomiting.   Endocrine: Negative for polyuria.   Genitourinary: Negative for decreased urine volume, difficulty urinating, dysuria, flank pain, frequency and hematuria.   Musculoskeletal: Negative for gait problem, myalgias and neck stiffness.   Skin: Negative for rash and wound.   Neurological: Negative for dizziness, tremors, seizures, weakness, light-headedness and headaches.   Hematological: Negative for adenopathy.   Psychiatric/Behavioral: Negative for agitation, confusion and sleep disturbance.       Objective:     Physical Exam:  Constitutional:   Vitals reviewed: yes   Well-nourished and well-groomed: yes  Eyes:   Sclerae icteric: no   Extraocular movements intact: yes  GI:    Bowel sounds normal: yes   Tenderness: no    If yes, quadrant/location: not applicable   Palpable masses: no    If yes, quadrant/location: not applicable   Hepatosplenomegaly: no   Ascites: no   Hernia: no    If yes, type/location: not applicable   Surgical scars: yes    If yes,  type/location: PD catheter Left michael-umbilical   Resp:   Effort normal: yes   Breath sounds normal: yes    CV:   Regular rate and rhythm: yes   Heart sounds normal: yes   Femoral pulses normal: yes   Extremities edematous: no  Skin:   Rashes or lesions present: no    If yes, describe:not applicable   Jaundice:: no    Musculoskeletal:   Gait normal: yes   Strength normal: yes  Psych:   Oriented to person, place, and time: yes   Affect and mood normal: yes    Additional comments: not applicable    Counseling: We provided Anitha Swenson with a group education session today.  We discussed kidney transplantation at length with her, including risks, potential complications, and alternatives in the management of her renal failure.  The discussion included complications related to anesthesia, bleeding, infection, primary nonfunction, and ATN.  I discussed the typical postoperative course, length of hospitalization, the need for long-term immunosuppression, and the need for long-term routine follow-up.  I discussed living-donor and -donor transplantation and the relative advantages and disadvantages of each.  I also discussed average waiting times for both living donation and  donation.  I discussed national and center-specific survival rates.  I also mentioned the potential benefit of multicenter listing to candidates listed with centers within more than one organ procurement organization.  All questions were answered.    Final determination of transplant candidacy will be made once evaluation is complete and reviewed by the Kidney & Kidney/Pancreas Selection Committee.         Transplant Surgery - Candidacy   Assessment/Plan:   Anitha Swenson has end stage renal disease (ESRD) on dialysis. I see no surgical contraindication to placing a kidney transplant. Based on available information, Anitha Swenson is a suitable kidney transplant candidate.     Tiffanie Crane MD

## 2019-10-16 NOTE — TELEPHONE ENCOUNTER
Reviewed pt transplant labs.  Notified dialysis unit dietitian of the following abnormal labs via fax and requested their most recent nutrition note on this pt.  Once this note is received it will be scanned into pt's chart.      Alb 3.1  Phos 4.8

## 2019-10-16 NOTE — PROGRESS NOTES
Pre Transplant Infectious Diseases Consult  Kidney Transplant Recipient Evaluation    Requesting Physician: Mauricio Pendleton MD    Reason for Visit:    Chief Complaint   Patient presents with    Kidney Transplant Evaluation     History of Present Illness  Anitha Swenson is a 24 y.o. year old White female with advanced Kidney disease currently being evaluated for Kidney transplant. She was on HD but now on PD since 7/2019.  Denies infectious issues from PD. Patient denies any recent fever, chills, or infective illnesses.      1) Do you have a history of:   YES NO   Diabetes      [] [x]     Wound/ Foot Infection/Bone Infection   []        [x]     Surgical Removal of Spleen   []  [x]                  2) Have you had recurrent infections involving:             YES NO  Sinus infections  []         [x]   Sore Throat   []         [x]                 Prostate Infections  []         []              Bladder Infections  []         [x]                     Kidney Infections  []         [x]                               Intestinal Infections  []         [x]      Skin Infections   []         [x]       Reproductive Infections          []  [x]   Periodontal Disease  []         [x]        3)Have you ever had: YES     NO UNKNOWN      Chicken Pox   [x]         []  []   Shingles   []         [x]  []   Orolabial Herpes             []  [x]  []   Genital Herpes  []         [x]  []   Cytomegalovirus  []         [x]  []   Marcelo-Barr Virus  []         [x]  []   Genital Warts   []         [x]  []   Hepatitis A   []         [x]  []   Hepatitis B   []         [x]  []   Hepatitis C   []         [x]  []   Syphilis   []         [x]  []   Gonorrhea   []         [x]  []   Pelvic Inflammatory   Disease   []         [x]  []   Chlamydia Infection  []         [x]  []   Intestinal parasites   or worms   []         [x]  []   Fungal Infections  []         [x]  []   Blood Infections  []         [x]  []       Comment:       4) Have you ever been exposed    YES NO  To someone with tuberculosis?  []   [x]   If yes, what treatment did you receive:     5) What states have you lived in? [place]    6) What countries have you visited for more than 2 weeks?   [place], [place]                      YES NO  7) Did you have any associated infections?  []  [x]       8) Are you planning to travel outside the    [x]  []   [place] after your transplant?    9) Household                   YES NO  Do you have pets living in your house?    [x]         []   If yes, describe: Dog    Do you spend time or live on a farm or     []         [x]   have livestock or other farm animals?  If yes, which ones:    Do you have a fish tank?          []  [x]       Do you have a litter box?      []         [x]     Do you fish or hunt?       []         [x]     Do you clean or skin fish or animals?    []         [x]     Do you consume raw or undercooked    []         [x]   meat, fish, or shellfish? 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          12)Do you garden or otherwise  YES NO   work in the soil?    []         [x]   13)Do you hike, camp, or spend     time in wooded areas?   []         [x]        14) The patient's immunization history was reviewed.    Have you ever received:  YES NO UNKNOWN DATES   Routine Childhood vaccines  [x]         []  []      Influenza vaccine   [x]  []  [] 2019 Pneumovax    []  []  []     Tetanus-diptheria   [x]         []  []    Hepatitis A vaccine series       [x]  []  []    Hepatitis B vaccine series         [x]  []  []    Meningitis vaccine   [x]         []  []    Varicella vaccine   [x]         []  []      <=">  Immunizations                DTaP 3/26/2001, 10/18/1997, 1996, 1996, 1996          Hepatitis A, Pediatric/Adolescent, 2 Dose 11/12/2007, 5/4/2006          Hepatitis B, Pediatric/Adolescent 2/20/2002, 3/26/2001, 8/13/1999          HIB 7/26/1997, 1996, 1996, 1996           HPV Quadrivalent 6/19/2008, 11/21/2007, 5/21/2007          Influenza - Quadrivalent - Intranasal (2-49 years old) 10/28/2013          Influenza - Quadrivalent - PF (6 months and older) 11/21/2018          IPV 3/26/2001, 10/18/1997, 1996, 1996, 1996          Meningococcal Conjugate (MCV4O) 8/14/2012          Meningococcal Conjugate (MCV4P) 5/21/2007          MMR 3/26/2001, 7/26/1997          Pneumococcal Conjugate - 13 Valent 12/10/2018          Pneumococcal Polysaccharide - 23 Valent 2/20/2019          Tdap 5/21/2007          Varicella 7/3/2009, 4/14/1999            Based on the patients immunization history and serologies, immunizations were ordered:         Ordered  Not Ordered  Influenza Vaccine     []    [x]   Hepatitis A series at 0,  6 months   []    [x]   Hepatitis B seriesat 0, 1, and 6 months  []    [x]   Hepatitis B High Dose 0,1, and 6 months  []    [x]   Prevnar      []    [x]   Pneumovax      []    [x]    TDap       [x]    []    Shingrix      [x]    []   Menactra      []    [x]            The patient was encouraged to contact us about any problems that may develop after immunization and possible side effects were reviewed.      Previous Transplant: no    Etiology of Kidney Disease: lupus nephritis    Allergies: Sulfa (sulfonamide antibiotics)  Immunization History   Administered Date(s) Administered    DTaP 1996, 1996, 1996, 10/18/1997, 03/26/2001    HIB 1996, 1996, 1996, 07/26/1997    HPV Quadrivalent 05/21/2007, 11/21/2007, 06/19/2008    Hepatitis A, Pediatric/Adolescent, 2 Dose 05/04/2006, 11/12/2007    Hepatitis B, Pediatric/Adolescent 08/13/1999, 03/26/2001, 02/20/2002    IPV 1996, 1996, 1996, 10/18/1997, 03/26/2001    Influenza - Quadrivalent - Intranasal (2-49 years old) 10/28/2013    Influenza - Quadrivalent - PF (6 months and older) 11/21/2018    MMR 07/26/1997, 03/26/2001    Meningococcal Conjugate (MCV4O)  08/14/2012    Meningococcal Conjugate (MCV4P) 05/21/2007    Pneumococcal Conjugate - 13 Valent 12/10/2018    Pneumococcal Polysaccharide - 23 Valent 02/20/2019    Tdap 05/21/2007    Varicella 04/14/1999, 07/03/2009     Past Medical History:   Diagnosis Date    Anemia due to chronic blood loss 2/8/2018    Anemia, chronic disease 2/8/2018    Arthritis     Elevated homocysteine 2/8/2018    Encounter for blood transfusion     Fibromyalgia     GI bleeding 2/8/2018    Hypertension     Kidney failure 12/2017    Lupus nephritis, ISN/RPS class IV 12/2018    diagnosed 12/2018, 3 biopsies, 1st - Class 3-4, no crescents, IFTA 15%, 2nd - Class V, 3rd - Class 4, w/ cressents, IFTA 30%. Treated with cellcept in 2018 until 10-11/2018 (stopped for neutropenic fever), last steroid pulce 12/31/18 and had 2 doses CYC, last 1/19/2018. On HD since 1/2019. Sees Dr. Pendleton, nephrology in [place], [place]. and Dr. Small, rheumatology    Lupus nephritis, ISN/RPS class IV 12/2017    Migraine headache with aura     Other pulmonary embolism without acute cor pulmonale     Patient and mother deny     Past Surgical History:   Procedure Laterality Date    HIP ARTHROSCOPY W/ LABRAL DEBRIDEMENT Left 9/12/2019    Procedure: DEBRIDEMENT, LABRUM, HIP, ARTHROSCOPIC,shaving of articular cartilage(chondroplasty,abrasion arthroplasty and/or labrum(includes labral repair);  Surgeon: Avery Blake MD;  Location: [facility] OR;  Service: Orthopedics;  Laterality: Left;  DEBRIDEMENT, LABRUM, HIP, ARTHROSCOPIC,shaving of articular cartilage(chondroplasty,abrasion arthroplasty and/or labrum(includes labral repair)    HIP SURGERY      INSERTION OF TUNNELED CENTRAL VENOUS HEMODIALYSIS CATHETER N/A 1/31/2019    Procedure: INSERTION, CATHETER, CENTRAL VENOUS, HEMODIALYSIS, TUNNELED;  Surgeon: Nam Tucker MD;  Location: [facility] CATH LAB;  Service: Nephrology;  Laterality: N/A;    LAPAROSCOPIC INSERTION OF PERITONEAL DIALYSIS CATHETER  07/2019     SUBCHONDROPLASTY Left 9/12/2019    Procedure: SUBCHONDROPLASTY,Femoral Head;  Surgeon: Avery Blake MD;  Location: [facility];  Service: Orthopedics;  Laterality: Left;  SUBCHONDROPLASTY,Femoral Head  Clonidine/Epi/Ketorolac/Ropivacaine Injection 30cc    TYMPANOSTOMY TUBE PLACEMENT        Social History     Socioeconomic History    Marital status: Single     Spouse name: Not on file    Number of children: Not on file    Years of education: Not on file    Highest education level: Not on file   Occupational History    Not on file   Social Needs    Financial resource strain: Not on file    Food insecurity:     Worry: Not on file     Inability: Not on file    Transportation needs:     Medical: Not on file     Non-medical: Not on file   Tobacco Use    Smoking status: Former Smoker     Types: Vaping with nicotine    Smokeless tobacco: Never Used   Substance and Sexual Activity    Alcohol use: No    Drug use: No    Sexual activity: Not on file   Lifestyle    Physical activity:     Days per week: Not on file     Minutes per session: Not on file    Stress: Not on file   Relationships    Social connections:     Talks on phone: Not on file     Gets together: Not on file     Attends Quaker service: Not on file     Active member of club or organization: Not on file     Attends meetings of clubs or organizations: Not on file     Relationship status: Not on file   Other Topics Concern    Not on file   Social History Narrative    Not on file       Review of Systems   Constitution: Positive for malaise/fatigue and weight gain. Negative for chills, decreased appetite, fever, night sweats and weight loss.   HENT: Positive for sore throat. Negative for congestion, ear pain, hearing loss, hoarse voice and tinnitus.    Eyes: Negative for blurred vision, redness and visual disturbance.   Cardiovascular: Negative for chest pain, leg swelling and palpitations.   Respiratory: Negative for cough, hemoptysis, shortness of  breath, sputum production and wheezing.    Endocrine: Negative for cold intolerance and heat intolerance.   Hematologic/Lymphatic: Negative for adenopathy. Bruises/bleeds easily.   Skin: Negative for dry skin, itching, rash and suspicious lesions.   Musculoskeletal: Positive for back pain, joint pain, myalgias and neck pain.   Gastrointestinal: Positive for diarrhea, nausea and vomiting. Negative for abdominal pain, constipation and heartburn.   Genitourinary: Negative for dysuria, flank pain, frequency, hematuria, hesitancy and urgency.   Neurological: Negative for dizziness, headaches, numbness, paresthesias and weakness.   Psychiatric/Behavioral: Positive for depression. Negative for memory loss. The patient has insomnia and is nervous/anxious.    Allergic/Immunologic: Negative for environmental allergies, HIV exposure, hives and persistent infections.     Physical Exam   Constitutional: She is oriented to person, place, and time. She appears well-developed and well-nourished. No distress.       HENT:   Head: Normocephalic and atraumatic.   Mouth/Throat: She does not have dentures. No oral lesions. Normal dentition. No dental abscesses, lacerations or dental caries.   Eyes: Pupils are equal, round, and reactive to light. EOM are normal.   Neck: Normal range of motion. Neck supple.   Cardiovascular: Normal rate, regular rhythm and normal heart sounds. Exam reveals no gallop and no friction rub.   No murmur heard.  Pulmonary/Chest: Effort normal and breath sounds normal. No respiratory distress. She has no wheezes. She has no rales.   Abdominal: Soft. Bowel sounds are normal. She exhibits no distension and no mass. There is no tenderness. There is no guarding.   Musculoskeletal: She exhibits no edema.   Lymphadenopathy:        Head (right side): No submental, no submandibular, no tonsillar, no preauricular, no posterior auricular and no occipital adenopathy present.        Head (left side): No submental, no  submandibular, no tonsillar, no preauricular, no posterior auricular and no occipital adenopathy present.     She has no cervical adenopathy.        Right cervical: No superficial cervical, no deep cervical and no posterior cervical adenopathy present.       Left cervical: No superficial cervical, no deep cervical and no posterior cervical adenopathy present.     She has no axillary adenopathy.        Right axillary: No pectoral and no lateral adenopathy present.        Left axillary: No pectoral and no lateral adenopathy present.       Right: No inguinal, no supraclavicular and no epitrochlear adenopathy present.        Left: No inguinal, no supraclavicular and no epitrochlear adenopathy present.   Neurological: She is alert and oriented to person, place, and time.   Skin: Skin is warm and dry. She is not diaphoretic.   Psychiatric: She has a normal mood and affect. Her behavior is normal.     Diagnostics:   RPR   Date Value Ref Range Status   01/28/2019 Non-reactive Non-reactive Final     No results found for: CMVANTIBODIE  No results found for: HIV1X2  No results found for: HTLVIIIANTIB  Hepatitis B Surface Ag   Date Value Ref Range Status   01/28/2019 Negative  Final     Hep B Core Total Ab   Date Value Ref Range Status   01/28/2019 Negative  Final     Hepatitis C Ab   Date Value Ref Range Status   01/28/2019 Negative  Final     No results found for: TOXOIGG  No components found for: TOXOIGGINTER  No results found for: IRR2WJS  No results found for: SXU3DGC  Varicella Zoster IgG   Date Value Ref Range Status   01/28/2019 0.83 0.00 - 0.90 ISR Final     Varicella Interpretation   Date Value Ref Range Status   01/28/2019 Negative Negative Final     Comment:     <or=0.90     Negative        No detectable IgG antibody to Varicella zoster  by the YURIDIA test. Such individuals are presumed to be   uninfected with Varicella zoster and to be susceptible to   primary infection.  0.91-1.09    Equivocal  >or=1.10      Positive        Indicates presence of detectable IgG antibody to Varicella   zoster by the YURIDIA test. Indicative of previous or current   infection.       Strongyloides Ab IgG   Date Value Ref Range Status   01/28/2019 Negative Negative Final     Comment:     No detectable levels of IgG antibodies to Strongyloides.  Repeat testing in 1-2 weeks if clinically indicated.  Test Performed by:  [facility]  [address] [place] [address]       No results found for: EPSTEINBARRV  Hep B S Ab   Date Value Ref Range Status   01/28/2019 Positive (A)  Final     Results for ALEXANDRA PARIS (MRN [id number]) as of 10/16/2019 22:05   Ref. Range 1/28/2019 06:11 1/28/2019 11:06 1/29/2019 16:32 10/16/2019 08:00   Hep B Core Total Ab Unknown Negative   Negative   Hep B S Ab Unknown Positive (A)      Hepatitis B Surface Ag Latest Ref Range: Negative  Negative   Negative   Hepatitis C Ab Latest Ref Range: Negative  Negative   Negative   Mitogen - Nil Latest Ref Range: See text IU/mL   0.020    NIL Latest Ref Range: See text IU/mL   0.020    TB Gold Plus Unknown   Indeterminate (A)    TB1 - Nil Latest Ref Range: See text IU/mL   0.000    TB2 - Nil Latest Ref Range: See text IU/mL   0.000    HIV 1/2 Ag/Ab Latest Ref Range: Negative  Negative   Negative   RPR Latest Ref Range: Non-reactive  Non-reactive      Strongyloides Ab IgG Latest Ref Range: Negative   Negative     Varicella IgG Latest Ref Range: 0.00 - 0.90 ISR 0.83      Varicella Interpretation Latest Ref Range: Negative  Negative             Transplant Infectious Diseases - Candidacy    Assessment/Plan:     Transplant Candidacy: Based on available information, there are no identified significant barriers to transplantation from an infectious disease standpoint pending acceptable serologies including an indeterminate or positive quantiferon Gold.  Refer to ID clinic for any serologies that require further evaluation.  Patient is  negative for Varicella IgG and will need revaccination with Shingrix series as she has already had the primary varicella series and has no immunity.  Once Shingrix series is complete she will need repeat Varicella IgG to ensure immunity .  Shingrix is chosen as it may have better efficacy than Varivax especially since it may not have produced immunity and it is a not live vaccine as she is immunocompromised and live vaccines are contraindicated.  Final determination of transplant candidacy will be made once evaluation is complete and reviewed by the Transplant Selection Committee.    RICCARDO Michelle  Vaccine Needs:  1. Tdap  2. Shinrix series  3. Varicella IgG 2 months post Shingrix completion       Counseling:I discussed with Anitha the risk for increased susceptibility to infections following transplantation including increased risk for infection right after transplant and if rejection should occur.  The patients has been counseled on the importance of vaccinations including but not limited to a yearly flu vaccine.  Specific guidance has been provided to the patient regarding the patients occupation, hobbies and activities to avoid future infectious complications including but not limited to avoiding undercooked meats and seafood, proper hygiene, and contact with animals.
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
## 2019-10-17 ENCOUNTER — TELEPHONE (OUTPATIENT)
Dept: HEMATOLOGY/ONCOLOGY | Facility: CLINIC | Age: 24
End: 2019-10-17

## 2019-10-17 DIAGNOSIS — D72.819 LEUKOPENIA, UNSPECIFIED TYPE: Primary | ICD-10-CM

## 2019-10-17 NOTE — TELEPHONE ENCOUNTER
----- Message from Sukhjinder Goodwin MD sent at 10/17/2019  2:45 PM CDT -----  Set up bone marrow biopsy as per kidney transplant doc's request        ----- Message -----  From: Campbell Zuniga MD  Sent: 10/17/2019   9:36 AM CDT  To: Sukhjinder Goodwin MD, #    If you feel is needed, please proceed Thanks    Campbell   ----- Message -----  From: Sukhjinder Goodwin MD  Sent: 10/17/2019   9:22 AM CDT  To: Campbell Zuniga MD    If you want one, I will set it up.      ----- Message -----  From: Campbell Zuniga MD  Sent: 10/16/2019   1:51 PM CDT  To: Sukhjinder Goodwin MD, #    Dr Goodwin,    This patient was seen in our transplant clinic today in anticipation for listing for a kidney transplant. I see a bone marrow biopsy 7/2018 and under commentary the idea was ?to repeat bone marrow in three months. This BM was done due to pancytopenia in July 2018.  From the hematologic point of view does she need any further test before she is listed. We have enough time, she is in the very early stage of transplant evaluation. Please let me know    Regards    Campbell Zuniga MD  Transplant Nephrologist

## 2019-10-17 NOTE — TELEPHONE ENCOUNTER
Patient notified that Dr. Goodwin was contacted by her transplant nephrologist and would like a bone marrow biopsy.  Patient notified that the biopsy was ordered and she would be contacted to schedule.

## 2019-10-18 ENCOUNTER — TELEPHONE (OUTPATIENT)
Dept: RADIOLOGY | Facility: HOSPITAL | Age: 24
End: 2019-10-18

## 2019-10-18 DIAGNOSIS — Z76.82 ORGAN TRANSPLANT CANDIDATE: Primary | ICD-10-CM

## 2019-10-22 ENCOUNTER — TELEPHONE (OUTPATIENT)
Dept: TRANSPLANT | Facility: CLINIC | Age: 24
End: 2019-10-22

## 2019-10-22 ENCOUNTER — SOCIAL WORK (OUTPATIENT)
Dept: TRANSPLANT | Facility: CLINIC | Age: 24
End: 2019-10-22

## 2019-10-22 NOTE — PROGRESS NOTES
[facility], [phone]. PD     10-21-19 completed dialysis compliance form show the following:    Current Dry Weight: 88.0 kg     Most recent pre treatment weight: 86.2 kg,  10-18-19    Within last 3 months:  AMA 0        No shows 0  Last intact PTH: 176  On 10-21-19  No concerns with labs.  No concerns with caregivers, transportation, or mental health.    Psychosocial Suitability: Patient presents as an unacceptable candidate for kidney transplant at this time. Based on psychosocial risk factors, patient presents as high risk, due to significant mental health history of depression, anxiety and PTSD and patient must be cleared by mental health therapist for organ transplant. Pt to provide mental health clearance letter for organ transplant. Pt reports having organ transplant caregiver/transportation plan, medical insurance plan and plan to afford transplant costs all in place.      Recommendations/Additional Comments:   Pt reports has been engaged in counseling for PTSD, anxiety and depression for about 3 years and is on Prozac 40 mg for about 3 or 4 months (prescribed by PCP). Patient reports her next psychotherapy appointment is 10-21-19 4pm and patient reports plan to provide organ transplant team with mental health clearance letter from her psychotherapist, Dr. Krystal Cochran. Pt reports understanding she will not be cleared without organ transplant mental health clearance letter in place.     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 Pt reports is engaged with [employer] office of disability accommodations (Malia Menezes). Pt reports will contact [employer] department regarding any paperwork needed to be completed for any time missed due to transplant.     Pt reports caregivers work and may need employer paperwork completed for any time missed due to transplant.     Final determination of transplant candidacy will be made once work up is complete and  reviewed by the selection committee.     Savannah Roberto MSW [facility]

## 2019-10-24 ENCOUNTER — TELEPHONE (OUTPATIENT)
Dept: SPORTS MEDICINE | Facility: CLINIC | Age: 24
End: 2019-10-24

## 2019-10-24 NOTE — TELEPHONE ENCOUNTER
The patient was contacted regarding their call to the office earlier and a voice mail was left to call the office back at their convenience.    ----- Message from Rachel Buckner sent at 10/24/2019  2:04 PM CDT -----  Contact: pt  Pt would like to be called back regarding her post op appt    Pt can be reached at [phone]

## 2019-10-24 NOTE — TELEPHONE ENCOUNTER
Spoke to the patient and scheduled an appt for   Monday Nov 4, 2019   Appt at 11:45 AM (15 min)          ----- Message from Meet Villarreal sent at 10/24/2019  3:29 PM CDT -----  Contact: patient   Attn Leann    Please call pt at [phone]    Patient is returning your call    Thank you

## 2019-10-30 ENCOUNTER — HOSPITAL ENCOUNTER (OUTPATIENT)
Dept: CARDIOLOGY | Facility: HOSPITAL | Age: 24
Discharge: HOME | End: 2019-10-30
Attending: NURSE PRACTITIONER
Payer: COMMERCIAL

## 2019-10-30 ENCOUNTER — HOSPITAL ENCOUNTER (OUTPATIENT)
Dept: RADIOLOGY | Facility: HOSPITAL | Age: 24
Discharge: HOME | End: 2019-10-30
Attending: NURSE PRACTITIONER
Payer: COMMERCIAL

## 2019-10-30 VITALS — RESPIRATION RATE: 14 BRPM | HEART RATE: 100 BPM | SYSTOLIC BLOOD PRESSURE: 117 MMHG | DIASTOLIC BLOOD PRESSURE: 82 MMHG

## 2019-10-30 DIAGNOSIS — Z76.82 ORGAN TRANSPLANT CANDIDATE: ICD-10-CM

## 2019-10-30 LAB
CV PHARM DOSE: 0.4 MG
CV STRESS BASE HR: 93 BPM
DIASTOLIC BLOOD PRESSURE: 82 MMHG
OHS CV CPX 1 MINUTE RECOVERY HEART RATE: 125 BPM
OHS CV CPX 85 PERCENT MAX PREDICTED HEART RATE MALE: 158
OHS CV CPX ESTIMATED METS: 2.8
OHS CV CPX MAX PREDICTED HEART RATE: 186
OHS CV CPX PATIENT IS FEMALE: 1
OHS CV CPX PATIENT IS MALE: 0
OHS CV CPX PEAK DIASTOLIC BLOOD PRESSURE: 92 MMHG
OHS CV CPX PEAK HEAR RATE: 129 BPM
OHS CV CPX PEAK RATE PRESSURE PRODUCT: NORMAL
OHS CV CPX PEAK SYSTOLIC BLOOD PRESSURE: 125 MMHG
OHS CV CPX PERCENT MAX PREDICTED HEART RATE ACHIEVED: 69
OHS CV CPX RATE PRESSURE PRODUCT PRESENTING: NORMAL
STRESS ECHO POST EXERCISE DUR MIN: 3 MINUTES
STRESS ECHO POST EXERCISE DUR SEC: 31 SECONDS
STRESS ECHO TARGET HR: 167 BPM
SYSTOLIC BLOOD PRESSURE: 117 MMHG

## 2019-10-30 PROCEDURE — 78452 NM MYOCARDIAL PERFUSION SPECT MULTI PHARM: ICD-10-PCS | Mod: 26,TXP,, | Performed by: RADIOLOGY

## 2019-10-30 PROCEDURE — 63600175 PHARM REV CODE 636 W HCPCS: Mod: TXP | Performed by: SPECIALIST

## 2019-10-30 PROCEDURE — 78452 HT MUSCLE IMAGE SPECT MULT: CPT | Mod: 26,TXP,, | Performed by: RADIOLOGY

## 2019-10-30 PROCEDURE — A9502 TC99M TETROFOSMIN: HCPCS | Mod: TXP

## 2019-10-30 PROCEDURE — 93017 CV STRESS TEST TRACING ONLY: CPT | Mod: TXP

## 2019-10-30 RX ORDER — REGADENOSON 0.08 MG/ML
0.4 INJECTION, SOLUTION INTRAVENOUS ONCE
Status: COMPLETED | OUTPATIENT
Start: 2019-10-30 | End: 2019-10-30

## 2019-10-30 RX ADMIN — REGADENOSON 0.4 MG: 0.08 INJECTION, SOLUTION INTRAVENOUS at 09:10

## 2019-10-31 ENCOUNTER — SOCIAL WORK (OUTPATIENT)
Dept: TRANSPLANT | Facility: CLINIC | Age: 24
End: 2019-10-31

## 2019-10-31 NOTE — PROGRESS NOTES
Mental health clearance.    Transplant  spoke with patient [phone] requesting mental health clearance letter to be FAXed for organ transplant team review. Pt reports she has the clearance letter and did not remember correct FAX number. Pt was provided transplant  [phone].    Also, Pt also requesting transplant nurse coordinator FAX number. Epic message sent to transplant nurse coordinator to call patient back with correct transplant nurse coordinator FAX number.     Savannah Roberto MSW [facility]

## 2019-11-05 ENCOUNTER — DOCUMENTATION ONLY (OUTPATIENT)
Dept: TRANSPLANT | Facility: CLINIC | Age: 24
End: 2019-11-05

## 2019-11-06 ENCOUNTER — TELEPHONE (OUTPATIENT)
Dept: TRANSPLANT | Facility: CLINIC | Age: 24
End: 2019-11-06

## 2019-11-06 ENCOUNTER — DOCUMENTATION ONLY (OUTPATIENT)
Dept: TRANSPLANT | Facility: CLINIC | Age: 24
End: 2019-11-06

## 2019-11-06 DIAGNOSIS — Z76.82 ORGAN TRANSPLANT CANDIDATE: Primary | ICD-10-CM

## 2019-11-06 NOTE — NURSING
Message left for patient regarding the name and phone number to her Rheumatologist. Call back name and number provided.

## 2019-11-06 NOTE — TELEPHONE ENCOUNTER
----- Message from Shahrzad Torres sent at 11/6/2019  1:09 PM CST -----  Contact: pt   Reason: Pt returning call to [facility]    Communication:[phone]

## 2019-11-06 NOTE — TELEPHONE ENCOUNTER
----- Message from Lisa Piotr sent at 10/17/2019  4:05 PM CDT -----      ----- Message -----  From: Campbell Zuniga MD  Sent: 10/17/2019   9:36 AM CDT  To: Sukhjinder Goodwin MD, #    If you feel is needed, please proceed Thanks    Campbell   ----- Message -----  From: Sukhjinder Goodwin MD  Sent: 10/17/2019   9:22 AM CDT  To: Campbell Zuniga MD    If you want one, I will set it up.      ----- Message -----  From: Cmapbell Zuniga MD  Sent: 10/16/2019   1:51 PM CDT  To: Sukhjinder Goodwin MD, #    Dr Goodwin,    This patient was seen in our transplant clinic today in anticipation for listing for a kidney transplant. I see a bone marrow biopsy 7/2018 and under commentary the idea was ?to repeat bone marrow in three months. This BM was done due to pancytopenia in July 2018.  From the hematologic point of view does she need any further test before she is listed. We have enough time, she is in the very early stage of transplant evaluation. Please let me know    Regards    Campbell Zuniga MD  Transplant Nephrologist

## 2019-11-06 NOTE — TELEPHONE ENCOUNTER
Nurse spoke with the patient and she states that the name of her Rheumatologist was Dr Pendleton. She states she needed to get the fax number so that he can fax over records and clearance letter. Fax number provided. All questions answered. Patient verbalized understanding of the above information.

## 2019-11-08 ENCOUNTER — HOSPITAL ENCOUNTER (OUTPATIENT)
Facility: HOSPITAL | Age: 24
Discharge: HOME | End: 2019-11-08
Attending: INTERNAL MEDICINE | Admitting: INTERNAL MEDICINE
Payer: COMMERCIAL

## 2019-11-08 VITALS
BODY MASS INDEX: 25.2 KG/M2 | WEIGHT: 176 LBS | OXYGEN SATURATION: 100 % | RESPIRATION RATE: 20 BRPM | HEIGHT: 70 IN | TEMPERATURE: 98 F | HEART RATE: 74 BPM | SYSTOLIC BLOOD PRESSURE: 129 MMHG | DIASTOLIC BLOOD PRESSURE: 87 MMHG

## 2019-11-08 DIAGNOSIS — D72.819 LEUKOPENIA, UNSPECIFIED TYPE: ICD-10-CM

## 2019-11-08 LAB
APTT PPP: 26 SEC (ref 23.6–33.3)
B-HCG UR QL: NEGATIVE
BASOPHILS # BLD AUTO: 0.06 K/UL (ref 0–0.2)
BASOPHILS NFR BLD: 1.1 % (ref 0–1.9)
CTP QC/QA: YES
DIFFERENTIAL METHOD: ABNORMAL
EOSINOPHIL # BLD AUTO: 0 K/UL (ref 0–0.5)
EOSINOPHIL NFR BLD: 0 % (ref 0–8)
ERYTHROCYTE [DISTWIDTH] IN BLOOD BY AUTOMATED COUNT: 15.3 % (ref 11.5–14.5)
HCT VFR BLD AUTO: 31.6 % (ref 37–48.5)
HGB BLD-MCNC: 9.2 G/DL (ref 12–16)
IMM GRANULOCYTES # BLD AUTO: 0.06 K/UL (ref 0–0.04)
IMM GRANULOCYTES NFR BLD AUTO: 1.1 % (ref 0–0.5)
INR PPP: 1.1
LYMPHOCYTES # BLD AUTO: 1.3 K/UL (ref 1–4.8)
LYMPHOCYTES NFR BLD: 25.5 % (ref 18–48)
MCH RBC QN AUTO: 27.1 PG (ref 27–31)
MCHC RBC AUTO-ENTMCNC: 29.1 G/DL (ref 32–36)
MCV RBC AUTO: 93 FL (ref 82–98)
MONOCYTES # BLD AUTO: 0.6 K/UL (ref 0.3–1)
MONOCYTES NFR BLD: 12 % (ref 4–15)
NEUTROPHILS # BLD AUTO: 3.2 K/UL (ref 1.8–7.7)
NEUTROPHILS NFR BLD: 60.3 % (ref 38–73)
NRBC BLD-RTO: 0 /100 WBC
PLATELET # BLD AUTO: 294 K/UL (ref 150–350)
PMV BLD AUTO: 9.5 FL (ref 9.2–12.9)
PROTHROMBIN TIME: 13.8 SEC (ref 10.6–14.8)
RBC # BLD AUTO: 3.4 M/UL (ref 4–5.4)
WBC # BLD AUTO: 5.25 K/UL (ref 3.9–12.7)

## 2019-11-08 PROCEDURE — 85610 PROTHROMBIN TIME: CPT

## 2019-11-08 PROCEDURE — 25000003 PHARM REV CODE 250: Performed by: RADIOLOGY

## 2019-11-08 PROCEDURE — 71000015 HC POSTOP RECOV 1ST HR

## 2019-11-08 PROCEDURE — 81025 URINE PREGNANCY TEST: CPT | Performed by: RADIOLOGY

## 2019-11-08 PROCEDURE — 71000016 HC POSTOP RECOV ADDL HR

## 2019-11-08 PROCEDURE — 63600175 PHARM REV CODE 636 W HCPCS: Performed by: RADIOLOGY

## 2019-11-08 PROCEDURE — 85025 COMPLETE CBC W/AUTO DIFF WBC: CPT

## 2019-11-08 PROCEDURE — 85730 THROMBOPLASTIN TIME PARTIAL: CPT

## 2019-11-08 RX ORDER — CHOLECALCIFEROL (VITAMIN D3) 125 MCG
CAPSULE ORAL DAILY
COMMUNITY

## 2019-11-08 RX ORDER — FENTANYL CITRATE 50 UG/ML
INJECTION, SOLUTION INTRAMUSCULAR; INTRAVENOUS CODE/TRAUMA/SEDATION MEDICATION
Status: COMPLETED | OUTPATIENT
Start: 2019-11-08 | End: 2019-11-08

## 2019-11-08 RX ORDER — SODIUM CHLORIDE 9 MG/ML
INJECTION, SOLUTION INTRAVENOUS
Status: COMPLETED | OUTPATIENT
Start: 2019-11-08 | End: 2019-11-08

## 2019-11-08 RX ORDER — LIDOCAINE HYDROCHLORIDE 10 MG/ML
INJECTION INFILTRATION; PERINEURAL CODE/TRAUMA/SEDATION MEDICATION
Status: COMPLETED | OUTPATIENT
Start: 2019-11-08 | End: 2019-11-08

## 2019-11-08 RX ORDER — MIDAZOLAM HYDROCHLORIDE 1 MG/ML
INJECTION INTRAMUSCULAR; INTRAVENOUS CODE/TRAUMA/SEDATION MEDICATION
Status: COMPLETED | OUTPATIENT
Start: 2019-11-08 | End: 2019-11-08

## 2019-11-08 RX ADMIN — FENTANYL CITRATE 50 MCG: 50 INJECTION INTRAMUSCULAR; INTRAVENOUS at 10:11

## 2019-11-08 RX ADMIN — LIDOCAINE HYDROCHLORIDE 10 ML: 10 INJECTION, SOLUTION INFILTRATION; PERINEURAL at 10:11

## 2019-11-08 RX ADMIN — SODIUM CHLORIDE 250 ML/HR: 9 INJECTION, SOLUTION INTRAVENOUS at 10:11

## 2019-11-08 RX ADMIN — MIDAZOLAM HYDROCHLORIDE 1 MG: 1 INJECTION, SOLUTION INTRAMUSCULAR; INTRAVENOUS at 10:11

## 2019-11-08 RX ADMIN — MIDAZOLAM HYDROCHLORIDE 2 MG: 1 INJECTION, SOLUTION INTRAMUSCULAR; INTRAVENOUS at 10:11

## 2019-11-15 ENCOUNTER — TELEPHONE (OUTPATIENT)
Dept: TRANSPLANT | Facility: CLINIC | Age: 24
End: 2019-11-15

## 2019-11-19 ENCOUNTER — SOCIAL WORK (OUTPATIENT)
Dept: TRANSPLANT | Facility: CLINIC | Age: 24
End: 2019-11-19

## 2019-11-19 NOTE — PROGRESS NOTES
"November 13, 2019    Krystal Cochran, PhD  [address] [place] [address]  [phone]             Dear Dr. Krystal Cochran,    Patient: Anitha Swenson   MR Number: [id number]   YOB: 1995     Thank you very much for your beautifully written and insightful mental health clearance letter regarding Anitha Swenson. Please insert a statement such as "Anitha is cleared regarding mental health and is a suitable candidate for kidney organ transplant". This statement is required in the mental health clearance letter for organ transplant committee to consider Anitha as a suitable kidney recipient. Please send the updated letter to my FAX machine [phone] and I will place in Anitha's medical chart for organ transplant team review.     I really appreciate your help with this clarification.     Alex Roberto [facility]  Desk phone: [phone]  [facility] FAX NUMBER: [phone]    [facility]  [address] [place] [id number]  [facility] : [phone]     "

## 2019-12-10 ENCOUNTER — SOCIAL WORK (OUTPATIENT)
Dept: TRANSPLANT | Facility: CLINIC | Age: 24
End: 2019-12-10

## 2019-12-10 NOTE — PROGRESS NOTES
"Please review 10-16-19 transplant psychosocial for Family Dynamics and Mental Health sections for more information.    Mental health clearance.    11-7-19 mental health clearance letter provided by pt's local mental health provider, Dr. Krystal Cochran, PhD tel: [phone].Dr. Cochran stated patient has "very small support system" and family and patient "is not connected to a [employer] or community organization capable of nurturing Anitha and her family through medical crises and recovery periods". States "Anitha's mother has been her primary help. Now that Anitha is an adult, there is less attention from mother -- who is absolutely overloaded with family members' competing needs. Anitha's father and sister are neither reliable caregivers or emotional supports."    However, with that in mind, Dr. Cochran did clear patient for organ transplant stating: "I endorse Ms. Swenson's effort to secure a new kidney. As a non-physician psychotherapist, I know of no information regarding Ms. Rodriguezs mental health that renders her an unsuitable candidate for a kidney organ transplant."    Please review entire letter for more details.    Psychosocial Suitability: Patient presents as an acceptable candidate for kidney transplant at this time. Based on psychosocial risk factors, patient presents as medium risk, due to significant mental health history of depression, anxiety, PTSD and due to Dr. Cochran reporting patient having very small support system in place. Dr. Krystal Cochran PhD provided 11-7-19 mental health clearance for kidney organ transplant. Pt reports having organ transplant caregiver/transportation plan, medical insurance plan and plan to afford transplant costs all in place. Pt reports having high dialysis compliance within last 3 months.     Recommendations/Additional Comments:   Pt reports has been engaged in counseling for PTSD, anxiety and depression for about 3 years and is on Prozac 40 mg for about 3 or 4 months " (prescribed by PCP). 11-7-19 mental health clearance letter provided showing pt's local mental health provider, Dr. Cochran, has cleared patient for organ transplant regarding mental health.     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 Pt reports is engaged with [employer] office of disability accommodations (Malia Menezes). Pt reports will contact [employer] department regarding any paperwork needed to be completed for any time missed due to transplant.     Pt reports caregivers work and may need employer paperwork completed for any time missed due to transplant.     Final determination of transplant candidacy will be made once work up is complete and reviewed by the selection committee.    Above provided to transplant nurse coordinator.     Savannah Roberto MSW [facility]

## 2019-12-23 ENCOUNTER — TELEPHONE (OUTPATIENT)
Dept: ADMINISTRATIVE | Facility: OTHER | Age: 24
End: 2019-12-23

## 2019-12-27 ENCOUNTER — TELEPHONE (OUTPATIENT)
Dept: TRANSPLANT | Facility: CLINIC | Age: 24
End: 2019-12-27

## 2019-12-27 NOTE — TELEPHONE ENCOUNTER
----- Message from Josefina Joyce sent at 12/23/2019  3:00 PM CST -----  Contact[facility]ANDLER[facility]  tel:  [phone]   Caller is asking that you call  to discuss some questions.    Pls call her and the patient.    Pt. Says she did see the Rheumatology dr. And you should have that info..

## 2019-12-27 NOTE — TELEPHONE ENCOUNTER
I spoke with Amy and she wanted to make me aware that Anitha saw rheumatology however I am not seeing a note so I have asked for amy to get a doctor name for me. We reviewed the case and specifically the psych clearance and the mention that Anitha's support system is limited. Amy was surprised to hear this as Anitha always has someone with her at her appts, either the mother, grandmother or a lifelong friend. Will await follow up on rheumatologists name.

## 2019-12-30 ENCOUNTER — OFFICE VISIT (OUTPATIENT)
Dept: HEMATOLOGY/ONCOLOGY | Facility: CLINIC | Age: 24
End: 2019-12-30
Payer: COMMERCIAL

## 2019-12-30 VITALS
RESPIRATION RATE: 19 BRPM | DIASTOLIC BLOOD PRESSURE: 87 MMHG | SYSTOLIC BLOOD PRESSURE: 117 MMHG | WEIGHT: 175.5 LBS | HEART RATE: 90 BPM | TEMPERATURE: 98 F | BODY MASS INDEX: 25.18 KG/M2

## 2019-12-30 DIAGNOSIS — D50.0 ANEMIA DUE TO CHRONIC BLOOD LOSS: ICD-10-CM

## 2019-12-30 DIAGNOSIS — D64.89 ANEMIA DUE TO MULTIPLE MECHANISMS: ICD-10-CM

## 2019-12-30 DIAGNOSIS — I26.99 OTHER PULMONARY EMBOLISM WITHOUT ACUTE COR PULMONALE, UNSPECIFIED CHRONICITY: Primary | ICD-10-CM

## 2019-12-30 DIAGNOSIS — K92.2 GASTROINTESTINAL HEMORRHAGE, UNSPECIFIED GASTROINTESTINAL HEMORRHAGE TYPE: ICD-10-CM

## 2019-12-30 DIAGNOSIS — Z15.89 COMPOUND HETEROZYGOUS MTHFR MUTATION C677T/A1298C: ICD-10-CM

## 2019-12-30 DIAGNOSIS — D63.1 ANEMIA IN CHRONIC KIDNEY DISEASE, UNSPECIFIED CKD STAGE: ICD-10-CM

## 2019-12-30 DIAGNOSIS — N18.9 ANEMIA IN CHRONIC KIDNEY DISEASE, UNSPECIFIED CKD STAGE: ICD-10-CM

## 2019-12-30 DIAGNOSIS — R79.89 ELEVATED HOMOCYSTEINE: ICD-10-CM

## 2019-12-30 DIAGNOSIS — E53.8 B12 DEFICIENCY: ICD-10-CM

## 2019-12-30 PROCEDURE — 3008F BODY MASS INDEX DOCD: CPT | Mod: S$GLB,,, | Performed by: INTERNAL MEDICINE

## 2019-12-30 PROCEDURE — 3008F PR BODY MASS INDEX (BMI) DOCUMENTED: ICD-10-PCS | Mod: S$GLB,,, | Performed by: INTERNAL MEDICINE

## 2019-12-30 PROCEDURE — 99213 OFFICE O/P EST LOW 20 MIN: CPT | Mod: 25,S$GLB,, | Performed by: INTERNAL MEDICINE

## 2019-12-30 PROCEDURE — 96372 PR INJECTION,THERAP/PROPH/DIAG2ST, IM OR SUBCUT: ICD-10-PCS | Mod: S$GLB,,, | Performed by: INTERNAL MEDICINE

## 2019-12-30 PROCEDURE — 96372 THER/PROPH/DIAG INJ SC/IM: CPT | Mod: S$GLB,,, | Performed by: INTERNAL MEDICINE

## 2019-12-30 PROCEDURE — 99213 PR OFFICE/OUTPT VISIT, EST, LEVL III, 20-29 MIN: ICD-10-PCS | Mod: 25,S$GLB,, | Performed by: INTERNAL MEDICINE

## 2019-12-30 RX ORDER — CYANOCOBALAMIN 1000 UG/ML
1000 INJECTION, SOLUTION INTRAMUSCULAR; SUBCUTANEOUS
Status: COMPLETED | OUTPATIENT
Start: 2019-12-30 | End: 2019-12-30

## 2019-12-30 RX ADMIN — CYANOCOBALAMIN 1000 MCG: 1000 INJECTION, SOLUTION INTRAMUSCULAR; SUBCUTANEOUS at 02:12

## 2019-12-30 NOTE — PROGRESS NOTES
[facility] Hematology/Oncology  PROGRESS NOTE      Subjective:       Patient ID:   NAME: Anitha Swenson : 1995     24 y.o. female    Referring Doc: Radha (peds)  Other Physicians:  Keerthi Camarillo; Alfred Small    Chief Complaint:  Anemia f/u    History of Present Illness:     Patient returns today for a regularly scheduled follow-up visit.  She was been on steroids with 5mg daily.  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 She has been followed by  Dr Pendleton with nephrology and she has been on peritoneal dialysis. She denies any CP, SOB, HA's or N/V.She is here by herself, ambulating ok. She has been back in school. She had repeat bone marrow biopsy on 2019 at the request of the transplant service.        ROS:   GEN: normal without any fever, night sweats or weight loss  HEENT: normal with no HA's, sore throat, stiff neck, changes in vision  CV: normal with no CP, SOB, PND, BOOGIE or orthopnea  PULM: normal with no SOB, cough, hemoptysis, sputum or pleuritic pain  GI: normal with no abdominal pain, nausea, vomiting, constipation, diarrhea, melanotic stools, BRBPR, or hematemesis  : normal with no hematuria, dysuria  BREAST: normal with no mass, discharge, pain  SKIN: normal with no rash, erythema, bruising, or swelling    Allergies:  Review of patient's allergies indicates:   Allergen Reactions    Sulfur        Medications:    Current Outpatient Medications:     BENLYSTA 200 mg/mL AtIn, Inject 1 mL (200 mg total) as directed Every Friday. HOLD UNTIL TOLD TO RESUME BY RHEUMATOLOGIST, Disp: , Rfl:     biotin 5,000 mcg TbDL, Take by mouth once daily., Disp: , Rfl:     calcium acetate (PHOSLO) 667 mg capsule, Take 667 mg by mouth 3 (three) times daily., Disp: , Rfl:     carvedilol (COREG) 12.5 MG tablet, Take 12.5 mg by mouth 2 (two) times daily with meals. , Disp: , Rfl:     cholecalciferol, vitamin D3,  (VITAMIN D3) 5,000 unit Tab, Take 5,000 Units by mouth every morning., Disp: , Rfl:     FLUoxetine 40 MG capsule, Take 1 capsule (40 mg total) by mouth 2 (two) times daily., Disp: 60 capsule, Rfl: 5    gabapentin (NEURONTIN) 300 MG capsule, Take 1 capsule (300 mg total) by mouth every evening. (Patient taking differently: Take 300 mg by mouth 3 (three) times daily. ), Disp: , Rfl: 0    hydroxychloroquine (PLAQUENIL) 200 mg tablet, Take 1 tablet by mouth nightly, Disp: , Rfl: 6    medroxyPROGESTERone (DEPO-PROVERA) 150 mg/mL Syrg, Inject 150 mg into the muscle every 3 (three) months. , Disp: , Rfl:     mycophenolate mofetil (CELLCEPT ORAL), Take 1,000 mg by mouth 2 (two) times daily., Disp: , Rfl:     pantoprazole (PROTONIX) 40 MG tablet, Take 1 tablet (40 mg total) by mouth once daily. (Patient taking differently: Take 40 mg by mouth 2 (two) times daily. ), Disp: , Rfl:     PREDNISONE ORAL, Take 5 mg by mouth once daily. , Disp: , Rfl: 0    PMHx/PSHx Updates:  See patient's last visit with me on 5/20/2019.  See H&P on 2/8/2018        Pathology:  Bone Marrow biopsy 11/8/2019:    BONE MARROW, RIGHT POSTERIOR ILIAC CREST, ASPIRATE, CLOT SECTION, AND   CORE BIOPSY:  --CELLULAR MARROW (APPROXIMATELY 45% TO 50%) WITH TRILINEAGE   HEMATOPOIETIC ELEMENTS, MILD     MEGALOBLASTOID CHANGES, MYELOID LEFT SHIFT, AND NON-SPECIFIC MILD   DYSHEMATOPOIESIS (SEE COMMENT).   --MODERATELY INCREASED STAINABLE IRON WITH RARE RING SIDEROBLAST (LESS   THAN 1%).  --PERIPHERAL BLOOD WITH ANEMIA (HEMOGLOBIN 9.2 GRAM/DECILITER) AND   OCCASIONAL HYPERSEGMENTED    NEUTROPHILS.  Immunophenotyping fails to identify any unique cell populations      Bone Marrow biopsy 7/19/2018:    FINAL DIAGNOSIS:  PERIPHERAL BLOOD:   PANCYTOPENIA:   SEVERE LEUKOPENIA WITH ABSOLUTE NEUTROPENIA AND SLIGHT ABSOLUTE  LYMPHOPENIA.   MILD NORMOCHROMIC NORMOCYTIC ANEMIA AND MILD PERIPHERAL THROMBOCYTOPENIA.    BONE MARROW ASPIRATE, BIOPSY AND CLOT  SECTION:   CELLULARITY: 40% ON THE CLOT, 40-50% ON THE BIOPSY.   HYPOCELLULAR MARROW FOR THE PATIENT'S AGE.   TRILINEAGE HEMATOPOEITIC ACTIVITY WITH MARKED MEGALOBLASTIC ERYTHROID HYPERPLASIA.   RELATIVELY MARKED DECREASED LEFT SHIFTED MYELOID MATURATION TO IMMATURE FORMS AND DYSPOIESIS.   NO INCREASED IN CD34+ MYELOID BLASTS (1%) BY FLOW CYTOMETRY.   ADEQUATE MEGAKARYOCYTES WITH A FEW ATYPICAL HYPOLOBULATED FORMS.   ADEQUATE TO MILDLY INCREASED IRON STORES.   SPECIAL STAINS FOR AFB AND FUNGI ARE NEGATIVE.    COMMENT: In view of the patient's clinical history, the peripheral  blood and bone marrow findings are suggestive of a secondary process,  that is, post-treatment megaloblastic anemia in view of the patient's  history of vitamin B12 deficiency.  Other possible etiologies for the  patient's leukopenia would include immunosuppressive drugs/toxin  effect, folate deficiency or chronic autoimmune disease.    Objective:     Vitals:  Blood pressure 117/87, pulse 90, temperature 98.3 °F (36.8 °C), temperature source Oral, resp. rate 19, weight 79.6 kg (175 lb 8 oz).    Physical Examination:   GEN: no apparent distress, comfortable; AAOx3  HEAD: atraumatic and normocephalic; general swelling form Cushings  EYES: no pallor, no icterus, PERRLA  ENT: OMM, no pharyngeal erythema, external ears WNL; no nasal discharge; no thrush  NECK: no masses, thyroid normal, trachea midline, no LAD/LN's, supple  CV: RRR with no murmur; normal pulse; normal S1 and S2; mild pedal swelling  CHEST: Normal respiratory effort; CTAB; normal breath sounds; no wheeze or crackles  ABDOM: nontender and nondistended; soft; normal bowel sounds; no rebound/guarding  MUSC/Skeletal: ROM normal; no crepitus; joints normal; no deformities or arthropathy  EXTREM: no clubbing, cyanosis, bilateral LE swelling much improved  SKIN: no rashes, lesions, ulcers, petechiae or subcutaneous nodules; no current rash of erythema;    : no preciado  NEURO: grossly  intact; motor/sensory WNL; AAOx3; no tremors  PSYCH: normal mood, affect and behavior  LYMPH: normal cervical, supraclavicular, axillary and groin LN's            Labs:       11/8/2019    Lab Results   Component Value Date    WBC 5.25 11/08/2019    HGB 9.2 (L) 11/08/2019    HCT 31.6 (L) 11/08/2019    MCV 93 11/08/2019     11/08/2019              Radiology/Diagnostic Studies:    Head CT  5/9/2019 - negative    MRI head  5/10/2019  Normal    CXR 5/9/2019 stable            Head CT 4/3/2018    I have reviewed all available lab results and radiology reports.    Assessment/Plan:     (1) 24 y.o. female with diagnosis of SLE/lupus with recent admission at [facility] for HTN crisis, renal failure and anasarca.  She was seen by hematology a consult for evaluation of her anemia.  - suspected multifactorial process with anemia of chronic disorders, lupus mediated anemia, anemia of renal disease and prior GI bleed component; could also have marrow suppression from the cellcept  - seen by Dr Camarillo with GI for stool OBS positive studies previously  - bili and LDH were normal so no hemolysis was suspected      Anemia of chronic disorders and anemia of chronic renal:  - latest hgb is at 9.2  - recent repeat bone marrow biopsy on chart from 11/8/2019, with no significant findings or changes in my opninion  - prior bone marrow biopsy on 7/19/2018     (2) HTN/CRI with nephrotic syndrome 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 - followed by Dr Pendleton with nephrology  - she had kidney biopsy couple of weeks ago which seemed to show improved findings per patient  - seeing Dr Pendleton   - now on peritoneal dialysis and plans to go on the kidney transplant list - sees the transplant team Oct 16th at [facility]    (3) SLE/Lupus - followed by Dr Rohan Lanza with Rheum previously; she is on cellcept and prednisone; she is seeing Dr Alfred Small in [place] now  - now on Benlysta infusions for the Lupus     (4) Questionable  pulmonary emboli with indeterminant VQ - 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  - clot workup was ordered while in hospital: ATIII was 135, prothrombin II was normal;  Factor V leiden was negative; LA was negative; protein C and S were adequate; antiphospholipid and anticardiolipin negative;   - homocysteine was a little elevated at 16.8  - she is now off the eliquis for about 3 weeks per nephrology's directives  - MTHFR A and C genes were both abnormal and heterozygous  - discussed genetic implications    (5) PTSD     (6) B12 deficiency with prior level at 240 - she has been on B12 supplements; latest level improved to 1149     (7) Prior Leucocytosis and leucopenia issues    - prior leukemia screen was negative; she is on steroids  - wbc was 5.25 and normal currently    (8) Low Vit D - I will defer to discretion of Dr Pendleton    (9) Prior admit for candidal esophagitis    (10) recent MRSA Pneumonia and C. Diff:   - recent admission at [facility] with MRSA pneumonia and bacteremia  - she was seen by Dr An with ID    (11) Thrombocytopenia - most likely due to autoimmune disorder itself and/or the  immunosuppressive therapy with subsequent marrow suppression  - latest platelet count at 294,000    (12) Mildly elevated ferritin with latest level at 684 - suspect that this is a reactive process         1. Other pulmonary embolism without acute cor pulmonale, unspecified chronicity     2. Anemia due to chronic blood loss     3. Anemia due to multiple mechanisms     4. Anemia in chronic kidney disease, unspecified CKD stage     5. Compound heterozygous MTHFR mutation [id number]     6. Elevated homocysteine     7. Gastrointestinal hemorrhage, unspecified gastrointestinal hemorrhage type         PLAN:  1. F/u with Dr Small with Rheum  2. F/u with Dr Pendleton with neph, Dr An with ID  3. Encouraged compliance with labs  4. Check labs once a month  5. She should be a candidate frorprocrit  or aranesp - will leave up to discretion of Dr Pendleton  6. Proceed with kidney transplant team per their directives  7. Continue B12 monthly      RTC in 3-4 months  Fax note to Alfred Garcia; Marquise; Jennifer Pendleton Casey; [place]; Keiva Smith    Discussion:     I have explained all of the above in detail and the patient understands all of the current recommendation(s). I have answered all of their questions to the best of my ability and to their complete satisfaction.   The patient is to continue with the current management plan.            Electronically signed by Sukhjinder Goodwin MD

## 2020-01-13 ENCOUNTER — TELEPHONE (OUTPATIENT)
Dept: GYNECOLOGIC ONCOLOGY | Facility: CLINIC | Age: 25
End: 2020-01-13

## 2020-01-14 ENCOUNTER — INITIAL CONSULT (OUTPATIENT)
Dept: GYNECOLOGIC ONCOLOGY | Facility: CLINIC | Age: 25
End: 2020-01-14
Payer: COMMERCIAL

## 2020-01-14 VITALS
WEIGHT: 166 LBS | DIASTOLIC BLOOD PRESSURE: 59 MMHG | BODY MASS INDEX: 23.77 KG/M2 | HEIGHT: 70 IN | HEART RATE: 54 BPM | SYSTOLIC BLOOD PRESSURE: 102 MMHG

## 2020-01-14 DIAGNOSIS — N87.9 CERVICAL DYSPLASIA: Primary | ICD-10-CM

## 2020-01-14 PROBLEM — D06.9 SEVERE CERVICAL DYSPLASIA: Status: ACTIVE | Noted: 2020-01-14

## 2020-01-14 PROCEDURE — 99244 OFF/OP CNSLTJ NEW/EST MOD 40: CPT | Mod: S$GLB,,, | Performed by: OBSTETRICS & GYNECOLOGY

## 2020-01-14 PROCEDURE — 99999 PR PBB SHADOW E&M-EST. PATIENT-LVL III: ICD-10-PCS | Mod: PBBFAC,,, | Performed by: OBSTETRICS & GYNECOLOGY

## 2020-01-14 PROCEDURE — 99244 PR OFFICE CONSULTATION,LEVEL IV: ICD-10-PCS | Mod: S$GLB,,, | Performed by: OBSTETRICS & GYNECOLOGY

## 2020-01-14 PROCEDURE — 99999 PR PBB SHADOW E&M-EST. PATIENT-LVL III: CPT | Mod: PBBFAC,,, | Performed by: OBSTETRICS & GYNECOLOGY

## 2020-01-14 RX ORDER — AMLODIPINE BESYLATE 10 MG/1
TABLET ORAL
COMMUNITY
Start: 2018-02-14 | End: 2020-01-14

## 2020-01-14 RX ORDER — TRAMADOL HYDROCHLORIDE 50 MG/1
TABLET ORAL
COMMUNITY
Start: 2018-03-19 | End: 2020-01-14

## 2020-01-14 RX ORDER — PROMETHAZINE HYDROCHLORIDE 12.5 MG/1
25 TABLET ORAL 4 TIMES DAILY
COMMUNITY

## 2020-01-14 RX ORDER — PREDNISONE 1 MG/1
TABLET ORAL
Refills: 1 | COMMUNITY
Start: 2019-10-22 | End: 2020-01-14

## 2020-01-14 RX ORDER — CARVEDILOL 25 MG/1
TABLET ORAL
Refills: 3 | COMMUNITY
Start: 2019-11-11 | End: 2020-01-14

## 2020-01-14 RX ORDER — LEVOFLOXACIN 750 MG/1
TABLET ORAL
COMMUNITY
Start: 2018-04-01 | End: 2020-01-14

## 2020-01-14 RX ORDER — OXYCODONE HYDROCHLORIDE 5 MG/1
TABLET ORAL
COMMUNITY
Start: 2018-03-21 | End: 2020-01-14

## 2020-01-14 RX ORDER — DIPHENOXYLATE HYDROCHLORIDE AND ATROPINE SULFATE 2.5; .025 MG/1; MG/1
TABLET ORAL
COMMUNITY
Start: 2018-03-26 | End: 2020-01-14

## 2020-01-14 RX ORDER — FLUCONAZOLE 200 MG/1
TABLET ORAL
COMMUNITY
Start: 2018-04-01 | End: 2020-01-14

## 2020-01-14 RX ORDER — GENTAMICIN SULFATE 1 MG/G
CREAM TOPICAL
COMMUNITY
Start: 2019-11-14 | End: 2020-01-14

## 2020-01-14 RX ORDER — MYCOPHENOLATE MOFETIL 500 MG/1
TABLET ORAL
COMMUNITY
Start: 2019-12-26 | End: 2020-01-14

## 2020-01-14 RX ORDER — MEDROXYPROGESTERONE ACETATE 150 MG/ML
INJECTION, SUSPENSION INTRAMUSCULAR
COMMUNITY
Start: 2018-03-19 | End: 2020-01-14

## 2020-01-14 RX ORDER — PANTOPRAZOLE SODIUM 40 MG/1
TABLET, DELAYED RELEASE ORAL
COMMUNITY
Start: 2018-04-02 | End: 2020-01-14

## 2020-01-14 RX ORDER — FUROSEMIDE 40 MG/1
TABLET ORAL
COMMUNITY
Start: 2018-04-06 | End: 2020-01-14

## 2020-01-14 RX ORDER — DULOXETIN HYDROCHLORIDE 30 MG/1
CAPSULE, DELAYED RELEASE ORAL
COMMUNITY
Start: 2018-03-23 | End: 2020-01-14

## 2020-01-14 RX ORDER — PREDNISONE 5 MG/1
TABLET ORAL
COMMUNITY
Start: 2020-01-11 | End: 2020-01-14

## 2020-01-14 RX ORDER — SODIUM CHLORIDE 9 MG/ML
INJECTION, SOLUTION INTRAVENOUS CONTINUOUS
Status: CANCELLED | OUTPATIENT
Start: 2020-01-14

## 2020-01-14 RX ORDER — CALCITRIOL 0.25 UG/1
CAPSULE ORAL
COMMUNITY
Start: 2018-04-14 | End: 2020-01-14

## 2020-01-14 RX ORDER — AZITHROMYCIN 250 MG/1
TABLET, FILM COATED ORAL
COMMUNITY
Start: 2020-01-07 | End: 2020-01-14

## 2020-01-14 RX ORDER — CLONIDINE HYDROCHLORIDE 0.1 MG/1
TABLET ORAL
COMMUNITY
Start: 2018-04-07 | End: 2020-01-14

## 2020-01-14 RX ORDER — NYSTATIN 100000 [USP'U]/ML
SUSPENSION ORAL
COMMUNITY
Start: 2018-03-26 | End: 2020-01-14

## 2020-01-14 RX ORDER — CARVEDILOL 25 MG/1
TABLET ORAL
COMMUNITY
Start: 2018-04-16 | End: 2020-01-14

## 2020-01-14 RX ORDER — RAMIPRIL 10 MG/1
CAPSULE ORAL
COMMUNITY
Start: 2018-04-05 | End: 2020-01-14

## 2020-01-14 RX ORDER — SERTRALINE HYDROCHLORIDE 100 MG/1
TABLET, FILM COATED ORAL
COMMUNITY
Start: 2018-03-18 | End: 2020-01-14

## 2020-01-14 RX ORDER — VALACYCLOVIR HYDROCHLORIDE 500 MG/1
TABLET, FILM COATED ORAL
COMMUNITY
Start: 2018-04-01 | End: 2020-01-14

## 2020-01-14 RX ORDER — RAMIPRIL 5 MG/1
CAPSULE ORAL
COMMUNITY
Start: 2018-04-11 | End: 2020-01-14

## 2020-01-14 RX ORDER — PREDNISONE 20 MG/1
TABLET ORAL
COMMUNITY
Start: 2018-04-14 | End: 2020-01-14

## 2020-01-14 RX ORDER — CALCIUM ACETATE 667 MG/1
CAPSULE ORAL
COMMUNITY
Start: 2018-02-14 | End: 2020-01-14

## 2020-01-14 RX ORDER — DULOXETIN HYDROCHLORIDE 60 MG/1
CAPSULE, DELAYED RELEASE ORAL
COMMUNITY
Start: 2018-03-23 | End: 2020-01-14

## 2020-01-14 RX ORDER — MUPIROCIN 20 MG/G
OINTMENT TOPICAL
COMMUNITY
Start: 2018-03-19 | End: 2020-01-14

## 2020-01-14 RX ORDER — CLONAZEPAM 0.5 MG/1
TABLET ORAL
COMMUNITY
Start: 2018-04-20 | End: 2020-01-14

## 2020-01-14 RX ORDER — MYCOPHENOLATE MOFETIL 500 MG/1
TABLET ORAL
COMMUNITY
Start: 2018-03-19 | End: 2020-01-14

## 2020-01-14 RX ORDER — OXYCODONE HYDROCHLORIDE 10 MG/1
TABLET ORAL
COMMUNITY
Start: 2020-01-10

## 2020-01-14 NOTE — H&P (VIEW-ONLY)
Subjective:      Patient ID: Anitha Swenson is a 24 y.o. female.    Chief Complaint: Cervical Dysplasia      HPI  Referred by Dr. Manuela Youngblood for severe cervical dysplasia.     Undergoing evaluation for renal transplant due to lupus. G0 Several other comorbidities, sees Dr. Goodwin with heme onc. Chronic steroid use.       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     Review of Systems   Constitutional: Negative for appetite change, chills, fatigue and fever.   HENT: Negative for mouth sores.    Respiratory: Negative for cough and shortness of breath.    Cardiovascular: Negative for leg swelling.   Gastrointestinal: Negative for abdominal pain, blood in stool, constipation and diarrhea.   Endocrine: Negative for cold intolerance.   Genitourinary: Negative for dysuria and vaginal bleeding.   Musculoskeletal: Negative for myalgias.   Skin: Negative for rash.   Allergic/Immunologic: Negative.    Neurological: Negative for weakness and numbness.   Hematological: Negative for adenopathy. Does not bruise/bleed easily.   Psychiatric/Behavioral: Negative for confusion.       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 Treated with cellcept in 2018 until 10-11/2018 (stopped for neutropenic fever), last steroid pulce 12/31/18 and had 2 doses CYC, last 1/19/2018. On HD since 1/2019. Sees Dr. Pendleton, nephrology in [place], [place]. and   Staci, rheumatology    Lupus nephritis, ISN/RPS class IV 12/2017    Migraine headache with aura     Other pulmonary embolism without acute cor pulmonale     Patient and mother deny     Past Surgical History:   Procedure Laterality Date    BONE MARROW BIOPSY N/A 11/8/2019    Procedure: BIOPSY, BONE MARROW;  Surgeon: Jamaal Diagnostic Provider;  Location[facility];  Service: Interventional Radiology;  Laterality: N/A;    HIP ARTHROSCOPY W/ LABRAL DEBRIDEMENT Left 9/12/2019    Procedure: DEBRIDEMENT, LABRUM, HIP, ARTHROSCOPIC,shaving of articular cartilage(chondroplasty,abrasion arthroplasty and/or labrum(includes labral repair);  Surgeon: Avery Blake MD;  Location: [facility] OR;  Service: Orthopedics;  Laterality: Left;  DEBRIDEMENT, LABRUM, HIP, ARTHROSCOPIC,shaving of articular cartilage(chondroplasty,abrasion arthroplasty and/or labrum(includes labral repair)    HIP SURGERY      INSERTION OF TUNNELED CENTRAL VENOUS HEMODIALYSIS CATHETER N/A 1/31/2019    Procedure: INSERTION, CATHETER, CENTRAL VENOUS, HEMODIALYSIS, TUNNELED;  Surgeon: Nam Tucker MD;  Location: [facility] CATH LAB;  Service: Nephrology;  Laterality: N/A;    LAPAROSCOPIC INSERTION OF PERITONEAL DIALYSIS CATHETER  07/2019    SUBCHONDROPLASTY Left 9/12/2019    Procedure: SUBCHONDROPLASTY,Femoral Head;  Surgeon: Avery Blake MD;  [facility];  Service: Orthopedics;  Laterality: Left;  SUBCHONDROPLASTY,Femoral Head  Clonidine/Epi/Ketorolac/Ropivacaine Injection 30cc    TYMPANOSTOMY TUBE PLACEMENT       Family History   Problem Relation Age of Onset    Goiter Mother     Anxiety disorder Father     Hypertension Father     Diabetes Mellitus Maternal Grandfather     Eating disorder Sister     Kidney disease Neg Hx      Social History     Socioeconomic History    Marital status: Single     Spouse name: Not on file    Number of children: Not on file    Years of education: Not on file    Highest education level: Not on file    Occupational History    Occupation: goes to [employer]     Comment: full time student   Social Needs    Financial resource strain: Not on file    Food insecurity:     Worry: Not on file     Inability: Not on file    Transportation needs:     Medical: Not on file     Non-medical: Not on file   Tobacco Use    Smoking status: Former Smoker     Packs/day: 0.25     Years: 1.00     Pack years: 0.25     Types: Vaping with nicotine     Start date: 10/16/2016     Last attempt to quit: 10/16/2016     Years since quitting: 3.2    Smokeless tobacco: Never Used   Substance and Sexual Activity    Alcohol use: No    Drug use: No    Sexual activity: Not on file   Lifestyle    Physical activity:     Days per week: Not on file     Minutes per session: Not on file    Stress: Not on file   Relationships    Social connections:     Talks on phone: Not on file     Gets together: Not on file     Attends Buddhism service: Not on file     Active member of club or organization: Not on file     Attends meetings of clubs or organizations: Not on file     Relationship status: Not on file   Other Topics Concern    Not on file   Social History Narrative    She live in [place] with her parents. They will be the care givers     Current Outpatient Medications   Medication Sig    BENLYSTA 200 mg/mL AtIn Inject 1 mL (200 mg total) as directed Every Friday. HOLD UNTIL TOLD TO RESUME BY RHEUMATOLOGIST    biotin 5,000 mcg TbDL Take by mouth once daily.    calcium acetate (PHOSLO) 667 mg capsule Take 667 mg by mouth 3 (three) times daily.    cholecalciferol, vitamin D3, (VITAMIN D3) 5,000 unit Tab Take 5,000 Units by mouth every morning.    FLUoxetine 40 MG capsule Take 1 capsule (40 mg total) by mouth 2 (two) times daily.    gabapentin (NEURONTIN) 300 MG capsule Take 1 capsule (300 mg total) by mouth every evening. (Patient taking differently: Take 300 mg by mouth 3 (three) times daily. )    hydroxychloroquine (PLAQUENIL) 200 mg tablet  Take 1 tablet by mouth nightly    mycophenolate mofetil (CELLCEPT ORAL) Take 1,000 mg by mouth 2 (two) times daily.    oxyCODONE (ROXICODONE) 10 mg Tab immediate release tablet     pantoprazole (PROTONIX) 40 MG tablet Take 1 tablet (40 mg total) by mouth once daily. (Patient taking differently: Take 40 mg by mouth 2 (two) times daily. )    PREDNISONE ORAL Take 5 mg by mouth once daily.     promethazine (PHENERGAN) 12.5 MG Tab Take 25 mg by mouth 4 (four) times daily.     No current facility-administered medications for this visit.      Review of patient's allergies indicates:   Allergen Reactions    Sulfa (sulfonamide antibiotics) Hives       Objective:   Physical Exam:   Constitutional: She is oriented to person, place, and time. She appears well-developed and well-nourished.    HENT:   Head: Normocephalic and atraumatic.    Eyes: Pupils are equal, round, and reactive to light. EOM are normal.    Neck: Normal range of motion. Neck supple. No thyromegaly present.    Cardiovascular: Normal rate, regular rhythm and intact distal pulses.     Pulmonary/Chest: Effort normal and breath sounds normal. No respiratory distress. She has no wheezes.        Abdominal: Soft. Bowel sounds are normal. She exhibits no distension, no ascites and no mass. There is no tenderness.             Musculoskeletal: Normal range of motion and moves all extremeties.      Lymphadenopathy:     She has no cervical adenopathy.        Right: No supraclavicular adenopathy present.        Left: No supraclavicular adenopathy present.    Neurological: She is alert and oriented to person, place, and time.    Skin: Skin is warm and dry. No rash noted.    Psychiatric: She has a normal mood and affect.       Assessment:     1. Cervical dysplasia        Plan:   No orders of the defined types were placed in this encounter.      I discussed with the patient the natural history of HPV and cervical dysplasia. Recommendations for high grade dysplasia are  excision. Discussed CK. She desires to proceed.     The risks, benefits, and indications of the procedure were discussed with the patient and her family members if present.  These included bleeding, transfusion, infection, damage to surrounding tissues (bowel, bladder, ureter), wound separation, lymphedema, conversion to laparotomy if laparoscopic, perioperative cardiac events, VTE, pneumonia, and possible death.  She voiced understanding, all questions were answered and consents were signed.    Plan for [facility] 1/23/2020

## 2020-01-14 NOTE — LETTER
January 14, 2020      Manuela Youngblood MD  [address]  [address]  [facility] Obstetric & Gynecology  [facility] [address]           [facility] [address] [facility] [address]  [address], [address]  [facility] [address]  Phone: [phone]  Fax: [phone]          Patient: Anitha Swenson   MR Number: [id number]   YOB: 1995   Date of Visit: 1/14/2020       Dear Dr. Manuela Youngblood:    Thank you for referring Anitha Swenson to me for evaluation. Attached you will find relevant portions of my assessment and plan of care.    If you have questions, please do not hesitate to call me. I look forward to following Anitha Swenson along with you.    Sincerely,    Amy Hernandez MD    Enclosure  CC:  MD Campbell Best MD    If you would like to receive this communication electronically, please contact [email or web address] or [phone] to request more information on [employer] access.    For providers and/or their staff who would like to refer a patient to [facility], please contact us through our one-stop-shop provider referral line, Abimael Mccabe, at [phone].    If you feel you have received this communication in error or would no longer like to receive these types of communications, please e-mail [email or web address]

## 2020-01-14 NOTE — PROGRESS NOTES
Subjective:      Patient ID: Anitha Swenson is a 24 y.o. female.    Chief Complaint: Cervical Dysplasia      HPI  Referred by Dr. Manuela Youngblood for severe cervical dysplasia.     Undergoing evaluation for renal transplant due to lupus. G0 Several other comorbidities, sees Dr. Goodwin with heme onc. Chronic steroid use.       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     Review of Systems   Constitutional: Negative for appetite change, chills, fatigue and fever.   HENT: Negative for mouth sores.    Respiratory: Negative for cough and shortness of breath.    Cardiovascular: Negative for leg swelling.   Gastrointestinal: Negative for abdominal pain, blood in stool, constipation and diarrhea.   Endocrine: Negative for cold intolerance.   Genitourinary: Negative for dysuria and vaginal bleeding.   Musculoskeletal: Negative for myalgias.   Skin: Negative for rash.   Allergic/Immunologic: Negative.    Neurological: Negative for weakness and numbness.   Hematological: Negative for adenopathy. Does not bruise/bleed easily.   Psychiatric/Behavioral: Negative for confusion.       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 Treated with cellcept in 2018 until 10-11/2018 (stopped for neutropenic fever), last steroid pulce 12/31/18 and had 2 doses CYC, last 1/19/2018. On HD since 1/2019. Sees Dr. Pendleton, nephrology in [place], [place]. and   Staci, rheumatology    Lupus nephritis, ISN/RPS class IV 12/2017    Migraine headache with aura     Other pulmonary embolism without acute cor pulmonale     Patient and mother deny     Past Surgical History:   Procedure Laterality Date    BONE MARROW BIOPSY N/A 11/8/2019    Procedure: BIOPSY, BONE MARROW;  Surgeon: Jamaal Diagnostic Provider;  Location: [facility] OR;  Service: Interventional Radiology;  Laterality: N/A;    HIP ARTHROSCOPY W/ LABRAL DEBRIDEMENT Left 9/12/2019    Procedure: DEBRIDEMENT, LABRUM, HIP, ARTHROSCOPIC,shaving of articular cartilage(chondroplasty,abrasion arthroplasty and/or labrum(includes labral repair);  Surgeon: Avery Blake MD;  Location: [facility] OR;  Service: Orthopedics;  Laterality: Left;  DEBRIDEMENT, LABRUM, HIP, ARTHROSCOPIC,shaving of articular cartilage(chondroplasty,abrasion arthroplasty and/or labrum(includes labral repair)    HIP SURGERY      INSERTION OF TUNNELED CENTRAL VENOUS HEMODIALYSIS CATHETER N/A 1/31/2019    Procedure: INSERTION, CATHETER, CENTRAL VENOUS, HEMODIALYSIS, TUNNELED;  Surgeon: Nam Tucker MD;  Location: [facility] CATH LAB;  Service: Nephrology;  Laterality: N/A;    LAPAROSCOPIC INSERTION OF PERITONEAL DIALYSIS CATHETER  07/2019    SUBCHONDROPLASTY Left 9/12/2019    Procedure: SUBCHONDROPLASTY,Femoral Head;  Surgeon: Avery Blake MD;  Location: [facility];  Service: Orthopedics;  Laterality: Left;  SUBCHONDROPLASTY,Femoral Head  Clonidine/Epi/Ketorolac/Ropivacaine Injection 30cc    TYMPANOSTOMY TUBE PLACEMENT       Family History   Problem Relation Age of Onset    Goiter Mother     Anxiety disorder Father     Hypertension Father     Diabetes Mellitus Maternal Grandfather     Eating disorder Sister     Kidney disease Neg Hx      Social History     Socioeconomic History    Marital status: Single     Spouse name: Not on file    Number of children: Not on file    Years of education: Not on file    Highest education level: Not on file    Occupational History    Occupation: goes to [employer]     Comment: full time student   Social Needs    Financial resource strain: Not on file    Food insecurity:     Worry: Not on file     Inability: Not on file    Transportation needs:     Medical: Not on file     Non-medical: Not on file   Tobacco Use    Smoking status: Former Smoker     Packs/day: 0.25     Years: 1.00     Pack years: 0.25     Types: Vaping with nicotine     Start date: 10/16/2016     Last attempt to quit: 10/16/2016     Years since quitting: 3.2    Smokeless tobacco: Never Used   Substance and Sexual Activity    Alcohol use: No    Drug use: No    Sexual activity: Not on file   Lifestyle    Physical activity:     Days per week: Not on file     Minutes per session: Not on file    Stress: Not on file   Relationships    Social connections:     Talks on phone: Not on file     Gets together: Not on file     Attends Restorationist service: Not on file     Active member of club or organization: Not on file     Attends meetings of clubs or organizations: Not on file     Relationship status: Not on file   Other Topics Concern    Not on file   Social History Narrative    She live in [place] with her parents. They will be the care givers     Current Outpatient Medications   Medication Sig    BENLYSTA 200 mg/mL AtIn Inject 1 mL (200 mg total) as directed Every Friday. HOLD UNTIL TOLD TO RESUME BY RHEUMATOLOGIST    biotin 5,000 mcg TbDL Take by mouth once daily.    calcium acetate (PHOSLO) 667 mg capsule Take 667 mg by mouth 3 (three) times daily.    cholecalciferol, vitamin D3, (VITAMIN D3) 5,000 unit Tab Take 5,000 Units by mouth every morning.    FLUoxetine 40 MG capsule Take 1 capsule (40 mg total) by mouth 2 (two) times daily.    gabapentin (NEURONTIN) 300 MG capsule Take 1 capsule (300 mg total) by mouth every evening. (Patient taking differently: Take 300 mg by mouth 3 (three) times daily. )    hydroxychloroquine (PLAQUENIL) 200 mg tablet  Take 1 tablet by mouth nightly    mycophenolate mofetil (CELLCEPT ORAL) Take 1,000 mg by mouth 2 (two) times daily.    oxyCODONE (ROXICODONE) 10 mg Tab immediate release tablet     pantoprazole (PROTONIX) 40 MG tablet Take 1 tablet (40 mg total) by mouth once daily. (Patient taking differently: Take 40 mg by mouth 2 (two) times daily. )    PREDNISONE ORAL Take 5 mg by mouth once daily.     promethazine (PHENERGAN) 12.5 MG Tab Take 25 mg by mouth 4 (four) times daily.     No current facility-administered medications for this visit.      Review of patient's allergies indicates:   Allergen Reactions    Sulfa (sulfonamide antibiotics) Hives       Objective:   Physical Exam:   Constitutional: She is oriented to person, place, and time. She appears well-developed and well-nourished.    HENT:   Head: Normocephalic and atraumatic.    Eyes: Pupils are equal, round, and reactive to light. EOM are normal.    Neck: Normal range of motion. Neck supple. No thyromegaly present.    Cardiovascular: Normal rate, regular rhythm and intact distal pulses.     Pulmonary/Chest: Effort normal and breath sounds normal. No respiratory distress. She has no wheezes.        Abdominal: Soft. Bowel sounds are normal. She exhibits no distension, no ascites and no mass. There is no tenderness.             Musculoskeletal: Normal range of motion and moves all extremeties.      Lymphadenopathy:     She has no cervical adenopathy.        Right: No supraclavicular adenopathy present.        Left: No supraclavicular adenopathy present.    Neurological: She is alert and oriented to person, place, and time.    Skin: Skin is warm and dry. No rash noted.    Psychiatric: She has a normal mood and affect.       Assessment:     1. Cervical dysplasia        Plan:   No orders of the defined types were placed in this encounter.      I discussed with the patient the natural history of HPV and cervical dysplasia. Recommendations for high grade dysplasia are  excision. Discussed CK. She desires to proceed.     The risks, benefits, and indications of the procedure were discussed with the patient and her family members if present.  These included bleeding, transfusion, infection, damage to surrounding tissues (bowel, bladder, ureter), wound separation, lymphedema, conversion to laparotomy if laparoscopic, perioperative cardiac events, VTE, pneumonia, and possible death.  She voiced understanding, all questions were answered and consents were signed.    Plan for [facility] 1/23/2020

## 2020-01-15 ENCOUNTER — TELEPHONE (OUTPATIENT)
Dept: TRANSPLANT | Facility: CLINIC | Age: 25
End: 2020-01-15

## 2020-01-15 NOTE — TELEPHONE ENCOUNTER
Call returned. Anitha is notified that we need to await her GYN procedure before we can move forward with her transplant process. I asked that she check in with me once that is done and she verbalized understanding.    ----- Message from Zachary Bain MA sent at 1/14/2020  1:47 PM CST -----  Contact: pt       ----- Message -----  From: Mansi Shannon  Sent: 1/14/2020   1:40 PM CST  To: [facility] Pre-Kidney Transplant Non-Clinical    Pt is calling to speak with the nurse to check the status of being on the kidney transplant waiting list can you please call pt at [phone] jc

## 2020-01-21 ENCOUNTER — TELEPHONE (OUTPATIENT)
Dept: GYNECOLOGIC ONCOLOGY | Facility: CLINIC | Age: 25
End: 2020-01-21

## 2020-01-21 ENCOUNTER — LAB VISIT (OUTPATIENT)
Dept: LAB | Facility: HOSPITAL | Age: 25
End: 2020-01-21
Attending: INTERNAL MEDICINE
Payer: COMMERCIAL

## 2020-01-21 DIAGNOSIS — E72.12 METHYLENE THF REDUCTASE DEFICIENCY AND HOMOCYSTINURIA: ICD-10-CM

## 2020-01-21 DIAGNOSIS — N18.9 ANEMIA OF CHRONIC RENAL FAILURE: ICD-10-CM

## 2020-01-21 DIAGNOSIS — I26.99 IATROGENIC PULMONARY EMBOLISM AND INFARCTION: Primary | ICD-10-CM

## 2020-01-21 DIAGNOSIS — N18.9 CHRONIC KIDNEY DISEASE, UNSPECIFIED: ICD-10-CM

## 2020-01-21 DIAGNOSIS — D50.0 IRON DEFICIENCY ANEMIA SECONDARY TO BLOOD LOSS (CHRONIC): ICD-10-CM

## 2020-01-21 DIAGNOSIS — T81.718A IATROGENIC PULMONARY EMBOLISM AND INFARCTION: Primary | ICD-10-CM

## 2020-01-21 DIAGNOSIS — K92.2 ACUTE GASTROINTESTINAL HEMORRHAGE: ICD-10-CM

## 2020-01-21 DIAGNOSIS — E72.11 METHYLENE THF REDUCTASE DEFICIENCY AND HOMOCYSTINURIA: ICD-10-CM

## 2020-01-21 DIAGNOSIS — D63.1 ANEMIA OF CHRONIC RENAL FAILURE: ICD-10-CM

## 2020-01-21 DIAGNOSIS — D64.9 ANEMIA, UNSPECIFIED: ICD-10-CM

## 2020-01-21 LAB
ALBUMIN SERPL BCP-MCNC: 2.8 G/DL (ref 3.5–5.2)
ALP SERPL-CCNC: 48 U/L (ref 55–135)
ALT SERPL W/O P-5'-P-CCNC: 16 U/L (ref 10–44)
ANION GAP SERPL CALC-SCNC: 19 MMOL/L (ref 8–16)
AST SERPL-CCNC: 32 U/L (ref 10–40)
BASOPHILS # BLD AUTO: 0.04 K/UL (ref 0–0.2)
BASOPHILS NFR BLD: 0.6 % (ref 0–1.9)
BILIRUB SERPL-MCNC: 2 MG/DL (ref 0.1–1)
BUN SERPL-MCNC: 31 MG/DL (ref 6–20)
CALCIUM SERPL-MCNC: 9 MG/DL (ref 8.7–10.5)
CHLORIDE SERPL-SCNC: 91 MMOL/L (ref 95–110)
CO2 SERPL-SCNC: 23 MMOL/L (ref 23–29)
CREAT SERPL-MCNC: 9.4 MG/DL (ref 0.5–1.4)
DIFFERENTIAL METHOD: ABNORMAL
EOSINOPHIL # BLD AUTO: 0 K/UL (ref 0–0.5)
EOSINOPHIL NFR BLD: 0 % (ref 0–8)
ERYTHROCYTE [DISTWIDTH] IN BLOOD BY AUTOMATED COUNT: 15.8 % (ref 11.5–14.5)
EST. GFR  (AFRICAN AMERICAN): 6.1 ML/MIN/1.73 M^2
EST. GFR  (NON AFRICAN AMERICAN): 5.3 ML/MIN/1.73 M^2
FERRITIN SERPL-MCNC: 1408 NG/ML (ref 20–300)
FOLATE SERPL-MCNC: 7.7 NG/ML (ref 4–24)
GLUCOSE SERPL-MCNC: 77 MG/DL (ref 70–110)
HCT VFR BLD AUTO: 24.7 % (ref 37–48.5)
HGB BLD-MCNC: 7.3 G/DL (ref 12–16)
IMM GRANULOCYTES # BLD AUTO: 0.13 K/UL (ref 0–0.04)
IMM GRANULOCYTES NFR BLD AUTO: 1.9 % (ref 0–0.5)
IRON SERPL-MCNC: 67 UG/DL (ref 30–160)
LYMPHOCYTES # BLD AUTO: 0.8 K/UL (ref 1–4.8)
LYMPHOCYTES NFR BLD: 12.1 % (ref 18–48)
MCH RBC QN AUTO: 26.9 PG (ref 27–31)
MCHC RBC AUTO-ENTMCNC: 29.6 G/DL (ref 32–36)
MCV RBC AUTO: 91 FL (ref 82–98)
MONOCYTES # BLD AUTO: 0.6 K/UL (ref 0.3–1)
MONOCYTES NFR BLD: 9.2 % (ref 4–15)
NEUTROPHILS # BLD AUTO: 5.1 K/UL (ref 1.8–7.7)
NEUTROPHILS NFR BLD: 76.2 % (ref 38–73)
NRBC BLD-RTO: 0 /100 WBC
PLATELET # BLD AUTO: 330 K/UL (ref 150–350)
PMV BLD AUTO: 10.2 FL (ref 9.2–12.9)
POTASSIUM SERPL-SCNC: 3.1 MMOL/L (ref 3.5–5.1)
PROT SERPL-MCNC: 5.4 G/DL (ref 6–8.4)
RBC # BLD AUTO: 2.71 M/UL (ref 4–5.4)
SATURATED IRON: 58 % (ref 20–50)
SODIUM SERPL-SCNC: 133 MMOL/L (ref 136–145)
TOTAL IRON BINDING CAPACITY: 116 UG/DL (ref 250–450)
TRANSFERRIN SERPL-MCNC: 83 MG/DL (ref 200–375)
VIT B12 SERPL-MCNC: >1500 PG/ML (ref 210–950)
WBC # BLD AUTO: 6.75 K/UL (ref 3.9–12.7)

## 2020-01-21 PROCEDURE — 85025 COMPLETE CBC W/AUTO DIFF WBC: CPT

## 2020-01-21 PROCEDURE — 82746 ASSAY OF FOLIC ACID SERUM: CPT

## 2020-01-21 PROCEDURE — 82607 VITAMIN B-12: CPT

## 2020-01-21 PROCEDURE — 80053 COMPREHEN METABOLIC PANEL: CPT

## 2020-01-21 PROCEDURE — 82728 ASSAY OF FERRITIN: CPT

## 2020-01-21 PROCEDURE — 83540 ASSAY OF IRON: CPT

## 2020-01-21 PROCEDURE — 36415 COLL VENOUS BLD VENIPUNCTURE: CPT

## 2020-01-21 NOTE — TELEPHONE ENCOUNTER
Spoke with pt. Confirmed procedure for 1/23/2020. Informed pt to arrive at 0530 and go to the 2nd floor of the [facility]. Reminded pt nothing to eat or drink after midnight. Pt verbalized understanding and denies any other needs at this time.

## 2020-01-22 ENCOUNTER — ANESTHESIA EVENT (OUTPATIENT)
Dept: SURGERY | Facility: HOSPITAL | Age: 25
End: 2020-01-22
Payer: COMMERCIAL

## 2020-01-22 ENCOUNTER — TELEPHONE (OUTPATIENT)
Dept: HEMATOLOGY/ONCOLOGY | Facility: CLINIC | Age: 25
End: 2020-01-22

## 2020-01-22 ENCOUNTER — INFUSION (OUTPATIENT)
Dept: INFUSION THERAPY | Facility: HOSPITAL | Age: 25
End: 2020-01-22
Attending: INTERNAL MEDICINE
Payer: COMMERCIAL

## 2020-01-22 DIAGNOSIS — D64.89 ANEMIA DUE TO MULTIPLE MECHANISMS: ICD-10-CM

## 2020-01-22 DIAGNOSIS — D63.1 ANEMIA IN CHRONIC KIDNEY DISEASE, UNSPECIFIED CKD STAGE: ICD-10-CM

## 2020-01-22 DIAGNOSIS — D63.8 ANEMIA, CHRONIC DISEASE: ICD-10-CM

## 2020-01-22 DIAGNOSIS — D50.0 ANEMIA DUE TO CHRONIC BLOOD LOSS: Primary | ICD-10-CM

## 2020-01-22 DIAGNOSIS — N18.9 ANEMIA IN CHRONIC KIDNEY DISEASE, UNSPECIFIED CKD STAGE: ICD-10-CM

## 2020-01-22 DIAGNOSIS — R52 PAIN: Primary | ICD-10-CM

## 2020-01-22 DIAGNOSIS — D50.0 ANEMIA DUE TO CHRONIC BLOOD LOSS: ICD-10-CM

## 2020-01-22 LAB
ABO + RH BLD: NORMAL
BLD GP AB SCN CELLS X3 SERPL QL: NORMAL
BLD PROD TYP BPU: NORMAL
BLD PROD TYP BPU: NORMAL
BLOOD UNIT EXPIRATION DATE: NORMAL
BLOOD UNIT EXPIRATION DATE: NORMAL
BLOOD UNIT TYPE CODE: 7300
BLOOD UNIT TYPE CODE: 7300
BLOOD UNIT TYPE: NORMAL
BLOOD UNIT TYPE: NORMAL
CODING SYSTEM: NORMAL
CODING SYSTEM: NORMAL
DISPENSE STATUS: NORMAL
DISPENSE STATUS: NORMAL
NUM UNITS TRANS PACKED RBC: NORMAL
NUM UNITS TRANS PACKED RBC: NORMAL

## 2020-01-22 PROCEDURE — 96374 THER/PROPH/DIAG INJ IV PUSH: CPT

## 2020-01-22 PROCEDURE — 36430 TRANSFUSION BLD/BLD COMPNT: CPT

## 2020-01-22 PROCEDURE — P9016 RBC LEUKOCYTES REDUCED: HCPCS

## 2020-01-22 PROCEDURE — 86920 COMPATIBILITY TEST SPIN: CPT

## 2020-01-22 PROCEDURE — 86850 RBC ANTIBODY SCREEN: CPT

## 2020-01-22 PROCEDURE — 63600175 PHARM REV CODE 636 W HCPCS: Performed by: INTERNAL MEDICINE

## 2020-01-22 PROCEDURE — 25000003 PHARM REV CODE 250: Performed by: INTERNAL MEDICINE

## 2020-01-22 RX ORDER — ACETAMINOPHEN 325 MG/1
650 TABLET ORAL ONCE
Status: CANCELLED | OUTPATIENT
Start: 2020-01-22

## 2020-01-22 RX ORDER — OXYCODONE AND ACETAMINOPHEN 10; 325 MG/1; MG/1
1 TABLET ORAL ONCE
Status: COMPLETED | OUTPATIENT
Start: 2020-01-22 | End: 2020-01-22

## 2020-01-22 RX ORDER — DIPHENHYDRAMINE HYDROCHLORIDE 50 MG/ML
25 INJECTION INTRAMUSCULAR; INTRAVENOUS ONCE
Status: COMPLETED | OUTPATIENT
Start: 2020-01-22 | End: 2020-01-22

## 2020-01-22 RX ORDER — HYDROCODONE BITARTRATE AND ACETAMINOPHEN 500; 5 MG/1; MG/1
TABLET ORAL ONCE
Status: ACTIVE | OUTPATIENT
Start: 2020-01-22

## 2020-01-22 RX ORDER — DIPHENHYDRAMINE HYDROCHLORIDE 50 MG/ML
25 INJECTION INTRAMUSCULAR; INTRAVENOUS ONCE
Status: CANCELLED | OUTPATIENT
Start: 2020-01-22

## 2020-01-22 RX ORDER — OXYCODONE AND ACETAMINOPHEN 10; 325 MG/1; MG/1
1 TABLET ORAL EVERY 6 HOURS PRN
Qty: 20 TABLET | Refills: 0 | Status: CANCELLED | OUTPATIENT
Start: 2020-01-22 | End: 2021-01-21

## 2020-01-22 RX ORDER — ACETAMINOPHEN 325 MG/1
650 TABLET ORAL ONCE
Status: COMPLETED | OUTPATIENT
Start: 2020-01-22 | End: 2020-01-22

## 2020-01-22 RX ORDER — HYDROCODONE BITARTRATE AND ACETAMINOPHEN 500; 5 MG/1; MG/1
TABLET ORAL ONCE
Status: CANCELLED | OUTPATIENT
Start: 2020-01-22 | End: 2020-01-22

## 2020-01-22 RX ADMIN — OXYCODONE HYDROCHLORIDE AND ACETAMINOPHEN 1 TABLET: 10; 325 TABLET ORAL at 10:01

## 2020-01-22 RX ADMIN — ACETAMINOPHEN 325 MG: 325 TABLET ORAL at 04:01

## 2020-01-22 RX ADMIN — OXYCODONE HYDROCHLORIDE AND ACETAMINOPHEN 1 TABLET: 10; 325 TABLET ORAL at 04:01

## 2020-01-22 RX ADMIN — DIPHENHYDRAMINE HYDROCHLORIDE 25 MG: 50 INJECTION INTRAMUSCULAR; INTRAVENOUS at 04:01

## 2020-01-22 NOTE — TELEPHONE ENCOUNTER
----- Message from Sukhjinder Goodwin MD sent at 1/21/2020  3:25 PM CST -----  Check with her nephrologist - hgb is low - do they want to give her blood with next dialysis ?

## 2020-01-22 NOTE — PROGRESS NOTES
Report called to [facility] AGUIAR on [address]. Pt transferred via stretcher without incident at 1745. 1st unit of prbc's infusing. Vsstable. No complaints.

## 2020-01-22 NOTE — TELEPHONE ENCOUNTER
Called the patient and asked her who was her Nephrologist and where does she get her dialysis because her Hgb 7.3.  Instructed her that we may give her blood today.  Called Dr. Pendleton office to see if it is okay to give the patient 2 units of PRBC's   Okay to give the blood but no diuretics after the blood and Tylenol/Benadryl.

## 2020-01-23 ENCOUNTER — ANESTHESIA (OUTPATIENT)
Dept: SURGERY | Facility: HOSPITAL | Age: 25
End: 2020-01-23
Payer: COMMERCIAL

## 2020-01-23 ENCOUNTER — HOSPITAL ENCOUNTER (OUTPATIENT)
Facility: HOSPITAL | Age: 25
Discharge: HOME | End: 2020-01-23
Attending: OBSTETRICS & GYNECOLOGY | Admitting: OBSTETRICS & GYNECOLOGY
Payer: COMMERCIAL

## 2020-01-23 VITALS
WEIGHT: 175 LBS | HEIGHT: 70 IN | SYSTOLIC BLOOD PRESSURE: 138 MMHG | DIASTOLIC BLOOD PRESSURE: 78 MMHG | OXYGEN SATURATION: 98 % | HEART RATE: 87 BPM | BODY MASS INDEX: 25.05 KG/M2 | RESPIRATION RATE: 18 BRPM | TEMPERATURE: 98 F

## 2020-01-23 VITALS
SYSTOLIC BLOOD PRESSURE: 123 MMHG | RESPIRATION RATE: 30 BRPM | BODY MASS INDEX: 23.91 KG/M2 | HEIGHT: 70 IN | DIASTOLIC BLOOD PRESSURE: 81 MMHG | HEART RATE: 93 BPM | WEIGHT: 167 LBS | TEMPERATURE: 98 F | OXYGEN SATURATION: 99 %

## 2020-01-23 DIAGNOSIS — N87.9 CERVICAL DYSPLASIA: ICD-10-CM

## 2020-01-23 LAB — HCG INTACT+B SERPL-ACNC: <1.2 MIU/ML

## 2020-01-23 PROCEDURE — 88307 TISSUE EXAM BY PATHOLOGIST: CPT | Mod: 26,NTX,, | Performed by: PATHOLOGY

## 2020-01-23 PROCEDURE — 25000003 PHARM REV CODE 250: Mod: TXP | Performed by: STUDENT IN AN ORGANIZED HEALTH CARE EDUCATION/TRAINING PROGRAM

## 2020-01-23 PROCEDURE — 71000033 HC RECOVERY, INTIAL HOUR: Mod: TXP | Performed by: OBSTETRICS & GYNECOLOGY

## 2020-01-23 PROCEDURE — 36000706: Mod: TXP | Performed by: OBSTETRICS & GYNECOLOGY

## 2020-01-23 PROCEDURE — 63600175 PHARM REV CODE 636 W HCPCS: Mod: TXP | Performed by: STUDENT IN AN ORGANIZED HEALTH CARE EDUCATION/TRAINING PROGRAM

## 2020-01-23 PROCEDURE — D9220A PRA ANESTHESIA: Mod: TXP,,, | Performed by: ANESTHESIOLOGY

## 2020-01-23 PROCEDURE — 63600175 PHARM REV CODE 636 W HCPCS: Mod: TXP | Performed by: OBSTETRICS & GYNECOLOGY

## 2020-01-23 PROCEDURE — 94761 N-INVAS EAR/PLS OXIMETRY MLT: CPT | Mod: TXP

## 2020-01-23 PROCEDURE — 88307 TISSUE EXAM BY PATHOLOGIST: CPT | Mod: TXP | Performed by: PATHOLOGY

## 2020-01-23 PROCEDURE — 36000707: Mod: TXP | Performed by: OBSTETRICS & GYNECOLOGY

## 2020-01-23 PROCEDURE — D9220A PRA ANESTHESIA: ICD-10-PCS | Mod: TXP,,, | Performed by: ANESTHESIOLOGY

## 2020-01-23 PROCEDURE — 84702 CHORIONIC GONADOTROPIN TEST: CPT | Mod: TXP

## 2020-01-23 PROCEDURE — 88305 TISSUE EXAM BY PATHOLOGIST: CPT | Mod: 26,NTX,, | Performed by: PATHOLOGY

## 2020-01-23 PROCEDURE — 57520 CONIZATION OF CERVIX: CPT | Mod: NTX,,, | Performed by: OBSTETRICS & GYNECOLOGY

## 2020-01-23 PROCEDURE — 37000008 HC ANESTHESIA 1ST 15 MINUTES: Mod: TXP | Performed by: OBSTETRICS & GYNECOLOGY

## 2020-01-23 PROCEDURE — 71000039 HC RECOVERY, EACH ADD'L HOUR: Mod: TXP | Performed by: OBSTETRICS & GYNECOLOGY

## 2020-01-23 PROCEDURE — 00940 ANES VAGINAL PX NOS: CPT | Mod: TXP | Performed by: OBSTETRICS & GYNECOLOGY

## 2020-01-23 PROCEDURE — 57520 PR CONIZATION CERVIX,KNIFE/LASER: ICD-10-PCS | Mod: NTX,,, | Performed by: OBSTETRICS & GYNECOLOGY

## 2020-01-23 PROCEDURE — 88307 PR  SURG PATH,LEVEL V: ICD-10-PCS | Mod: 26,NTX,, | Performed by: PATHOLOGY

## 2020-01-23 PROCEDURE — 25000003 PHARM REV CODE 250: Mod: TXP | Performed by: OBSTETRICS & GYNECOLOGY

## 2020-01-23 PROCEDURE — 37000009 HC ANESTHESIA EA ADD 15 MINS: Mod: TXP | Performed by: OBSTETRICS & GYNECOLOGY

## 2020-01-23 PROCEDURE — 88305 TISSUE EXAM BY PATHOLOGIST: CPT | Mod: TXP | Performed by: PATHOLOGY

## 2020-01-23 PROCEDURE — 71000015 HC POSTOP RECOV 1ST HR: Mod: TXP | Performed by: OBSTETRICS & GYNECOLOGY

## 2020-01-23 PROCEDURE — 88305 TISSUE EXAM BY PATHOLOGIST: ICD-10-PCS | Mod: 26,NTX,, | Performed by: PATHOLOGY

## 2020-01-23 RX ORDER — OXYCODONE HYDROCHLORIDE 5 MG/1
5 CAPSULE ORAL EVERY 4 HOURS PRN
Qty: 10 CAPSULE | Refills: 0 | Status: SHIPPED | OUTPATIENT
Start: 2020-01-23

## 2020-01-23 RX ORDER — ONDANSETRON 8 MG/1
8 TABLET, ORALLY DISINTEGRATING ORAL EVERY 8 HOURS PRN
Status: DISCONTINUED | OUTPATIENT
Start: 2020-01-23 | End: 2020-01-23 | Stop reason: HOSPADM

## 2020-01-23 RX ORDER — ACETAMINOPHEN 10 MG/ML
INJECTION, SOLUTION INTRAVENOUS
Status: DISCONTINUED | OUTPATIENT
Start: 2020-01-23 | End: 2020-01-23

## 2020-01-23 RX ORDER — KETAMINE HCL IN 0.9 % NACL 50 MG/5 ML
SYRINGE (ML) INTRAVENOUS
Status: DISCONTINUED | OUTPATIENT
Start: 2020-01-23 | End: 2020-01-23

## 2020-01-23 RX ORDER — PHENYLEPHRINE HYDROCHLORIDE 10 MG/ML
INJECTION INTRAVENOUS
Status: DISCONTINUED | OUTPATIENT
Start: 2020-01-23 | End: 2020-01-23

## 2020-01-23 RX ORDER — DIPHENHYDRAMINE HCL 25 MG
25 CAPSULE ORAL EVERY 4 HOURS PRN
Status: DISCONTINUED | OUTPATIENT
Start: 2020-01-23 | End: 2020-01-23 | Stop reason: HOSPADM

## 2020-01-23 RX ORDER — HYDROCODONE BITARTRATE AND ACETAMINOPHEN 10; 325 MG/1; MG/1
1 TABLET ORAL EVERY 4 HOURS PRN
Status: DISCONTINUED | OUTPATIENT
Start: 2020-01-23 | End: 2020-01-23 | Stop reason: HOSPADM

## 2020-01-23 RX ORDER — PROPOFOL 10 MG/ML
VIAL (ML) INTRAVENOUS
Status: DISCONTINUED | OUTPATIENT
Start: 2020-01-23 | End: 2020-01-23

## 2020-01-23 RX ORDER — FENTANYL CITRATE 50 UG/ML
INJECTION, SOLUTION INTRAMUSCULAR; INTRAVENOUS
Status: DISCONTINUED | OUTPATIENT
Start: 2020-01-23 | End: 2020-01-23

## 2020-01-23 RX ORDER — DIPHENHYDRAMINE HYDROCHLORIDE 50 MG/ML
25 INJECTION INTRAMUSCULAR; INTRAVENOUS EVERY 4 HOURS PRN
Status: DISCONTINUED | OUTPATIENT
Start: 2020-01-23 | End: 2020-01-23 | Stop reason: HOSPADM

## 2020-01-23 RX ORDER — VASOPRESSIN 20 [USP'U]/ML
INJECTION, SOLUTION INTRAMUSCULAR; SUBCUTANEOUS
Status: DISCONTINUED | OUTPATIENT
Start: 2020-01-23 | End: 2020-01-23 | Stop reason: HOSPADM

## 2020-01-23 RX ORDER — MIDAZOLAM HYDROCHLORIDE 1 MG/ML
INJECTION, SOLUTION INTRAMUSCULAR; INTRAVENOUS
Status: DISCONTINUED | OUTPATIENT
Start: 2020-01-23 | End: 2020-01-23

## 2020-01-23 RX ORDER — SODIUM CHLORIDE 9 MG/ML
INJECTION, SOLUTION INTRAVENOUS CONTINUOUS
Status: DISCONTINUED | OUTPATIENT
Start: 2020-01-23 | End: 2020-01-23 | Stop reason: HOSPADM

## 2020-01-23 RX ORDER — ONDANSETRON 2 MG/ML
INJECTION INTRAMUSCULAR; INTRAVENOUS
Status: DISCONTINUED | OUTPATIENT
Start: 2020-01-23 | End: 2020-01-23

## 2020-01-23 RX ORDER — ONDANSETRON HYDROCHLORIDE 8 MG/1
8 TABLET, FILM COATED ORAL EVERY 8 HOURS PRN
COMMUNITY

## 2020-01-23 RX ORDER — HYDROMORPHONE HYDROCHLORIDE 1 MG/ML
0.2 INJECTION, SOLUTION INTRAMUSCULAR; INTRAVENOUS; SUBCUTANEOUS EVERY 5 MIN PRN
Status: COMPLETED | OUTPATIENT
Start: 2020-01-23 | End: 2020-01-23

## 2020-01-23 RX ORDER — LIDOCAINE HCL/PF 100 MG/5ML
SYRINGE (ML) INTRAVENOUS
Status: DISCONTINUED | OUTPATIENT
Start: 2020-01-23 | End: 2020-01-23

## 2020-01-23 RX ORDER — HYDROCODONE BITARTRATE AND ACETAMINOPHEN 5; 325 MG/1; MG/1
1 TABLET ORAL EVERY 4 HOURS PRN
Status: DISCONTINUED | OUTPATIENT
Start: 2020-01-23 | End: 2020-01-23 | Stop reason: HOSPADM

## 2020-01-23 RX ORDER — DEXAMETHASONE SODIUM PHOSPHATE 4 MG/ML
INJECTION, SOLUTION INTRA-ARTICULAR; INTRALESIONAL; INTRAMUSCULAR; INTRAVENOUS; SOFT TISSUE
Status: DISCONTINUED | OUTPATIENT
Start: 2020-01-23 | End: 2020-01-23

## 2020-01-23 RX ORDER — SODIUM CHLORIDE 0.9 % (FLUSH) 0.9 %
10 SYRINGE (ML) INJECTION
Status: DISCONTINUED | OUTPATIENT
Start: 2020-01-23 | End: 2020-01-23 | Stop reason: HOSPADM

## 2020-01-23 RX ADMIN — ONDANSETRON 8 MG: 2 INJECTION INTRAMUSCULAR; INTRAVENOUS at 07:01

## 2020-01-23 RX ADMIN — PROPOFOL 150 MG: 10 INJECTION, EMULSION INTRAVENOUS at 06:01

## 2020-01-23 RX ADMIN — HYDROMORPHONE HYDROCHLORIDE 0.2 MG: 1 INJECTION, SOLUTION INTRAMUSCULAR; INTRAVENOUS; SUBCUTANEOUS at 09:01

## 2020-01-23 RX ADMIN — PHENYLEPHRINE HYDROCHLORIDE 100 MCG: 10 INJECTION INTRAVENOUS at 07:01

## 2020-01-23 RX ADMIN — HYDROCODONE BITARTRATE AND ACETAMINOPHEN 1 TABLET: 10; 325 TABLET ORAL at 09:01

## 2020-01-23 RX ADMIN — MIDAZOLAM HYDROCHLORIDE 2 MG: 1 INJECTION, SOLUTION INTRAMUSCULAR; INTRAVENOUS at 06:01

## 2020-01-23 RX ADMIN — HYDROMORPHONE HYDROCHLORIDE 0.2 MG: 1 INJECTION, SOLUTION INTRAMUSCULAR; INTRAVENOUS; SUBCUTANEOUS at 08:01

## 2020-01-23 RX ADMIN — Medication 50 MG: at 06:01

## 2020-01-23 RX ADMIN — DIPHENHYDRAMINE HYDROCHLORIDE 25 MG: 25 CAPSULE ORAL at 09:01

## 2020-01-23 RX ADMIN — DEXAMETHASONE SODIUM PHOSPHATE 4 MG: 4 INJECTION, SOLUTION INTRAMUSCULAR; INTRAVENOUS at 07:01

## 2020-01-23 RX ADMIN — FENTANYL CITRATE 100 MCG: 50 INJECTION, SOLUTION INTRAMUSCULAR; INTRAVENOUS at 06:01

## 2020-01-23 RX ADMIN — LIDOCAINE HYDROCHLORIDE 100 MG: 20 INJECTION, SOLUTION INTRAVENOUS at 06:01

## 2020-01-23 RX ADMIN — FENTANYL CITRATE 50 MCG: 50 INJECTION, SOLUTION INTRAMUSCULAR; INTRAVENOUS at 08:01

## 2020-01-23 RX ADMIN — ACETAMINOPHEN 1000 MG: 10 INJECTION, SOLUTION INTRAVENOUS at 07:01

## 2020-01-23 RX ADMIN — PROPOFOL 50 MG: 10 INJECTION, EMULSION INTRAVENOUS at 07:01

## 2020-01-23 RX ADMIN — SODIUM CHLORIDE: 0.9 INJECTION, SOLUTION INTRAVENOUS at 06:01

## 2020-01-23 NOTE — BRIEF OP NOTE
[facility]  Brief Op Note/Discharge Note  Short Stay    Admit Date: 1/23/2020    Discharge Date: 01/23/2020    Attending Physician: Amy Hernandez MD     Surgery Date: 1/23/2020     Surgeon(s) and Role:     * Amy Hernandez MD - Primary     * Nancy Engel MD - Resident - Assisting        Pre-op Diagnosis:  Cervical dysplasia [N87.9]    Post-op Diagnosis:  Post-Op Diagnosis Codes:     * Cervical dysplasia [N87.9]    Procedure(s) (LRB):  CONE BIOPSY, CERVIX, (N/A)    Anesthesia: General    Findings/Key Components:   1. Normal appearing external genitalia   2. No overt dysplasia or abnormality on cervix   3. Cone specimen obtained without complication   4. Hemostasis at procedure close     Estimated Blood Loss: minimal < 20         Specimens:   Specimen (12h ago, onward)    None          Discharge Provider: Nancy Engel    Diagnoses:  Active Hospital Problems    Diagnosis  POA    Cervical dysplasia [N87.9]  Yes      Resolved Hospital Problems   No resolved problems to display.       Discharged Condition: good    Hospital Course:   Patient was admitted for outpatient procedure as above, and tolerated the procedure well with no complications. Please see operative report for further details. Following the procedure, the patient was awakened from anesthesia and transferred to the recovery area in stable condition. She was discharged to home once ambulating, voiding, tolerating PO intake, and pain was well-controlled. Patient was given routine post-op instructions and prescriptions for pain medication to take as needed. Patient instructed to follow up with gyn provider in 6 weeks.    Final Diagnoses: Same as principal problem.    Disposition: Home or Self Care    Follow up/Patient Instructions:    Medications:  Reconciled Home Medications:      Medication List      CHANGE how you take these medications    gabapentin 300 MG capsule  Commonly known as:  NEURONTIN  Take 1 capsule (300 mg total)  by mouth every evening.  What changed:  when to take this     * oxyCODONE 10 mg Tab immediate release tablet  Commonly known as:  ROXICODONE  What changed:  Another medication with the same name was added. Make sure you understand how and when to take each.     * oxyCODONE 5 mg Cap  Commonly known as:  OXY-IR  Take 1 capsule (5 mg total) by mouth every 4 (four) hours as needed for Pain.  What changed:  You were already taking a medication with the same name, and this prescription was added. Make sure you understand how and when to take each.     pantoprazole 40 MG tablet  Commonly known as:  PROTONIX  Take 1 tablet (40 mg total) by mouth once daily.  What changed:  when to take this         * This list has 2 medication(s) that are the same as other medications prescribed for you. Read the directions carefully, and ask your doctor or other care provider to review them with you.            CONTINUE taking these medications    Benlysta 200 mg/mL Atin  Generic drug:  belimumab  Inject 1 mL (200 mg total) as directed Every Friday. HOLD UNTIL TOLD TO RESUME BY RHEUMATOLOGIST     biotin 5,000 mcg Tbdl  Take by mouth once daily.     calcium acetate 667 mg capsule  Commonly known as:  PHOSLO  Take 667 mg by mouth 3 (three) times daily.     CELLCEPT ORAL  Take 1,000 mg by mouth 2 (two) times daily.     FLUoxetine 40 MG capsule  Take 1 capsule (40 mg total) by mouth 2 (two) times daily.     hydroxychloroquine 200 mg tablet  Commonly known as:  PLAQUENIL  Take 1 tablet by mouth nightly     ondansetron 8 MG tablet  Commonly known as:  ZOFRAN  Take 8 mg by mouth every 8 (eight) hours as needed for Nausea.     PREDNISONE ORAL  Take 5 mg by mouth once daily.     promethazine 12.5 MG Tab  Commonly known as:  PHENERGAN  Take 25 mg by mouth 4 (four) times daily.     Vitamin D3 125 mcg (5,000 unit) Tab  Generic drug:  cholecalciferol (vitamin D3)  Take 5,000 Units by mouth every morning.          Discharge Procedure Orders   Diet  general     Pelvic Rest     Call MD for:  temperature >100.4     Call MD for:  persistent nausea and vomiting     Call MD for:  severe uncontrolled pain     Call MD for:  persistent dizziness or light-headedness     Call MD for:  extreme fatigue     Call MD for:   Order Comments: Heavy vaginal bleeding >1 pad/hour for greater than 2 hours     Activity as tolerated         Nancy Engel MD  OBGYN, PGY-1

## 2020-01-23 NOTE — ANESTHESIA RELEASE NOTE
"Anesthesia Release from PACU Note    Patient: Anitha Swenson    Procedure(s) Performed: Procedure(s) (LRB):  CONE BIOPSY, CERVIX, (N/A)    Anesthesia type: GEN    Post pain:having some pain but sleepy, nurse titrating narcotics as tolerated    Post assessment: no apparent anesthetic complications, tolerated procedure well and no evidence of recall    Post vital signs: /80   Pulse 91   Temp 36.4 °C (97.5 °F) (Temporal)   Resp 18   Ht 5' 10" (1.778 m)   Wt 75.8 kg (167 lb)   SpO2 95%   Breastfeeding? No   BMI 23.96 kg/m²     Level of consciousness: awake, alert and oriented    Nausea/Vomiting: no nausea/no vomiting    Complications: none    Airway Patency: patent    Respiratory: unassisted, spontaneous ventilation, room air    Cardiovascular: stable and blood pressure at baseline    Hydration: euvolemic    "

## 2020-01-23 NOTE — OP NOTE
Operative Report    Procedure: CKC, ECC    Date of Surgery 01/23/2020    Pre-Op Diagnosis:   1. Severe Cervical Dysplasia     Post-op Diagnosis:  Same    Complications: none    EBL: < 20 cc     IVF: 500 NS     Specimen: Ectocervical conization, ECC    Findings: Findings/Key Components:  1. Normal appearing external genitalia   2. No overt dysplasia or abnormality on ectocervix with Lugols  3. Cone specimen obtained without complication   4. Hemostasis at procedure close      DESCRIPTION OF PROCEDURE:   The patient was taken to the operating room, where  anesthesia was obtained without difficulty. The patient was prepped and draped in the normal sterile fashion in the dorsal lithotomy position using Sukhjinder stirrups. Attention was then turned to the patient's vagina where the a right angle retractor was placed in the posterior fornix and an additional right angle was placed into the anterior fornix. Two sutures of 1 chromic were used to ligate the cervical branches of the cervical artery at the 3 and 9 o'clock positions. Then, the cervix was injected with 4cc of diluted Pitressin. Lugol solution was applied to the cervix to see any abnormalities. Cold-knife cone specimen was then obtained. This was handed off for pathologic review. Stitch was placed at the 12 o'clock position. Next, endocervical curetting was performed of the canal. This was handed off also for pathologic review. The base of the cervical cone bed was then cauterized using Bovie cautery. The margins of the cone bed were also cauterized in a similar manner. The cone site was noted to be hemostatic. The sutures were cut and all the instruments removed from the patient's vagina. All sponge, lap and needle counts were correct x2. The patient tolerated the procedure well, was awakened and transferred to the recovery room.    Nancy Engel MD  OBGYN, PGY-2

## 2020-01-23 NOTE — ANESTHESIA PREPROCEDURE EVALUATION
[facility]  Anesthesia Pre-Operative Evaluation         Patient Name: Anitha Swenson  YOB: 1995  MRN: [id number]    SUBJECTIVE:     Pre-operative evaluation for Procedure(s) (LRB):  CONE BIOPSY, CERVIX, USING COLD KNIFE (N/A)     01/22/2020    Anitha Swenson is a 24 y.o. female w/ a significant PMHx of Lupus nephritis (on peritoneal dialysis), GERD, HTN, PUD, anemia of chronic disease, fibromyalgia    Patient now presents for the above procedure(s).      LDA: None documented.       Tunneled Central Line Insertion/Assessment - Double Lumen  01/31/19 1237 right subclavian;right internal jugular (Active)   Number of days: 356            Peripheral IV - Single Lumen 01/22/20 1600 Anterior;Right Forearm (Active)   Number of days: 0       Prev airway: None documented.    Drips: None documented.      Patient Active Problem List   Diagnosis    Left hip pain    Acetabular labrum tear    Surgery follow-up, L hip arthroscopic labrum repair, synovectomy (12/26/14)    GI bleeding    Anemia due to chronic blood loss    Anemia due to multiple mechanisms    Other pulmonary embolism without acute cor pulmonale    Elevated homocysteine    Compound heterozygous MTHFR mutation [id number]    Lupus    Immunocompromised    Shortness of breath    Anxiety and depression    Lupus nephritis    Acid reflux disease with ulcer    Essential hypertension    Acute renal failure    Anemia in CKD (chronic kidney disease)    Rash    Alteration in skin integrity    Skin bulla    ESRD (end stage renal disease)    Avascular necrosis of femoral head, left    Avascular necrosis of bone of left hip    Severe cervical dysplasia       Review of patient's allergies indicates:   Allergen Reactions    Sulfa (sulfonamide antibiotics) Hives       Current Inpatient Medications:   sodium chloride   Intravenous Once       No current facility-administered medications on file prior to  encounter.      Current Outpatient Medications on File Prior to Encounter   Medication Sig Dispense Refill    BENLYSTA 200 mg/mL AtIn Inject 1 mL (200 mg total) as directed Every Friday. HOLD UNTIL TOLD TO RESUME BY RHEUMATOLOGIST      biotin 5,000 mcg TbDL Take by mouth once daily.      calcium acetate (PHOSLO) 667 mg capsule Take 667 mg by mouth 3 (three) times daily.      cholecalciferol, vitamin D3, (VITAMIN D3) 5,000 unit Tab Take 5,000 Units by mouth every morning.      FLUoxetine 40 MG capsule Take 1 capsule (40 mg total) by mouth 2 (two) times daily. 60 capsule 5    gabapentin (NEURONTIN) 300 MG capsule Take 1 capsule (300 mg total) by mouth every evening. (Patient taking differently: Take 300 mg by mouth 3 (three) times daily. )  0    hydroxychloroquine (PLAQUENIL) 200 mg tablet Take 1 tablet by mouth nightly  6    mycophenolate mofetil (CELLCEPT ORAL) Take 1,000 mg by mouth 2 (two) times daily.      oxyCODONE (ROXICODONE) 10 mg Tab immediate release tablet       pantoprazole (PROTONIX) 40 MG tablet Take 1 tablet (40 mg total) by mouth once daily. (Patient taking differently: Take 40 mg by mouth 2 (two) times daily. )      PREDNISONE ORAL Take 5 mg by mouth once daily.   0    promethazine (PHENERGAN) 12.5 MG Tab Take 25 mg by mouth 4 (four) times daily.         Past Surgical History:   Procedure Laterality Date    BONE MARROW BIOPSY N/A 11/8/2019    Procedure: BIOPSY, BONE MARROW;  Surgeon: Jamaal Diagnostic Provider;  Location: [facility] OR;  Service: Interventional Radiology;  Laterality: N/A;    HIP ARTHROSCOPY W/ LABRAL DEBRIDEMENT Left 9/12/2019    Procedure: DEBRIDEMENT, LABRUM, HIP, ARTHROSCOPIC,shaving of articular cartilage(chondroplasty,abrasion arthroplasty and/or labrum(includes labral repair);  Surgeon: Avery Blake MD;  Location: [facility] OR;  Service: Orthopedics;  Laterality: Left;  DEBRIDEMENT, LABRUM, HIP, ARTHROSCOPIC,shaving of articular cartilage(chondroplasty,abrasion  arthroplasty and/or labrum(includes labral repair)    HIP SURGERY      INSERTION OF TUNNELED CENTRAL VENOUS HEMODIALYSIS CATHETER N/A 1/31/2019    Procedure: INSERTION, CATHETER, CENTRAL VENOUS, HEMODIALYSIS, TUNNELED;  Surgeon: Nam Tucker MD;  Location: [facility] CATH LAB;  Service: Nephrology;  Laterality: N/A;    LAPAROSCOPIC INSERTION OF PERITONEAL DIALYSIS CATHETER  07/2019    SUBCHONDROPLASTY Left 9/12/2019    Procedure: SUBCHONDROPLASTY,Femoral Head;  Surgeon: Avery Blake MD;  Location: [facility] OR;  Service: Orthopedics;  Laterality: Left;  SUBCHONDROPLASTY,Femoral Head  Clonidine/Epi/Ketorolac/Ropivacaine Injection 30cc    TYMPANOSTOMY TUBE PLACEMENT         Social History     Socioeconomic History    Marital status: Single     Spouse name: Not on file    Number of children: Not on file    Years of education: Not on file    Highest education level: Not on file   Occupational History    Occupation: goes to [employer]     Comment: full time student   Social Needs    Financial resource strain: Not on file    Food insecurity:     Worry: Not on file     Inability: Not on file    Transportation needs:     Medical: Not on file     Non-medical: Not on file   Tobacco Use    Smoking status: Former Smoker     Packs/day: 0.25     Years: 1.00     Pack years: 0.25     Types: Vaping with nicotine     Start date: 10/16/2016     Last attempt to quit: 10/16/2016     Years since quitting: 3.2    Smokeless tobacco: Never Used   Substance and Sexual Activity    Alcohol use: No    Drug use: No    Sexual activity: Not on file   Lifestyle    Physical activity:     Days per week: Not on file     Minutes per session: Not on file    Stress: Not on file   Relationships    Social connections:     Talks on phone: Not on file     Gets together: Not on file     Attends [employer] service: Not on file     Active member of club or organization: Not on file     Attends meetings of clubs or organizations: Not on file      Relationship status: Not on file   Other Topics Concern    Not on file   Social History Narrative    She live in [place] with her parents. They will be the care givers       OBJECTIVE:     Vital Signs Range (Last 24H):  Temp:  [36.7 °C (98 °F)-37.1 °C (98.7 °F)]   Pulse:  []   Resp:  [14-17]   BP: (121-139)/(84-96)   SpO2:  [98 %]       Significant Labs:  Lab Results   Component Value Date    WBC 6.75 01/21/2020    HGB 7.3 (L) 01/21/2020    HCT 24.7 (L) 01/21/2020     01/21/2020    CHOL 173 10/16/2019    TRIG 162 (H) 10/16/2019    HDL 35 (L) 10/16/2019    ALT 16 01/21/2020    AST 32 01/21/2020     (L) 01/21/2020    K 3.1 (L) 01/21/2020    CL 91 (L) 01/21/2020    CREATININE 9.4 (H) 01/21/2020    BUN 31 (H) 01/21/2020    CO2 23 01/21/2020    TSH 2.48 10/23/2017    INR 1.1 11/08/2019    HGBA1C 5.7 (H) 01/26/2019       Diagnostic Studies: No relevant studies.    EKG:   Results for orders placed or performed during the hospital encounter of 01/26/19   EKG 12-lead    Collection Time: 01/26/19  6:48 PM    Narrative    Test Reason : L93.0,    Vent. Rate : 094 BPM     Atrial Rate : 094 BPM     P-R Int : 182 ms          QRS Dur : 092 ms      QT Int : 352 ms       P-R-T Axes : 012 -02 022 degrees     QTc Int : 440 ms    Normal sinus rhythm  Moderate voltage criteria for LVH, may be normal variant  Borderline Abnormal ECG  No previous ECGs available  Confirmed by Emily Byrd MD ([id number]) on 1/27/2019 11:28:47 AM    Referred By: EDEN VALE           Confirmed By:Emily Byrd MD       2D ECHO:  TTE:  Results for orders placed or performed during the hospital encounter of 01/26/19   Transthoracic echo (TTE) complete (Cupid Only)   Result Value Ref Range    Ascending aorta 3.22 cm    STJ 2.58 cm    AV mean gradient 1.71 mmHg    Ao peak leeroy 0.92 m/s    Ao VTI 15.49 cm    IVS 1.02 0.6 - 1.1 cm    LA size 2.58 cm    Left Atrium Major Axis 5.47 cm    Left Atrium Minor Axis 6.24 cm    LVIDD 4.52 3.5 - 6.0 cm     LVIDS 3.23 2.1 - 4.0 cm    LVOT diameter 2.30 cm    LVOT peak VTI 13.13 cm    PW 0.90 0.6 - 1.1 cm    MV Peak A Oc 0.73 m/s    E wave decelartion time 66.46 msec    MV Peak E Oc 0.74 m/s    RA Major Axis 5.29 cm    RA Width 3.53 cm    RVDD 3.16 cm    Sinus 3.14 cm    TAPSE 2.62 cm    TR Max Oc 1.98 m/s    TDI LATERAL 0.09     TDI SEPTAL 0.12     LA WIDTH 3.75 cm    LV Diastolic Volume 93.44 mL    LV Systolic Volume 41.97 mL    RV S' 19.19 m/s    LVOT peak oc 0.042311651696662 m/s    LV LATERAL E/E' RATIO 8.22     LV SEPTAL E/E' RATIO 6.17     FS 29 %    LA volume 47.94 cm3    LV mass 145.99 g    Left Ventricle Relative Wall Thickness 0.40 cm    AV valve area 3.52 cm2    AV Velocity Ratio 0.85     AV index (prosthetic) 0.85     E/A ratio 1.01     Mean e' 0.11     LVOT area 4.15 cm2    LVOT stroke volume 54.52 cm3    AV peak gradient 3.39 mmHg    E/E' ratio 7.05     LV Systolic Volume Index 18.8 mL/m2    LV Diastolic Volume Index 41.83 mL/m2    LA Volume Index 21.5 mL/m2    LV Mass Index 65.4 g/m2    Triscuspid Valve Regurgitation Peak Gradient 15.68 mmHg    BSA 2.28 m2    Right Atrial Pressure (from IVC) 3 mmHg    TV rest pulmonary artery pressure 19 mmHg    Narrative    · Normal left ventricular systolic function. The estimated ejection   fraction is 55%  · Normal LV diastolic function.  · Normal right ventricular systolic function.  · The estimated PA systolic pressure is 19 mm Hg  · Normal central venous pressure (3 mm Hg).  · Small, hemodynamically inconsequential, posterolateral pericardial   effusion.          YESSENIA:  No results found for this or any previous visit.    ASSESSMENT/PLAN:         Anesthesia Evaluation    I have reviewed the Patient Summary Reports.    I have reviewed the Nursing Notes.   I have reviewed the Medications.     Review of Systems  Anesthesia Hx:  No problems with previous Anesthesia    Social:  Non-Smoker    Hematology/Oncology:         -- Anemia (anemia of chronic disease):    EENT/Dental:EENT/Dental Normal   Cardiovascular:   Hypertension    Pulmonary:   Shortness of breath    Renal/:   Chronic Renal Disease (Lupus nephritis), Dialysis, ESRD    Hepatic/GI:   PUD, GERD, well controlled    Neurological:   Headaches    Endocrine:  Endocrine Normal           Anesthesia Plan  Type of Anesthesia, risks & benefits discussed:  Anesthesia Type:  general, MAC  Patient's Preference:   Intra-op Monitoring Plan: standard ASA monitors  Intra-op Monitoring Plan Comments:   Post Op Pain Control Plan: multimodal analgesia, IV/PO Opioids PRN and per primary service following discharge from PACU  Post Op Pain Control Plan Comments:   Induction:   IV  Beta Blocker:  Patient is not currently on a Beta-Blocker (No further documentation required).       Informed Consent:    ASA Score: 3     Day of Surgery Review of History & Physical:    H&P update referred to the surgeon.         Ready For Surgery From Anesthesia Perspective.

## 2020-01-23 NOTE — INTERVAL H&P NOTE
The patient has been examined and the H&P has been reviewed:    I concur with the findings and no changes have occurred since H&P was written.    Anesthesia/Surgery risks, benefits and alternative options discussed and understood by patient/family.          Active Hospital Problems    Diagnosis  POA    Cervical dysplasia [N87.9]  Yes      Resolved Hospital Problems   No resolved problems to display.

## 2020-01-23 NOTE — ANESTHESIA POSTPROCEDURE EVALUATION
Anesthesia Post Evaluation    Patient: Anitha Swenson    Procedure(s) Performed: Procedure(s) (LRB):  CONE BIOPSY, CERVIX, (N/A)    Final Anesthesia Type: general    Patient location during evaluation: PACU  Patient participation: Yes- Able to Participate  Level of consciousness: awake and alert and oriented  Post-procedure vital signs: reviewed and stable  Pain management: adequate (having some pain but sleepy, nurse titrating narcotics as tolerated)  Airway patency: patent    PONV status at discharge: No PONV  Anesthetic complications: no      Cardiovascular status: stable  Respiratory status: unassisted, spontaneous ventilation and room air  Hydration status: euvolemic  Follow-up not needed.          Vitals Value Taken Time   /72 1/23/2020  8:47 AM   Temp 36.4 °C (97.5 °F) 1/23/2020  8:04 AM   Pulse 89 1/23/2020  8:49 AM   Resp 18 1/23/2020  8:49 AM   SpO2 98 % 1/23/2020  8:49 AM   Vitals shown include unvalidated device data.      No case tracking events are documented in the log.      Pain/Tia Score: Pain Rating Prior to Med Admin: 7 (1/22/2020 10:36 PM)  Pain Rating Post Med Admin: 0 (1/22/2020 11:30 PM)  Tia Score: 10 (1/23/2020  8:04 AM)

## 2020-01-23 NOTE — TRANSFER OF CARE
"Anesthesia Transfer of Care Note    Patient: Anitha Swenson    Procedure(s) Performed: Procedure(s) (LRB):  CONE BIOPSY, CERVIX, USING COLD KNIFE (N/A)    Patient location: PACU    Anesthesia Type: general    Transport from OR: Transported from OR on 6-10 L/min O2 by face mask with adequate spontaneous ventilation    Post pain: adequate analgesia    Post assessment: no apparent anesthetic complications    Post vital signs: stable    Level of consciousness: awake    Nausea/Vomiting: no nausea/vomiting    Complications: none    Transfer of care protocol was followed      Last vitals:   Visit Vitals  /81 (BP Location: Left arm, Patient Position: Lying)   Pulse 82   Temp 36.4 °C (97.5 °F) (Temporal)   Resp 16   Ht 5' 10" (1.778 m)   Wt 75.8 kg (167 lb)   SpO2 100%   Breastfeeding? No   BMI 23.96 kg/m²     "

## 2020-01-24 ENCOUNTER — TELEPHONE (OUTPATIENT)
Dept: GYNECOLOGIC ONCOLOGY | Facility: CLINIC | Age: 25
End: 2020-01-24

## 2020-01-24 NOTE — TELEPHONE ENCOUNTER
Spoke with pt to confirm fax was received. Pt needs to talk with her mother and states she will call back to verify the fax was received. Denies any other needs.   ----- Message from Yuki Olmos, Patient Care Assistant sent at 1/24/2020  2:46 PM CST -----  Contact: ALEXANDRA PARIS [[id number]]  Name of Who is Calling: ALEXANDRA PARIS [[id number]]    What is the request in detail:requesting work note be faxed to mom for 1/23/20 and 1/24/20, phone number is [phone]. Please contact to further discuss and advise      Can the clinic reply by [facility]: No    What [facility]  [phone]

## 2020-01-28 ENCOUNTER — TELEPHONE (OUTPATIENT)
Dept: SPORTS MEDICINE | Facility: CLINIC | Age: 25
End: 2020-01-28

## 2020-01-28 NOTE — TELEPHONE ENCOUNTER
LVM informing patient that Dr. Blake would not be available for the 1/29/19 visit at 12:45. Asked that patient arrive same day 11:00 am. Asked that patient return call if not avaialable.

## 2020-01-29 ENCOUNTER — OFFICE VISIT (OUTPATIENT)
Dept: SPORTS MEDICINE | Facility: CLINIC | Age: 25
End: 2020-01-29
Payer: COMMERCIAL

## 2020-01-29 ENCOUNTER — TELEPHONE (OUTPATIENT)
Dept: GYNECOLOGIC ONCOLOGY | Facility: CLINIC | Age: 25
End: 2020-01-29

## 2020-01-29 ENCOUNTER — HOSPITAL ENCOUNTER (OUTPATIENT)
Dept: RADIOLOGY | Facility: HOSPITAL | Age: 25
Discharge: HOME | End: 2020-01-29
Attending: ORTHOPAEDIC SURGERY
Payer: COMMERCIAL

## 2020-01-29 ENCOUNTER — TELEPHONE (OUTPATIENT)
Dept: SPORTS MEDICINE | Facility: CLINIC | Age: 25
End: 2020-01-29

## 2020-01-29 VITALS
BODY MASS INDEX: 23.91 KG/M2 | TEMPERATURE: 99 F | WEIGHT: 167 LBS | DIASTOLIC BLOOD PRESSURE: 95 MMHG | HEART RATE: 99 BPM | SYSTOLIC BLOOD PRESSURE: 147 MMHG | HEIGHT: 70 IN

## 2020-01-29 DIAGNOSIS — Z09 SURGERY FOLLOW-UP: Primary | ICD-10-CM

## 2020-01-29 DIAGNOSIS — M87.052 AVASCULAR NECROSIS OF BONE OF HIP, LEFT: ICD-10-CM

## 2020-01-29 DIAGNOSIS — Z09 SURGERY FOLLOW-UP: ICD-10-CM

## 2020-01-29 DIAGNOSIS — M87.052 AVASCULAR NECROSIS OF BONE OF LEFT HIP: ICD-10-CM

## 2020-01-29 DIAGNOSIS — M25.552 LEFT HIP PAIN: ICD-10-CM

## 2020-01-29 LAB
FINAL PATHOLOGIC DIAGNOSIS: NORMAL
GROSS: NORMAL

## 2020-01-29 PROCEDURE — 3008F BODY MASS INDEX DOCD: CPT | Mod: CPTII,S$GLB,TXP, | Performed by: ORTHOPAEDIC SURGERY

## 2020-01-29 PROCEDURE — 73521 X-RAY EXAM HIPS BI 2 VIEWS: CPT | Mod: 26,TXP,, | Performed by: RADIOLOGY

## 2020-01-29 PROCEDURE — 99999 PR PBB SHADOW E&M-EST. PATIENT-LVL III: CPT | Mod: PBBFAC,TXP,, | Performed by: ORTHOPAEDIC SURGERY

## 2020-01-29 PROCEDURE — 99214 OFFICE O/P EST MOD 30 MIN: CPT | Mod: S$GLB,TXP,, | Performed by: ORTHOPAEDIC SURGERY

## 2020-01-29 PROCEDURE — 73521 X-RAY EXAM HIPS BI 2 VIEWS: CPT | Mod: TC,NTX

## 2020-01-29 PROCEDURE — 99214 PR OFFICE/OUTPT VISIT, EST, LEVL IV, 30-39 MIN: ICD-10-PCS | Mod: S$GLB,TXP,, | Performed by: ORTHOPAEDIC SURGERY

## 2020-01-29 PROCEDURE — 73521 XR HIPS BILATERAL 2 VIEW INCL AP PELVIS: ICD-10-PCS | Mod: 26,TXP,, | Performed by: RADIOLOGY

## 2020-01-29 PROCEDURE — 3008F PR BODY MASS INDEX (BMI) DOCUMENTED: ICD-10-PCS | Mod: CPTII,S$GLB,TXP, | Performed by: ORTHOPAEDIC SURGERY

## 2020-01-29 PROCEDURE — 99999 PR PBB SHADOW E&M-EST. PATIENT-LVL III: ICD-10-PCS | Mod: PBBFAC,TXP,, | Performed by: ORTHOPAEDIC SURGERY

## 2020-01-29 RX ORDER — OXYCODONE AND ACETAMINOPHEN 10; 325 MG/1; MG/1
1 TABLET ORAL EVERY 12 HOURS PRN
Qty: 42 TABLET | Refills: 0 | Status: SHIPPED | OUTPATIENT
Start: 2020-01-29

## 2020-01-29 NOTE — LETTER
January 29, 2020        Alfred Small Jr., MD  [address]  [facility]  [address]  [facility] [address]             [address] [address]  Phone: [phone]   Patient: Anitha Swenson   MR Number: [id number]   YOB: 1995   Date of Visit: 1/29/2020       Dear Dr. Small:    Thank you for referring Anitha Swenson to me for evaluation. Below are the relevant portions of my assessment and plan of care.            If you have questions, please do not hesitate to call me. I look forward to following Anitha along with you.    Sincerely,      Avery Blake MD           CC  Mauricio Pendleton MD

## 2020-01-29 NOTE — LETTER
January 29, 2020        Alfred Small Jr., MD  [address]  [facility]  [address]  [facility] [address]             [address] [address]  Phone: [phone]   Patient: Anitha Swenson   MR Number: [id number]   YOB: 1995   Date of Visit: 1/29/2020       Dear Dr. Small:    Thank you for referring Anitha Swenson to me for evaluation. Below are the relevant portions of my assessment and plan of care.    Encounter Diagnoses   Name Primary?    Surgery follow-up, L hip arthroscopic labrum repair, synovectomy (12/26/14) Yes    Avascular necrosis of bone of hip, left     Left hip pain     Avascular necrosis of bone of left hip         1. Ye Hip Score was not filled out today in clinic.     RTC in 2-3 weeks with Dr. Avery Blake. Patient will fill out Ye Hip Score on return.     2.  Physical Therapy-[facility]    3. MRI-Arthrogram Left Hip ordered today.    4.Information regarding hip replacement provided, will hold off as long as possible.    5. Would like to complete CSI into Left Hip, need clearance from Dr. Small,  Dr. Pendleton, letter sent today.    6. Percocet sent to pharmacy today.        If you have questions, please do not hesitate to call me. I look forward to following Anitha along with you.    Sincerely,        Avery Blake MD           CC  Mauricio Pendleton MD

## 2020-01-29 NOTE — TELEPHONE ENCOUNTER
Note for patient visit faxed.    ----- Message from Sheila Wiggins sent at 1/29/2020 12:44 PM CST -----  Contact: #[phone] fax#[phone]  Calling for doctors note for visit today to be faxed to: fax#[phone] ATTN: Meaghan Swenson

## 2020-01-29 NOTE — TELEPHONE ENCOUNTER
Called to review pathology.   Moderate cervical dysplasia with negative margins. ECC negative. Recommendations for follow up in 12 months.     No answer. Left voicemail.

## 2020-01-29 NOTE — PROGRESS NOTES
Subjective:          Chief Complaint: Anitha Swenson is a 24 y.o. female who had concerns including Pain of the Left Hip.    Patient presents to clinic for 4 months post op evaluation of left hip. She explains that she feels like her hip pain is worsening. She did not receive the relief that she hoped after surgery. Pain today is 8/10. Pain was 8/10 prior to surgery. She is not currently taking any medication for her pain. She presents using a cane for ambulation assistance. She reports several falls due to her pain and her hip giving way. Denies fever, chills, CP, SOB, and calf pain. She has not been going to PT at [facility] recently due to surgery and the pain in her left hip.    Surgery Date: 9/12/2019   Surgeon(s) and Role:     * Avery Blake MD - Primary  Assistant:  Benjie Jarrett MD- Fellow  Leann Abreu, [facility]     Pre-op Diagnosis:  Avascular necrosis of bone of left hip (M87.052)     Post-op Diagnosis: Post-Op Diagnosis Codes:     * Avascular necrosis of bone of left hip (M87.052)     Procedure(s) (LRB):  SUBCHONDROPLASTY,Femoral Head (Left)  DEBRIDEMENT, LABRUM, HIP, ARTHROSCOPIC,shaving of articular cartilage(chondroplasty,abrasion arthroplasty and/or labrum(includes labral repair) (Left)          Review of Systems   Constitution: Negative. Negative for chills, fever, weight gain and weight loss.   HENT: Negative.  Negative for congestion and sore throat.    Eyes: Negative.  Negative for blurred vision and double vision.   Cardiovascular: Negative.  Negative for chest pain, leg swelling and palpitations.   Respiratory: Negative.  Negative for cough and shortness of breath.    Endocrine: Negative.    Hematologic/Lymphatic: Negative.  Does not bruise/bleed easily.   Skin: Negative.  Negative for itching, poor wound healing and rash.   Musculoskeletal: Positive for joint pain, joint swelling and stiffness. Negative for back pain, muscle weakness and myalgias.   Gastrointestinal: Negative.   Negative for abdominal pain, constipation, diarrhea, nausea and vomiting.   Genitourinary: Negative.  Negative for frequency and hematuria.   Neurological: Negative.  Negative for dizziness, headaches, numbness, paresthesias and sensory change.   Psychiatric/Behavioral: Negative.  Negative for altered mental status and depression. The patient is not nervous/anxious.    Allergic/Immunologic: Negative.  Negative for hives.       Pain Related Questions  Over the past 3 days, what was your average pain during activity? (I.e. running, jogging, walking, climbing stairs, getting dressed, ect.): 9  Over the past 3 days, what was your highest pain level?: 9  Over the past 3 days, what was your lowest pain level? : 8    Other  How many nights a week are you awakened by your affected body part?: 3  Was the patient's HEIGHT measured or patient reported?: Patient Reported  Was the patient's WEIGHT measured or patient reported?: Patient Reported      Objective:        General: Anitha is well-developed, well-nourished, appears stated age, in no acute distress, alert and oriented to time, place and person.     General    Vitals reviewed.  Constitutional: She is oriented to person, place, and time. She appears well-developed and well-nourished. No distress.   HENT:   Mouth/Throat: No oropharyngeal exudate.   Eyes: Right eye exhibits no discharge. Left eye exhibits no discharge.   Neck: Normal range of motion.   Cardiovascular: Normal rate and regular rhythm.    Pulmonary/Chest: Effort normal and breath sounds normal. No respiratory distress.   Neurological: She is alert and oriented to person, place, and time. She has normal reflexes. No cranial nerve deficit. Coordination normal.   Psychiatric: She has a normal mood and affect. Her behavior is normal. Judgment and thought content normal.     General Musculoskeletal Exam   Gait: abnormal and antalgic   Pelvic Obliquity: none      Right Knee Exam     Inspection   Alignment:   normal  Effusion: absent    Left Knee Exam     Inspection   Alignment:  normal  Effusion: absent    Right Hip Exam     Inspection   Scars: absent  Swelling: absent  Bruising: absent  No deformity of hip.  Quadriceps Atrophy:  Negative  Erythema: absent    Range of Motion   Abduction:  40 normal   Adduction:  20 normal   Extension:  0 normal   Flexion:  110 normal   External rotation:  60 normal   Internal rotation:  15 normal     Tests   Pain w/ forced internal rotation (CHEPE): absent  Pain w/ forced external rotation (FADIR): absent  Gina: negative  Trendelenburg Test: negative  Circumduction test: negative  Stinchfield test: negative  Log Roll: negative  Snapping Hip (internal): negative  Step-down test: negative  Resisted sit-up pain: negative  Unresisted sit-up pain: negative  Resisted sit-up pain with adductor contraction pain: negative    Other   Sensation: normal  Left Hip Exam     Inspection   Scars: present  Swelling: present  No deformity of hip.  Quadriceps Atrophy:  negative  Erythema: absent  Bruising: absent    Range of Motion   Abduction:  20 abnormal   Adduction:  10 abnormal   Extension:  0 normal   Flexion:  100 abnormal   External rotation:  30 abnormal   Internal rotation: 10 abnormal     Tests   Pain w/ forced internal rotation (CHEPE): absent  Pain w/ forced external rotation (FADIR): absent  Gina: negative  Trendelenburg Test: negative  Circumduction test: negative  Stinchfield test: negative  Log Roll: negative  Snapping Hip (internal): negative  Step-down test: negative  Resisted sit-up pain: negative  Resisted sit-up pain with adductor contraction pain: negative  Unresisted sit-up pain: negative    Other   Sensation: normal      Back (L-Spine & T-Spine) / Neck (C-Spine) Exam   Back exam is normal.      Muscle Strength   Right Lower Extremity   Hip Abduction: 5/5   Hip Adduction: 5/5   Hip Flexion: 5/5   Left Lower Extremity   Hip Abduction: 5/5   Hip Adduction: 5/5   Hip Flexion: 4/5      Reflexes     Left Side  Quadriceps:  2+  Achilles:  2+    Right Side   Quadriceps:  2+  Achilles:  2+    Vascular Exam     Right Pulses  Dorsalis Pedis:      2+  Posterior Tibial:      2+        Left Pulses  Dorsalis Pedis:      2+  Posterior Tibial:      2+        Capillary Refill  Right Hand: normal capillary refill  Left Hand: normal capillary refill    Edema  Right Upper Leg: absent  Left Upper Leg: absent  RADIOGRAPHS TODAY:  Left hip:  FINDINGS:  Abnormal sclerosis and subchondral cystic lucency are identified within the left femoral head are identified, representing avascular necrosis.  This is a detrimental change since the 2014 examination, but was noted on the MR examination referenced above.  The MR exam also demonstrated avascular necrosis of the right femoral head, though this is much less evident on conventional radiography.  Contour of the femoral head on each side appears essentially unremarkable, without significant flattening.  No significant interval hip joint space narrowing is observed on either side.  Remaining osseous structures are intact, with no evidence of recent or healing fracture or lytic destructive process.  Peritoneal dialysis catheter is incidentally observed.        Assessment:       Encounter Diagnoses   Name Primary?    Surgery follow-up, L hip arthroscopic labrum repair, synovectomy (12/26/14) Yes    Avascular necrosis of bone of hip, left     Left hip pain     Avascular necrosis of bone of left hip           Plan:       1. Ye Hip Score was not filled out today in clinic.     RTC in 2-3 weeks with Dr. Avery Blake. Patient will fill out Ye Hip Score on return.     2.  Physical Therapy-[facility]    3. MRI-Arthrogram Left Hip ordered today.    4.Information regarding hip replacement provided, will hold off as long as possible.    5. Would like to complete CSI into Left Hip, need clearance from Dr. Small,  Dr. Pendleton, letter sent today.    6. Percocet sent to  pharmacy today.                Patient questionnaires may have been collected.

## 2020-01-29 NOTE — TELEPHONE ENCOUNTER
LVM for patient in regard to appointment on 1/29/20. Would like to reschedule the patients appt for Dr. Blake's next available.

## 2020-01-30 ENCOUNTER — HOSPITAL ENCOUNTER (OUTPATIENT)
Facility: HOSPITAL | Age: 25
Discharge: HOME | End: 2020-01-30
Attending: INTERNAL MEDICINE | Admitting: INTERNAL MEDICINE
Payer: COMMERCIAL

## 2020-01-30 VITALS
TEMPERATURE: 99 F | SYSTOLIC BLOOD PRESSURE: 112 MMHG | WEIGHT: 167 LBS | HEIGHT: 70 IN | HEART RATE: 72 BPM | RESPIRATION RATE: 12 BRPM | BODY MASS INDEX: 23.91 KG/M2 | DIASTOLIC BLOOD PRESSURE: 75 MMHG | OXYGEN SATURATION: 98 %

## 2020-01-30 DIAGNOSIS — D64.9 ANEMIA: ICD-10-CM

## 2020-01-30 PROCEDURE — 45380 COLONOSCOPY AND BIOPSY: CPT | Performed by: INTERNAL MEDICINE

## 2020-01-30 PROCEDURE — 87252 VIRUS INOCULATION TISSUE: CPT

## 2020-01-30 PROCEDURE — 63600175 PHARM REV CODE 636 W HCPCS: Performed by: INTERNAL MEDICINE

## 2020-01-30 PROCEDURE — 30000890 HC MISC. SEND OUT TEST

## 2020-01-30 PROCEDURE — 27200043 HC FORCEPS, BIOPSY: Performed by: INTERNAL MEDICINE

## 2020-01-30 PROCEDURE — 36415 COLL VENOUS BLD VENIPUNCTURE: CPT

## 2020-01-30 PROCEDURE — 30000890 LABCORP MISCELLANEOUS TEST

## 2020-01-30 PROCEDURE — 99152 MOD SED SAME PHYS/QHP 5/>YRS: CPT | Performed by: INTERNAL MEDICINE

## 2020-01-30 PROCEDURE — 99153 MOD SED SAME PHYS/QHP EA: CPT | Performed by: INTERNAL MEDICINE

## 2020-01-30 PROCEDURE — 45331 SIGMOIDOSCOPY AND BIOPSY: CPT | Performed by: INTERNAL MEDICINE

## 2020-01-30 PROCEDURE — 43239 EGD BIOPSY SINGLE/MULTIPLE: CPT | Performed by: INTERNAL MEDICINE

## 2020-01-30 PROCEDURE — 87254 VIRUS INOCULATION SHELL VIA: CPT

## 2020-01-30 RX ORDER — DIAZEPAM 10 MG/2ML
INJECTION INTRAMUSCULAR
Status: COMPLETED | OUTPATIENT
Start: 2020-01-30 | End: 2020-01-30

## 2020-01-30 RX ORDER — SODIUM CHLORIDE 9 MG/ML
INJECTION, SOLUTION INTRAVENOUS CONTINUOUS
Status: DISCONTINUED | OUTPATIENT
Start: 2020-01-30 | End: 2020-01-30 | Stop reason: HOSPADM

## 2020-01-30 RX ORDER — FENTANYL CITRATE 50 UG/ML
INJECTION, SOLUTION INTRAMUSCULAR; INTRAVENOUS
Status: COMPLETED | OUTPATIENT
Start: 2020-01-30 | End: 2020-01-30

## 2020-01-30 RX ORDER — MIDAZOLAM HYDROCHLORIDE 1 MG/ML
INJECTION INTRAMUSCULAR; INTRAVENOUS
Status: COMPLETED | OUTPATIENT
Start: 2020-01-30 | End: 2020-01-30

## 2020-01-30 RX ORDER — SODIUM CHLORIDE 0.9 % (FLUSH) 0.9 %
2 SYRINGE (ML) INJECTION
Status: DISCONTINUED | OUTPATIENT
Start: 2020-01-30 | End: 2020-01-30 | Stop reason: HOSPADM

## 2020-01-30 RX ADMIN — FENTANYL CITRATE 25 MCG: 50 INJECTION INTRAMUSCULAR; INTRAVENOUS at 01:01

## 2020-01-30 RX ADMIN — FENTANYL CITRATE 50 MCG: 50 INJECTION INTRAMUSCULAR; INTRAVENOUS at 01:01

## 2020-01-30 RX ADMIN — DIAZEPAM 5 MG: 5 INJECTION, SOLUTION INTRAMUSCULAR; INTRAVENOUS at 01:01

## 2020-01-30 RX ADMIN — MIDAZOLAM HYDROCHLORIDE 2 MG: 1 INJECTION, SOLUTION INTRAMUSCULAR; INTRAVENOUS at 01:01

## 2020-01-30 RX ADMIN — MIDAZOLAM HYDROCHLORIDE 1 MG: 1 INJECTION, SOLUTION INTRAMUSCULAR; INTRAVENOUS at 01:01

## 2020-01-30 NOTE — H&P
GASTROENTEROLOGY PRE-PROCEDURE H&P NOTE  Patient Name: Anitha Swenson  Patient MRN: [id number]  Patient : 1995    Service date: 2020    PCP: Oj Hernández MD    No chief complaint on file.      HPI: Patient is a 24 y.o. 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     Past Medical History:  Past Medical History:   Diagnosis Date    Anemia due to chronic blood loss 2018    Anemia, chronic disease 2018    Arthritis     Elevated homocysteine 2018    Encounter for blood transfusion     ESRD (end stage renal disease) 2019    Fibromyalgia     GI bleeding 2018    Hypertension     Kidney failure 2017    Lupus nephritis, ISN/RPS class IV 2018    diagnosed 2018, 3 biopsies, 1st - Class 3-4, no crescents, IFTA 15%, 2nd - Class V, 3rd - Class 4, w/ cressents, IFTA 30%. Treated with cellcept in 2018 until  (stopped for neutropenic fever), last steroid pulce 18 and had 2 doses CYC, last 2018. On HD since 2019. Sees Dr. Pendleton, [place] in [place], [place]. and Dr. Small, rheumatology    Lupus nephritis, ISN/RPS class IV 2017    Migraine headache with aura     Other pulmonary embolism without acute cor pulmonale     Patient and mother deny        Past Surgical History:  Past Surgical History:   Procedure Laterality Date    BONE MARROW BIOPSY N/A 2019    Procedure: BIOPSY, BONE MARROW;  Surgeon: Jamaal Diagnostic Provider;  Location: [facility];  Service: Interventional Radiology;  Laterality: N/A;    COLD KNIFE CONIZATION OF CERVIX N/A 2020    Procedure: CONE BIOPSY, CERVIX,;  Surgeon: Amy Hernandez MD;  Location: [facility] OR [address] [place] [address];  Service: OB/GYN;  Laterality: N/A;    HIP ARTHROSCOPY W/  LABRAL DEBRIDEMENT Left 9/12/2019    Procedure: DEBRIDEMENT, LABRUM, HIP, ARTHROSCOPIC,shaving of articular cartilage(chondroplasty,abrasion arthroplasty and/or labrum(includes labral repair);  Surgeon: Avery Blake MD;  Location: [facility] OR;  Service: Orthopedics;  Laterality: Left;  DEBRIDEMENT, LABRUM, HIP, ARTHROSCOPIC,shaving of articular cartilage(chondroplasty,abrasion arthroplasty and/or labrum(includes labral repair)    HIP SURGERY      INSERTION OF TUNNELED CENTRAL VENOUS HEMODIALYSIS CATHETER N/A 1/31/2019    Procedure: INSERTION, CATHETER, CENTRAL VENOUS, HEMODIALYSIS, TUNNELED;  Surgeon: Nam Tucker MD;  Location: [facility] CATH LAB;  Service: Nephrology;  Laterality: N/A;    LAPAROSCOPIC INSERTION OF PERITONEAL DIALYSIS CATHETER  07/2019    SUBCHONDROPLASTY Left 9/12/2019    Procedure: SUBCHONDROPLASTY,Femoral Head;  Surgeon: Avery Blake MD;  Location: [facility];  Service: Orthopedics;  Laterality: Left;  SUBCHONDROPLASTY,Femoral Head  Clonidine/Epi/Ketorolac/Ropivacaine Injection 30cc    TYMPANOSTOMY TUBE PLACEMENT          Home Medications:  Facility-Administered Medications Prior to Admission   Medication Dose Route Frequency Provider Last Rate Last Dose    0.9%  NaCl infusion (for blood administration)   Intravenous Once Sukhjinder Goodwin MD         Medications Prior to Admission   Medication Sig Dispense Refill Last Dose    BENLYSTA 200 mg/mL AtIn Inject 1 mL (200 mg total) as directed Every Friday. HOLD UNTIL TOLD TO RESUME BY RHEUMATOLOGIST   1/24/2020    biotin 5,000 mcg TbDL Take by mouth once daily.   1/29/2020 at Unknown time    calcium acetate (PHOSLO) 667 mg capsule Take 667 mg by mouth 3 (three) times daily.   1/30/2020 at Unknown time    cholecalciferol, vitamin D3, (VITAMIN D3) 5,000 unit Tab Take 5,000 Units by mouth every morning.   1/30/2020 at Unknown time    FLUoxetine 40 MG capsule Take 1 capsule (40 mg total) by mouth 2 (two) times daily. 60 capsule 5 1/30/2020  at Unknown time    gabapentin (NEURONTIN) 300 MG capsule Take 1 capsule (300 mg total) by mouth every evening. (Patient taking differently: Take 300 mg by mouth 3 (three) times daily. )  0 1/30/2020 at Unknown time    hydroxychloroquine (PLAQUENIL) 200 mg tablet Take 1 tablet by mouth nightly  6 1/30/2020 at Unknown time    mycophenolate mofetil (CELLCEPT ORAL) Take 1,000 mg by mouth 2 (two) times daily.   1/30/2020 at Unknown time    ondansetron (ZOFRAN) 8 MG tablet Take 8 mg by mouth every 8 (eight) hours as needed for Nausea.   1/30/2020 at Unknown time    oxyCODONE (ROXICODONE) 10 mg Tab immediate release tablet    1/30/2020 at Unknown time    pantoprazole (PROTONIX) 40 MG tablet Take 1 tablet (40 mg total) by mouth once daily. (Patient taking differently: Take 40 mg by mouth 2 (two) times daily. )   1/30/2020 at Unknown time    PREDNISONE ORAL Take 5 mg by mouth once daily.   0 1/30/2020 at Unknown time    oxyCODONE (OXY-IR) 5 mg Cap Take 1 capsule (5 mg total) by mouth every 4 (four) hours as needed for Pain. 10 capsule 0     oxyCODONE-acetaminophen (PERCOCET)  mg per tablet Take 1 tablet by mouth every 12 (twelve) hours as needed for Pain. (Patient not taking: Reported on 1/29/2020) 42 tablet 0 Not Taking    promethazine (PHENERGAN) 12.5 MG Tab Take 25 mg by mouth 4 (four) times daily.   Not Taking       Inpatient Medications:    sodium chloride 0.9%    Review of patient's allergies indicates:   Allergen Reactions    Sulfa (sulfonamide antibiotics) Hives       Social History:   Social History     Occupational History    Occupation: goes to [employer]     Comment: full time student   Tobacco Use    Smoking status: Former Smoker     Packs/day: 0.25     Years: 1.00     Pack years: 0.25     Types: Vaping with nicotine     Start date: 10/16/2016     Last attempt to quit: 10/16/2016     Years since quitting: 3.2    Smokeless tobacco: Never Used   Substance and Sexual Activity    Alcohol use: No     "Drug use: No    Sexual activity: Not on file       Family History:   Family History   Problem Relation Age of Onset    Goiter Mother     Anxiety disorder Father     Hypertension Father     Diabetes Mellitus Maternal Grandfather     Eating disorder Sister     Kidney disease Neg Hx        Review of Systems:  A 10 point review of systems was performed and was normal, except as mentioned in the HPI, including constitutional, HEENT, heme, lymph, cardiovascular, respiratory, gastrointestinal, genitourinary, neurologic, endocrine, psychiatric and musculoskeletal.      OBJECTIVE:    Physical Exam:  24 Hour Vital Sign Ranges: Temp:  [99 °F (37.2 °C)] 99 °F (37.2 °C)  Pulse:  [89] 89  Resp:  [16] 16  SpO2:  [97 %] 97 %  BP: (118)/(86) 118/86  Most recent vitals: /86   Pulse 89   Temp 99 °F (37.2 °C) (Oral)   Resp 16   Ht 5' 10" (1.778 m)   Wt 75.8 kg (167 lb)   SpO2 97%   Breastfeeding? No   BMI 23.96 kg/m²    GEN: well-developed, well-nourished, awake and alert, non-toxic appearing adult  HEENT: PERRL, sclera anicteric, oral mucosa pink and moist without lesion  NECK: trachea midline; Good ROM  CV: regular rate and rhythm, no murmurs or gallops  RESP: clear to auscultation bilaterally, no wheezes, rhonci or rales  ABD: soft, non-tender, non-distended, normal bowel sounds  EXT: no swelling or edema, 2+ pulses distally  SKIN: no rashes or jaundice  PSYCH: normal affect    Labs:   No results for input(s): WBC, MCV, PLT in the last 72 hours.    Invalid input(s): HGBAU  No results for input(s): NA, K, CL, CO2, BUN, GLU in the last 72 hours.    Invalid input(s): CREA  No results for input(s): ALB in the last 72 hours.    Invalid input(s): ALKP, SGOT, SGPT, TBIL, DBIL, TPRO  No results for input(s): PT, INR, PTT in the last 72 hours.      IMPRESSION / RECOMMENDATIONS:  EGD / flex w/ sedation today. Risks, benefits, alternative discussed in detail with patient / family regarding anticipated procedure and " possible complications.     Jesus CHANDLER Dauterive III  1/30/2020  1:14 PM

## 2020-01-30 NOTE — [employer] PATIENT INSTRUCTIONS
Discharge Summary/Instructions after an Endoscopic Procedure  Patient Name: Anitha Swenson  Patient MRN: [id number]  Patient YOB: 1995 Thursday, January 30, 2020  Jesus Camarillo III, MD  RESTRICTIONS:  During your procedure today, you received medications for sedation.  These   medications may affect your judgment, balance and coordination.  Therefore,   for 24 hours, you have the following restrictions:   - DO NOT drive a car, operate machinery, make legal/financial decisions,   sign important papers or drink alcohol.    ACTIVITY:  Today: no heavy lifting, straining or running due to procedural   sedation/anesthesia.  The following day: return to full activity including work.  DIET:  Eat and drink normally unless instructed otherwise.     TREATMENT FOR COMMON SIDE EFFECTS:  - Mild abdominal pain, nausea, belching, bloating or excessive gas:  rest,   eat lightly and use a heating pad.  - Sore Throat: treat with throat lozenges and/or gargle with warm salt   water.  - Because air was used during the procedure, expelling large amounts of air   from your rectum or belching is normal.  - If a bowel prep was taken, you may not have a bowel movement for 1-3 days.    This is normal.  SYMPTOMS TO WATCH FOR AND REPORT TO YOUR PHYSICIAN:  1. Abdominal pain or bloating, other than gas cramps.  2. Chest pain.  3. Back pain.  4. Signs of infection such as: chills or fever occurring within 24 hours   after the procedure.  5. Rectal bleeding, which would show as bright red, maroon, or black stools.   (A tablespoon of blood from the rectum is not serious, especially if   hemorrhoids are present.)  6. Vomiting.  7. Weakness or dizziness.  GO DIRECTLY TO THE NEAREST EMERGENCY ROOM IF YOU HAVE ANY OF THE FOLLOWING:      Difficulty breathing              Chills and/or fever over 101 F   Persistent vomiting and/or vomiting blood   Severe abdominal pain   Severe chest pain   Black, tarry stools   Bleeding- more than one  tablespoon   Any other symptom or condition that you feel may need urgent attention  Your doctor recommends these additional instructions:  If any biopsies were taken, your doctors clinic will contact you in 1 to 2   weeks with any results.  - Patient has a contact number available for emergencies.  The signs and   symptoms of potential delayed complications were discussed with the   patient.  Return to normal activities tomorrow.  Written discharge   instructions were provided to the patient.   - Discharge patient to home (with escort).  For questions, problems or results please call your physician - Jesus Camarillo III, MD at Work:  [phone].  [facility], EMERGENCY ROOM PHONE NUMBER: [phone]  IF A COMPLICATION OR EMERGENCY SITUATION ARISES AND YOU ARE UNABLE TO REACH   YOUR PHYSICIAN - GO DIRECTLY TO THE EMERGENCY ROOM.  Jesus Camarillo III, MD  1/30/2020 2:10:31 PM  This report has been verified and signed electronically.  [employer]

## 2020-01-30 NOTE — PROVATION PATIENT INSTRUCTIONS
Discharge Summary/Instructions after an Endoscopic Procedure  Patient Name: Anitha Swenson  Patient MRN: [id number]  Patient YOB: 1995 Thursday, January 30, 2020  Jesus Camarillo III, MD  RESTRICTIONS:  During your procedure today, you received medications for sedation.  These   medications may affect your judgment, balance and coordination.  Therefore,   for 24 hours, you have the following restrictions:   - DO NOT drive a car, operate machinery, make legal/financial decisions,   sign important papers or drink alcohol.    ACTIVITY:  Today: no heavy lifting, straining or running due to procedural   sedation/anesthesia.  The following day: return to full activity including work.  DIET:  Eat and drink normally unless instructed otherwise.     TREATMENT FOR COMMON SIDE EFFECTS:  - Mild abdominal pain, nausea, belching, bloating or excessive gas:  rest,   eat lightly and use a heating pad.  - Sore Throat: treat with throat lozenges and/or gargle with warm salt   water.  - Because air was used during the procedure, expelling large amounts of air   from your rectum or belching is normal.  - If a bowel prep was taken, you may not have a bowel movement for 1-3 days.    This is normal.  SYMPTOMS TO WATCH FOR AND REPORT TO YOUR PHYSICIAN:  1. Abdominal pain or bloating, other than gas cramps.  2. Chest pain.  3. Back pain.  4. Signs of infection such as: chills or fever occurring within 24 hours   after the procedure.  5. Rectal bleeding, which would show as bright red, maroon, or black stools.   (A tablespoon of blood from the rectum is not serious, especially if   hemorrhoids are present.)  6. Vomiting.  7. Weakness or dizziness.  GO DIRECTLY TO THE NEAREST EMERGENCY ROOM IF YOU HAVE ANY OF THE FOLLOWING:      Difficulty breathing              Chills and/or fever over 101 F   Persistent vomiting and/or vomiting blood   Severe abdominal pain   Severe chest pain   Black, tarry stools   Bleeding- more than one  tablespoon   Any other symptom or condition that you feel may need urgent attention  Your doctor recommends these additional instructions:  If any biopsies were taken, your doctors clinic will contact you in 1 to 2   weeks with any results.  - F/u clinic next week w/ biopsy results  - Discharge patient to home (with escort).  For questions, problems or results please call your physician - Jesus Camarillo III, MD at Work:  [phone].  [facility], EMERGENCY ROOM PHONE NUMBER: [phone]  IF A COMPLICATION OR EMERGENCY SITUATION ARISES AND YOU ARE UNABLE TO REACH   YOUR PHYSICIAN - GO DIRECTLY TO THE EMERGENCY ROOM.  Jesus Camarillo III, MD  1/30/2020 2:13:47 PM  This report has been verified and signed electronically.  [employer]

## 2020-02-03 ENCOUNTER — TELEPHONE (OUTPATIENT)
Dept: GYNECOLOGIC ONCOLOGY | Facility: CLINIC | Age: 25
End: 2020-02-03

## 2020-02-07 ENCOUNTER — HOSPITAL ENCOUNTER (OUTPATIENT)
Dept: RADIOLOGY | Facility: HOSPITAL | Age: 25
Discharge: HOME | End: 2020-02-07
Attending: ORTHOPAEDIC SURGERY
Payer: COMMERCIAL

## 2020-02-07 ENCOUNTER — TELEPHONE (OUTPATIENT)
Dept: SPORTS MEDICINE | Facility: CLINIC | Age: 25
End: 2020-02-07

## 2020-02-07 DIAGNOSIS — M25.552 LEFT HIP PAIN: ICD-10-CM

## 2020-02-07 DIAGNOSIS — M87.052 AVASCULAR NECROSIS OF BONE OF LEFT HIP: ICD-10-CM

## 2020-02-07 DIAGNOSIS — Z09 SURGERY FOLLOW-UP: ICD-10-CM

## 2020-02-07 DIAGNOSIS — M87.052 AVASCULAR NECROSIS OF BONE OF HIP, LEFT: ICD-10-CM

## 2020-02-07 PROCEDURE — 73721 MRI HIP WITHOUT CONTRAST LEFT: ICD-10-PCS | Mod: 26,LT,NTX, | Performed by: RADIOLOGY

## 2020-02-07 PROCEDURE — 73721 MRI JNT OF LWR EXTRE W/O DYE: CPT | Mod: 26,LT,NTX, | Performed by: RADIOLOGY

## 2020-02-07 PROCEDURE — 73721 MRI JNT OF LWR EXTRE W/O DYE: CPT | Mod: TC,LT,NTX

## 2020-02-07 NOTE — TELEPHONE ENCOUNTER
Spoke to Tony Goldstein about message received from RT Khushi regarding the patient's GFR being too low for contrast for MRI Arthrogram. He stated that we can continue with an MRI of the hip without contrast.

## 2020-02-10 LAB
LABCORP MISC TEST CODE: 8201
LABCORP MISC TEST NAME: NORMAL
LABCORP MISCELLANEOUS TEST: NORMAL

## 2020-02-11 ENCOUNTER — TELEPHONE (OUTPATIENT)
Dept: SPORTS MEDICINE | Facility: CLINIC | Age: 25
End: 2020-02-11

## 2020-02-11 NOTE — TELEPHONE ENCOUNTER
Spoke to the patient's mother. Will discuss with Dr. Blake regarding bracing options.\    ----- Message from Jonathan Reardon sent at 2/11/2020 10:07 AM CST -----  Contact: Pt(Mother)  Patient's mother called inquiring as to if there was any brace she could purchase to help alleviate the pain to the patient's hip callback to discuss in further detail     Callback:[phone] (home)

## 2020-02-13 ENCOUNTER — TELEPHONE (OUTPATIENT)
Dept: SPORTS MEDICINE | Facility: CLINIC | Age: 25
End: 2020-02-13

## 2020-02-13 NOTE — TELEPHONE ENCOUNTER
The patient was contacted regarding their call to the office earlier and a voice mail was left to call the office back at their convenience.    ----- Message from Reji Parra sent at 2/13/2020  2:36 PM CST -----  Contact: Meaghan (mom) @ [phone]  Meaghan is asking to speak w/ the nurse about pt getting brace for support

## 2020-02-17 ENCOUNTER — TELEPHONE (OUTPATIENT)
Dept: GYNECOLOGIC ONCOLOGY | Facility: CLINIC | Age: 25
End: 2020-02-17

## 2020-02-18 ENCOUNTER — TELEPHONE (OUTPATIENT)
Dept: GYNECOLOGIC ONCOLOGY | Facility: CLINIC | Age: 25
End: 2020-02-18

## 2020-02-18 NOTE — PATIENT INSTRUCTIONS
Understanding Hip Replacement  The hip joint is one of the bodys largest weight-bearing joints. It is a ball-and-socket joint. This helps the hip remain stable even during twisting and extreme ranges of motion. A healthy hip joint allows you to walk, squat, and turn without pain. But when a hip joint is damaged, it is likely to hurt when you move. When a natural hip must be replaced, a prosthesis is used.    A healthy hip  In a healthy hip, smooth cartilage covers the ends of the thighbone, as well as the pelvis where it joins the thighbone. This allows the ball to glide easily inside the socket with little friction. When the surrounding muscles support your weight and the joint moves smoothly, you can walk painlessly.                A problem hip  In a problem hip, the worn cartilage no longer serves as a cushion. As the roughened bones rub together, they become irregular, with a surface like sandpaper. The ball grinds in the socket when you move your leg, causing pain and stiffness.                A prosthesis  An artificial ball replaces the head of the thighbone, and an artificial cup replaces the worn socket. A stem is inserted into the thigh bone to keep the ball in place. These parts connect to create your new artificial hip. A plastic liner is placed between the metal ball and cup to create a smooth surface for comfortable movement once you have healed.     Date Last Reviewed: 10/4/2015  © 5845-5569 [employer]. [address] [place] [address], [place] [address]. All rights reserved. This information is not intended as a substitute for professional medical care. Always follow your healthcare professional's instructions.

## 2020-02-18 NOTE — PROGRESS NOTES
Subjective:          Chief Complaint: Anitha Swenson is a 24 y.o. female who had concerns including Results of the Left Hip.    Patient returns to clinic for MRI results review (results below).  States pain has increased recently.  She is ready for DYANA.    Surgery Date: 9/12/2019   Surgeon(s) and Role:     * Avery Blake MD - Primary  Assistant:  Benjie Jarrett MD- Fellow  [facility]ann [facility]breu [facility]     Pre-op Diagnosis:  Avascular necrosis of bone of left hip (M87.052)     Post-op Diagnosis: Post-Op Diagnosis Codes:     * Avascular necrosis of bone of left hip (M87.052)     Procedure(s) (LRB):  SUBCHONDROPLASTY,Femoral Head (Left)  DEBRIDEMENT, LABRUM, HIP, ARTHROSCOPIC,shaving of articular cartilage(chondroplasty,abrasion arthroplasty and/or labrum(includes labral repair) (Left)          Review of Systems   Constitution: Negative. Negative for chills, fever, weight gain and weight loss.   HENT: Negative.  Negative for congestion and sore throat.    Eyes: Negative.  Negative for blurred vision and double vision.   Cardiovascular: Negative.  Negative for chest pain, leg swelling and palpitations.   Respiratory: Negative.  Negative for cough and shortness of breath.    Endocrine: Negative.    Hematologic/Lymphatic: Negative.  Does not bruise/bleed easily.   Skin: Negative.  Negative for itching, poor wound healing and rash.   Musculoskeletal: Positive for joint pain, joint swelling and stiffness. Negative for back pain, muscle weakness and myalgias.   Gastrointestinal: Negative.  Negative for abdominal pain, constipation, diarrhea, nausea and vomiting.   Genitourinary: Negative.  Negative for frequency and hematuria.   Neurological: Negative.  Negative for dizziness, headaches, numbness, paresthesias and sensory change.   Psychiatric/Behavioral: Negative.  Negative for altered mental status and depression. The patient is not nervous/anxious.    Allergic/Immunologic: Negative.  Negative for hives.                    Objective:        General: Anitha is well-developed, well-nourished, appears stated age, in no acute distress, alert and oriented to time, place and person.     General    Vitals reviewed.  Constitutional: She is oriented to person, place, and time. She appears well-developed and well-nourished. No distress.   HENT:   Mouth/Throat: No oropharyngeal exudate.   Eyes: Right eye exhibits no discharge. Left eye exhibits no discharge.   Neck: Normal range of motion.   Cardiovascular: Normal rate and regular rhythm.    Pulmonary/Chest: Effort normal and breath sounds normal. No respiratory distress.   Neurological: She is alert and oriented to person, place, and time. She has normal reflexes. No cranial nerve deficit. Coordination normal.   Psychiatric: She has a normal mood and affect. Her behavior is normal. Judgment and thought content normal.     General Musculoskeletal Exam   Gait: abnormal and antalgic   Pelvic Obliquity: none      Right Knee Exam     Inspection   Alignment:  normal  Effusion: absent    Left Knee Exam     Inspection   Alignment:  normal  Effusion: absent    Right Hip Exam     Inspection   Scars: absent  Swelling: absent  Bruising: absent  No deformity of hip.  Quadriceps Atrophy:  Negative  Erythema: absent    Range of Motion   Abduction:  40 normal   Adduction:  20 normal   Extension:  0 normal   Flexion:  110 normal   External rotation:  60 normal   Internal rotation:  15 normal     Tests   Pain w/ forced internal rotation (CHEPE): absent  Pain w/ forced external rotation (FADIR): absent  Gina: negative  Trendelenburg Test: negative  Circumduction test: negative  Stinchfield test: negative  Log Roll: negative  Snapping Hip (internal): negative  Step-down test: negative  Resisted sit-up pain: negative  Unresisted sit-up pain: negative  Resisted sit-up pain with adductor contraction pain: negative    Other   Sensation: normal  Left Hip Exam     Inspection   Scars: present  Swelling: present  No  deformity of hip.  Quadriceps Atrophy:  negative  Erythema: absent  Bruising: absent    Range of Motion   Abduction:  20 abnormal   Adduction:  10 abnormal   Extension:  0 normal   Flexion:  100 abnormal   External rotation:  30 abnormal   Internal rotation: 10 abnormal     Tests   Pain w/ forced internal rotation (CHEPE): absent  Pain w/ forced external rotation (FADIR): absent  Gina: negative  Trendelenburg Test: negative  Circumduction test: negative  Stinchfield test: negative  Log Roll: negative  Snapping Hip (internal): negative  Step-down test: negative  Resisted sit-up pain: negative  Resisted sit-up pain with adductor contraction pain: negative  Unresisted sit-up pain: negative    Other   Sensation: normal      Back (L-Spine & T-Spine) / Neck (C-Spine) Exam   Back exam is normal.      Muscle Strength   Right Lower Extremity   Hip Abduction: 5/5   Hip Adduction: 5/5   Hip Flexion: 5/5   Left Lower Extremity   Hip Abduction: 5/5   Hip Adduction: 5/5   Hip Flexion: 4/5     Reflexes     Left Side  Quadriceps:  2+  Achilles:  2+    Right Side   Quadriceps:  2+  Achilles:  2+    Vascular Exam     Right Pulses  Dorsalis Pedis:      2+  Posterior Tibial:      2+        Left Pulses  Dorsalis Pedis:      2+  Posterior Tibial:      2+        Capillary Refill  Right Hand: normal capillary refill  Left Hand: normal capillary refill    Edema  Right Upper Leg: absent  Left Upper Leg: absent  RADIOGRAPHS TODAY:    MRI HIP WITHOUT CONTRAST LEFT    CLINICAL HISTORY:  Arthritis, hip;  Encounter for follow-up examination after completed treatment for conditions other than malignant neoplasm    TECHNIQUE:  Multiplanar, multisequence MR imaging of the left hip was performed without contrast    COMPARISON:  08/07/2019    FINDINGS:  There has been core decompression of the left femoral head.  Abundant marrow edema is present within the left femoral head and neck, accompanied by serpiginous femoral head signal abnormality,  compatible with osteonecrosis.  This involves approximately 50% of the circumference of the femoral head.  A large left hip joint effusion is present.  Moderate T2 hyperintense intrasubstance signal is present within the anterior labrum, without distinct tear identified.    The right hip demonstrates moderate serpiginous subcortical signal abnormality compatible with osteonecrosis.  No accompanying marrow edema.      Impression       Severe left and moderate right hip osteonecrosis.  Status post left core decompression.    Large left hip joint effusion.    No definite recurrent left labral tear status post repair.           Assessment:       Encounter Diagnoses   Name Primary?    Avascular necrosis of femoral head, left Yes    Surgery follow-up, L hip arthroscopic labrum repair, synovectomy (12/26/14)     Tear of left acetabular labrum, sequela           Plan:       1. Ye Hip Score was filled out today in clinic.     RTC in 2-3 weeks with [facility] for preop HP. Patient will not fill out Ye Hip Score on return.     2.  Physical Therapy-[facility]    3. We reviewed with Anitha today, the pathology and natural history of her diagnosis. We have discussed a variety of treatment   options including medications, physical therapy and other alternative treatments. I also explained the indications, risks and benefits of surgery.   After discussion, Anitha decided to proceed with surgery. The decision was made to go forward with   1. Left total hip arthroplasty (Depuy, anterior approach)    The details of the surgical procedure were explained, including the location of probable incisions and a description of likely hardware and/or grafts to be used.  The patient understands the likely convalescence after surgery.  Also, we have thoroughly discussed the risks, benefits and alternatives to surgery, including, but not limited to, the risk of infection, joint stiffness, blood clot (including DVT and/or pulmonary  embolus), neurologic and vascular injury.  It was explained that, if tissue has been repaired or reconstructed, there is a chance of failure, which may require further management.      All of the patient's questions were answered and informed consent was obtained. The patient will contact us if they have any questions or concerns in the interim.    4. Refilled Oxycodone today                    Patient questionnaires may have been collected.

## 2020-02-18 NOTE — TELEPHONE ENCOUNTER
----- Message from Luciana Regan RN sent at 2/18/2020  1:10 PM CST -----  Contact: pt      ----- Message -----  From: Delisa Narvaez  Sent: 2/18/2020  12:24 PM CST  To: [facility] Staff    Pt would like a call back to reschedule an appt that was for today at 1:15pm...    Pt can be reached at [phone]

## 2020-02-19 ENCOUNTER — OFFICE VISIT (OUTPATIENT)
Dept: SPORTS MEDICINE | Facility: CLINIC | Age: 25
End: 2020-02-19
Payer: COMMERCIAL

## 2020-02-19 VITALS
SYSTOLIC BLOOD PRESSURE: 134 MMHG | HEIGHT: 70 IN | HEART RATE: 95 BPM | WEIGHT: 167 LBS | DIASTOLIC BLOOD PRESSURE: 95 MMHG | BODY MASS INDEX: 23.91 KG/M2

## 2020-02-19 DIAGNOSIS — M87.052 AVASCULAR NECROSIS OF FEMORAL HEAD, LEFT: Primary | ICD-10-CM

## 2020-02-19 DIAGNOSIS — Z09 SURGERY FOLLOW-UP: ICD-10-CM

## 2020-02-19 DIAGNOSIS — S73.192S TEAR OF LEFT ACETABULAR LABRUM, SEQUELA: ICD-10-CM

## 2020-02-19 PROCEDURE — 99214 OFFICE O/P EST MOD 30 MIN: CPT | Mod: S$GLB,TXP,, | Performed by: ORTHOPAEDIC SURGERY

## 2020-02-19 PROCEDURE — 3008F PR BODY MASS INDEX (BMI) DOCUMENTED: ICD-10-PCS | Mod: CPTII,S$GLB,TXP, | Performed by: ORTHOPAEDIC SURGERY

## 2020-02-19 PROCEDURE — 99214 PR OFFICE/OUTPT VISIT, EST, LEVL IV, 30-39 MIN: ICD-10-PCS | Mod: S$GLB,TXP,, | Performed by: ORTHOPAEDIC SURGERY

## 2020-02-19 PROCEDURE — 99999 PR PBB SHADOW E&M-EST. PATIENT-LVL V: CPT | Mod: PBBFAC,TXP,, | Performed by: ORTHOPAEDIC SURGERY

## 2020-02-19 PROCEDURE — 3008F BODY MASS INDEX DOCD: CPT | Mod: CPTII,S$GLB,TXP, | Performed by: ORTHOPAEDIC SURGERY

## 2020-02-19 PROCEDURE — 99999 PR PBB SHADOW E&M-EST. PATIENT-LVL V: ICD-10-PCS | Mod: PBBFAC,TXP,, | Performed by: ORTHOPAEDIC SURGERY

## 2020-02-19 RX ORDER — OXYCODONE AND ACETAMINOPHEN 10; 325 MG/1; MG/1
1 TABLET ORAL
Qty: 42 TABLET | Refills: 0 | Status: SHIPPED | OUTPATIENT
Start: 2020-02-19

## 2020-02-20 ENCOUNTER — TELEPHONE (OUTPATIENT)
Dept: SPORTS MEDICINE | Facility: CLINIC | Age: 25
End: 2020-02-20

## 2020-02-20 ENCOUNTER — TELEPHONE (OUTPATIENT)
Dept: PREADMISSION TESTING | Facility: HOSPITAL | Age: 25
End: 2020-02-20

## 2020-02-20 DIAGNOSIS — M87.052 AVASCULAR NECROSIS OF BONE OF LEFT HIP: Primary | ICD-10-CM

## 2020-02-20 DIAGNOSIS — M79.605 PAIN OF LEFT LOWER EXTREMITY: Primary | ICD-10-CM

## 2020-02-20 NOTE — TELEPHONE ENCOUNTER
----- Message from Jonathan Reardon sent at 2/20/2020 12:42 PM CST -----  Contact: Pt  Pt returned missed call from stan requesting callback       Callback:[phone] (home)

## 2020-02-20 NOTE — TELEPHONE ENCOUNTER
----- Message from Niharika Huang LPN sent at 2/20/2020 11:39 AM CST -----  Surgery 3/6    Patient needs PT/INR,POC and OPOC appt.

## 2020-02-20 NOTE — TELEPHONE ENCOUNTER
----- Message from Jonathan Reardon sent at 2/20/2020 11:33 AM CST -----  Contact: Pt  Patient called stating her insurance isn't covering her Rx oxyCODONE-acetaminophen (PERCOCET)  mg per tablet due to the dispensed count not coinciding with dosage instructions     [id number] - [place] - [address]  [facility] [address]  Phone: [phone] Fax: [phone]

## 2020-02-24 ENCOUNTER — TELEPHONE (OUTPATIENT)
Dept: ADMINISTRATIVE | Facility: OTHER | Age: 25
End: 2020-02-24

## 2020-03-02 ENCOUNTER — TELEPHONE (OUTPATIENT)
Dept: PREADMISSION TESTING | Facility: HOSPITAL | Age: 25
End: 2020-03-02

## (undated) DEVICE — SEE MEDLINE ITEM 152622

## (undated) DEVICE — SEE MEDLINE ITEM 157181

## (undated) DEVICE — SUT MCRYL PLUS 4-0 PS2 27IN

## (undated) DEVICE — DRAPE OPTIMA MAJOR PEDIATRIC

## (undated) DEVICE — SEE MEDLINE ITEM 156894

## (undated) DEVICE — PAD COLD THERAPY KNEE WRAP ON

## (undated) DEVICE — DRAPE STERI INSTRUMENT 1018

## (undated) DEVICE — SOL 9P NACL IRR PIC IL

## (undated) DEVICE — DRESSING XEROFORM FOIL PK 1X8

## (undated) DEVICE — SUT 0 27IN CHROMIC GUT CT-1

## (undated) DEVICE — SYS ACCUMIX MIXING

## (undated) DEVICE — SUT 4.0 ETHILON

## (undated) DEVICE — DRAPE C-ARMOR EQUIPMENT COVER

## (undated) DEVICE — KIT MICROINTRO 4F .018X40X7CM

## (undated) DEVICE — WRAP HIP L

## (undated) DEVICE — SYR 30CC LUER LOCK

## (undated) DEVICE — PAD ELECTRODE STER 1.5X3

## (undated) DEVICE — UNDERGLOVES BIOGEL PI SIZE 8.5

## (undated) DEVICE — UNDERGLOVES BIOGEL PI SZ 7 LF

## (undated) DEVICE — SUT SILK 3-0 BLK BR SH 30IN

## (undated) DEVICE — TRAY PICC CENTRAL LINE DRSNG

## (undated) DEVICE — CONTAINER SPECIMEN STRL 4OZ

## (undated) DEVICE — DRAPE STERI U-SHAPED 47X51IN

## (undated) DEVICE — SEE MEDLINE ITEM 157169

## (undated) DEVICE — APPLICATOR CHLORAPREP ORN 26ML

## (undated) DEVICE — PAD ABD 8X10 STERILE

## (undated) DEVICE — Device

## (undated) DEVICE — BLADE SAMURAI CURVED

## (undated) DEVICE — SEE MEDLINE ITEM 146417

## (undated) DEVICE — UNDERGLOVES BIOGEL PI SIZE 7.5

## (undated) DEVICE — BLADE SURG CARBON STEEL SZ11

## (undated) DEVICE — TAPE SURG MEDIPORE 6X72IN

## (undated) DEVICE — GAUZE SPONGE 4X4 12PLY

## (undated) DEVICE — DRAPE ISOLATION 2ML

## (undated) DEVICE — SUT ETHILON 4-0 BLK MONO

## (undated) DEVICE — GLOVE BIOGEL SKINSENSE PI 8.5

## (undated) DEVICE — SEE MEDLINE ITEM 152530

## (undated) DEVICE — SEE MEDLINE ITEM 157150

## (undated) DEVICE — DRAPE C ARM 42 X 120 10/BX

## (undated) DEVICE — ELECTRODE REM PLYHSV RETURN 9

## (undated) DEVICE — NDL SAFETY 22G X 1.5 ECLIPSE

## (undated) DEVICE — TRAY MINOR GEN SURG

## (undated) DEVICE — WRAPON POLAR PAD HIP FOR POLAR

## (undated) DEVICE — SEE MEDLINE ITEM 154981

## (undated) DEVICE — SOL IRR NACL .9% 3000ML

## (undated) DEVICE — COVER LIGHT HANDLE 80/CA

## (undated) DEVICE — POSITIONER IV ARMBOARD FOAM

## (undated) DEVICE — CLIPPER BLADE MOD 4406 (CAREF)

## (undated) DEVICE — GLOVE BIOGEL SKINSENSE PI 7.0

## (undated) DEVICE — PACK PERI/GYN OPTIMA

## (undated) DEVICE — SET DECANTER MEDICHOICE

## (undated) DEVICE — DRESSING TELFA STRL 4X3 LF

## (undated) DEVICE — GOWN SURGICAL X-LARGE

## (undated) DEVICE — TUBE SET INFLOW/OUTFLOW

## (undated) DEVICE — DRAPE SURG W/TWL 17 5/8X23

## (undated) DEVICE — ELECTRODE BALL DISPOSABLE 3MM

## (undated) DEVICE — CANNULA FLOWPORT II 165MM

## (undated) DEVICE — TOURNIQUET SB QC DP 34X4IN

## (undated) DEVICE — SUT 1 27IN CHROMIC GUT CT-1

## (undated) DEVICE — KIT PROBE COVER WITH GEL

## (undated) DEVICE — GOWN B1 X-LG X-LONG